# Patient Record
Sex: MALE | Race: BLACK OR AFRICAN AMERICAN | NOT HISPANIC OR LATINO | Employment: OTHER | URBAN - METROPOLITAN AREA
[De-identification: names, ages, dates, MRNs, and addresses within clinical notes are randomized per-mention and may not be internally consistent; named-entity substitution may affect disease eponyms.]

---

## 2017-01-18 ENCOUNTER — OFFICE VISIT (OUTPATIENT)
Dept: TRANSPLANT | Facility: CLINIC | Age: 67
End: 2017-01-18
Payer: MEDICARE

## 2017-01-18 ENCOUNTER — HOSPITAL ENCOUNTER (OUTPATIENT)
Dept: PULMONOLOGY | Facility: CLINIC | Age: 67
Discharge: HOME | End: 2017-01-18
Payer: MEDICARE

## 2017-01-18 VITALS
HEART RATE: 82 BPM | HEIGHT: 74 IN | DIASTOLIC BLOOD PRESSURE: 89 MMHG | SYSTOLIC BLOOD PRESSURE: 140 MMHG | RESPIRATION RATE: 91 BRPM

## 2017-01-18 VITALS — BODY MASS INDEX: 26.24 KG/M2 | WEIGHT: 198 LBS | HEIGHT: 73 IN

## 2017-01-18 DIAGNOSIS — I27.24 CTEPH (CHRONIC THROMBOEMBOLIC PULMONARY HYPERTENSION): ICD-10-CM

## 2017-01-18 DIAGNOSIS — I27.20 PULMONARY HTN: Primary | ICD-10-CM

## 2017-01-18 DIAGNOSIS — I27.9 CHRONIC PULMONARY HEART DISEASE: ICD-10-CM

## 2017-01-18 PROCEDURE — 94620 PR PULMONARY STRESS TESTING,SIMPLE: CPT | Mod: 26,S$PBB,, | Performed by: INTERNAL MEDICINE

## 2017-01-18 PROCEDURE — 99214 OFFICE O/P EST MOD 30 MIN: CPT | Mod: 25,S$PBB,, | Performed by: INTERNAL MEDICINE

## 2017-01-18 PROCEDURE — 99212 OFFICE O/P EST SF 10 MIN: CPT | Mod: PBBFAC | Performed by: INTERNAL MEDICINE

## 2017-01-18 PROCEDURE — 99999 PR PBB SHADOW E&M-EST. PATIENT-LVL II: CPT | Mod: PBBFAC,,, | Performed by: INTERNAL MEDICINE

## 2017-01-18 RX ORDER — IPRATROPIUM BROMIDE 42 UG/1
2 SPRAY, METERED NASAL 4 TIMES DAILY
Qty: 15 ML | Refills: 6 | Status: SHIPPED | OUTPATIENT
Start: 2017-01-18 | End: 2017-11-13 | Stop reason: SDUPTHER

## 2017-01-19 NOTE — PROGRESS NOTES
Subjective:       Patient ID: Ambrosio Bray III is a 66 y.o. male.    Chief Complaint: Pulmonary Hypertension (1mo visit)    HPI   Ambrosio Bray III 66 y.o. male    has a past medical history of Diabetes mellitus; Hypertension; and PTSD (post-traumatic stress disorder).    has a past surgical history that includes Cardiac catheterization.   reports that he has never smoked. He does not have any smokeless tobacco history on file. He reports that he drinks about 1.2 - 1.8 oz of alcohol per week  He reports that he does not use illicit drugs.  Referred by: Dr. Ottoniel Orosco  Who had concerns including Pulmonary Hypertension.  The patient's last visit with me was on 12/14/2016.    Doing well, no complaints  Tolerating adempas well, improved exercise tolerance less sob  No fever chills, ns, wt changes, nausea, vomiting, diarrhea, constipation, chest pain, tightness, pressure  More active, cutting grass, weeding  Still on 1mg tid    Review of Systems    Objective:      Physical Exam  Personal Diagnostic Review    Pulmonary Function Tests 1/18/2017   Height 74.000         Assessment:       1. Pulmonary HTN    2. CTEPH (chronic thromboembolic pulmonary hypertension)        Outpatient Encounter Prescriptions as of 1/18/2017   Medication Sig Dispense Refill    atorvastatin (LIPITOR) 80 MG tablet Take 1 tablet (80 mg total) by mouth once daily. 30 tablet 5    hydrochlorothiazide (HYDRODIURIL) 50 MG tablet Take 0.5 tablets (25 mg total) by mouth once daily. 30 tablet 5    loratadine (CLARITIN) 10 mg tablet Take 1 tablet (10 mg total) by mouth once daily. 30 tablet 0    losartan (COZAAR) 50 MG tablet Take 1 tablet (50 mg total) by mouth once daily. 30 tablet 5    riociguat (ADEMPAS) 1 mg Tab Take 1 mg by mouth 3 (three) times daily. 90 tablet 11    rivaroxaban (XARELTO) 20 mg Tab Take 1 tablet (20 mg total) by mouth daily with dinner or evening meal. 30 tablet 5    tamsulosin (FLOMAX) 0.4 mg Cp24 Take 1 capsule (0.4  mg total) by mouth once daily. 30 capsule 5    tramadol (ULTRAM) 50 mg tablet Take 1 tablet (50 mg total) by mouth 2 (two) times daily as needed for Pain. 20 tablet 0    ipratropium (ATROVENT) 0.06 % nasal spray 2 sprays by Nasal route 4 (four) times daily. 15 mL 6    mometasone (ELOCON) 0.1 % ointment Apply topically once daily. 45 g 1    vardenafil (LEVITRA) 20 MG tablet Take 1 tablet (20 mg total) by mouth daily as needed for Erectile Dysfunction. 10 tablet 11     No facility-administered encounter medications on file as of 1/18/2017.      No orders of the defined types were placed in this encounter.    Plan:           Ambrosio was seen today for pulmonary hypertension.    Diagnoses and all orders for this visit:    Pulmonary HTN    CTEPH (chronic thromboembolic pulmonary hypertension)    Other orders  -     ipratropium (ATROVENT) 0.06 % nasal spray; 2 sprays by Nasal route 4 (four) times daily.    The current medical regimen is effective;  continue present plan and medications.  Increase dose already schedule  Return in about 4 weeks (around 2/15/2017).        Return in about 4 weeks (around 2/15/2017).    There are no Patient Instructions on file for this visit.    Immunization History   Administered Date(s) Administered    Influenza - High Dose 10/31/2016    Influenza Split 01/17/2013    Pneumococcal Conjugate - 13 Valent 10/31/2016    Zoster 01/13/2015    influenza - Quadrivalent 11/06/2014

## 2017-01-23 NOTE — PROCEDURES
Ambrosio Bray III is a 66 y.o.  male patient, who presents for a 6 minute walk test ordered by Alysha Lord MD.  The diagnosis is Pulmonary Hypertension.  The patient's BMI is 26.2 kg/m2.  Predicted distance (lower limit of normal) is 381.98 meters.      Test Results:    The test was completed without stopping.  The total time walked was 360 seconds.  During walking, the patient reported:  No complaints. The patient used no assistive devices during testing.     01/18/2017---------Distance: 365.76 meters (1200 feet)     O2 Sat % Supplemental Oxygen Heart Rate Blood Pressure Archie Scale   Pre-exercise  (Resting) 93 % Room Air 78 bpm 138/84 mmHg 3   During Exercise 91 % Room Air 118 bpm 143/93 mmHg 3   Post-exercise  (Recovery) 92 % Room Air  96 bpm       Recovery Time: 49 seconds    Performing nurse/tech: LINK Schmidt      PREVIOUS STUDY:   12/14/2016---------Distance: 304.8 meters (1000 feet)       O2 Sat % Supplemental Oxygen Heart Rate Blood Pressure Archie Scale   Pre-exercise  (Resting) 94 % Room Air 88 bpm 125/82 mmHg 3   During Exercise 92 % Room Air 119 bpm 142/92 mmHg 2   Post-exercise  (Recovery) 94 % Room Air  119 bpm           CLINICAL INTERPRETATION:  Six minute walk distance is 365.76 meters (1200 feet) with moderate dyspnea.  During exercise, there was no significant desaturation while breathing room air.  Both blood pressure and heart rate increased significantly with walking.  The patient did not report non-pulmonary symptoms during exercise.  Since the previous study in December 2016, exercise capacity is significantly improved.  Based upon age and body mass index, exercise capacity is less than predicted.

## 2017-01-30 ENCOUNTER — TELEPHONE (OUTPATIENT)
Dept: TRANSPLANT | Facility: CLINIC | Age: 67
End: 2017-01-30

## 2017-01-30 DIAGNOSIS — I27.24 CTEPH (CHRONIC THROMBOEMBOLIC PULMONARY HYPERTENSION): Primary | ICD-10-CM

## 2017-02-02 ENCOUNTER — LAB VISIT (OUTPATIENT)
Dept: LAB | Facility: HOSPITAL | Age: 67
End: 2017-02-02
Attending: INTERNAL MEDICINE
Payer: MEDICARE

## 2017-02-02 DIAGNOSIS — R06.82 TACHYPNEA: ICD-10-CM

## 2017-02-02 DIAGNOSIS — Z79.899 POLYPHARMACY: ICD-10-CM

## 2017-02-02 LAB
ALBUMIN SERPL BCP-MCNC: 3.7 G/DL
ALBUMIN SERPL BCP-MCNC: 3.7 G/DL
ALP SERPL-CCNC: 97 U/L
ALP SERPL-CCNC: 97 U/L
ALT SERPL W/O P-5'-P-CCNC: 20 U/L
ALT SERPL W/O P-5'-P-CCNC: 20 U/L
ANION GAP SERPL CALC-SCNC: 10 MMOL/L
ANION GAP SERPL CALC-SCNC: 10 MMOL/L
AST SERPL-CCNC: 24 U/L
AST SERPL-CCNC: 24 U/L
BASOPHILS # BLD AUTO: 0.03 K/UL
BASOPHILS NFR BLD: 0.7 %
BILIRUB SERPL-MCNC: 3.6 MG/DL
BILIRUB SERPL-MCNC: 3.6 MG/DL
BNP SERPL-MCNC: 226 PG/ML
BUN SERPL-MCNC: 17 MG/DL
BUN SERPL-MCNC: 17 MG/DL
CALCIUM SERPL-MCNC: 9.4 MG/DL
CALCIUM SERPL-MCNC: 9.4 MG/DL
CHLORIDE SERPL-SCNC: 107 MMOL/L
CHLORIDE SERPL-SCNC: 107 MMOL/L
CO2 SERPL-SCNC: 22 MMOL/L
CO2 SERPL-SCNC: 22 MMOL/L
CREAT SERPL-MCNC: 1 MG/DL
CREAT SERPL-MCNC: 1 MG/DL
DIFFERENTIAL METHOD: ABNORMAL
EOSINOPHIL # BLD AUTO: 0.1 K/UL
EOSINOPHIL NFR BLD: 2.5 %
ERYTHROCYTE [DISTWIDTH] IN BLOOD BY AUTOMATED COUNT: 18.5 %
EST. GFR  (AFRICAN AMERICAN): >60 ML/MIN/1.73 M^2
EST. GFR  (AFRICAN AMERICAN): >60 ML/MIN/1.73 M^2
EST. GFR  (NON AFRICAN AMERICAN): >60 ML/MIN/1.73 M^2
EST. GFR  (NON AFRICAN AMERICAN): >60 ML/MIN/1.73 M^2
GLUCOSE SERPL-MCNC: 97 MG/DL
GLUCOSE SERPL-MCNC: 97 MG/DL
HCT VFR BLD AUTO: 41.5 %
HGB BLD-MCNC: 14.1 G/DL
LYMPHOCYTES # BLD AUTO: 1.6 K/UL
LYMPHOCYTES NFR BLD: 35.6 %
MAGNESIUM SERPL-MCNC: 1.7 MG/DL
MCH RBC QN AUTO: 28.6 PG
MCHC RBC AUTO-ENTMCNC: 34 %
MCV RBC AUTO: 84 FL
MONOCYTES # BLD AUTO: 0.5 K/UL
MONOCYTES NFR BLD: 11 %
NEUTROPHILS # BLD AUTO: 2.2 K/UL
NEUTROPHILS NFR BLD: 50.2 %
PLATELET # BLD AUTO: 198 K/UL
PMV BLD AUTO: 11.4 FL
POTASSIUM SERPL-SCNC: 3.9 MMOL/L
POTASSIUM SERPL-SCNC: 3.9 MMOL/L
PROT SERPL-MCNC: 7.7 G/DL
PROT SERPL-MCNC: 7.7 G/DL
RBC # BLD AUTO: 4.93 M/UL
SODIUM SERPL-SCNC: 139 MMOL/L
SODIUM SERPL-SCNC: 139 MMOL/L
WBC # BLD AUTO: 4.44 K/UL

## 2017-02-02 PROCEDURE — 83880 ASSAY OF NATRIURETIC PEPTIDE: CPT

## 2017-02-02 PROCEDURE — 83036 HEMOGLOBIN GLYCOSYLATED A1C: CPT

## 2017-02-02 PROCEDURE — 83735 ASSAY OF MAGNESIUM: CPT

## 2017-02-02 PROCEDURE — 80053 COMPREHEN METABOLIC PANEL: CPT

## 2017-02-02 PROCEDURE — 36415 COLL VENOUS BLD VENIPUNCTURE: CPT | Mod: PO

## 2017-02-02 PROCEDURE — 85025 COMPLETE CBC W/AUTO DIFF WBC: CPT | Mod: PO

## 2017-02-03 LAB
ESTIMATED AVG GLUCOSE: 146 MG/DL
HBA1C MFR BLD HPLC: 6.7 %

## 2017-02-10 ENCOUNTER — APPOINTMENT (OUTPATIENT)
Dept: RADIOLOGY | Facility: HOSPITAL | Age: 67
End: 2017-02-10
Attending: NURSE PRACTITIONER
Payer: MEDICARE

## 2017-02-10 ENCOUNTER — OFFICE VISIT (OUTPATIENT)
Dept: FAMILY MEDICINE | Facility: CLINIC | Age: 67
End: 2017-02-10
Payer: MEDICARE

## 2017-02-10 ENCOUNTER — TELEPHONE (OUTPATIENT)
Dept: FAMILY MEDICINE | Facility: CLINIC | Age: 67
End: 2017-02-10

## 2017-02-10 VITALS
DIASTOLIC BLOOD PRESSURE: 70 MMHG | RESPIRATION RATE: 17 BRPM | OXYGEN SATURATION: 95 % | SYSTOLIC BLOOD PRESSURE: 110 MMHG | BODY MASS INDEX: 26.88 KG/M2 | HEART RATE: 74 BPM | HEIGHT: 73 IN | WEIGHT: 202.81 LBS | TEMPERATURE: 98 F

## 2017-02-10 DIAGNOSIS — M25.522 LEFT ELBOW PAIN: Primary | ICD-10-CM

## 2017-02-10 DIAGNOSIS — M25.522 LEFT ELBOW PAIN: ICD-10-CM

## 2017-02-10 PROCEDURE — 73080 X-RAY EXAM OF ELBOW: CPT | Mod: 26,LT,, | Performed by: RADIOLOGY

## 2017-02-10 PROCEDURE — 99214 OFFICE O/P EST MOD 30 MIN: CPT | Mod: S$PBB,,, | Performed by: NURSE PRACTITIONER

## 2017-02-10 PROCEDURE — 73080 X-RAY EXAM OF ELBOW: CPT | Mod: TC,PN,LT

## 2017-02-10 PROCEDURE — 99214 OFFICE O/P EST MOD 30 MIN: CPT | Mod: PBBFAC,PN | Performed by: NURSE PRACTITIONER

## 2017-02-10 PROCEDURE — 99999 PR PBB SHADOW E&M-EST. PATIENT-LVL IV: CPT | Mod: PBBFAC,,, | Performed by: NURSE PRACTITIONER

## 2017-02-10 NOTE — PROGRESS NOTES
Subjective:       Patient ID: Ambrosio Bray III is a 66 y.o. male.    Chief Complaint: Muscle Pain (arm left)    HPI Mr Bray is here for a 5 day h/o L elbow pain, he denies any specific incident, other than letting his truck tailgate down. It is a 7/10, he has decreased ROM and swelling, although it is improving, no treatment attempted, he denies ever being dx with gout.  Review of Systems   Constitutional: Negative for fever.   Respiratory: Negative.    Cardiovascular: Negative.    Musculoskeletal: Positive for arthralgias and joint swelling.       Objective:      Physical Exam   Constitutional: He is oriented to person, place, and time. He appears well-developed and well-nourished. He does not appear ill. No distress.   Cardiovascular: Normal rate.    Pulmonary/Chest: Effort normal.   Musculoskeletal: He exhibits edema. He exhibits no tenderness.        Left elbow: He exhibits decreased range of motion and swelling. No tenderness found.   Neurological: He is alert and oriented to person, place, and time.   Skin: Skin is warm and dry. No erythema.   Psychiatric: He has a normal mood and affect. His behavior is normal.   Vitals reviewed.      Assessment:       1. Left elbow pain        Plan:       Left elbow pain  -     X-Ray Elbow Complete Left; Future; Expected date: 2/10/17    It is improving, R/o gout.  He cannot take NSAIDs d/t xarelto, encouraged RICE therapy.  F/u if not improved.    Verbalized understanding

## 2017-02-10 NOTE — PATIENT INSTRUCTIONS
Follow up if not improved  Go to ER for new worse or concerning symptoms    Arthralgia    Arthralgia is the term for pain in or around the joint. It is a symptom, not a disease. This pain may involve one or more joints. In some cases, the pain moves from joint to joint.  There are many causes for joint pain. These include:  · Injury  · Osteoarthritis (wearing out of the joint surface)  · Gout (inflammation of the joint due to crystals in the joint fluid)  · Infection inside the joint    · Bursitis (inflammation of the fluid-filled sacs around the joint)  · Autoimmune disorders such as rheumatoid arthritis or lupus  · Tendonitis (inflamation of chords that attach muscle to bone)  Home care  · Rest the involved joint(s) until your symptoms improve.   · You may be prescribed pain medication. If none is prescribed, you may use acetaminophen or ibuprofen to control pain and inflammation.  Follow up  Follow up with your healthcare provider or our staff as advised.  When to seek medical care  Contact your healthcare provider right away if any of the following occurs:  · Pain, swelling, or redness of joint increases  · Pain worsens or recurs after a period of improvement  · Pain moves to other joints  · You cannot bear weight on the affected joint   · You cannot move the affected joint  · Joint appears deformed  · New rash appears  · Fever of 101ºF (38.8ºC) or higher, or as directed by your healthcare provider  Date Last Reviewed: 4/26/2015  © 6446-3804 [employer]. [address] [place] [address], [place] [address]. All rights reserved. This information is not intended as a substitute for professional medical care. Always follow your healthcare professional's instructions.

## 2017-02-10 NOTE — MR AVS SNAPSHOT
[facility]  [address]  [place] [address]  Phone: [phone]                  Ambrosio Bray III   2/10/2017 7:40 AM   Office Visit    Description:  Male : 1950   Provider:  Anaid Esparza, NP-C   Department:  [facility]           Reason for Visit     Muscle Pain           Diagnoses this Visit        Comments    Left elbow pain    -  Primary            To Do List           Future Appointments        Provider Department Dept Phone    2017 1:40 PM SIX, MINUTE WALK [facility] - Pulmonary Lab [phone]    2017 2:00 PM Inna Posadas MD [facility] [phone]      Goals (5 Years of Data)     None      [facility] On [facility]     [facility] On [facility] Nurse Care Line -  Assistance  Registered nurses in the [facility] provide clinical advisement, health education, appointment booking, and other advisory services.  Call for this free service at [phone].             Medications           Message regarding Medications     Verify the changes and/or additions to your medication regime listed below are the same as discussed with your clinician today.  If any of these changes or additions are incorrect, please notify your healthcare provider.        STOP taking these medications     losartan (COZAAR) 50 MG tablet Take 1 tablet (50 mg total) by mouth once daily.    tramadol (ULTRAM) 50 mg tablet Take 1 tablet (50 mg total) by mouth 2 (two) times daily as needed for Pain.           Verify that the below list of medications is an accurate representation of the medications you are currently taking.  If none reported, the list may be blank. If incorrect, please contact your healthcare provider. Carry this list with you in case of emergency.           Current Medications     atorvastatin (LIPITOR) 80 MG tablet Take 1 tablet (80 mg total) by mouth once daily.    hydrochlorothiazide (HYDRODIURIL) 50 MG tablet Take 0.5 tablets (25 mg total) by  "mouth once daily.    ipratropium (ATROVENT) 0.06 % nasal spray 2 sprays by Nasal route 4 (four) times daily.    loratadine (CLARITIN) 10 mg tablet Take 1 tablet (10 mg total) by mouth once daily.    riociguat (ADEMPAS) 1.5 mg Tab Take 1 tablet (1.5 mg total) by mouth 3 (three) times daily.    rivaroxaban (XARELTO) 20 mg Tab Take 1 tablet (20 mg total) by mouth daily with dinner or evening meal.    tamsulosin (FLOMAX) 0.4 mg Cp24 Take 1 capsule (0.4 mg total) by mouth once daily.    mometasone (ELOCON) 0.1 % ointment Apply topically once daily.    vardenafil (LEVITRA) 20 MG tablet Take 1 tablet (20 mg total) by mouth daily as needed for Erectile Dysfunction.           Clinical Reference Information           Your Vitals Were     BP Pulse Temp Resp Height Weight    110/70 (BP Location: Right arm, Patient Position: Sitting, BP Method: Manual) 74 97.9 °F (36.6 °C) (Oral) 17 6' 1" (1.854 m) 92 kg (202 lb 13.2 oz)    SpO2 BMI             95% 26.76 kg/m2         Blood Pressure          Most Recent Value    BP  110/70      Allergies as of 2/10/2017     No Known Allergies      Immunizations Administered on Date of Encounter - 2/10/2017     None      Orders Placed During Today's Visit     Future Labs/Procedures Expected by Expires    X-Ray Elbow Complete Left  2/10/2017 2/10/2018      [facility] Sign-Up     Activating your [facility] account is as easy as 1-2-3!     1) Visit [email or web address], select Sign Up Now, enter this activation code and your date of birth, then select Next.  [email or web address]  Expires: 3/27/2017  8:20 AM      2) Create a username and password to use when you visit [facility] in the future and select a security question in case you lose your password and select Next.    3) Enter your e-mail address and click Sign Up!    Additional Information  If you have questions, please e-mail [email or web address] or call [phone] to talk to our [facility] staff. Remember, [facility] is NOT to be used for urgent needs. " For medical emergencies, dial 911.         Instructions    Follow up if not improved  Go to ER for new worse or concerning symptoms    Arthralgia    Arthralgia is the term for pain in or around the joint. It is a symptom, not a disease. This pain may involve one or more joints. In some cases, the pain moves from joint to joint.  There are many causes for joint pain. These include:  · Injury  · Osteoarthritis (wearing out of the joint surface)  · Gout (inflammation of the joint due to crystals in the joint fluid)  · Infection inside the joint    · Bursitis (inflammation of the fluid-filled sacs around the joint)  · Autoimmune disorders such as rheumatoid arthritis or lupus  · Tendonitis (inflamation of chords that attach muscle to bone)  Home care  · Rest the involved joint(s) until your symptoms improve.   · You may be prescribed pain medication. If none is prescribed, you may use acetaminophen or ibuprofen to control pain and inflammation.  Follow up  Follow up with your healthcare provider or our staff as advised.  When to seek medical care  Contact your healthcare provider right away if any of the following occurs:  · Pain, swelling, or redness of joint increases  · Pain worsens or recurs after a period of improvement  · Pain moves to other joints  · You cannot bear weight on the affected joint   · You cannot move the affected joint  · Joint appears deformed  · New rash appears  · Fever of 101ºF (38.8ºC) or higher, or as directed by your healthcare provider  Date Last Reviewed: 4/26/2015  © 0605-9323 [employer]. [address] [place] [address], [place] [address]. All rights reserved. This information is not intended as a substitute for professional medical care. Always follow your healthcare professional's instructions.             Language Assistance Services     ATTENTION: Language assistance services are available, free of charge. Please call [phone].      ATENCIÓN: cheyenne [place]  disposición servicios gratuitos de asistencia lingüística. Pineda al [phone].     HALIMA Ý: N?u b?n nói Ti?ng Vi?t, có các d?ch v? h? tr? ngôn ng? mi?n phí dành cho b?n. G?i s? [phone].         [facility] complies with applicable Federal civil rights laws and does not discriminate on the basis of race, color, national origin, age, disability, or sex.

## 2017-02-14 NOTE — TELEPHONE ENCOUNTER
----- Message from Alexandria Madrid sent at 2/13/2017  4:03 PM CST -----  Contact: self  Pt returned call to the office. [phone]

## 2017-02-14 NOTE — TELEPHONE ENCOUNTER
Spoke with patient and advised of results and recommendations below    He would like to know if he can take Tylenol with Adempas. He states that he does not usually take Tylenol.     He states that pain is getting better and will just continue RICE. Will call back if needed

## 2017-02-22 ENCOUNTER — OFFICE VISIT (OUTPATIENT)
Dept: TRANSPLANT | Facility: CLINIC | Age: 67
End: 2017-02-22
Payer: MEDICARE

## 2017-02-22 ENCOUNTER — HOSPITAL ENCOUNTER (OUTPATIENT)
Dept: PULMONOLOGY | Facility: CLINIC | Age: 67
Discharge: HOME | End: 2017-02-22
Payer: MEDICARE

## 2017-02-22 VITALS
OXYGEN SATURATION: 94 % | WEIGHT: 198 LBS | DIASTOLIC BLOOD PRESSURE: 76 MMHG | HEIGHT: 73 IN | HEART RATE: 86 BPM | BODY MASS INDEX: 26.94 KG/M2 | WEIGHT: 203.25 LBS | HEIGHT: 73 IN | BODY MASS INDEX: 26.24 KG/M2 | SYSTOLIC BLOOD PRESSURE: 125 MMHG

## 2017-02-22 DIAGNOSIS — I27.9 CHRONIC PULMONARY HEART DISEASE: ICD-10-CM

## 2017-02-22 DIAGNOSIS — I27.24 CTEPH (CHRONIC THROMBOEMBOLIC PULMONARY HYPERTENSION): Primary | ICD-10-CM

## 2017-02-22 PROCEDURE — 99214 OFFICE O/P EST MOD 30 MIN: CPT | Mod: 25,S$PBB,, | Performed by: INTERNAL MEDICINE

## 2017-02-22 PROCEDURE — 94620 PR PULMONARY STRESS TESTING,SIMPLE: CPT | Mod: 26,S$PBB,, | Performed by: INTERNAL MEDICINE

## 2017-02-22 PROCEDURE — 99213 OFFICE O/P EST LOW 20 MIN: CPT | Mod: PBBFAC | Performed by: INTERNAL MEDICINE

## 2017-02-22 PROCEDURE — 99999 PR PBB SHADOW E&M-EST. PATIENT-LVL III: CPT | Mod: PBBFAC,,, | Performed by: INTERNAL MEDICINE

## 2017-02-22 NOTE — PROGRESS NOTES
Subjective:       Patient ID: Ambrosio Bray III is a 66 y.o. male.    Chief Complaint: Pulmonary Hypertension (1mo visit)    HPI   Ambrosio Bray III 66 y.o. male    has a past medical history of Diabetes mellitus; Hypertension; and PTSD (post-traumatic stress disorder).    has a past surgical history that includes Cardiac catheterization.   reports that he has never smoked. He does not have any smokeless tobacco history on file. He reports that he drinks about 1.2 - 1.8 oz of alcohol per week  He reports that he does not use illicit drugs.  Referred by: Dr. Ottoniel Orosco  Who had concerns including Pulmonary Hypertension.  The patient's last visit with me was on 1/18/2017.    Doing well- imprved 6mwd by another 30m  Feels well, has lost 10lbs  No complaints, tolerating meds well  No fever chills, ns, wt changes, nausea, vomiting, diarrhea, constipation, chest pain, tightness, pressure    Review of Systems    Objective:      Physical Exam  Personal Diagnostic Review    Pulmonary Function Tests 2/22/2017   Ordering Provider MD Risa   Performing nurse/tech/RT LINK Pittman   Diagnosis Pulmonary Hypertension   Height 73   Weight 3168   BMI (Calculated) 26.2   Patient Race    6MWT Status completed without stopping   Patient Reported No complaints   Was O2 used? No   6MW Distance walked (feet) 1300   Distance walked (meters) 396.24   Did patient stop? No   Type of assistive device(s) used? no assistive devices   Oxygen Saturation 96   Supplemental Oxygen Room Air   Heart Rate 88   Blood Pressure 138/77   Archie Dyspnea Rating  nothing at all   Oxygen Saturation 89   Supplemental Oxygen Room Air   Heart Rate 141   Blood Pressure 103/53   Archie Dyspnea Rating  heavy   Recovery Time (seconds) 564   Oxygen Saturation 95   Supplemental Oxygen Room Air   Heart Rate 86   Is procedure ready for interpretation? Yes         Assessment:       1. CTEPH (chronic thromboembolic pulmonary hypertension)         Outpatient Encounter Prescriptions as of 2/22/2017   Medication Sig Dispense Refill    atorvastatin (LIPITOR) 80 MG tablet Take 1 tablet (80 mg total) by mouth once daily. 30 tablet 5    hydrochlorothiazide (HYDRODIURIL) 50 MG tablet Take 0.5 tablets (25 mg total) by mouth once daily. 30 tablet 5    ipratropium (ATROVENT) 0.06 % nasal spray 2 sprays by Nasal route 4 (four) times daily. 15 mL 6    loratadine (CLARITIN) 10 mg tablet Take 1 tablet (10 mg total) by mouth once daily. 30 tablet 0    riociguat (ADEMPAS) 1.5 mg Tab Take 1 tablet (1.5 mg total) by mouth 3 (three) times daily. 90 tablet 11    rivaroxaban (XARELTO) 20 mg Tab Take 1 tablet (20 mg total) by mouth daily with dinner or evening meal. 30 tablet 5    tamsulosin (FLOMAX) 0.4 mg Cp24 Take 1 capsule (0.4 mg total) by mouth once daily. 30 capsule 5    mometasone (ELOCON) 0.1 % ointment Apply topically once daily. 45 g 1    vardenafil (LEVITRA) 20 MG tablet Take 1 tablet (20 mg total) by mouth daily as needed for Erectile Dysfunction. 10 tablet 11     No facility-administered encounter medications on file as of 2/22/2017.      No orders of the defined types were placed in this encounter.    Plan:           Ambrosio was seen today for pulmonary hypertension.    Diagnoses and all orders for this visit:    CTEPH (chronic thromboembolic pulmonary hypertension)       I personally reviewed the      1. Echo report   2. PFT   3. 6MWD 396m  4. CXR   5. CXR report   6. CT chest   7. CT chest report     Assessment:  CTEPH    Plan:  Continue adempas- dose increase this weekend  Exercise  Law school...    Return in about 3 months (around 5/22/2017).    There are no Patient Instructions on file for this visit.    Immunization History   Administered Date(s) Administered    Influenza - High Dose 10/31/2016    Influenza Split 01/17/2013    Pneumococcal Conjugate - 13 Valent 10/31/2016    Zoster 01/13/2015    influenza - Quadrivalent 11/06/2014

## 2017-02-24 NOTE — PROCEDURES
Ambrosio Bray III is a 66 y.o.  male patient, who presents for a 6 minute walk test ordered by Alysha Lord MD.  The diagnosis is Pulmonary Hypertension.  The patient's BMI is 26.2 kg/m2.  Predicted distance (lower limit of normal) is 381.98 meters.      Test Results:    The test was completed without stopping.  The total time walked was 360 seconds.  During walking, the patient reported:  No complaints. The patient used no assistive devices during testing.     02/22/2017---------Distance: 396.24 meters (1300 feet)     O2 Sat % Supplemental Oxygen Heart Rate Blood Pressure Archie Scale   Pre-exercise  (Resting) 96 % Room Air 88 bpm 138/77 mmHg 0   During Exercise 89 % Room Air 141 bpm 103/53 mmHg 5-6   Post-exercise  (Recovery) 95 % Room Air  86 bpm       Recovery Time: 564 seconds    Performing nurse/tech:  LINK Pittman      PREVIOUS STUDY:   01/18/2017---------Distance: 365.76 meters (1200 feet)       O2 Sat % Supplemental Oxygen Heart Rate Blood Pressure Archie Scale   Pre-exercise  (Resting) 93 % Room Air 78 bpm 138/84 mmHg 3   During Exercise 91 % Room Air 118 bpm 143/93 mmHg 3   Post-exercise  (Recovery) 92 % Room Air  96 bpm           CLINICAL INTERPRETATION:  Six minute walk distance is 396.24 meters (1300 feet) with heavy dyspnea.  During exercise, there was significant desaturation while breathing room air.  Blood pressure decreased significantly and Heart rate increased significantly with walking.  This may represent an abnormal cardiovascular response to exercise.  The patient did not report non-pulmonary symptoms during exercise.  Since the previous study in January 2017, exercise capacity is unchanged.  Based upon age and body mass index, exercise capacity is normal.

## 2017-04-11 ENCOUNTER — TELEPHONE (OUTPATIENT)
Dept: TRANSPLANT | Facility: CLINIC | Age: 67
End: 2017-04-11

## 2017-04-11 DIAGNOSIS — I27.24 CTEPH (CHRONIC THROMBOEMBOLIC PULMONARY HYPERTENSION): ICD-10-CM

## 2017-04-11 NOTE — TELEPHONE ENCOUNTER
"Pt returned call. Reports that it was about two weeks ago that he lost consciousness following bout with stomach virus. Pt went to [facility] and received "approximately 20 stitches." Pt denies any increased shortness of breath, and states "Please let Dr Posadas know that this was all related to me being dehydrated. The Adempas is working great." Message sent to Dr Posadas regarding the same. Pt has f/u appt in May.   "

## 2017-04-11 NOTE — TELEPHONE ENCOUNTER
Correspondence received from [facility] specialty pharmacy. Called patient to check current status--no answer, but left VM for patient to return call, if possible.     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 APPROXIMATELY 2 1/2 WEEKS AGO, PT OVER  EXERTED HIMSELF. HE GOT DEHYDRATED & COLLAPSED, POSSIBLY  FAINTED FOR A FEW SECONDS WHICH LEAD TO AN INJURY ON HIS LEG & HEAD THAT REQUIRED SUTURES. HE  WAS ADMITTED TO THE HOSPITAL FOR 4 DAYS TO BE  MONITORED. PT THEN CAME DOWN WITH SINUS INFECTION SYMPTOMS  WITH A LARGE AMOUNT OF NASAL DRAINAGE & A PRODUCTIVE COUGH THAT IS CLEAR MUCOUS. NO FURTHER  INFORMATION GIVEN.

## 2017-04-26 ENCOUNTER — OFFICE VISIT (OUTPATIENT)
Dept: FAMILY MEDICINE | Facility: CLINIC | Age: 67
End: 2017-04-26
Payer: MEDICARE

## 2017-04-26 VITALS
SYSTOLIC BLOOD PRESSURE: 130 MMHG | BODY MASS INDEX: 26.3 KG/M2 | RESPIRATION RATE: 17 BRPM | DIASTOLIC BLOOD PRESSURE: 72 MMHG | HEART RATE: 76 BPM | OXYGEN SATURATION: 95 % | TEMPERATURE: 98 F | WEIGHT: 198.44 LBS | HEIGHT: 73 IN

## 2017-04-26 DIAGNOSIS — I15.2 HYPERTENSION ASSOCIATED WITH DIABETES: Chronic | ICD-10-CM

## 2017-04-26 DIAGNOSIS — I27.20 PULMONARY HTN: ICD-10-CM

## 2017-04-26 DIAGNOSIS — E11.59 HYPERTENSION ASSOCIATED WITH DIABETES: Chronic | ICD-10-CM

## 2017-04-26 PROCEDURE — 99999 PR PBB SHADOW E&M-EST. PATIENT-LVL III: CPT | Mod: PBBFAC,,, | Performed by: INTERNAL MEDICINE

## 2017-04-26 PROCEDURE — 99214 OFFICE O/P EST MOD 30 MIN: CPT | Mod: S$PBB,,, | Performed by: INTERNAL MEDICINE

## 2017-04-26 PROCEDURE — 99213 OFFICE O/P EST LOW 20 MIN: CPT | Mod: PBBFAC,PN | Performed by: INTERNAL MEDICINE

## 2017-04-26 RX ORDER — LORATADINE 10 MG/1
10 TABLET ORAL
COMMUNITY
Start: 2016-12-09 | End: 2017-04-26 | Stop reason: SDUPTHER

## 2017-04-26 RX ORDER — ATORVASTATIN CALCIUM 80 MG/1
80 TABLET, FILM COATED ORAL
COMMUNITY
Start: 2016-10-27 | End: 2017-04-26 | Stop reason: SDUPTHER

## 2017-04-26 RX ORDER — HYDROCHLOROTHIAZIDE 50 MG/1
25 TABLET ORAL
COMMUNITY
Start: 2016-10-27 | End: 2017-04-26 | Stop reason: SDUPTHER

## 2017-04-26 RX ORDER — TAMSULOSIN HYDROCHLORIDE 0.4 MG/1
0.4 CAPSULE ORAL
COMMUNITY
End: 2017-04-26 | Stop reason: SDUPTHER

## 2017-04-26 RX ORDER — ROSUVASTATIN CALCIUM 20 MG/1
20 TABLET, COATED ORAL
COMMUNITY
Start: 2017-03-23 | End: 2017-04-26 | Stop reason: SDUPTHER

## 2017-04-26 RX ORDER — METOPROLOL TARTRATE 25 MG/1
25 TABLET, FILM COATED ORAL
COMMUNITY
Start: 2017-03-23 | End: 2017-04-26

## 2017-04-26 RX ORDER — MOMETASONE FUROATE 1 MG/G
OINTMENT TOPICAL
COMMUNITY
Start: 2016-03-21 | End: 2019-04-22

## 2017-04-26 RX ORDER — VARDENAFIL HYDROCHLORIDE 20 MG/1
20 TABLET ORAL
COMMUNITY
Start: 2015-02-16 | End: 2018-01-08 | Stop reason: ALTCHOICE

## 2017-04-26 RX ORDER — IPRATROPIUM BROMIDE 42 UG/1
2 SPRAY, METERED NASAL
COMMUNITY
Start: 2017-01-18 | End: 2017-04-26 | Stop reason: SDUPTHER

## 2017-05-03 DIAGNOSIS — I48.20 CHRONIC ATRIAL FIBRILLATION: ICD-10-CM

## 2017-05-04 RX ORDER — RIVAROXABAN 20 MG/1
TABLET, FILM COATED ORAL
Qty: 30 TABLET | Refills: 5 | Status: SHIPPED | OUTPATIENT
Start: 2017-05-04 | End: 2017-11-28 | Stop reason: SDUPTHER

## 2017-05-07 NOTE — PROGRESS NOTES
Subjective:       Patient ID: Ambrosio Bray III is a 66 y.o. male.    Chief Complaint: Medication Refill and Cerumen Impaction (right)    HPI Comments: Breathing improved since starting Adempas.  Ears clogged.  [facility]    Review of Systems   Respiratory: Negative for shortness of breath.    Cardiovascular: Negative for chest pain.       Objective:      Physical Exam   Constitutional: He is oriented to person, place, and time. He appears well-developed and well-nourished. No distress.   HENT:   Head: Normocephalic and atraumatic.   Right Ear: External ear normal.   Left Ear: External ear normal.   Eyes: Conjunctivae are normal. No scleral icterus.   Cardiovascular: Normal rate, regular rhythm and normal heart sounds.  Exam reveals no gallop and no friction rub.    No murmur heard.  Pulmonary/Chest: Effort normal and breath sounds normal. No respiratory distress. He has no wheezes. He has no rales.   Abdominal: Soft. Bowel sounds are normal. He exhibits no distension. There is no tenderness.   Neurological: He is alert and oriented to person, place, and time. No cranial nerve deficit.   Skin: Skin is warm and dry. No rash noted.   Psychiatric: He has a normal mood and affect.   Vitals reviewed.      Assessment:       1. Uncontrolled type 2 diabetes mellitus without complication, without long-term current use of insulin    2. Pulmonary HTN    3. Hypertension associated with diabetes        Plan:       Ambrosio was seen today for medication refill and cerumen impaction.    Diagnoses and all orders for this visit:    Uncontrolled type 2 diabetes mellitus without complication, without long-term current use of insulin - repeat A1c  -     Basic metabolic panel; Future  -     Hemoglobin A1c; Future  -     Microalbumin/creatinine urine ratio; Future    Pulmonary HTN - stable.  F/u cardiology    Hypertension associated with diabetes - bp at goal       F/u 2-3 months

## 2017-05-22 ENCOUNTER — TELEPHONE (OUTPATIENT)
Dept: FAMILY MEDICINE | Facility: CLINIC | Age: 67
End: 2017-05-22

## 2017-05-22 NOTE — TELEPHONE ENCOUNTER
----- Message from Gabbi Nick sent at 5/22/2017  3:17 PM CDT -----  Contact: self  Pt is asking for a call in regards to lab work. Please call [phone]

## 2017-05-24 ENCOUNTER — HOSPITAL ENCOUNTER (OUTPATIENT)
Dept: PULMONOLOGY | Facility: CLINIC | Age: 67
Discharge: HOME | End: 2017-05-24
Payer: MEDICARE

## 2017-05-24 ENCOUNTER — OFFICE VISIT (OUTPATIENT)
Dept: TRANSPLANT | Facility: CLINIC | Age: 67
End: 2017-05-24
Payer: MEDICARE

## 2017-05-24 VITALS
BODY MASS INDEX: 26.85 KG/M2 | HEIGHT: 73 IN | WEIGHT: 202.63 LBS | SYSTOLIC BLOOD PRESSURE: 136 MMHG | HEIGHT: 73 IN | WEIGHT: 203.25 LBS | OXYGEN SATURATION: 96 % | HEART RATE: 76 BPM | BODY MASS INDEX: 26.94 KG/M2 | DIASTOLIC BLOOD PRESSURE: 78 MMHG

## 2017-05-24 DIAGNOSIS — I27.9 CHRONIC PULMONARY HEART DISEASE: ICD-10-CM

## 2017-05-24 DIAGNOSIS — I27.24 CTEPH (CHRONIC THROMBOEMBOLIC PULMONARY HYPERTENSION): Primary | ICD-10-CM

## 2017-05-24 PROCEDURE — 99213 OFFICE O/P EST LOW 20 MIN: CPT | Mod: PBBFAC,25 | Performed by: INTERNAL MEDICINE

## 2017-05-24 PROCEDURE — 99999 PR PBB SHADOW E&M-EST. PATIENT-LVL III: CPT | Mod: PBBFAC,,, | Performed by: INTERNAL MEDICINE

## 2017-05-24 PROCEDURE — 99214 OFFICE O/P EST MOD 30 MIN: CPT | Mod: S$PBB,,, | Performed by: INTERNAL MEDICINE

## 2017-05-24 PROCEDURE — 94620 PR PULMONARY STRESS TESTING,SIMPLE: CPT | Mod: 26,S$PBB,, | Performed by: INTERNAL MEDICINE
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
## 2017-05-25 NOTE — PROCEDURES
Ambrosio Bray III is a 66 y.o.  male patient, who presents for a 6 minute walk test ordered by Alysha Lord MD.  The diagnosis is Pulmonary Hypertension.  The patient's BMI is 26.8 kg/m2.  Predicted distance (lower limit of normal) is 378.61 meters.      Test Results:    The test was completed without stopping.  The total time walked was 360 seconds.  During walking, the patient reported:  No complaints. The patient used no assistive devices during testing.     05/24/2017---------Distance: 426.72 meters (1400 feet)     O2 Sat % Supplemental Oxygen Heart Rate Blood Pressure Archie Scale   Pre-exercise  (Resting) 96 % Room Air 66 bpm 129/72 mmHg 0   During Exercise 90 % Room Air 126 bpm 161/79 mmHg 1   Post-exercise  (Recovery) 95 % Room Air  72 bpm 136/72 mmHg      Recovery Time: 180 seconds    Performing nurse/tech: SHANTAL Nguyễn      PREVIOUS STUDY:   02/22/2017---------Distance: 396.24 meters (1300 feet)       O2 Sat % Supplemental Oxygen Heart Rate Blood Pressure Archie Scale   Pre-exercise  (Resting) 96 % Room Air 88 bpm 138/77 mmHg 0   During Exercise 89 % Room Air 141 bpm 103/53 mmHg 5-6   Post-exercise  (Recovery) 95 % Room Air  86 bpm           CLINICAL INTERPRETATION:  Six minute walk distance is 426.72 meters (1400 feet) with very light dyspnea.  During exercise, there was significant desaturation while breathing room air.  Both blood pressure and heart rate increased significantly with walking.  The patient did not report non-pulmonary symptoms during exercise.  Since the previous study in February 2017, exercise capacity is unchanged.  Based upon age and body mass index, exercise capacity is normal.

## 2017-05-28 DIAGNOSIS — N40.0 BENIGN NON-NODULAR PROSTATIC HYPERPLASIA WITHOUT LOWER URINARY TRACT SYMPTOMS: ICD-10-CM

## 2017-05-29 RX ORDER — TAMSULOSIN HYDROCHLORIDE 0.4 MG/1
CAPSULE ORAL
Qty: 30 CAPSULE | Refills: 5 | Status: SHIPPED | OUTPATIENT
Start: 2017-05-29 | End: 2017-11-30 | Stop reason: SDUPTHER

## 2017-06-28 ENCOUNTER — LAB VISIT (OUTPATIENT)
Dept: LAB | Facility: HOSPITAL | Age: 67
End: 2017-06-28
Attending: UROLOGY
Payer: MEDICARE

## 2017-06-28 DIAGNOSIS — R97.20 ELEVATED PSA: ICD-10-CM

## 2017-06-28 LAB
ANION GAP SERPL CALC-SCNC: 7 MMOL/L
BUN SERPL-MCNC: 16 MG/DL
CALCIUM SERPL-MCNC: 9.3 MG/DL
CHLORIDE SERPL-SCNC: 110 MMOL/L
CO2 SERPL-SCNC: 23 MMOL/L
COMPLEXED PSA SERPL-MCNC: 7.1 NG/ML
CREAT SERPL-MCNC: 1 MG/DL
EST. GFR  (AFRICAN AMERICAN): >60 ML/MIN/1.73 M^2
EST. GFR  (NON AFRICAN AMERICAN): >60 ML/MIN/1.73 M^2
GLUCOSE SERPL-MCNC: 97 MG/DL
POTASSIUM SERPL-SCNC: 3.9 MMOL/L
SODIUM SERPL-SCNC: 140 MMOL/L

## 2017-06-28 PROCEDURE — 83036 HEMOGLOBIN GLYCOSYLATED A1C: CPT

## 2017-06-28 PROCEDURE — 80048 BASIC METABOLIC PNL TOTAL CA: CPT

## 2017-06-28 PROCEDURE — 36415 COLL VENOUS BLD VENIPUNCTURE: CPT | Mod: PO

## 2017-06-28 PROCEDURE — 84153 ASSAY OF PSA TOTAL: CPT

## 2017-06-28 RX ORDER — ATORVASTATIN CALCIUM 80 MG/1
80 TABLET, FILM COATED ORAL DAILY
Qty: 30 TABLET | Refills: 0 | Status: SHIPPED | OUTPATIENT
Start: 2017-06-28 | End: 2017-07-11 | Stop reason: SDUPTHER

## 2017-06-28 RX ORDER — ATORVASTATIN CALCIUM 80 MG/1
80 TABLET, FILM COATED ORAL DAILY
Qty: 30 TABLET | Refills: 5 | Status: CANCELLED | OUTPATIENT
Start: 2017-06-28

## 2017-06-29 LAB
ESTIMATED AVG GLUCOSE: 131 MG/DL
HBA1C MFR BLD HPLC: 6.2 %

## 2017-07-11 ENCOUNTER — OFFICE VISIT (OUTPATIENT)
Dept: FAMILY MEDICINE | Facility: CLINIC | Age: 67
End: 2017-07-11
Payer: MEDICARE

## 2017-07-11 ENCOUNTER — OFFICE VISIT (OUTPATIENT)
Dept: UROLOGY | Facility: CLINIC | Age: 67
End: 2017-07-11
Payer: MEDICARE

## 2017-07-11 VITALS
TEMPERATURE: 98 F | DIASTOLIC BLOOD PRESSURE: 86 MMHG | RESPIRATION RATE: 18 BRPM | WEIGHT: 204.81 LBS | HEART RATE: 73 BPM | SYSTOLIC BLOOD PRESSURE: 138 MMHG | BODY MASS INDEX: 27.14 KG/M2 | HEIGHT: 73 IN | OXYGEN SATURATION: 18 %

## 2017-07-11 VITALS — HEIGHT: 73 IN | BODY MASS INDEX: 26.85 KG/M2 | WEIGHT: 202.63 LBS | RESPIRATION RATE: 14 BRPM

## 2017-07-11 DIAGNOSIS — E11.59 HYPERTENSION ASSOCIATED WITH DIABETES: Chronic | ICD-10-CM

## 2017-07-11 DIAGNOSIS — L97.909 VENOUS STASIS ULCER: ICD-10-CM

## 2017-07-11 DIAGNOSIS — I83.009 VENOUS STASIS ULCER: ICD-10-CM

## 2017-07-11 DIAGNOSIS — R80.9 TYPE 2 DIABETES MELLITUS WITH MICROALBUMINURIA, WITHOUT LONG-TERM CURRENT USE OF INSULIN: Primary | ICD-10-CM

## 2017-07-11 DIAGNOSIS — I87.2 CHRONIC VENOUS STASIS DERMATITIS: ICD-10-CM

## 2017-07-11 DIAGNOSIS — I27.24 CTEPH (CHRONIC THROMBOEMBOLIC PULMONARY HYPERTENSION): ICD-10-CM

## 2017-07-11 DIAGNOSIS — R97.20 ELEVATED PSA: Primary | ICD-10-CM

## 2017-07-11 DIAGNOSIS — E11.29 TYPE 2 DIABETES MELLITUS WITH MICROALBUMINURIA, WITHOUT LONG-TERM CURRENT USE OF INSULIN: Primary | ICD-10-CM

## 2017-07-11 DIAGNOSIS — I15.2 HYPERTENSION ASSOCIATED WITH DIABETES: Chronic | ICD-10-CM

## 2017-07-11 PROCEDURE — 99213 OFFICE O/P EST LOW 20 MIN: CPT | Mod: S$PBB,,, | Performed by: UROLOGY

## 2017-07-11 PROCEDURE — 1126F AMNT PAIN NOTED NONE PRSNT: CPT | Mod: ,,, | Performed by: INTERNAL MEDICINE

## 2017-07-11 PROCEDURE — 99213 OFFICE O/P EST LOW 20 MIN: CPT | Mod: PBBFAC,PN | Performed by: INTERNAL MEDICINE

## 2017-07-11 PROCEDURE — 99999 PR PBB SHADOW E&M-EST. PATIENT-LVL III: CPT | Mod: PBBFAC,,, | Performed by: UROLOGY

## 2017-07-11 PROCEDURE — 99999 PR PBB SHADOW E&M-EST. PATIENT-LVL III: CPT | Mod: PBBFAC,,, | Performed by: INTERNAL MEDICINE

## 2017-07-11 PROCEDURE — 1159F MED LIST DOCD IN RCRD: CPT | Mod: ,,, | Performed by: UROLOGY

## 2017-07-11 PROCEDURE — 1159F MED LIST DOCD IN RCRD: CPT | Mod: ,,, | Performed by: INTERNAL MEDICINE

## 2017-07-11 PROCEDURE — 99214 OFFICE O/P EST MOD 30 MIN: CPT | Mod: S$PBB,,, | Performed by: INTERNAL MEDICINE

## 2017-07-11 PROCEDURE — 3044F HG A1C LEVEL LT 7.0%: CPT | Mod: ,,, | Performed by: INTERNAL MEDICINE

## 2017-07-11 PROCEDURE — 1126F AMNT PAIN NOTED NONE PRSNT: CPT | Mod: ,,, | Performed by: UROLOGY

## 2017-07-11 RX ORDER — ATORVASTATIN CALCIUM 80 MG/1
80 TABLET, FILM COATED ORAL DAILY
Qty: 30 TABLET | Refills: 11 | Status: SHIPPED | OUTPATIENT
Start: 2017-07-11 | End: 2018-01-08 | Stop reason: SDUPTHER

## 2017-07-11 NOTE — PROGRESS NOTES
Subjective:       Patient ID: Ambrosio Bray III is a 66 y.o. male.    Chief Complaint: Follow-up (Diabetes)    Presents for follow.  Reports he is doing great.  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  Otherwise he has made necessary changes to lower his blood sugar.  He does report a small area on his right foot which has reopened.  He reports clear drainage.  He has been cleansing with anti bacterial soap and wrapping the area.  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       Review of Systems   Cardiovascular: Positive for leg swelling. Negative for chest pain.   Skin: Positive for wound.   Neurological: Negative for syncope.       Objective:      Physical Exam   Constitutional: He is oriented to person, place, and time. He appears well-developed and well-nourished. No distress.   HENT:   Head: Normocephalic and atraumatic.   Right Ear: External ear normal.   Left Ear: External ear normal.   Eyes: Conjunctivae are normal. No scleral icterus.   Cardiovascular: Normal rate, regular rhythm and normal heart sounds.  Exam reveals no gallop and no friction rub.    No murmur heard.  Pulmonary/Chest: Effort normal and breath sounds normal. No respiratory distress. He has no wheezes. He has no rales.   Musculoskeletal: He exhibits edema.   Protective Sensation (w/ 10 gram monofilament):  Right: Intact  Left: Intact    Visual Inspection:  Ulceration -  Right superficial with clear drainage, no redness or induration , Dry Skin -  Bilateral and Onychomycosis -  Bilateral    Pedal Pulses:   Right: Diminshed  Left: Diminshed       Neurological: He is alert and oriented to person, place, and time. No cranial nerve deficit.   Skin: Skin is warm and dry. No rash noted.   Psychiatric: He has a normal mood and affect.   Vitals reviewed.      Assessment:       1. Type 2 diabetes mellitus with  "microalbuminuria, without long-term current use of insulin    2. CTEPH (chronic thromboembolic pulmonary hypertension)    3. Hypertension associated with diabetes    4. Chronic venous stasis dermatitis    5. Venous stasis ulcer        Plan:       Ambrosio was seen today for follow-up.    Diagnoses and all orders for this visit:    Type 2 diabetes mellitus with microalbuminuria, without long-term current use of insulin - We have reviewed his labs.  His A1c is improved.  Continue current management.  Repeat A1c next visit.   -     atorvastatin (LIPITOR) 80 MG tablet; Take 1 tablet (80 mg total) by mouth once daily.    CTEPH (chronic thromboembolic pulmonary hypertension) - stable on Adempas.  F/u cardiology    Hypertension associated with diabetes - slightly elevated today.  Reassess next visit    Chronic [facility] stasis dermatitis - [facility]  Encouraged compression stockings daily.  Reassess next visit  -     COMPRESSION STOCKINGS    Venous stasis ulcer - small area which opened recently.  No sign of infection.  Continue dresses at home.  [facility].  F/u if it does not continue to improve.   -     foam bandage (MEPILEX BORDER) 3 X 3 " Bndg; Apply as needed    F/u 4 months and prn     "

## 2017-07-11 NOTE — PROGRESS NOTES
Subjective:       Patient ID: Ambrosio Bray III is a 66 y.o. male.    Chief Complaint: Elevated PSA (yrly ck)    HPI  Patient is here for elevated PSA.  It is now 7.1 and has been slowly rising.  He has a very large prostate with mild lots.  We discussed the pros and cons of repeat biopsy and he would like to hold off  Past Medical History:   Diagnosis Date    Diabetes mellitus     Hypertension     PTSD (post-traumatic stress disorder)        Past Surgical History:   Procedure Laterality Date    CARDIAC CATHETERIZATION         Family History   Problem Relation Age of Onset    Cancer Father      colon       Social History     Social History    Marital status: Single     Spouse name: N/A    Number of children: N/A    Years of education: N/A     Occupational History    Not on file.     Social History Main Topics    Smoking status: Never Smoker    Smokeless tobacco: Never Used    Alcohol use 1.2 - 1.8 oz/week     2 - 3 Shots of liquor per week      Comment: regularly    Drug use: No    Sexual activity: Yes     Partners: Female     Other Topics Concern    Not on file     Social History Narrative    No narrative on file       Allergies:  Review of patient's allergies indicates no known allergies.    Medications:    Current Outpatient Prescriptions:     atorvastatin (LIPITOR) 80 MG tablet, Take 1 tablet (80 mg total) by mouth once daily., Disp: 30 tablet, Rfl: 0    hydrochlorothiazide (HYDRODIURIL) 50 MG tablet, Take 0.5 tablets (25 mg total) by mouth once daily., Disp: 30 tablet, Rfl: 5    ipratropium (ATROVENT) 0.06 % nasal spray, 2 sprays by Nasal route 4 (four) times daily., Disp: 15 mL, Rfl: 6    loratadine (CLARITIN) 10 mg tablet, Take 1 tablet (10 mg total) by mouth once daily., Disp: 30 tablet, Rfl: 0    mometasone (ELOCON) 0.1 % ointment, Apply topically once daily., Disp: , Rfl:     riociguat (ADEMPAS) 2.5 mg tablet, Take 1 tablet (2.5 mg total) by mouth 3 (three) times daily., Disp: 90  tablet, Rfl: 11    tamsulosin (FLOMAX) 0.4 mg Cp24, TAKE ONE CAPSULE BY MOUTH ONCE DAILY, Disp: 30 capsule, Rfl: 5    vardenafil (LEVITRA) 20 MG tablet, Take 20 mg by mouth., Disp: , Rfl:     XARELTO 20 mg Tab, TAKE ONE TABLET BY MOUTH ONCE DAILY WITH  DINNER  OR  EVENING  MEAL, Disp: 30 tablet, Rfl: 5    Review of Systems   Constitutional: Negative for activity change, appetite change, chills, diaphoresis, fatigue, fever and unexpected weight change.   HENT: Negative for congestion, dental problem, hearing loss, mouth sores, postnasal drip, rhinorrhea, sinus pressure and trouble swallowing.    Eyes: Negative for pain, discharge and itching.   Respiratory: Negative for apnea, cough, choking, chest tightness, shortness of breath and wheezing.    Cardiovascular: Negative for chest pain, palpitations and leg swelling.   Gastrointestinal: Negative for abdominal distention, abdominal pain, anal bleeding, blood in stool, constipation, diarrhea, nausea, rectal pain and vomiting.   Endocrine: Negative for polydipsia and polyuria.   Genitourinary: Positive for frequency and urgency. Negative for decreased urine volume, difficulty urinating, discharge, dysuria, enuresis, flank pain, genital sores, hematuria, penile pain, penile swelling, scrotal swelling and testicular pain.   Musculoskeletal: Negative for arthralgias, back pain and myalgias.   Skin: Negative for color change, rash and wound.   Neurological: Negative for dizziness, syncope, speech difficulty, light-headedness and headaches.   Hematological: Negative for adenopathy. Does not bruise/bleed easily.   Psychiatric/Behavioral: Negative for behavioral problems, confusion, hallucinations and sleep disturbance.       Objective:      Physical Exam   Constitutional: He appears well-developed.   HENT:   Head: Normocephalic.   Cardiovascular: Normal rate.    Pulmonary/Chest: Effort normal.   Abdominal: Soft.   Genitourinary: Prostate normal.   Genitourinary Comments: 40  g benign no nodules   Neurological: He is alert.   Skin: Skin is warm.     Psychiatric: He has a normal mood and affect.       Assessment:       1. Elevated PSA        Plan:       Ambrosio was seen today for elevated psa.    Diagnoses and all orders for this visit:    Elevated PSA         return clinic 6 months with PSA and we'll do truss with biopsy when necessary

## 2017-07-24 ENCOUNTER — TELEPHONE (OUTPATIENT)
Dept: ADMINISTRATIVE | Facility: HOSPITAL | Age: 67
End: 2017-07-24

## 2017-07-24 DIAGNOSIS — R60.9 EDEMA, UNSPECIFIED TYPE: Primary | ICD-10-CM

## 2017-07-24 NOTE — TELEPHONE ENCOUNTER
----- Message from Elizabeth Merida sent at 7/24/2017  9:31 AM CDT -----  Contact: Self/[phone]  Patient states that his prescription regarding stockings  has to be sent to [facility] in [place]. He would like to speak to the staff regarding this matter. Thank you.

## 2017-07-24 NOTE — TELEPHONE ENCOUNTER
Pt. Wishes to have an rx  for compression stockings sent to [facility] in [place].

## 2017-07-24 NOTE — TELEPHONE ENCOUNTER
[facility] information and request for patient's Tetanus vaccine and diabetic eye exam for diabetic retinopathy to [phone].

## 2017-07-27 NOTE — TELEPHONE ENCOUNTER
[facility] exam and Tetanus vaccine records to local number at [phone] and [phone].

## 2017-08-07 ENCOUNTER — OFFICE VISIT (OUTPATIENT)
Dept: CARDIOLOGY | Facility: CLINIC | Age: 67
End: 2017-08-07
Payer: MEDICARE

## 2017-08-07 VITALS
HEIGHT: 73 IN | HEART RATE: 70 BPM | BODY MASS INDEX: 26.97 KG/M2 | SYSTOLIC BLOOD PRESSURE: 130 MMHG | OXYGEN SATURATION: 94 % | DIASTOLIC BLOOD PRESSURE: 79 MMHG | WEIGHT: 203.5 LBS

## 2017-08-07 DIAGNOSIS — R42 DIZZINESS: ICD-10-CM

## 2017-08-07 DIAGNOSIS — I27.20 PULMONARY HTN: ICD-10-CM

## 2017-08-07 DIAGNOSIS — I50.32 CHRONIC DIASTOLIC CHF (CONGESTIVE HEART FAILURE): ICD-10-CM

## 2017-08-07 DIAGNOSIS — I10 ESSENTIAL HYPERTENSION: ICD-10-CM

## 2017-08-07 DIAGNOSIS — I48.0 PAROXYSMAL ATRIAL FIBRILLATION: Primary | ICD-10-CM

## 2017-08-07 DIAGNOSIS — I27.24 CTEPH (CHRONIC THROMBOEMBOLIC PULMONARY HYPERTENSION): ICD-10-CM

## 2017-08-07 PROCEDURE — 99214 OFFICE O/P EST MOD 30 MIN: CPT | Mod: S$PBB,,, | Performed by: INTERNAL MEDICINE

## 2017-08-07 PROCEDURE — 3008F BODY MASS INDEX DOCD: CPT | Mod: ,,, | Performed by: INTERNAL MEDICINE

## 2017-08-07 PROCEDURE — 99999 PR PBB SHADOW E&M-EST. PATIENT-LVL III: CPT | Mod: PBBFAC,,, | Performed by: INTERNAL MEDICINE

## 2017-08-07 PROCEDURE — 1125F AMNT PAIN NOTED PAIN PRSNT: CPT | Mod: ,,, | Performed by: INTERNAL MEDICINE

## 2017-08-07 PROCEDURE — 99213 OFFICE O/P EST LOW 20 MIN: CPT | Mod: PBBFAC | Performed by: INTERNAL MEDICINE

## 2017-08-07 PROCEDURE — 1159F MED LIST DOCD IN RCRD: CPT | Mod: ,,, | Performed by: INTERNAL MEDICINE

## 2017-08-07 PROCEDURE — 93010 ELECTROCARDIOGRAM REPORT: CPT | Mod: ,,, | Performed by: INTERNAL MEDICINE
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
## 2017-08-14 ENCOUNTER — HOSPITAL ENCOUNTER (OUTPATIENT)
Dept: CARDIOLOGY | Facility: HOSPITAL | Age: 67
Discharge: HOME | End: 2017-08-14
Attending: INTERNAL MEDICINE
Payer: MEDICARE

## 2017-08-14 DIAGNOSIS — I48.0 PAROXYSMAL ATRIAL FIBRILLATION: ICD-10-CM

## 2017-08-14 LAB
AORTIC VALVE REGURGITATION: ABNORMAL
ESTIMATED PA SYSTOLIC PRESSURE: 86.23
GLOBAL PERICARDIAL EFFUSION: ABNORMAL
MITRAL VALVE MOBILITY: NORMAL
MITRAL VALVE REGURGITATION: ABNORMAL
RETIRED EF AND QEF - SEE NOTES: 50 (ref 55–65)
TRICUSPID VALVE REGURGITATION: ABNORMAL

## 2017-08-14 PROCEDURE — 93306 TTE W/DOPPLER COMPLETE: CPT

## 2017-08-14 PROCEDURE — 93306 TTE W/DOPPLER COMPLETE: CPT | Mod: 26,,, | Performed by: INTERNAL MEDICINE

## 2017-09-06 ENCOUNTER — OFFICE VISIT (OUTPATIENT)
Dept: TRANSPLANT | Facility: CLINIC | Age: 67
End: 2017-09-06
Payer: MEDICARE

## 2017-09-06 ENCOUNTER — HOSPITAL ENCOUNTER (OUTPATIENT)
Dept: PULMONOLOGY | Facility: CLINIC | Age: 67
Discharge: HOME | End: 2017-09-06
Payer: MEDICARE

## 2017-09-06 VITALS — HEIGHT: 72 IN | BODY MASS INDEX: 26.55 KG/M2 | WEIGHT: 196 LBS

## 2017-09-06 VITALS
WEIGHT: 196.44 LBS | HEIGHT: 73 IN | HEART RATE: 80 BPM | SYSTOLIC BLOOD PRESSURE: 125 MMHG | OXYGEN SATURATION: 96 % | DIASTOLIC BLOOD PRESSURE: 65 MMHG | BODY MASS INDEX: 26.03 KG/M2

## 2017-09-06 DIAGNOSIS — I27.20 PULMONARY HYPERTENSION: Primary | ICD-10-CM

## 2017-09-06 DIAGNOSIS — I27.9 CHRONIC PULMONARY HEART DISEASE: ICD-10-CM

## 2017-09-06 PROCEDURE — 99999 PR PBB SHADOW E&M-EST. PATIENT-LVL III: CPT | Mod: PBBFAC,,, | Performed by: INTERNAL MEDICINE

## 2017-09-06 PROCEDURE — 99213 OFFICE O/P EST LOW 20 MIN: CPT | Mod: PBBFAC,25 | Performed by: INTERNAL MEDICINE

## 2017-09-06 PROCEDURE — 99214 OFFICE O/P EST MOD 30 MIN: CPT | Mod: 25,S$PBB,, | Performed by: INTERNAL MEDICINE

## 2017-09-06 PROCEDURE — 94620 PR PULMONARY STRESS TESTING,SIMPLE: CPT | Mod: 26,S$PBB,, | Performed by: INTERNAL MEDICINE

## 2017-09-06 PROCEDURE — 3078F DIAST BP <80 MM HG: CPT | Mod: ,,, | Performed by: INTERNAL MEDICINE

## 2017-09-06 PROCEDURE — 94620 PR PULMONARY STRESS TESTING,SIMPLE: CPT | Mod: PBBFAC | Performed by: INTERNAL MEDICINE

## 2017-09-06 PROCEDURE — 3074F SYST BP LT 130 MM HG: CPT | Mod: ,,, | Performed by: INTERNAL MEDICINE

## 2017-09-06 PROCEDURE — 1159F MED LIST DOCD IN RCRD: CPT | Mod: ,,, | Performed by: INTERNAL MEDICINE

## 2017-09-06 NOTE — PROGRESS NOTES
"Subjective:       Patient ID: Ambrosio Bray III is a 67 y.o. male.    Chief Complaint: No chief complaint on file.    HPI   Ambrosio Bray III 67 y.o. male    has a past medical history of Diabetes mellitus; Hypertension; and PTSD (post-traumatic stress disorder).    has a past surgical history that includes Cardiac catheterization.   reports that he has never smoked. He has never used smokeless tobacco. He reports that he drinks about 1.2 - 1.8 oz of alcohol per week . He reports that he does not use drugs.  Referred by: No ref. provider found  Who had concerns including Pulmonary Hypertension (3.5mo visit).  The patient's last visit with me was on 5/24/2017.    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      Assessment:       No diagnosis found.    Outpatient Encounter Prescriptions as of 9/6/2017   Medication Sig Dispense Refill    atorvastatin (LIPITOR) 80 MG tablet Take 1 tablet (80 mg total) by mouth once daily. 30 tablet 11    foam bandage (MEPILEX BORDER) 3 X 3 " Bndg Apply as needed 10 each 5    hydrochlorothiazide (HYDRODIURIL) 50 MG tablet Take 0.5 tablets (25 mg total) by mouth once daily. 30 tablet 5    ipratropium (ATROVENT) 0.06 % nasal spray 2 sprays by Nasal route 4 (four) times daily. 15 mL 6    loratadine (CLARITIN) 10 mg tablet Take 1 tablet (10 mg total) by mouth once daily. 30 tablet 0    mometasone (ELOCON) 0.1 % ointment Apply topically once daily.      riociguat (ADEMPAS) 2.5 mg tablet Take 1 tablet (2.5 mg total) by mouth 3 (three) times daily. 90 tablet 11    tamsulosin (FLOMAX) 0.4 mg Cp24 TAKE ONE CAPSULE BY MOUTH ONCE DAILY 30 capsule 5    " vardenafil (LEVITRA) 20 MG tablet Take 20 mg by mouth.      XARELTO 20 mg Tab TAKE ONE TABLET BY MOUTH ONCE DAILY WITH  DINNER  OR  EVENING  MEAL 30 tablet 5     No facility-administered encounter medications on file as of 9/6/2017.      No orders of the defined types were placed in this encounter.    Plan:            I personally reviewed the      1. Echo report       Assessment:  Ambrosio was seen today for pulmonary hypertension.    Diagnoses and all orders for this visit:    Pulmonary hypertension  -     2D echo with color flow doppler; Future        Plan:  Repeat echo to confirm or refute values- if remains positive, will need repeat RHC    Return in about 6 months (around 3/6/2018).    There are no Patient Instructions on file for this visit.    Immunization History   Administered Date(s) Administered    Influenza 01/17/2013    Influenza - High Dose 10/17/2015, 10/31/2016    Influenza Split 01/17/2013    Pneumococcal Conjugate - 13 Valent 10/31/2016    Zoster 01/13/2015    influenza - Quadrivalent 11/06/2014

## 2017-09-07 NOTE — PROCEDURES
Ambrosio Bray III is a 67 y.o.  male patient, who presents for a 6 minute walk test ordered by Alysha Lord MD.  The diagnosis is Pulmonary Hypertension.  The patient's BMI is 26.6 kg/m2.  Predicted distance (lower limit of normal) is 372.79 meters.      Test Results:    The test was completed without stopping.  The total time walked was 360 seconds.  During walking, the patient reported:  No complaints.  The patient used no assistive devices during testing.     09/06/2017---------Distance: 426.72 meters (1400 feet)     O2 Sat % Supplemental Oxygen Heart Rate Blood Pressure Archie Scale   Pre-exercise  (Resting) 97 % Room Air 91 bpm 133/69 mmHg 0   During Exercise 90 % Room Air 123 bpm 170/79 mmHg 0   Post-exercise  (Recovery) 96 % Room Air  93 bpm 128/60 mmHg      Recovery Time: 245 seconds    Performing nurse/tech: LINK Steinberg.      PREVIOUS STUDY:   05/24/2017---------Distance: 426.72 meters (1400 feet)       O2 Sat % Supplemental Oxygen Heart Rate Blood Pressure Archie Scale   Pre-exercise  (Resting) 96 % Room Air 66 bpm 129/72 mmHg 0   During Exercise 90 % Room Air 126 bpm 161/79 mmHg 1   Post-exercise  (Recovery) 95 % Room Air  72 bpm 136/72 mmHg         CLINICAL INTERPRETATION:  Six minute walk distance is 426.72 meters (1400 feet) with no dyspnea.  During exercise, there was significant desaturation while breathing room air.  Both blood pressure and heart rate increased significantly with walking.  The patient did not report non-pulmonary symptoms during exercise.  Since the previous study in May 2017, exercise capacity is unchanged.  Based upon age and body mass index, exercise capacity is normal.

## 2017-09-15 ENCOUNTER — CLINICAL SUPPORT (OUTPATIENT)
Dept: FAMILY MEDICINE | Facility: CLINIC | Age: 67
End: 2017-09-15
Payer: MEDICARE

## 2017-09-15 DIAGNOSIS — Z23 NEED FOR PROPHYLACTIC VACCINATION AND INOCULATION AGAINST INFLUENZA: Primary | ICD-10-CM

## 2017-09-15 PROCEDURE — G0008 ADMIN INFLUENZA VIRUS VAC: HCPCS | Mod: PBBFAC,PN

## 2017-09-15 NOTE — PROGRESS NOTES
Flu consent signed by patient. Flu vaccine administered. Advise 15 min wait for possible reactions.VIS form given.

## 2017-09-19 NOTE — TELEPHONE ENCOUNTER
[facility] information and request for patient's Tetanus vaccine and diabetic eye exam for diabetic retinopathy to [phone] and [phone].

## 2017-10-04 ENCOUNTER — HOSPITAL ENCOUNTER (OUTPATIENT)
Dept: CARDIOLOGY | Facility: CLINIC | Age: 67
Discharge: HOME | End: 2017-10-04
Payer: MEDICARE

## 2017-10-04 DIAGNOSIS — I27.20 PULMONARY HYPERTENSION: ICD-10-CM

## 2017-10-04 DIAGNOSIS — I51.7 CARDIOMEGALY: ICD-10-CM

## 2017-10-04 LAB
DIASTOLIC DYSFUNCTION: YES
ESTIMATED PA SYSTOLIC PRESSURE: 103.36
MITRAL VALVE REGURGITATION: ABNORMAL
RETIRED EF AND QEF - SEE NOTES: 55 (ref 55–65)
TRICUSPID VALVE REGURGITATION: ABNORMAL

## 2017-10-04 PROCEDURE — 93306 TTE W/DOPPLER COMPLETE: CPT | Mod: PBBFAC | Performed by: INTERNAL MEDICINE

## 2017-10-24 ENCOUNTER — TELEPHONE (OUTPATIENT)
Dept: TRANSPLANT | Facility: CLINIC | Age: 67
End: 2017-10-24

## 2017-11-05 DIAGNOSIS — E11.59 HYPERTENSION ASSOCIATED WITH DIABETES: Chronic | ICD-10-CM

## 2017-11-05 DIAGNOSIS — I15.2 HYPERTENSION ASSOCIATED WITH DIABETES: Chronic | ICD-10-CM

## 2017-11-05 RX ORDER — HYDROCHLOROTHIAZIDE 50 MG/1
TABLET ORAL
Qty: 30 TABLET | Refills: 0 | Status: SHIPPED | OUTPATIENT
Start: 2017-11-05 | End: 2018-01-08 | Stop reason: SDUPTHER

## 2017-11-13 ENCOUNTER — OFFICE VISIT (OUTPATIENT)
Dept: FAMILY MEDICINE | Facility: CLINIC | Age: 67
End: 2017-11-13
Payer: MEDICARE

## 2017-11-13 VITALS
TEMPERATURE: 98 F | HEART RATE: 65 BPM | OXYGEN SATURATION: 94 % | SYSTOLIC BLOOD PRESSURE: 138 MMHG | DIASTOLIC BLOOD PRESSURE: 80 MMHG | WEIGHT: 205 LBS | HEIGHT: 73 IN | RESPIRATION RATE: 17 BRPM | BODY MASS INDEX: 27.17 KG/M2

## 2017-11-13 DIAGNOSIS — I15.2 HYPERTENSION ASSOCIATED WITH DIABETES: Primary | Chronic | ICD-10-CM

## 2017-11-13 DIAGNOSIS — E11.29 TYPE 2 DIABETES MELLITUS WITH MICROALBUMINURIA, WITHOUT LONG-TERM CURRENT USE OF INSULIN: ICD-10-CM

## 2017-11-13 DIAGNOSIS — E11.59 HYPERTENSION ASSOCIATED WITH DIABETES: Primary | Chronic | ICD-10-CM

## 2017-11-13 DIAGNOSIS — R80.9 TYPE 2 DIABETES MELLITUS WITH MICROALBUMINURIA, WITHOUT LONG-TERM CURRENT USE OF INSULIN: ICD-10-CM

## 2017-11-13 DIAGNOSIS — I27.9 CHRONIC PULMONARY HEART DISEASE: ICD-10-CM

## 2017-11-13 PROCEDURE — 99999 PR PBB SHADOW E&M-EST. PATIENT-LVL III: CPT | Mod: PBBFAC,,, | Performed by: INTERNAL MEDICINE

## 2017-11-13 PROCEDURE — 99214 OFFICE O/P EST MOD 30 MIN: CPT | Mod: S$PBB,,, | Performed by: INTERNAL MEDICINE

## 2017-11-13 PROCEDURE — 99213 OFFICE O/P EST LOW 20 MIN: CPT | Mod: PBBFAC,PN | Performed by: INTERNAL MEDICINE

## 2017-11-13 RX ORDER — IPRATROPIUM BROMIDE 42 UG/1
2 SPRAY, METERED NASAL 4 TIMES DAILY
Qty: 15 ML | Refills: 6 | Status: SHIPPED | OUTPATIENT
Start: 2017-11-13 | End: 2020-04-08 | Stop reason: SDUPTHER

## 2017-11-13 NOTE — PROGRESS NOTES
Subjective:       Patient ID: Ambrosio Bray III is a 67 y.o. male.    Chief Complaint: Diabetes; Hypertension; and Follow-up    He presents for follow-up today.  He has been doing well overall.  He does report a recent elevated PSA.  He will have to have [facility].  She has had one prior biopsy.  His PSA has been elevated for some time.  Otherwise she is also had lab work at the [facility] and was told that his A1c was in normal range.  He is in need of refills otherwise.  He has no new complaints.      Review of Systems   Respiratory: Positive for shortness of breath.    Cardiovascular: Positive for leg swelling.   Skin: Positive for wound.       Objective:      Physical Exam   Constitutional: He is oriented to person, place, and time. He appears well-developed and well-nourished. No distress.   HENT:   Head: Normocephalic and atraumatic.   Right Ear: External ear normal.   Left Ear: External ear normal.   Mouth/Throat: Oropharynx is clear and moist. No oropharyngeal exudate.   Eyes: Conjunctivae and EOM are normal. Pupils are equal, round, and reactive to light. No scleral icterus.   Cardiovascular: Normal rate, regular rhythm and normal heart sounds.  Exam reveals no gallop and no friction rub.    No murmur heard.  Pulmonary/Chest: Effort normal and breath sounds normal. No respiratory distress. He has no wheezes. He has no rales.   Musculoskeletal: He exhibits edema. He exhibits no tenderness.   Neurological: He is alert and oriented to person, place, and time. No cranial nerve deficit.   Skin: Skin is warm and dry. No rash noted.   Psychiatric: He has a normal mood and affect.   Vitals reviewed.      Assessment:       No diagnosis found.    Plan:       Ambrosio was seen today for diabetes, hypertension and follow-up.    Diagnoses and all orders for this visit:    Hypertension associated with diabetes - BP controlled.     Type 2 diabetes mellitus with microalbuminuria, without long-term  current use of insulin - prior A1c improved.  [facility].  Will not repeat today.  F/u 3-6 months.     Chronic pulmonary heart disease - stable on Adempas.  Noted most recent PA pressure over 100.  F/u cardiology    Other orders  -     ipratropium (ATROVENT) 0.06 % nasal spray; 2 sprays by Nasal route 4 (four) times daily.

## 2017-11-20 DIAGNOSIS — I27.24 CTEPH (CHRONIC THROMBOEMBOLIC PULMONARY HYPERTENSION): ICD-10-CM

## 2017-11-28 ENCOUNTER — TELEPHONE (OUTPATIENT)
Dept: TRANSPLANT | Facility: CLINIC | Age: 67
End: 2017-11-28

## 2017-11-28 DIAGNOSIS — I48.20 CHRONIC ATRIAL FIBRILLATION: ICD-10-CM

## 2017-11-28 RX ORDER — RIVAROXABAN 20 MG/1
TABLET, FILM COATED ORAL
Qty: 30 TABLET | Refills: 5 | Status: SHIPPED | OUTPATIENT
Start: 2017-11-28 | End: 2018-01-08 | Stop reason: SDUPTHER

## 2017-11-29 NOTE — TELEPHONE ENCOUNTER
[facility]. No auth needed at this time. Notification sent to Shannan at [facility].

## 2017-11-30 DIAGNOSIS — N40.0 BENIGN NON-NODULAR PROSTATIC HYPERPLASIA WITHOUT LOWER URINARY TRACT SYMPTOMS: ICD-10-CM

## 2017-11-30 RX ORDER — TAMSULOSIN HYDROCHLORIDE 0.4 MG/1
1 CAPSULE ORAL DAILY
Qty: 30 CAPSULE | Refills: 2 | Status: SHIPPED | OUTPATIENT
Start: 2017-11-30 | End: 2018-01-08 | Stop reason: SDUPTHER

## 2017-12-26 ENCOUNTER — TELEPHONE (OUTPATIENT)
Dept: TRANSPLANT | Facility: CLINIC | Age: 67
End: 2017-12-26

## 2017-12-26 ENCOUNTER — HOSPITAL ENCOUNTER (INPATIENT)
Facility: HOSPITAL | Age: 67
LOS: 1 days | Discharge: HOME | DRG: 309 | End: 2017-12-27
Attending: EMERGENCY MEDICINE | Admitting: EMERGENCY MEDICINE
Payer: MEDICARE

## 2017-12-26 DIAGNOSIS — I15.2 HYPERTENSION ASSOCIATED WITH DIABETES: Chronic | ICD-10-CM

## 2017-12-26 DIAGNOSIS — E11.29 TYPE 2 DIABETES MELLITUS WITH MICROALBUMINURIA, WITHOUT LONG-TERM CURRENT USE OF INSULIN: ICD-10-CM

## 2017-12-26 DIAGNOSIS — I48.0 PAROXYSMAL ATRIAL FIBRILLATION: ICD-10-CM

## 2017-12-26 DIAGNOSIS — I27.20 PULMONARY HTN: ICD-10-CM

## 2017-12-26 DIAGNOSIS — E87.20 METABOLIC ACIDOSIS: ICD-10-CM

## 2017-12-26 DIAGNOSIS — E11.59 HYPERTENSION ASSOCIATED WITH DIABETES: Chronic | ICD-10-CM

## 2017-12-26 DIAGNOSIS — R80.9 TYPE 2 DIABETES MELLITUS WITH MICROALBUMINURIA, WITHOUT LONG-TERM CURRENT USE OF INSULIN: ICD-10-CM

## 2017-12-26 DIAGNOSIS — I27.24 CTEPH (CHRONIC THROMBOEMBOLIC PULMONARY HYPERTENSION): ICD-10-CM

## 2017-12-26 DIAGNOSIS — R00.0 RAPID HEART BEAT: ICD-10-CM

## 2017-12-26 DIAGNOSIS — N17.9 ACUTE RENAL FAILURE, UNSPECIFIED ACUTE RENAL FAILURE TYPE: ICD-10-CM

## 2017-12-26 DIAGNOSIS — R06.02 SOB (SHORTNESS OF BREATH): ICD-10-CM

## 2017-12-26 DIAGNOSIS — I50.32 CHRONIC DIASTOLIC CHF (CONGESTIVE HEART FAILURE): ICD-10-CM

## 2017-12-26 DIAGNOSIS — R00.0 WIDE-COMPLEX TACHYCARDIA: Primary | ICD-10-CM

## 2017-12-26 LAB
ALBUMIN SERPL BCP-MCNC: 3.8 G/DL
ALP SERPL-CCNC: 89 U/L
ALT SERPL W/O P-5'-P-CCNC: 42 U/L
ANION GAP SERPL CALC-SCNC: 14 MMOL/L
AST SERPL-CCNC: 39 U/L
BASOPHILS # BLD AUTO: 0.01 K/UL
BASOPHILS NFR BLD: 0.1 %
BILIRUB SERPL-MCNC: 3.8 MG/DL
BNP SERPL-MCNC: 855 PG/ML
BUN SERPL-MCNC: 23 MG/DL
CALCIUM SERPL-MCNC: 10 MG/DL
CHLORIDE SERPL-SCNC: 107 MMOL/L
CO2 SERPL-SCNC: 19 MMOL/L
CREAT SERPL-MCNC: 1.5 MG/DL
DIFFERENTIAL METHOD: ABNORMAL
EOSINOPHIL # BLD AUTO: 0 K/UL
EOSINOPHIL NFR BLD: 0 %
ERYTHROCYTE [DISTWIDTH] IN BLOOD BY AUTOMATED COUNT: 18 %
EST. GFR  (AFRICAN AMERICAN): 55 ML/MIN/1.73 M^2
EST. GFR  (NON AFRICAN AMERICAN): 47 ML/MIN/1.73 M^2
GLUCOSE SERPL-MCNC: 139 MG/DL
HCT VFR BLD AUTO: 42.1 %
HGB BLD-MCNC: 14.1 G/DL
LYMPHOCYTES # BLD AUTO: 1.1 K/UL
LYMPHOCYTES NFR BLD: 13.3 %
MAGNESIUM SERPL-MCNC: 2.2 MG/DL
MCH RBC QN AUTO: 27.8 PG
MCHC RBC AUTO-ENTMCNC: 33.5 G/DL
MCV RBC AUTO: 83 FL
MONOCYTES # BLD AUTO: 0.5 K/UL
MONOCYTES NFR BLD: 6.6 %
NEUTROPHILS # BLD AUTO: 6.4 K/UL
NEUTROPHILS NFR BLD: 80 %
PLATELET # BLD AUTO: 221 K/UL
PMV BLD AUTO: 10.7 FL
POCT GLUCOSE: 122 MG/DL (ref 70–110)
POTASSIUM SERPL-SCNC: 3.5 MMOL/L
PROT SERPL-MCNC: 8.1 G/DL
RBC # BLD AUTO: 5.07 M/UL
SODIUM SERPL-SCNC: 140 MMOL/L
TROPONIN I SERPL DL<=0.01 NG/ML-MCNC: 0.16 NG/ML
TSH SERPL DL<=0.005 MIU/L-ACNC: 3.74 UIU/ML
WBC # BLD AUTO: 8.05 K/UL

## 2017-12-26 PROCEDURE — 63600175 PHARM REV CODE 636 W HCPCS: Performed by: INTERNAL MEDICINE

## 2017-12-26 PROCEDURE — 96365 THER/PROPH/DIAG IV INF INIT: CPT

## 2017-12-26 PROCEDURE — 83735 ASSAY OF MAGNESIUM: CPT

## 2017-12-26 PROCEDURE — 63600175 PHARM REV CODE 636 W HCPCS: Performed by: EMERGENCY MEDICINE

## 2017-12-26 PROCEDURE — 84443 ASSAY THYROID STIM HORMONE: CPT

## 2017-12-26 PROCEDURE — 96375 TX/PRO/DX INJ NEW DRUG ADDON: CPT

## 2017-12-26 PROCEDURE — 84484 ASSAY OF TROPONIN QUANT: CPT

## 2017-12-26 PROCEDURE — 99291 CRITICAL CARE FIRST HOUR: CPT | Mod: 25

## 2017-12-26 PROCEDURE — 85025 COMPLETE CBC W/AUTO DIFF WBC: CPT

## 2017-12-26 PROCEDURE — 25000003 PHARM REV CODE 250: Performed by: EMERGENCY MEDICINE

## 2017-12-26 PROCEDURE — 96366 THER/PROPH/DIAG IV INF ADDON: CPT

## 2017-12-26 PROCEDURE — 80053 COMPREHEN METABOLIC PANEL: CPT

## 2017-12-26 PROCEDURE — 93005 ELECTROCARDIOGRAM TRACING: CPT

## 2017-12-26 PROCEDURE — 93010 ELECTROCARDIOGRAM REPORT: CPT | Mod: ,,, | Performed by: INTERNAL MEDICINE

## 2017-12-26 PROCEDURE — 25000003 PHARM REV CODE 250: Performed by: INTERNAL MEDICINE

## 2017-12-26 PROCEDURE — 83880 ASSAY OF NATRIURETIC PEPTIDE: CPT

## 2017-12-26 PROCEDURE — 99291 CRITICAL CARE FIRST HOUR: CPT | Mod: ,,, | Performed by: INTERNAL MEDICINE

## 2017-12-26 PROCEDURE — 20000000 HC ICU ROOM

## 2017-12-26 RX ORDER — IBUPROFEN 200 MG
24 TABLET ORAL
Status: DISCONTINUED | OUTPATIENT
Start: 2017-12-26 | End: 2017-12-27 | Stop reason: HOSPADM

## 2017-12-26 RX ORDER — AMIODARONE HYDROCHLORIDE 150 MG/3ML
150 INJECTION, SOLUTION INTRAVENOUS
Status: COMPLETED | OUTPATIENT
Start: 2017-12-26 | End: 2017-12-26

## 2017-12-26 RX ORDER — ATORVASTATIN CALCIUM 40 MG/1
80 TABLET, FILM COATED ORAL DAILY
Status: DISCONTINUED | OUTPATIENT
Start: 2017-12-26 | End: 2017-12-26

## 2017-12-26 RX ORDER — FLUTICASONE PROPIONATE 50 MCG
1 SPRAY, SUSPENSION (ML) NASAL DAILY
COMMUNITY
End: 2018-01-11

## 2017-12-26 RX ORDER — ATORVASTATIN CALCIUM 40 MG/1
40 TABLET, FILM COATED ORAL DAILY
Status: DISCONTINUED | OUTPATIENT
Start: 2017-12-26 | End: 2017-12-27 | Stop reason: HOSPADM

## 2017-12-26 RX ORDER — GLUCAGON 1 MG
1 KIT INJECTION
Status: DISCONTINUED | OUTPATIENT
Start: 2017-12-26 | End: 2017-12-27 | Stop reason: HOSPADM

## 2017-12-26 RX ORDER — TAMSULOSIN HYDROCHLORIDE 0.4 MG/1
1 CAPSULE ORAL DAILY
Status: DISCONTINUED | OUTPATIENT
Start: 2017-12-27 | End: 2017-12-27 | Stop reason: HOSPADM

## 2017-12-26 RX ORDER — SODIUM CHLORIDE 0.9 % (FLUSH) 0.9 %
3 SYRINGE (ML) INJECTION
Status: DISCONTINUED | OUTPATIENT
Start: 2017-12-26 | End: 2017-12-27 | Stop reason: HOSPADM

## 2017-12-26 RX ORDER — IBUPROFEN 200 MG
16 TABLET ORAL
Status: DISCONTINUED | OUTPATIENT
Start: 2017-12-26 | End: 2017-12-27 | Stop reason: HOSPADM

## 2017-12-26 RX ORDER — INSULIN ASPART 100 [IU]/ML
1-10 INJECTION, SOLUTION INTRAVENOUS; SUBCUTANEOUS
Status: DISCONTINUED | OUTPATIENT
Start: 2017-12-26 | End: 2017-12-27 | Stop reason: HOSPADM

## 2017-12-26 RX ORDER — HYDROCHLOROTHIAZIDE 25 MG/1
25 TABLET ORAL DAILY
Status: DISCONTINUED | OUTPATIENT
Start: 2017-12-26 | End: 2017-12-26

## 2017-12-26 RX ADMIN — ATORVASTATIN CALCIUM 40 MG: 40 TABLET, FILM COATED ORAL at 05:12

## 2017-12-26 RX ADMIN — AMIODARONE HYDROCHLORIDE 1 MG/MIN: 1.8 INJECTION, SOLUTION INTRAVENOUS at 12:12

## 2017-12-26 RX ADMIN — RIVAROXABAN 20 MG: 20 TABLET, FILM COATED ORAL at 05:12

## 2017-12-26 RX ADMIN — AMIODARONE HYDROCHLORIDE 150 MG: 50 INJECTION, SOLUTION INTRAVENOUS at 12:12

## 2017-12-26 RX ADMIN — AMIODARONE HYDROCHLORIDE 1 MG/MIN: 1.8 INJECTION, SOLUTION INTRAVENOUS at 05:12

## 2017-12-26 NOTE — TELEPHONE ENCOUNTER
"Pt reports since yesterday morning, he has felt dizzy, "bad in general" and some trouble walking. Pt states he feels his HR increase with any exertion (has h/o a-fib). He says "I wonder if I got dehydrated like I have been before, because I had terrible diarrhea the day before yesterday. I have also had sweats." Pt reports he is headed to ER but will likely go to [facility] because he is concerned about parking on [facility]. Pt instructed to bring all meds with him, especially Adempas, which he states he will do. Informed pt to notify ER staff that Dr Posadas manages his PH, and that physicians there may contact HTS provider on call at [facility], if needed. Pt verbalized understanding of all. Will continue to monitor.   "

## 2017-12-26 NOTE — PROGRESS NOTES
"1615 pt on amiodarone gtt, clarified orders with md Mcdermott.  1645 pt reports taking atorvastatin 40 mg daily not 80 mg, also pt's bp 105/69. Spoke with Md Mcdermott, new orders received, ok to change atorvastatin dose and d/c hydrochlorothiazde. Asked MD Mcdermott if any hold parameters for adempas indicated, MD states "no".  "

## 2017-12-26 NOTE — CONSULTS
[facility]  Cardiology  Consult Note    Patient Name: Ambrosio Bray III  MRN: [id number]  Admission Date: 12/26/2017  Hospital Length of Stay: 0 days  Code Status: Full Code   Attending Provider: Ajith Becker MD   Consulting Provider: Kun Gilman MD  Primary Care Physician: Ottoniel Orosco MD  Principal Problem:<principal problem not specified>    Patient information was obtained from patient, past medical records and ER records.     Cardiology  Consult performed by: KUN GILMAN  Consult ordered by: ZACHERY ALEGRIA  Reason for consult: Wide-complex tachycardia        Subjective:     Chief Complaint:  Dizziness and shortness of breath     HPI:   67 y.o. male with a medical history of diabetes mellitus, HTN, and PTSD presents to the ED for an evaluation of acute onset dizziness. Patient reports yesterday he experienced nasal congestion. So, he used a generic Flonase nasal spray. However, 30 minutes later, he began to feel dizzy with an elevated heart rate. Patient states whenever he starts walking or gets up to move, heart rate increases noting he has to sit down. His cardiologist Dr. Gilman. No modifying factors. No prior tx. Otherwise, patient denies fever, chills, chest pain, SOB, N/V/D, abdominal pain, cough, and sore throat.    Called to the ED for significant white complex tachycardia.  Patient is well-known to me from clinic.  He has a history of severe pulmonary hypertension seen by Dr. Posadas at [facility] on arterial vasodilators.  He began abruptly experiencing tachycardia and weakness yesterday.  His wife finally convinced him to come in today was found to be severely tachycardic upwards of 200 bpm but was stable hemodynamically.  Due to his underlying pulmonary condition we feared giving him several medicines including adenosine, procainamide etc. pads were placed and he was given IV amiodarone bolus which converted him to sinus tachycardia.    Past Medical History:  "  Diagnosis Date    Diabetes mellitus     Hypertension     PTSD (post-traumatic stress disorder)        Past Surgical History:   Procedure Laterality Date    CARDIAC CATHETERIZATION         Review of patient's allergies indicates:  No Known Allergies    No current facility-administered medications on file prior to encounter.      Current Outpatient Prescriptions on File Prior to Encounter   Medication Sig    atorvastatin (LIPITOR) 80 MG tablet Take 1 tablet (80 mg total) by mouth once daily.    hydroCHLOROthiazide (HYDRODIURIL) 50 MG tablet TAKE ONE-HALF TABLET BY MOUTH ONCE DAILY    riociguat (ADEMPAS) 2.5 mg tablet Take 1 tablet (2.5 mg total) by mouth 3 (three) times daily.    tamsulosin (FLOMAX) 0.4 mg Cp24 Take 1 capsule (0.4 mg total) by mouth once daily.    XARELTO 20 mg Tab TAKE ONE TABLET BY MOUTH ONCE DAILY WITH  DINNER  OR  EVENING  MEAL    foam bandage (MEPILEX BORDER) 3 X 3 " Bndg Apply as needed    ipratropium (ATROVENT) 0.06 % nasal spray 2 sprays by Nasal route 4 (four) times daily.    mometasone (ELOCON) 0.1 % ointment Apply topically once daily.    vardenafil (LEVITRA) 20 MG tablet Take 20 mg by mouth.    [DISCONTINUED] loratadine (CLARITIN) 10 mg tablet Take 1 tablet (10 mg total) by mouth once daily.     Family History     Problem Relation (Age of Onset)    Cancer Father        Social History Main Topics    Smoking status: Never Smoker    Smokeless tobacco: Never Used    Alcohol use 1.2 - 1.8 oz/week     2 - 3 Shots of liquor per week      Comment: regularly    Drug use: No    Sexual activity: Yes     Partners: Female     Review of Systems   Unable to perform ROS: acuity of condition     Objective:     Vital Signs (Most Recent):  Temp: 97.8 °F (36.6 °C) (12/26/17 1530)  Pulse: (!) 117 (12/26/17 1624)  Resp: (!) 22 (12/26/17 1624)  BP: 105/69 (12/26/17 1617)  SpO2: 97 % (12/26/17 1624) Vital Signs (24h Range):  Temp:  [97.5 °F (36.4 °C)-97.9 °F (36.6 °C)] 97.8 °F (36.6 " °C)  Pulse:  [114-249] 117  Resp:  [17-26] 22  SpO2:  [92 %-99 %] 97 %  BP: (101-136)/(51-87) 105/69     Weight: 88 kg (194 lb 0.1 oz)  Body mass index is 25.6 kg/m².    SpO2: 97 %  O2 Device (Oxygen Therapy): (P) nasal cannula      Intake/Output Summary (Last 24 hours) at 12/26/17 1632  Last data filed at 12/26/17 1615   Gross per 24 hour   Intake           117.66 ml   Output                0 ml   Net           117.66 ml       Lines/Drains/Airways     Peripheral Intravenous Line                 Peripheral IV - Single Lumen 12/26/17 1225 Right Antecubital less than 1 day         Peripheral IV - Single Lumen 12/26/17 1229 Left Hand less than 1 day                Physical Exam   Constitutional: He is oriented to person, place, and time. He appears well-developed and well-nourished. No distress.   HENT:   Head: Normocephalic and atraumatic.   Eyes: Conjunctivae and EOM are normal. Pupils are equal, round, and reactive to light.   Neck: Normal range of motion. Neck supple. JVD present. No thyromegaly present.   Cardiovascular: Regular rhythm and normal heart sounds.  Tachycardia present.    No murmur heard.  Pulmonary/Chest: Effort normal and breath sounds normal. No respiratory distress. He has no wheezes. He has no rales. He exhibits no tenderness.   Abdominal: Soft. Bowel sounds are normal.   Musculoskeletal: He exhibits no edema.   Neurological: He is alert and oriented to person, place, and time.   Skin: Skin is warm and dry.   Psychiatric: He has a normal mood and affect. His behavior is normal.       Significant Labs:   CMP   Recent Labs  Lab 12/26/17  1230      K 3.5      CO2 19*   *   BUN 23   CREATININE 1.5*   CALCIUM 10.0   PROT 8.1   ALBUMIN 3.8   BILITOT 3.8*   ALKPHOS 89   AST 39   ALT 42   ANIONGAP 14   ESTGFRAFRICA 55*   EGFRNONAA 47*   , CBC   Recent Labs  Lab 12/26/17  1230   WBC 8.05   HGB 14.1   HCT 42.1      , INR No results for input(s): INR, PROTIME in the last 48 hours.,  Lipid Panel No results for input(s): CHOL, HDL, LDLCALC, TRIG, CHOLHDL in the last 48 hours. and Troponin   Recent Labs  Lab 12/26/17  1230   TROPONINI 0.163*       Significant Imaging: Echocardiogram:   2D echo with color flow doppler:   Results for orders placed or performed during the hospital encounter of 10/04/17   2D echo with color flow doppler   Result Value Ref Range    EF 55 55 - 65    Mitral Valve Regurgitation MILD     Diastolic Dysfunction Yes (A)     Est. PA Systolic Pressure 103.36 (A)     Tricuspid Valve Regurgitation MILD TO MODERATE      Assessment and Plan:     Wide-complex tachycardia    Appears to be supraventricular in origin  Broke with IV amiodarone bolus  Continue protocol   Likely will need outpatient EP referral        Pulmonary HTN    Followed in pulmonary hypertension clinic by Dr. Jovel  Continue arterial vasodilators, currently not on formulary so patient is okay to take his home supply        Atrial fibrillation    Continue nOAC             VTE Risk Mitigation         Ordered     rivaroxaban tablet 20 mg  With dinner     Route:  Oral        12/26/17 1524     High Risk of VTE  Once      12/26/17 1524     Reason for No Pharmacological VTE Prophylaxis  Once      12/26/17 1524        Total ICU time spent 45 minutes    Thank you for your consult. I will follow-up with patient. Please contact us if you have any additional questions.    Kun Mcdermott MD  Cardiology   [facility]

## 2017-12-26 NOTE — PLAN OF CARE
Problem: Patient Care Overview  Goal: Plan of Care Review  Outcome: Ongoing (interventions implemented as appropriate)  Plan of care reviewed. No falls/injuries this shift. Skin intact. Pt to ICU on amio gtt, 's. Seen by cardiology. Denies pain. Tolerating cardiac diet. On 4lnc. Will monitor.

## 2017-12-26 NOTE — SUBJECTIVE & OBJECTIVE
"Past Medical History:   Diagnosis Date    Diabetes mellitus     Hypertension     PTSD (post-traumatic stress disorder)        Past Surgical History:   Procedure Laterality Date    CARDIAC CATHETERIZATION         Review of patient's allergies indicates:  No Known Allergies    No current facility-administered medications on file prior to encounter.      Current Outpatient Prescriptions on File Prior to Encounter   Medication Sig    atorvastatin (LIPITOR) 80 MG tablet Take 1 tablet (80 mg total) by mouth once daily.    hydroCHLOROthiazide (HYDRODIURIL) 50 MG tablet TAKE ONE-HALF TABLET BY MOUTH ONCE DAILY    riociguat (ADEMPAS) 2.5 mg tablet Take 1 tablet (2.5 mg total) by mouth 3 (three) times daily.    tamsulosin (FLOMAX) 0.4 mg Cp24 Take 1 capsule (0.4 mg total) by mouth once daily.    XARELTO 20 mg Tab TAKE ONE TABLET BY MOUTH ONCE DAILY WITH  DINNER  OR  EVENING  MEAL    foam bandage (MEPILEX BORDER) 3 X 3 " Bndg Apply as needed    ipratropium (ATROVENT) 0.06 % nasal spray 2 sprays by Nasal route 4 (four) times daily.    mometasone (ELOCON) 0.1 % ointment Apply topically once daily.    vardenafil (LEVITRA) 20 MG tablet Take 20 mg by mouth.    [DISCONTINUED] loratadine (CLARITIN) 10 mg tablet Take 1 tablet (10 mg total) by mouth once daily.     Family History     Problem Relation (Age of Onset)    Cancer Father        Social History Main Topics    Smoking status: Never Smoker    Smokeless tobacco: Never Used    Alcohol use 1.2 - 1.8 oz/week     2 - 3 Shots of liquor per week      Comment: regularly    Drug use: No    Sexual activity: Yes     Partners: Female     Review of Systems   Unable to perform ROS: acuity of condition     Objective:     Vital Signs (Most Recent):  Temp: 97.8 °F (36.6 °C) (12/26/17 1530)  Pulse: (!) 117 (12/26/17 1624)  Resp: (!) 22 (12/26/17 1624)  BP: 105/69 (12/26/17 1617)  SpO2: 97 % (12/26/17 1624) Vital Signs (24h Range):  Temp:  [97.5 °F (36.4 °C)-97.9 °F (36.6 °C)] " 97.8 °F (36.6 °C)  Pulse:  [114-249] 117  Resp:  [17-26] 22  SpO2:  [92 %-99 %] 97 %  BP: (101-136)/(51-87) 105/69     Weight: 88 kg (194 lb 0.1 oz)  Body mass index is 25.6 kg/m².    SpO2: 97 %  O2 Device (Oxygen Therapy): (P) nasal cannula      Intake/Output Summary (Last 24 hours) at 12/26/17 1632  Last data filed at 12/26/17 1615   Gross per 24 hour   Intake           117.66 ml   Output                0 ml   Net           117.66 ml       Lines/Drains/Airways     Peripheral Intravenous Line                 Peripheral IV - Single Lumen 12/26/17 1225 Right Antecubital less than 1 day         Peripheral IV - Single Lumen 12/26/17 1229 Left Hand less than 1 day                Physical Exam   Constitutional: He is oriented to person, place, and time. He appears well-developed and well-nourished. No distress.   HENT:   Head: Normocephalic and atraumatic.   Eyes: Conjunctivae and EOM are normal. Pupils are equal, round, and reactive to light.   Neck: Normal range of motion. Neck supple. JVD present. No thyromegaly present.   Cardiovascular: Regular rhythm and normal heart sounds.  Tachycardia present.    No murmur heard.  Pulmonary/Chest: Effort normal and breath sounds normal. No respiratory distress. He has no wheezes. He has no rales. He exhibits no tenderness.   Abdominal: Soft. Bowel sounds are normal.   Musculoskeletal: He exhibits no edema.   Neurological: He is alert and oriented to person, place, and time.   Skin: Skin is warm and dry.   Psychiatric: He has a normal mood and affect. His behavior is normal.       Significant Labs:   CMP   Recent Labs  Lab 12/26/17  1230      K 3.5      CO2 19*   *   BUN 23   CREATININE 1.5*   CALCIUM 10.0   PROT 8.1   ALBUMIN 3.8   BILITOT 3.8*   ALKPHOS 89   AST 39   ALT 42   ANIONGAP 14   ESTGFRAFRICA 55*   EGFRNONAA 47*   , CBC   Recent Labs  Lab 12/26/17  1230   WBC 8.05   HGB 14.1   HCT 42.1      , INR No results for input(s): INR, PROTIME in the  last 48 hours., Lipid Panel No results for input(s): CHOL, HDL, LDLCALC, TRIG, CHOLHDL in the last 48 hours. and Troponin   Recent Labs  Lab 12/26/17  1230   TROPONINI 0.163*       Significant Imaging: Echocardiogram:   2D echo with color flow doppler:   Results for orders placed or performed during the hospital encounter of 10/04/17   2D echo with color flow doppler   Result Value Ref Range    EF 55 55 - 65    Mitral Valve Regurgitation MILD     Diastolic Dysfunction Yes (A)     Est. PA Systolic Pressure 103.36 (A)     Tricuspid Valve Regurgitation MILD TO MODERATE

## 2017-12-26 NOTE — HPI
67 y.o. male with a medical history of diabetes mellitus, HTN, and PTSD presents to the ED for an evaluation of acute onset dizziness. Patient reports yesterday he experienced nasal congestion. So, he used a generic Flonase nasal spray. However, 30 minutes later, he began to feel dizzy with an elevated heart rate. Patient states whenever he starts walking or gets up to move, heart rate increases noting he has to sit down. His cardiologist Dr. Mcdermott. No modifying factors. No prior tx. Otherwise, patient denies fever, chills, chest pain, SOB, N/V/D, abdominal pain, cough, and sore throat.    Patient is well-known to me from clinic.  He has a history of severe pulmonary hypertension seen by Dr. Posadas at [facility] on arterial vasodilators.  He began abruptly experiencing tachycardia and weakness yesterday.  His wife finally convinced him to come in today was found to be severely tachycardic upwards of 200 bpm but was stable hemodynamically.  Due to his underlying pulmonary condition we feared giving him several medicines including adenosine, procainamide etc. pads were placed and he was given IV amiodarone bolus which converted him to sinus tachycardia.

## 2017-12-26 NOTE — ASSESSMENT & PLAN NOTE
Appears to be supraventricular in origin  Broke with IV amiodarone bolus  Continue protocol   Likely will need outpatient EP referral

## 2017-12-26 NOTE — ASSESSMENT & PLAN NOTE
Followed in pulmonary hypertension clinic by Dr. Jovel  Continue arterial vasodilators, currently not on formulary so patient is okay to take his home supply

## 2017-12-26 NOTE — TELEPHONE ENCOUNTER
----- Message from Zita Tovar MA sent at 12/26/2017  9:34 AM CST -----  Contact: self [phone]      ----- Message -----  From: Kathy Cisse  Sent: 12/26/2017   9:28 AM  To: [facility] Staff    Patient states that he sick his heart rate is going up His body is sweating when he walks he states he lost a lot of body fluid. He's loosing breath as he walks he states hes going to the  emergency room

## 2017-12-26 NOTE — ED PROVIDER NOTES
"Encounter Date: 12/26/2017    SCRIBE #1 NOTE: I, Melany Mendes, am scribing for, and in the presence of,  Jp Jones MD. I have scribed the following portions of the note - Other sections scribed: HPI/ROS.       History     Chief Complaint   Patient presents with    Fatigue     "Im having trouble walking, getting dizzy and my heart rate goes up", no falls     CC: Dizziness     HPI: This 67 y.o. male with a medical history of diabetes mellitus, HTN, and PTSD presents to the ED for an evaluation of acute onset dizziness. Patient reports yesterday he experienced nasal congestion. So, he used a generic Flonase nasal spray. However, 30 minutes later, he began to feel dizzy with an elevated heart rate. Patient states whenever he starts walking or gets up to move, heart rate increases noting he has to sit down. His cardiologist Dr. Mcdermott. No modifying factors. No prior tx. Otherwise, patient denies fever, chills, chest pain, SOB, N/V/D, abdominal pain, cough, and sore throat.      The history is provided by the patient. No  was used.     Review of patient's allergies indicates:  No Known Allergies  Past Medical History:   Diagnosis Date    Diabetes mellitus     Hypertension     PTSD (post-traumatic stress disorder)      Past Surgical History:   Procedure Laterality Date    CARDIAC CATHETERIZATION       Family History   Problem Relation Age of Onset    Cancer Father      colon     Social History   Substance Use Topics    Smoking status: Never Smoker    Smokeless tobacco: Never Used    Alcohol use 1.2 - 1.8 oz/week     2 - 3 Shots of liquor per week      Comment: regularly     Review of Systems   Constitutional: Negative for chills and fever.   HENT: Negative for congestion, ear pain, rhinorrhea and sore throat.    Eyes: Negative for pain and visual disturbance.   Respiratory: Negative for cough and shortness of breath.    Cardiovascular: Negative for chest pain.        (+) elevated " heart rate   Gastrointestinal: Negative for abdominal pain, diarrhea, nausea and vomiting.   Genitourinary: Negative for dysuria.   Musculoskeletal: Negative for back pain and neck pain.   Skin: Negative for rash.   Neurological: Positive for dizziness. Negative for headaches.       Physical Exam     Initial Vitals [12/26/17 1200]   BP Pulse Resp Temp SpO2   (!) 101/51 (!) 122 18 97.5 °F (36.4 °C) (!) 94 %      MAP       67.67         Physical Exam    Nursing note and vitals reviewed.  Constitutional: He appears well-developed and well-nourished.   HENT:   Head: Atraumatic.   Eyes: EOM are normal. Pupils are equal, round, and reactive to light.   Neck: Normal range of motion. Neck supple. No JVD present.   Cardiovascular:      Pulmonary/Chest: Breath sounds normal. No respiratory distress. He has no wheezes. He has no rhonchi. He has no rales.   Abdominal: Soft. Bowel sounds are normal. He exhibits no distension. There is no tenderness. There is no rebound and no guarding.   Musculoskeletal: Normal range of motion. He exhibits no edema.   Lymphadenopathy:     He has no cervical adenopathy.   Neurological: He is alert and oriented to person, place, and time. He has normal strength.   Skin: Skin is warm and dry.   Psychiatric: He has a normal mood and affect. Thought content normal.         ED Course   Critical Care  Date/Time: 12/26/2017 2:12 PM  Performed by: ZACHERY ALEGRIA  Authorized by: ZACHERY ALEGRIA   Direct patient critical care time: 20 minutes  Additional history critical care time: 10 minutes  Ordering / reviewing critical care time: 10 minutes  Documentation critical care time: 10 minutes  Consulting other physicians critical care time: 10 minutes  Other critical care time: 10 (admission) minutes  Total critical care time (exclusive of procedural time) : 70 minutes  Critical care time was exclusive of separately billable procedures and treating other patients and teaching time.  Critical  care was necessary to treat or prevent imminent or life-threatening deterioration of the following conditions: circulatory failure.  Critical care was time spent personally by me on the following activities: development of treatment plan with patient or surrogate, discussions with consultants, interpretation of cardiac output measurements, evaluation of patient's response to treatment, examination of patient, obtaining history from patient or surrogate, ordering and performing treatments and interventions, ordering and review of laboratory studies, ordering and review of radiographic studies, pulse oximetry, re-evaluation of patient's condition and review of old charts.        Labs Reviewed   CBC W/ AUTO DIFFERENTIAL - Abnormal; Notable for the following:        Result Value    RDW 18.0 (*)     Gran% 80.0 (*)     Lymph% 13.3 (*)     All other components within normal limits   COMPREHENSIVE METABOLIC PANEL - Abnormal; Notable for the following:     CO2 19 (*)     Glucose 139 (*)     Creatinine 1.5 (*)     Total Bilirubin 3.8 (*)     eGFR if  55 (*)     eGFR if non  47 (*)     All other components within normal limits   B-TYPE NATRIURETIC PEPTIDE - Abnormal; Notable for the following:      (*)     All other components within normal limits   TROPONIN I - Abnormal; Notable for the following:     Troponin I 0.163 (*)     All other components within normal limits   MAGNESIUM   TSH     EKG Readings: (Independently Interpreted)   Wide complex tachycardia        X-Rays:   Independently Interpreted Readings:   Chest X-Ray: No acute abnormalities.      Patient's wide-complex tachycardia broke into a sinus tachycardia with a rate of 118 with amiodarone infusion.  Dr. Campbell came and saw the patient emergency Department recommends admission on amiodarone drip.  Spoke with Dr. Becker who accepted the patient.           Scribe Attestation:   Scribe #1: I performed the above scribed  service and the documentation accurately describes the services I performed. I attest to the accuracy of the note.    Attending Attestation:           Physician Attestation for Scribe:  Physician Attestation Statement for Scribe #1: I, Jp Jones MD, reviewed documentation, as scribed by Melany Mendes in my presence, and it is both accurate and complete.                 ED Course      Clinical Impression:   The primary encounter diagnosis was Wide-complex tachycardia. A diagnosis of SOB (shortness of breath) was also pertinent to this visit.                           Jp Jones MD  12/26/17 0892

## 2017-12-27 VITALS
DIASTOLIC BLOOD PRESSURE: 72 MMHG | HEART RATE: 72 BPM | HEIGHT: 73 IN | SYSTOLIC BLOOD PRESSURE: 109 MMHG | TEMPERATURE: 98 F | BODY MASS INDEX: 26 KG/M2 | RESPIRATION RATE: 20 BRPM | OXYGEN SATURATION: 95 % | WEIGHT: 196.19 LBS

## 2017-12-27 PROBLEM — R00.0 WIDE-COMPLEX TACHYCARDIA: Status: RESOLVED | Noted: 2017-12-26 | Resolved: 2017-12-27

## 2017-12-27 LAB
ALBUMIN SERPL BCP-MCNC: 3.3 G/DL
ALP SERPL-CCNC: 71 U/L
ALT SERPL W/O P-5'-P-CCNC: 35 U/L
ANION GAP SERPL CALC-SCNC: 10 MMOL/L
AST SERPL-CCNC: 29 U/L
BASOPHILS # BLD AUTO: 0.02 K/UL
BASOPHILS NFR BLD: 0.3 %
BILIRUB SERPL-MCNC: 2.5 MG/DL
BNP SERPL-MCNC: 295 PG/ML
BUN SERPL-MCNC: 25 MG/DL
CALCIUM SERPL-MCNC: 9.1 MG/DL
CHLORIDE SERPL-SCNC: 107 MMOL/L
CO2 SERPL-SCNC: 21 MMOL/L
CREAT SERPL-MCNC: 1.2 MG/DL
DIFFERENTIAL METHOD: ABNORMAL
EOSINOPHIL # BLD AUTO: 0.1 K/UL
EOSINOPHIL NFR BLD: 0.7 %
ERYTHROCYTE [DISTWIDTH] IN BLOOD BY AUTOMATED COUNT: 18.1 %
EST. GFR  (AFRICAN AMERICAN): >60 ML/MIN/1.73 M^2
EST. GFR  (NON AFRICAN AMERICAN): >60 ML/MIN/1.73 M^2
ESTIMATED AVG GLUCOSE: 126 MG/DL
GLUCOSE SERPL-MCNC: 116 MG/DL
HBA1C MFR BLD HPLC: 6 %
HCT VFR BLD AUTO: 37.2 %
HGB BLD-MCNC: 12.5 G/DL
LYMPHOCYTES # BLD AUTO: 1.8 K/UL
LYMPHOCYTES NFR BLD: 24.5 %
MCH RBC QN AUTO: 27.5 PG
MCHC RBC AUTO-ENTMCNC: 33.6 G/DL
MCV RBC AUTO: 82 FL
MONOCYTES # BLD AUTO: 0.7 K/UL
MONOCYTES NFR BLD: 9.2 %
NEUTROPHILS # BLD AUTO: 4.8 K/UL
NEUTROPHILS NFR BLD: 65.3 %
PLATELET # BLD AUTO: 200 K/UL
PMV BLD AUTO: 10.6 FL
POCT GLUCOSE: 125 MG/DL (ref 70–110)
POCT GLUCOSE: 140 MG/DL (ref 70–110)
POTASSIUM SERPL-SCNC: 3.5 MMOL/L
PROT SERPL-MCNC: 6.9 G/DL
RBC # BLD AUTO: 4.54 M/UL
SODIUM SERPL-SCNC: 138 MMOL/L
TROPONIN I SERPL DL<=0.01 NG/ML-MCNC: 0.41 NG/ML
WBC # BLD AUTO: 7.39 K/UL

## 2017-12-27 PROCEDURE — 25000003 PHARM REV CODE 250: Performed by: INTERNAL MEDICINE

## 2017-12-27 PROCEDURE — 84484 ASSAY OF TROPONIN QUANT: CPT

## 2017-12-27 PROCEDURE — 83880 ASSAY OF NATRIURETIC PEPTIDE: CPT

## 2017-12-27 PROCEDURE — 63600175 PHARM REV CODE 636 W HCPCS: Performed by: INTERNAL MEDICINE

## 2017-12-27 PROCEDURE — 99232 SBSQ HOSP IP/OBS MODERATE 35: CPT | Mod: ,,, | Performed by: INTERNAL MEDICINE

## 2017-12-27 PROCEDURE — 80053 COMPREHEN METABOLIC PANEL: CPT

## 2017-12-27 PROCEDURE — 25000003 PHARM REV CODE 250: Performed by: EMERGENCY MEDICINE

## 2017-12-27 PROCEDURE — 36415 COLL VENOUS BLD VENIPUNCTURE: CPT

## 2017-12-27 PROCEDURE — 83036 HEMOGLOBIN GLYCOSYLATED A1C: CPT

## 2017-12-27 PROCEDURE — 85025 COMPLETE CBC W/AUTO DIFF WBC: CPT

## 2017-12-27 RX ORDER — AMIODARONE HYDROCHLORIDE 200 MG/1
200 TABLET ORAL 2 TIMES DAILY
Qty: 60 TABLET | Refills: 0 | Status: SHIPPED | OUTPATIENT
Start: 2017-12-27 | End: 2018-01-16 | Stop reason: SDUPTHER

## 2017-12-27 RX ORDER — AMIODARONE HYDROCHLORIDE 200 MG/1
200 TABLET ORAL 2 TIMES DAILY
Status: DISCONTINUED | OUTPATIENT
Start: 2017-12-27 | End: 2017-12-27 | Stop reason: HOSPADM

## 2017-12-27 RX ADMIN — AMIODARONE HYDROCHLORIDE 0.5 MG/MIN: 1.8 INJECTION, SOLUTION INTRAVENOUS at 02:12

## 2017-12-27 RX ADMIN — RIVAROXABAN 20 MG: 20 TABLET, FILM COATED ORAL at 04:12

## 2017-12-27 RX ADMIN — AMIODARONE HYDROCHLORIDE 200 MG: 200 TABLET ORAL at 12:12

## 2017-12-27 RX ADMIN — ATORVASTATIN CALCIUM 40 MG: 40 TABLET, FILM COATED ORAL at 08:12

## 2017-12-27 NOTE — PLAN OF CARE
Recommendations     Recommendation/Intervention:   1. Continue current diet   2. RD to monitor     Goals: Meet 85% EEN  Nutrition Goal Status: new  Communication of RD Recs: reviewed with RN     Continuum of Care Plan     Referral to Outpatient Services:  (D/C planning: ADA/Cardiac Diet)

## 2017-12-27 NOTE — H&P
"[facility] Hospital Medicine  History & Physical    Patient Name: Ambrosio Bray III  MRN: [id number]  Admission Date: 12/26/2017  Attending Physician: Ajith Becker MD   Primary Care Provider: Ottoniel Orosco MD         Patient information was obtained from patient and ER records.     Subjective:     Principal Problem:Wide-complex tachycardia    Chief Complaint:   Chief Complaint   Patient presents with    Fatigue     "Im having trouble walking, getting dizzy and my heart rate goes up", no falls        HPI: 68 y/o male presents to the ER for an evaluation of acute onset dizziness. Patient reports yesterday he experienced nasal congestion. He used a generic Flonase nasal spray. However, 30 minutes later, he began to feel dizzy with an elevated heart rate.  Patient states whenever he starts walking or gets up to move, heart rate increases noting he has to sit down. No other associated symptoms.  He presented to ER where he was noted to be in a wide complex tachycardia with HR>200.  Patient was given Amiodarone and then placed on Amio infusion.  Patient has history of severe pulmonary HTN secondary to PE.  No other complaints.    Past Medical History:   Diagnosis Date    Diabetes mellitus     Hypertension     PTSD (post-traumatic stress disorder)        Past Surgical History:   Procedure Laterality Date    CARDIAC CATHETERIZATION         Review of patient's allergies indicates:  No Known Allergies    No current facility-administered medications on file prior to encounter.      Current Outpatient Prescriptions on File Prior to Encounter   Medication Sig    atorvastatin (LIPITOR) 80 MG tablet Take 1 tablet (80 mg total) by mouth once daily. (Patient taking differently: Take 80 mg by mouth once daily. Pt takes 1/2 pill daily, 40 mg.)    hydroCHLOROthiazide (HYDRODIURIL) 50 MG tablet TAKE ONE-HALF TABLET BY MOUTH ONCE DAILY    riociguat (ADEMPAS) 2.5 mg tablet Take 1 tablet (2.5 mg total) by " "mouth 3 (three) times daily.    tamsulosin (FLOMAX) 0.4 mg Cp24 Take 1 capsule (0.4 mg total) by mouth once daily.    XARELTO 20 mg Tab TAKE ONE TABLET BY MOUTH ONCE DAILY WITH  DINNER  OR  EVENING  MEAL    foam bandage (MEPILEX BORDER) 3 X 3 " Bndg Apply as needed    ipratropium (ATROVENT) 0.06 % nasal spray 2 sprays by Nasal route 4 (four) times daily.    mometasone (ELOCON) 0.1 % ointment Apply topically once daily.    vardenafil (LEVITRA) 20 MG tablet Take 20 mg by mouth.    [DISCONTINUED] loratadine (CLARITIN) 10 mg tablet Take 1 tablet (10 mg total) by mouth once daily.     Family History     Problem Relation (Age of Onset)    Cancer Father        Social History Main Topics    Smoking status: Never Smoker    Smokeless tobacco: Never Used    Alcohol use 1.2 - 1.8 oz/week     2 - 3 Shots of liquor per week      Comment: regularly    Drug use: No    Sexual activity: Yes     Partners: Female     Review of Systems   Constitutional: Negative for chills and fever.   HENT: Negative for ear discharge and ear pain.    Eyes: Negative for pain and itching.   Respiratory: Negative for cough and shortness of breath.    Cardiovascular: Negative for chest pain and palpitations.   Gastrointestinal: Negative for abdominal distention and abdominal pain.   Endocrine: Negative for polyphagia and polyuria.   Genitourinary: Negative for difficulty urinating and dysuria.   Musculoskeletal: Negative for neck pain and neck stiffness.   Skin: Negative for rash and wound.   Neurological: Positive for dizziness. Negative for seizures.   Psychiatric/Behavioral: Negative for agitation and hallucinations.     Objective:     Vital Signs (Most Recent):  Temp: 97.8 °F (36.6 °C) (12/26/17 1530)  Pulse: (!) 119 (12/26/17 1803)  Resp: (!) 21 (12/26/17 1803)  BP: 110/63 (12/26/17 1802)  SpO2: 98 % (12/26/17 1803) Vital Signs (24h Range):  Temp:  [97.5 °F (36.4 °C)-97.9 °F (36.6 °C)] 97.8 °F (36.6 °C)  Pulse:  [114-249] 119  Resp:  " [17-26] 21  SpO2:  [92 %-99 %] 98 %  BP: (101-136)/(51-87) 110/63     Weight: 88 kg (194 lb 0.1 oz)  Body mass index is 25.6 kg/m².    Physical Exam   Constitutional: He is oriented to person, place, and time. He appears well-developed. No distress.   HENT:   Head: Normocephalic and atraumatic.   Eyes: Conjunctivae are normal. Right eye exhibits no discharge. Left eye exhibits no discharge.   Neck: Neck supple.   Cardiovascular: Exam reveals no gallop and no friction rub.    Tachycardic   Pulmonary/Chest: Effort normal and breath sounds normal. No stridor.   Abdominal: Soft. Bowel sounds are normal.   Musculoskeletal: He exhibits no deformity.   Neurological: He is alert and oriented to person, place, and time.   Skin: Skin is warm and dry.           Significant Labs:   BMP:   Recent Labs  Lab 12/26/17  1230   *      K 3.5      CO2 19*   BUN 23   CREATININE 1.5*   CALCIUM 10.0   MG 2.2     CBC:   Recent Labs  Lab 12/26/17  1230   WBC 8.05   HGB 14.1   HCT 42.1          Significant Imaging: I have reviewed all pertinent imaging results/findings within the past 24 hours.    Assessment/Plan:     * Wide-complex tachycardia    Patient presented with wide complex tachycardia and HR>200.  Improved with dose of Amiodarone and then placed on infusion.  Patient has been admitted to ICU and Cardiology consulted.        CTEPH (chronic thromboembolic pulmonary hypertension)    Patient with significant pulmonary HTN.  Continue Adempas.          Hypertension associated with diabetes    Currently normotensive.  Continue to monitor.          Type 2 diabetes mellitus with microalbuminuria, without long-term current use of insulin    Does not seem to be on any home medications.  Diabetic diet and insulin sliding scale.          ARF (acute renal failure)    Slight increase in Creat, but has had similar Creat in past.  Repeat labs in AM.            VTE Risk Mitigation         Ordered     rivaroxaban tablet 20  mg  With dinner     Route:  Oral        12/26/17 1524     High Risk of VTE  Once      12/26/17 1524     Reason for No Pharmacological VTE Prophylaxis  Once      12/26/17 1524      Ajith Becker MD  Department of Hospital Medicine   [facility]

## 2017-12-27 NOTE — PROGRESS NOTES
[facility]    WRITTEN HEALTHCARE and DISCHARGE INFORMATION   FROM YOUR CARE MANAGER  Follow-up Information     Kun Mcdermott MD On 1/3/2018.    Specialties:  INTERVENTIONAL CARDIOLOGY, Cardiology  Why:  9:40 am (Wednesday)--arrive 15 mniutes early, bring ALL your medications, your insurance card and your picture ID--  Contact information:  [address] [facility] [facility]  [address]  [place] [address]  [phone]             Sonia Brothers MD On 1/8/2018.    Specialty:  Family Medicine  Why:  9 am (Monday) arrive 15 mintues early, bring ALL your medications, your insurance cards and picture ID  Contact information:  [address] [place] [address]  [phone]                 PLEASE REMEMBER YOUR PLAN:  1. Getting your prescriptions filled   2. Taking your medications as directed, DO NOT MISS ANY DOSES!  3. Going to your follow-up doctor appointment. This is important because it allow the doctor to monitor your progress and determine if any changes need to made to your treatment plan.    HELP AT HOME:  [facility] On [facility] Care Line - 24/7 Assistance  Registered [facility] nurses can provide appointment booking, health education, clinical advisement, and other advisory services.   Call for this free service at [phone].    Thank you for choosing [facility] for your care.  Within 48-72 hours after leaving the hospital you will receive a call from [facility] Nurses following up to see how you are doing. The team will ask you a few questions and the call will last approximately 20 minutes.     Please answer any calls you may receive from [facility] we want to continue to support you as you manage your healthcare needs. [facility] is happy to have the opportunity to serve you.     Sincerely,  Your [facility] Team,   THANK YOU FOR LETTING ME ASSIST WITH YOUR DISCHARGE PLANNING,     Adalgisa Monreal [facility] [place]  [phone]

## 2017-12-27 NOTE — PROGRESS NOTES
[facility]  Cardiology  Progress Note    Patient Name: Ambrosio Bray III  MRN: [id number]  Admission Date: 12/26/2017  Hospital Length of Stay: 1 days  Code Status: Full Code   Attending Physician: Kati Stafford MD   Primary Care Physician: Ottoniel Orosco MD  Expected Discharge Date:   Principal Problem:Wide-complex tachycardia    Subjective:     Hospital Course:   12/26: Converted to sinus rhythm on amiodarone    Interval History: Breathing is stable, adult blood pressure medicines low SBP    Telemetry: Normal sinus rhythm    Review of Systems   All other systems reviewed and are negative.    Objective:     Vital Signs (Most Recent):  Temp: 98.6 °F (37 °C) (12/27/17 0730)  Pulse: 69 (12/27/17 1045)  Resp: 20 (12/27/17 1045)  BP: 106/64 (12/27/17 1032)  SpO2: 96 % (12/27/17 1045) Vital Signs (24h Range):  Temp:  [97.5 °F (36.4 °C)-98.6 °F (37 °C)] 98.6 °F (37 °C)  Pulse:  [] 69  Resp:  [10-46] 20  SpO2:  [87 %-99 %] 96 %  BP: ()/(51-87) 106/64     Weight: 88 kg (194 lb 0.1 oz)  Body mass index is 25.6 kg/m².     SpO2: 96 %  O2 Device (Oxygen Therapy): nasal cannula      Intake/Output Summary (Last 24 hours) at 12/27/17 1055  Last data filed at 12/27/17 1000   Gross per 24 hour   Intake            730.6 ml   Output              510 ml   Net            220.6 ml       Lines/Drains/Airways     Peripheral Intravenous Line                 Peripheral IV - Single Lumen 12/26/17 1225 Right Antecubital less than 1 day         Peripheral IV - Single Lumen 12/26/17 1229 Left Hand less than 1 day                Physical Exam   Constitutional: He is oriented to person, place, and time. No distress.   Cardiovascular: Normal rate and regular rhythm.    Pulmonary/Chest:   Decreased breath sounds bilaterally   Musculoskeletal: He exhibits no edema.   Neurological: He is alert and oriented to person, place, and time.       Significant Labs:   BMP:   Recent Labs  Lab 12/26/17  1230 12/27/17  0147   GLU  139* 116*    138   K 3.5 3.5    107   CO2 19* 21*   BUN 23 25*   CREATININE 1.5* 1.2   CALCIUM 10.0 9.1   MG 2.2  --     and CBC   Recent Labs  Lab 12/26/17  1230 12/27/17  0147   WBC 8.05 7.39   HGB 14.1 12.5*   HCT 42.1 37.2*    200       Significant Imaging: Echocardiogram:   2D echo with color flow doppler:   Results for orders placed or performed during the hospital encounter of 10/04/17   2D echo with color flow doppler   Result Value Ref Range    EF 55 55 - 65    Mitral Valve Regurgitation MILD     Diastolic Dysfunction Yes (A)     Est. PA Systolic Pressure 103.36 (A)     Tricuspid Valve Regurgitation MILD TO MODERATE      Assessment and Plan:     Brief HPI:     * Wide-complex tachycardia    Appears to be supraventricular in origin  Change to oral amiodarone 200 twice a day  Likely will need outpatient EP referral        Pulmonary HTN    Followed in pulmonary hypertension clinic by Dr. Posadas  Continue arterial vasodilators, currently not on formulary so patient is okay to take his home supply  Should be on home O2       Atrial fibrillation    Continue nOAC             VTE Risk Mitigation         Ordered     rivaroxaban tablet 20 mg  With dinner     Route:  Oral        12/26/17 1524     High Risk of VTE  Once      12/26/17 1524     Reason for No Pharmacological VTE Prophylaxis  Once      12/26/17 1524        Okay to DC from CV standpoint if stable.  Follow-up with me in one to 2 weeks on discharge    Kun Mcdermott MD  Cardiology  [facility]

## 2017-12-27 NOTE — SUBJECTIVE & OBJECTIVE
Interval History: Breathing is stable, adult blood pressure medicines low SBP    Telemetry: Normal sinus rhythm    Review of Systems   All other systems reviewed and are negative.    Objective:     Vital Signs (Most Recent):  Temp: 98.6 °F (37 °C) (12/27/17 0730)  Pulse: 69 (12/27/17 1045)  Resp: 20 (12/27/17 1045)  BP: 106/64 (12/27/17 1032)  SpO2: 96 % (12/27/17 1045) Vital Signs (24h Range):  Temp:  [97.5 °F (36.4 °C)-98.6 °F (37 °C)] 98.6 °F (37 °C)  Pulse:  [] 69  Resp:  [10-46] 20  SpO2:  [87 %-99 %] 96 %  BP: ()/(51-87) 106/64     Weight: 88 kg (194 lb 0.1 oz)  Body mass index is 25.6 kg/m².     SpO2: 96 %  O2 Device (Oxygen Therapy): nasal cannula      Intake/Output Summary (Last 24 hours) at 12/27/17 1055  Last data filed at 12/27/17 1000   Gross per 24 hour   Intake            730.6 ml   Output              510 ml   Net            220.6 ml       Lines/Drains/Airways     Peripheral Intravenous Line                 Peripheral IV - Single Lumen 12/26/17 1225 Right Antecubital less than 1 day         Peripheral IV - Single Lumen 12/26/17 1229 Left Hand less than 1 day                Physical Exam   Constitutional: He is oriented to person, place, and time. No distress.   Cardiovascular: Normal rate and regular rhythm.    Pulmonary/Chest:   Decreased breath sounds bilaterally   Musculoskeletal: He exhibits no edema.   Neurological: He is alert and oriented to person, place, and time.       Significant Labs:   BMP:   Recent Labs  Lab 12/26/17  1230 12/27/17  0147   * 116*    138   K 3.5 3.5    107   CO2 19* 21*   BUN 23 25*   CREATININE 1.5* 1.2   CALCIUM 10.0 9.1   MG 2.2  --     and CBC   Recent Labs  Lab 12/26/17  1230 12/27/17  0147   WBC 8.05 7.39   HGB 14.1 12.5*   HCT 42.1 37.2*    200       Significant Imaging: Echocardiogram:   2D echo with color flow doppler:   Results for orders placed or performed during the hospital encounter of 10/04/17   2D echo with color  flow doppler   Result Value Ref Range    EF 55 55 - 65    Mitral Valve Regurgitation MILD     Diastolic Dysfunction Yes (A)     Est. PA Systolic Pressure 103.36 (A)     Tricuspid Valve Regurgitation MILD TO MODERATE

## 2017-12-27 NOTE — HPI
68 y/o male presents to the ER for an evaluation of acute onset dizziness. Patient reports yesterday he experienced nasal congestion. He used a generic Flonase nasal spray. However, 30 minutes later, he began to feel dizzy with an elevated heart rate.  Patient states whenever he starts walking or gets up to move, heart rate increases noting he has to sit down. No other associated symptoms.  He presented to ER where he was noted to be in a wide complex tachycardia with HR>200.  Patient was given Amiodarone and then placed on Amio infusion.  Patient has history of severe pulmonary HTN secondary to PE.  No other complaints.

## 2017-12-27 NOTE — HOSPITAL COURSE
Mr. Bray was admitted with increased SOB and dizziness due to wide complex tachycardia. He was admitted to the ICU and placed on amiodarone gtt. He was seen by Cardiology who felt it was likely supraventricular in origin. He as underlying Afib and severe pulmonary HTN that likely contributed this event. Pt responded to treatment faster than anticipated and his HR normalized quickly and was able to be transitioned to PO amiodarone. His renal insufficiency resolved with control of his HR back down to normal. He was continued on his Xarelto for anticoagulation. Pt had chronically elevated troponins and his mild increase from his baseline levels were due to demand mismatch and not consistent with ACS. Pt symptomatically improved and was stable for discharge as per Cardiology with close follow up with them in 1 week. He will get referral for EP evaluation upon follow up.

## 2017-12-27 NOTE — CONSULTS
Discharge planning: see assessment below--patient is independent, drives self, monitors blood sugars, controls DM with diet and exercise. While needs new PCP, wants to research available [facility] providers and will make own PCP appointment. Has transportation to appointments and help at home if needed. No needs identified today. SW will follow, assist as needed.

## 2017-12-27 NOTE — PROGRESS NOTES
1030 spoke with md Mcdermott, informed of pt's room air sats 86-88%. Few runs of bi/trigeminy. Replaced back on 2lnc. /65. Informed MD pt remains off of hydrochlorothiazde. MD reports to RN pt is supposed to wear O2 at home, RN reported to MD, pt reports that he does not wear O2 at home, was discontinued 2 years ago by pulmonary doctor. RN will continue to monitor pt sats, no new orders.   1200 pt on room air and tolerating, will monitor.   1346 new orders received to discharge pt, discussed plan of care with md aguilera. md aware of xarelto admin time and d/c of amio gtt and po admin. Informed pt denies use of O2 at home and tolerating room air.

## 2017-12-27 NOTE — DISCHARGE SUMMARY
[facility] Hospital Medicine  Discharge Summary      Patient Name: Ambrosio Bray III  MRN: [id number]  Admission Date: 12/26/2017  Hospital Length of Stay: 1 days  Discharge Date and Time:  12/27/2017 5:09 PM  Attending Physician: Kati Stafford MD   Discharging Provider: Kati Stafford MD  Primary Care Provider: Sonia Brothers MD      HPI:   68 y/o male presents to the ER for an evaluation of acute onset dizziness. Patient reports yesterday he experienced nasal congestion. He used a generic Flonase nasal spray. However, 30 minutes later, he began to feel dizzy with an elevated heart rate.  Patient states whenever he starts walking or gets up to move, heart rate increases noting he has to sit down. No other associated symptoms.  He presented to ER where he was noted to be in a wide complex tachycardia with HR>200.  Patient was given Amiodarone and then placed on Amio infusion.  Patient has history of severe pulmonary HTN secondary to PE.  No other complaints.    * No surgery found *      Hospital Course:   Mr. Bray was admitted with increased SOB and dizziness due to wide complex tachycardia. He was admitted to the ICU and placed on amiodarone gtt. He was seen by Cardiology who felt it was likely supraventricular in origin. He as underlying Afib and severe pulmonary HTN that likely contributed this event. Pt responded to treatment faster than anticipated and his HR normalized quickly and was able to be transitioned to PO amiodarone. His renal insufficiency resolved with control of his HR back down to normal. He was continued on his Xarelto for anticoagulation. Pt had chronically elevated troponins and his mild increase from his baseline levels were due to demand mismatch and not consistent with ACS. Pt symptomatically improved and was stable for discharge as per Cardiology with close follow up with them in 1 week. He will get referral for EP evaluation upon follow up.       Consults:   Consults          Status Ordering Provider     Cardiology  Once     Provider:  Kun Mcdermott MD    Completed ZACHERY ALEGRIA consult to case management  Once     Provider:  (Not yet assigned)    MARILYN Castro          Service: Hospital Medicine    Final Active Diagnoses:    Diagnosis Date Noted POA    Metabolic acidosis [E87.2] 12/26/2017 Yes    CTEPH (chronic thromboembolic pulmonary hypertension) [I27.24] 12/16/2016 Yes    Pulmonary HTN [I27.20] 11/06/2014 Yes    Hypertension associated with diabetes [E11.59, I10] 11/06/2014 Yes     Chronic    Atrial fibrillation [I48.91] 10/02/2014 Yes    Chronic diastolic CHF (congestive heart failure) [I50.32] 09/27/2014 Yes    Type 2 diabetes mellitus with microalbuminuria, without long-term current use of insulin [E11.29, R80.9] 09/27/2014 Yes      Problems Resolved During this Admission:    Diagnosis Date Noted Date Resolved POA    PRINCIPAL PROBLEM:  Wide-complex tachycardia [I47.2] 12/26/2017 12/27/2017 Yes    ARF (acute renal failure) [N17.9] 09/25/2014 12/27/2017 Yes       Discharged Condition: good    Disposition: Home or Self Care    Follow Up:  Follow-up Information     Kun Mcdermott MD On 1/3/2018.    Specialties:  INTERVENTIONAL CARDIOLOGY, Cardiology  Why:  9:40 am (Wednesday)--arrive 15 mniutes early, bring ALL your medications, your insurance card and your picture ID--  Contact information:  [address] [place] [address] [phone]             Sonia Brothers MD On 1/8/2018.    Specialty:  Family Medicine  Why:  9 am (Monday) arrive 15 mintues early, bring ALL your medications, your insurance cards and picture ID  Contact information:  [address] [place] [address] [phone]                 Patient Instructions:     Diet Diabetic 2000 Calories     Diet Cardiac (2gm sodium and 60gm fat)     Activity as tolerated         Significant Diagnostic Studies: None    Pending Diagnostic Studies:     None        "  Medications:  Reconciled Home Medications:   Current Discharge Medication List      START taking these medications    Details   amiodarone (PACERONE) 200 MG Tab Take 1 tablet (200 mg total) by mouth 2 (two) times daily.  Qty: 60 tablet, Refills: 0         CONTINUE these medications which have NOT CHANGED    Details   atorvastatin (LIPITOR) 80 MG tablet Take 1 tablet (80 mg total) by mouth once daily.  Qty: 30 tablet, Refills: 11    Associated Diagnoses: Type 2 diabetes mellitus with microalbuminuria, without long-term current use of insulin      fluticasone (FLONASE) 50 mcg/actuation nasal spray 1 spray by Each Nare route once daily.      hydroCHLOROthiazide (HYDRODIURIL) 50 MG tablet TAKE ONE-HALF TABLET BY MOUTH ONCE DAILY  Qty: 30 tablet, Refills: 0    Comments: Please consider 90 day supplies to promote better adherence  Associated Diagnoses: Hypertension associated with diabetes      riociguat (ADEMPAS) 2.5 mg tablet Take 1 tablet (2.5 mg total) by mouth 3 (three) times daily.  Qty: 90 tablet, Refills: 11    Comments: Filled by [facility] pharmacy. Disregard if e-scripted to retail. Paper rx to follow  Associated Diagnoses: CTEPH (chronic thromboembolic pulmonary hypertension)      tamsulosin (FLOMAX) 0.4 mg Cp24 Take 1 capsule (0.4 mg total) by mouth once daily.  Qty: 30 capsule, Refills: 2    Associated Diagnoses: Benign non-nodular prostatic hyperplasia without lower urinary tract symptoms      XARELTO 20 mg Tab TAKE ONE TABLET BY MOUTH ONCE DAILY WITH  DINNER  OR  EVENING  MEAL  Qty: 30 tablet, Refills: 5    Comments: Please consider 90 day supplies to promote better adherence  Associated Diagnoses: Chronic atrial fibrillation      foam bandage (MEPILEX BORDER) 3 X 3 " Bndg Apply as needed  Qty: 10 each, Refills: 5    Associated Diagnoses: Venous stasis ulcer      ipratropium (ATROVENT) 0.06 % nasal spray 2 sprays by Nasal route 4 (four) times daily.  Qty: 15 mL, Refills: 6      mometasone (ELOCON) 0.1 % " ointment Apply topically once daily.      vardenafil (LEVITRA) 20 MG tablet Take 20 mg by mouth.             Indwelling Lines/Drains at time of discharge:   Lines/Drains/Airways          No matching active lines, drains, or airways          Time spent on the discharge of patient: 35 minutes  Patient was seen and examined on the date of discharge and determined to be suitable for discharge.      Kati Stafford MD  Department of Hospital Medicine  [facility]

## 2017-12-27 NOTE — NURSING
MD Mcdermott notified of an increase in troponin from 0.163 to 0.410. MD states not to worry about it. No new orders received. Will continue to monitor.

## 2017-12-27 NOTE — PLAN OF CARE
"   12/27/17 1000   Discharge Assessment   Assessment Type Discharge Planning Assessment   Confirmed/corrected address and phone number on facesheet? Yes  (correcting)   Assessment information obtained from? Patient;Medical Record   Expected Length of Stay (days) 2   Communicated expected length of stay with patient/caregiver yes   Prior to hospitilization cognitive status: Alert/Oriented   Prior to hospitalization functional status: Independent   Current cognitive status: Alert/Oriented   Current Functional Status: Needs Assistance  (due to ICU lines)   Lives With significant other   Is patient able to care for self after discharge? Yes   Who are your caregiver(s) and their phone number(s)? if needs assistance, s/o or sisters   Patient's perception of discharge disposition home or selfcare   Readmission Within The Last 30 Days no previous admission in last 30 days   Patient currently being followed by outpatient case management? No   Patient currently receives any other outside agency services? No   Equipment Currently Used at Home glucometer   Do you have any problems affording any of your prescribed medications? No   Is the patient taking medications as prescribed? yes   Does the patient have transportation home? Yes   Transportation Available family or friend will provide   Discharge Plan A Home   Patient/Family In Agreement With Plan yes   Does the patient have transportation to healthcare appointments? Yes   KRISTI met with patient in ICU, explained role of SW/CM with treatment team, provided contact information with the "discharge planning begins on admission" checklist handout.   Confirmed information in demographics: updating.   KRISTI reviewed the discharge planning folder, explained to leave in room with patient so that team/patient can place all written discharge information throughout hospital stay. Provided education regarding the importance of using written discharge information to help manage health care at " home.   SW provided education regarding the importance of obtaining, taking all medications at discharge. Preferred pharmacy is   [facility] - [place] - [address]  [address]  [place] [address]  Phone: [phone] Fax: [phone]    [address] [address]  Phone: [phone] Fax: [phone]  .  SW provided education regarding importance of going to all follow up appointments to help manage health care at home. Patient prefers medical appointment during early am.   SW will follow in ICU and assist as needed.    Patient expects to discharge later today once confirm that oral medication effective. Cards following to make decision.   Patient has not chosen new PCP since Dr Orosco left. He informs prefers to make own PCP appointment. SW can make cardiac follow up once Dr Mcdermott advises when.

## 2017-12-27 NOTE — PLAN OF CARE
Problem: Patient Care Overview  Goal: Plan of Care Review  Outcome: Ongoing (interventions implemented as appropriate)  Pt remains in ICU with amio gtt infusing. HR 60s-70s. 4 L NC; sats >95%. Pt voiding per urinal at the bedside. Pt denies chest pain or SOB at this time. AAOx4. Afebrile. No visitors at the bedside. No new hospital acquired injuries this shift.

## 2017-12-27 NOTE — ASSESSMENT & PLAN NOTE
Patient presented with wide complex tachycardia and HR>200.  Improved with dose of Amiodarone and then placed on infusion.  Patient has been admitted to ICU and Cardiology consulted.

## 2017-12-27 NOTE — SUBJECTIVE & OBJECTIVE
"Past Medical History:   Diagnosis Date    Diabetes mellitus     Hypertension     PTSD (post-traumatic stress disorder)        Past Surgical History:   Procedure Laterality Date    CARDIAC CATHETERIZATION         Review of patient's allergies indicates:  No Known Allergies    No current facility-administered medications on file prior to encounter.      Current Outpatient Prescriptions on File Prior to Encounter   Medication Sig    atorvastatin (LIPITOR) 80 MG tablet Take 1 tablet (80 mg total) by mouth once daily. (Patient taking differently: Take 80 mg by mouth once daily. Pt takes 1/2 pill daily, 40 mg.)    hydroCHLOROthiazide (HYDRODIURIL) 50 MG tablet TAKE ONE-HALF TABLET BY MOUTH ONCE DAILY    riociguat (ADEMPAS) 2.5 mg tablet Take 1 tablet (2.5 mg total) by mouth 3 (three) times daily.    tamsulosin (FLOMAX) 0.4 mg Cp24 Take 1 capsule (0.4 mg total) by mouth once daily.    XARELTO 20 mg Tab TAKE ONE TABLET BY MOUTH ONCE DAILY WITH  DINNER  OR  EVENING  MEAL    foam bandage (MEPILEX BORDER) 3 X 3 " Bndg Apply as needed    ipratropium (ATROVENT) 0.06 % nasal spray 2 sprays by Nasal route 4 (four) times daily.    mometasone (ELOCON) 0.1 % ointment Apply topically once daily.    vardenafil (LEVITRA) 20 MG tablet Take 20 mg by mouth.    [DISCONTINUED] loratadine (CLARITIN) 10 mg tablet Take 1 tablet (10 mg total) by mouth once daily.     Family History     Problem Relation (Age of Onset)    Cancer Father        Social History Main Topics    Smoking status: Never Smoker    Smokeless tobacco: Never Used    Alcohol use 1.2 - 1.8 oz/week     2 - 3 Shots of liquor per week      Comment: regularly    Drug use: No    Sexual activity: Yes     Partners: Female     Review of Systems   Constitutional: Negative for chills and fever.   HENT: Negative for ear discharge and ear pain.    Eyes: Negative for pain and itching.   Respiratory: Negative for cough and shortness of breath.    Cardiovascular: Negative for " chest pain and palpitations.   Gastrointestinal: Negative for abdominal distention and abdominal pain.   Endocrine: Negative for polyphagia and polyuria.   Genitourinary: Negative for difficulty urinating and dysuria.   Musculoskeletal: Negative for neck pain and neck stiffness.   Skin: Negative for rash and wound.   Neurological: Positive for dizziness. Negative for seizures.   Psychiatric/Behavioral: Negative for agitation and hallucinations.     Objective:     Vital Signs (Most Recent):  Temp: 97.8 °F (36.6 °C) (12/26/17 1530)  Pulse: (!) 119 (12/26/17 1803)  Resp: (!) 21 (12/26/17 1803)  BP: 110/63 (12/26/17 1802)  SpO2: 98 % (12/26/17 1803) Vital Signs (24h Range):  Temp:  [97.5 °F (36.4 °C)-97.9 °F (36.6 °C)] 97.8 °F (36.6 °C)  Pulse:  [114-249] 119  Resp:  [17-26] 21  SpO2:  [92 %-99 %] 98 %  BP: (101-136)/(51-87) 110/63     Weight: 88 kg (194 lb 0.1 oz)  Body mass index is 25.6 kg/m².    Physical Exam   Constitutional: He is oriented to person, place, and time. He appears well-developed. No distress.   HENT:   Head: Normocephalic and atraumatic.   Eyes: Conjunctivae are normal. Right eye exhibits no discharge. Left eye exhibits no discharge.   Neck: Neck supple.   Cardiovascular: Exam reveals no gallop and no friction rub.    Tachycardic   Pulmonary/Chest: Effort normal and breath sounds normal. No stridor.   Abdominal: Soft. Bowel sounds are normal.   Musculoskeletal: He exhibits no deformity.   Neurological: He is alert and oriented to person, place, and time.   Skin: Skin is warm and dry.           Significant Labs:   BMP:   Recent Labs  Lab 12/26/17  1230   *      K 3.5      CO2 19*   BUN 23   CREATININE 1.5*   CALCIUM 10.0   MG 2.2     CBC:   Recent Labs  Lab 12/26/17  1230   WBC 8.05   HGB 14.1   HCT 42.1          Significant Imaging: I have reviewed all pertinent imaging results/findings within the past 24 hours.

## 2017-12-27 NOTE — PROGRESS NOTES
"  [facility]  Adult Nutrition  Consult Note    SUMMARY     Recommendations    Recommendation/Intervention:   1. Continue current diet   2. RD to monitor    Goals: Meet 85% EEN  Nutrition Goal Status: new  Communication of RD Recs: reviewed with RN    Continuum of Care Plan    Referral to Outpatient Services:  (D/C planning: ADA/Cardiac Diet)    Reason for Assessment    Reason for Assessment: identified at risk by screening criteria  Diagnosis:  (tachycardia)  Relevent Medical History: DM, HTN   Interdisciplinary Rounds: did not attend     General Information Comments: Diet advanced to 2000 ADA/Cardiac. PO intake 100% x 2 meals. Patient denies n/v/chewing or swallowing issues. Reports weight stable PTA. Reports good BG levels at home. Reports prior education on Diabetic Diet.      Nutrition Prescription Ordered    Current Diet Order: 2000 ADA/Cardiac        Evaluation of Received Nutrients/Fluid Intake    Energy Calories Required: meeting needs  Protein Required: meeting needs     I/O: 530/510       Fluid Required: meeting needs  Comments: LBM: 12/26  Tolerance: tolerating  % Intake of Estimated Energy Needs: %  % Meal Intake: %    Nutrition Risk Screen     Nutrition Risk Screen: other (see comments) (ensure + once a week)    Nutrition/Diet History     Food Preferences: Denies cultural, Restorationist, ethnic food preferences.    Labs/Tests/Procedures/Meds     Pertinent Labs Reviewed: reviewed   Pertinent Medications Reviewed: reviewed     Physical Findings    Overall Physical Appearance: nourished   Oral/Mouth Cavity: WDL  Skin: intact    Anthropometrics    Temp: 98 °F (36.7 °C)     Height: 6' 1" (185.4 cm)  Weight Method: Bed Scale  Weight: 88 kg (194 lb 0.1 oz)  Ideal Body Weight (IBW), Male: 184 lb     % Ideal Body Weight, Male (lb): 105.44 lb     BMI (Calculated): 25.6  BMI Grade: 25 - 29.9 - overweight     Estimated/Assessed Needs    Weight Used For Calorie Calculations: 88 kg (194 lb " 0.1 oz)      Energy Calorie Requirements (kcal): 6593-9012 kcal  Energy Need Method: Montcalm-St Jeor     RMR (Montcalm-St. Jeor Equation): 1708.88      Weight Used For Protein Calculations: 88 kg (194 lb 0.1 oz)  Protein Requirements: 70-90 g     Fluid Need Method: RDA Method      RDA Method (mL): 2000       CHO Requirement: 250 g     Assessment and Plan    Nutrition Dx: No nutrition related issues at this time.       Monitor and Evaluation    Food and Nutrient Intake: energy intake, food and beverage intake  Food and Nutrient Adminstration: diet order  Knowledge/Beliefs/Attitudes: food and nutrition knowledge/skill  Physical Activity and Function: nutrition-related ADLs and IADLs  Anthropometric Measurements: weight, weight change  Biochemical Data, Medical Tests and Procedures: electrolyte and renal panel, glucose/endocrine profile  Nutrition-Focused Physical Findings: overall appearance    Nutrition Risk    Level of Risk:  (1 x week)    Nutrition Follow-Up    RD Follow-up?: Yes

## 2017-12-28 ENCOUNTER — PATIENT OUTREACH (OUTPATIENT)
Dept: ADMINISTRATIVE | Facility: CLINIC | Age: 67
End: 2017-12-28

## 2017-12-28 DIAGNOSIS — I27.24 CTEPH (CHRONIC THROMBOEMBOLIC PULMONARY HYPERTENSION): Primary | ICD-10-CM

## 2017-12-28 NOTE — Clinical Note
Please forward this important TCC information to your provider in order to maximize the post discharge care delivery of this patient.  C3 nurse spoke with Ambrosio Bray III  for a TCC post hospital discharge follow up call. The patient has a scheduled HOSFU appointment with Sonia Brothers MD on 1/8 @ 0900. Please change to a TCC HOSFU appointment  in [facility]. THANKING YOU IN ADVANCE  Respectfully, Gabrielle Helms RN  [facility]   [email or web address]     Please do not reply to this message, as this inbox is not routinely monitored.

## 2017-12-28 NOTE — PATIENT INSTRUCTIONS
Discharge Instructions for Atrial Fibrillation  You have been diagnosed with atrial fibrillation. With this condition, your hearts two upper chambers quiver rather than squeeze the blood out in a normal pattern. This leads to an irregular and sometimes rapid heartbeat. Some people will develop associated symptoms such as a flip flopping heartbeat, lightheadedness or shortness of breath. Other people may have no symptoms at all. Atrial fibrillation is serious since it affects the hearts ability to fill with blood as it should. Blood clots may form; this increases the risk of stroke. Untreated atrial fibrillation can also lead to heart failure. Atrial fibrillation can be controlled. With individualized treatment, most people with atrial fibrillation lead normal lives. It is estimated that over 2.5 million Americans have atrial fibrillation.  Home Care  Take your medications exactly as directed. Dont skip doses.  Work with your doctor to determine the proper medications and doses.  Learn to take your own pulse. Keep a record of your results. Ask your doctor which pulse rates mean that you need medical attention. Slowing your pulse is often the goal of treatment. Ask your doctor if its okay for you to use an automatic machine to check your pulse at home. Sometimes these machines dont count the pulse correctly when you have atrial fibrillation.  Limit your intake of coffee, tea, cola, and other beverages with caffeine to 2 per day. Talk with your doctor about whether you should eliminate caffeine.  Avoid over-the-counter medications that contain caffeine.  Let your doctor know what medication you take, including prescription and over-the-counter as well as any supplements. They interfere with some medications given for atrial fibrillation.  Ask your doctor about whether or not you can drink alcohol. Sometimes alcohol needs to be avoided to better treat atrial fibrillation. If you are taking blood-thinner  medications, alcohol may interfere with them by increasing their effect.  Never take stimulants such as amphetamines or cocaine. These drugs can speed up your heart rate and trigger atrial fibrillation.  Follow-Up  Make a follow-up appointment as directed by our staff.    When to Call Your Doctor  Call your doctor immediately if you have any of the following:  Weakness  Dizziness  Fainting  Fatigue  Shortness of breath  Chest pain with increased activity  A change in the usual regularity of your heartbeat, or an unusually fast heartbeat   © 6475-4376 [facility], [address] [place] [address], [place] [address]. All rights reserved. This information is not intended as a substitute for professional medical care. Always follow your healthcare professional's instructions.

## 2017-12-28 NOTE — PLAN OF CARE
12/28/17 1725   Final Note   Assessment Type Final Discharge Note   Discharge Disposition Home   What phone number can be called within the next 1-3 days to see how you are doing after discharge? [phone]   Hospital Follow Up  Appt(s) scheduled? Yes   Discharge plans and expectations educations in teach back method with documentation complete? Yes   written discharge education for follow up medical care, medications management, and tachycardia (Neil) provided (teach back method) per ZENA Zambrano to assist with s/s explanations.

## 2018-01-02 ENCOUNTER — LAB VISIT (OUTPATIENT)
Dept: LAB | Facility: HOSPITAL | Age: 68
End: 2018-01-02
Attending: UROLOGY
Payer: MEDICARE

## 2018-01-02 DIAGNOSIS — R97.20 ELEVATED PSA: ICD-10-CM

## 2018-01-02 LAB — COMPLEXED PSA SERPL-MCNC: 8.1 NG/ML

## 2018-01-02 PROCEDURE — 36415 COLL VENOUS BLD VENIPUNCTURE: CPT | Mod: PO

## 2018-01-02 PROCEDURE — 84153 ASSAY OF PSA TOTAL: CPT

## 2018-01-03 ENCOUNTER — OFFICE VISIT (OUTPATIENT)
Dept: CARDIOLOGY | Facility: CLINIC | Age: 68
End: 2018-01-03
Payer: MEDICARE

## 2018-01-03 VITALS
BODY MASS INDEX: 26.35 KG/M2 | SYSTOLIC BLOOD PRESSURE: 133 MMHG | OXYGEN SATURATION: 92 % | WEIGHT: 199.75 LBS | DIASTOLIC BLOOD PRESSURE: 73 MMHG | HEART RATE: 69 BPM

## 2018-01-03 DIAGNOSIS — I27.20 PULMONARY HYPERTENSION: ICD-10-CM

## 2018-01-03 DIAGNOSIS — I50.32 CHRONIC DIASTOLIC CHF (CONGESTIVE HEART FAILURE): ICD-10-CM

## 2018-01-03 DIAGNOSIS — I10 ESSENTIAL HYPERTENSION: ICD-10-CM

## 2018-01-03 DIAGNOSIS — R42 DIZZINESS: ICD-10-CM

## 2018-01-03 DIAGNOSIS — I48.0 PAROXYSMAL ATRIAL FIBRILLATION: Primary | ICD-10-CM

## 2018-01-03 DIAGNOSIS — I27.24 CTEPH (CHRONIC THROMBOEMBOLIC PULMONARY HYPERTENSION): ICD-10-CM

## 2018-01-03 PROCEDURE — 99214 OFFICE O/P EST MOD 30 MIN: CPT | Mod: S$PBB,,, | Performed by: INTERNAL MEDICINE

## 2018-01-03 PROCEDURE — 99999 PR PBB SHADOW E&M-EST. PATIENT-LVL III: CPT | Mod: PBBFAC,,, | Performed by: INTERNAL MEDICINE

## 2018-01-03 PROCEDURE — 99213 OFFICE O/P EST LOW 20 MIN: CPT | Mod: PBBFAC | Performed by: INTERNAL MEDICINE

## 2018-01-03 NOTE — PROGRESS NOTES
"Subjective:    Patient ID:  Ambrosio Bray III is a 67 y.o. male who presents for follow-up of Hospital Follow Up      Atrial Fibrillation   Past medical history includes atrial fibrillation.   Medication Refill   Pertinent negatives include no abdominal pain.     Previous history:  Here for follow-up of severe pulmonary hypertension.  He denies any worsening cardiopulmonary complaints.  He still has shortness breath on heavier exertion but relieved with rest.  He denies any sustained palpitations or tachycardia.  He's not expressing PND, orthopnea but has some lower extremity edema.  He says that his heart "eyes I was decreased and he is now taking it every other day due to heavy urination.  He describes no dizziness, presyncope or syncope.  He does have a prescription for compression stockings.  Whenever his leg feels he will wear them.  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    Today:  Here for follow-up of severe pulmonary hypertension.  He's recently admitted the hospital with wide complex tachyarrhythmia with a rate above 200 bpm.  He was given IV amiodarone bolus with correction to sinus tachycardia.  He's had no problems post hospital discharge.  He's agreeable to go see electrophysiology for evaluation.  He denies any current chest pain but has similar symptoms terms shortness of breath on heavier exertion but relieved with rest.  He denies any PND, orthopnea or lower edema.  He's not expressing dizziness, presyncope or syncope.  We discussed again symptom limited exercise.      Review of Systems   Constitution: Negative.   HENT: Negative.    Eyes: Negative.    Cardiovascular: Positive for dyspnea on exertion. Negative for irregular heartbeat, leg swelling, near-syncope, orthopnea and paroxysmal nocturnal dyspnea.   Skin: Negative.    Musculoskeletal: Negative.    Gastrointestinal: Negative for abdominal pain, constipation and diarrhea.   Genitourinary: Negative " for dysuria.   Psychiatric/Behavioral: Negative.         Objective:    Physical Exam   Constitutional: He is oriented to person, place, and time. He appears well-developed and well-nourished. No distress.   HENT:   Head: Normocephalic and atraumatic.   Eyes: Conjunctivae and EOM are normal. Pupils are equal, round, and reactive to light.   Neck: Normal range of motion. Neck supple. No thyromegaly present.   Cardiovascular: Normal rate, regular rhythm and normal heart sounds.    No murmur heard.  Pulmonary/Chest: Effort normal and breath sounds normal. No respiratory distress. He has no wheezes. He has no rales. He exhibits no tenderness.   Abdominal: Soft. Bowel sounds are normal.   Musculoskeletal: He exhibits no edema.   Neurological: He is alert and oriented to person, place, and time.   Skin: Skin is warm and dry.   Psychiatric: He has a normal mood and affect. His behavior is normal.       echo:  CONCLUSIONS     1 - Normal left ventricular systolic function (EF 55-60%).     2 - Severe right ventricular enlargement with moderately to severely depressed systolic function.     3 - Impaired LV relaxation, elevated LAP (grade 2 diastolic dysfunction).     4 - Biatrial enlargement (R>L).     5 - Mild to moderate tricuspid regurgitation.     6 - Mild mitral regurgitation.     7 - Pulmonary hypertension. The estimated PA systolic pressure is 103 mmHg.     8 - Increased central venous pressure.     Assessment:       1. Paroxysmal atrial fibrillation    2. Pulmonary hypertension    3. Essential hypertension    4. Dizziness    5. Chronic diastolic CHF (congestive heart failure)    6. CTEPH (chronic thromboembolic pulmonary hypertension)         Plan:       -severe pulm htn almost equal to systemic bp. Has home o2 but doesn't use  -no response in cath lab and likely irreversible dz.   -Follow-up with pulmonary Dr. Posadas  -anyi garrido with DVT hx and afib  -currently in NSR, stable on amio --> refer to EP with new onset  wide complex tachycardia      RTC 3 months

## 2018-01-08 ENCOUNTER — OFFICE VISIT (OUTPATIENT)
Dept: FAMILY MEDICINE | Facility: CLINIC | Age: 68
End: 2018-01-08
Payer: MEDICARE

## 2018-01-08 VITALS
SYSTOLIC BLOOD PRESSURE: 110 MMHG | TEMPERATURE: 98 F | HEIGHT: 73 IN | WEIGHT: 198.44 LBS | OXYGEN SATURATION: 94 % | BODY MASS INDEX: 26.3 KG/M2 | HEART RATE: 67 BPM | DIASTOLIC BLOOD PRESSURE: 80 MMHG

## 2018-01-08 DIAGNOSIS — I15.2 HYPERTENSION ASSOCIATED WITH DIABETES: Chronic | ICD-10-CM

## 2018-01-08 DIAGNOSIS — E11.29 TYPE 2 DIABETES MELLITUS WITH MICROALBUMINURIA, WITHOUT LONG-TERM CURRENT USE OF INSULIN: ICD-10-CM

## 2018-01-08 DIAGNOSIS — R80.9 TYPE 2 DIABETES MELLITUS WITH MICROALBUMINURIA, WITHOUT LONG-TERM CURRENT USE OF INSULIN: ICD-10-CM

## 2018-01-08 DIAGNOSIS — E11.59 HYPERTENSION ASSOCIATED WITH DIABETES: Chronic | ICD-10-CM

## 2018-01-08 DIAGNOSIS — N40.0 BENIGN NON-NODULAR PROSTATIC HYPERPLASIA WITHOUT LOWER URINARY TRACT SYMPTOMS: Primary | ICD-10-CM

## 2018-01-08 DIAGNOSIS — I48.20 CHRONIC ATRIAL FIBRILLATION: ICD-10-CM

## 2018-01-08 DIAGNOSIS — I50.32 CHRONIC DIASTOLIC CHF (CONGESTIVE HEART FAILURE): ICD-10-CM

## 2018-01-08 DIAGNOSIS — I82.5Z2 CHRONIC DEEP VEIN THROMBOSIS (DVT) OF DISTAL VEIN OF LEFT LOWER EXTREMITY: ICD-10-CM

## 2018-01-08 PROBLEM — I87.2 CHRONIC VENOUS STASIS DERMATITIS: Status: RESOLVED | Noted: 2017-07-11 | Resolved: 2018-01-08

## 2018-01-08 PROCEDURE — 99213 OFFICE O/P EST LOW 20 MIN: CPT | Mod: PBBFAC,PO | Performed by: FAMILY MEDICINE

## 2018-01-08 PROCEDURE — 99999 PR PBB SHADOW E&M-EST. PATIENT-LVL III: CPT | Mod: PBBFAC,,, | Performed by: FAMILY MEDICINE

## 2018-01-08 PROCEDURE — 99214 OFFICE O/P EST MOD 30 MIN: CPT | Mod: S$PBB,,, | Performed by: FAMILY MEDICINE

## 2018-01-08 RX ORDER — TAMSULOSIN HYDROCHLORIDE 0.4 MG/1
1 CAPSULE ORAL DAILY
Qty: 90 CAPSULE | Refills: 1 | Status: SHIPPED | OUTPATIENT
Start: 2018-01-08 | End: 2018-11-12 | Stop reason: SDUPTHER

## 2018-01-08 RX ORDER — ATORVASTATIN CALCIUM 40 MG/1
40 TABLET, FILM COATED ORAL DAILY
Qty: 90 TABLET | Refills: 1 | Status: SHIPPED | OUTPATIENT
Start: 2018-01-08 | End: 2018-12-10 | Stop reason: SDUPTHER

## 2018-01-08 RX ORDER — HYDROCHLOROTHIAZIDE 50 MG/1
TABLET ORAL
Qty: 90 TABLET | Refills: 1 | Status: SHIPPED | OUTPATIENT
Start: 2018-01-08 | End: 2019-01-04 | Stop reason: SDUPTHER

## 2018-01-08 NOTE — PROGRESS NOTES
Routine Office Visit    Patient Name: Ambrosio Bray III    : 1950  MRN: [id number]    Subjective:  Ambrosio is a 67 y.o. male who presents today for     1. Establish care/ new to me  2. hospital follow-up - pt was in ICU for wide complex tachycardia which he thinks started after using flonase. He was covered using IV amodarone. He was followed by cardiology and has already had cardiology follow-up. He has an appointment to see the EP specialist, Dr. Mendieta next week. He needs prescription refills. He otherwise denies any chest pain, sob, palpitations.     Review of Systems   Constitutional: Negative for chills and fever.   HENT: Negative for congestion.    Eyes: Negative for blurred vision.   Respiratory: Negative for cough.    Cardiovascular: Negative for chest pain.   Gastrointestinal: Negative for abdominal pain, constipation, diarrhea, heartburn, nausea and vomiting.   Genitourinary: Negative for dysuria.   Musculoskeletal: Negative for myalgias.   Skin: Negative for itching and rash.   Neurological: Negative for dizziness and headaches.   Psychiatric/Behavioral: Negative for depression.       Active Problem List  Patient Active Problem List   Diagnosis    Type 2 diabetes mellitus with microalbuminuria, without long-term current use of insulin    Chronic diastolic CHF (congestive heart failure)    Moderate tricuspid regurgitation    DVT, lower extremity, distal, chronic    Atrial fibrillation    Pulmonary HTN    Hypertension associated with diabetes    Pulmonary nodule    Varicose veins of both lower extremities    Chronic pulmonary heart disease    CTEPH (chronic thromboembolic pulmonary hypertension)    Metabolic acidosis       Past Surgical History  Past Surgical History:   Procedure Laterality Date    CARDIAC CATHETERIZATION         Family History  Family History   Problem Relation Age of Onset    Cancer Father      colon       Social History  Social History     Social History    Marital  "status: Single     Spouse name: N/A    Number of children: N/A    Years of education: N/A     Occupational History    Not on file.     Social History Main Topics    Smoking status: Never Smoker    Smokeless tobacco: Never Used    Alcohol use 1.2 - 1.8 oz/week     2 - 3 Shots of liquor per week      Comment: regularly    Drug use: No    Sexual activity: Yes     Partners: Female     Other Topics Concern    Not on file     Social History Narrative    No narrative on file       Medications and Allergies  Reviewed and updated.   Current Outpatient Prescriptions   Medication Sig    amiodarone (PACERONE) 200 MG Tab Take 1 tablet (200 mg total) by mouth 2 (two) times daily.    atorvastatin (LIPITOR) 40 MG tablet Take 1 tablet (40 mg total) by mouth once daily.    fluticasone (FLONASE) 50 mcg/actuation nasal spray 1 spray by Each Nare route once daily.    foam bandage (MEPILEX BORDER) 3 X 3 " Bndg Apply as needed    hydroCHLOROthiazide (HYDRODIURIL) 50 MG tablet TAKE ONE-HALF TABLET BY MOUTH ONCE DAILY    ipratropium (ATROVENT) 0.06 % nasal spray 2 sprays by Nasal route 4 (four) times daily. (Patient taking differently: 2 sprays by Nasal route 4 (four) times daily as needed. )    mometasone (ELOCON) 0.1 % ointment Apply topically once daily.    riociguat (ADEMPAS) 2.5 mg tablet Take 1 tablet (2.5 mg total) by mouth 3 (three) times daily.    rivaroxaban (XARELTO) 20 mg Tab TAKE ONE TABLET BY MOUTH ONCE DAILY WITH  DINNER  OR  EVENING  MEAL    tamsulosin (FLOMAX) 0.4 mg Cp24 Take 1 capsule (0.4 mg total) by mouth once daily.     No current facility-administered medications for this visit.        Physical Exam  /80 (BP Location: Right arm, Patient Position: Sitting, BP Method: Large (Manual))   Pulse 67   Temp 98.1 °F (36.7 °C) (Oral)   Ht 6' 1" (1.854 m)   Wt 90 kg (198 lb 6.6 oz)   SpO2 (!) 94%   BMI 26.18 kg/m²   Physical Exam   Constitutional: He is oriented to person, place, and time. He " appears well-developed and well-nourished.   HENT:   Head: Normocephalic and atraumatic.   Right Ear: Hearing, tympanic membrane, external ear and ear canal normal.   Left Ear: Hearing, tympanic membrane, external ear and ear canal normal.   Eyes: Conjunctivae and EOM are normal. Pupils are equal, round, and reactive to light.   Neck: Normal range of motion. Neck supple. No JVD present. No thyromegaly present.   Cardiovascular: Normal rate, regular rhythm and normal heart sounds.    Pulmonary/Chest: Effort normal and breath sounds normal. He has no wheezes.   Abdominal: Soft. Bowel sounds are normal. He exhibits no distension. There is no tenderness. There is no guarding.   Musculoskeletal: Normal range of motion.   Lymphadenopathy:     He has no cervical adenopathy.   Neurological: He is alert and oriented to person, place, and time.   Skin: Skin is warm and dry.   Psychiatric: He has a normal mood and affect. His behavior is normal.         Assessment/Plan:  Ambrosio Bray III is a 67 y.o. male who presents today for :    Benign non-nodular prostatic hyperplasia without lower urinary tract symptoms  -     tamsulosin (FLOMAX) 0.4 mg Cp24; Take 1 capsule (0.4 mg total) by mouth once daily.  Dispense: 90 capsule; Refill: 1  The current medical regimen is effective;  continue present plan and medications.    Chronic atrial fibrillation / Chronic deep vein thrombosis (DVT) of distal vein of left lower extremity  -     rivaroxaban (XARELTO) 20 mg Tab; TAKE ONE TABLET BY MOUTH ONCE DAILY WITH  DINNER  OR  EVENING  MEAL  Dispense: 90 tablet; Refill: 1  The current medical regimen is effective;  continue present plan and medications.    Hypertension associated with diabetes / Type 2 diabetes mellitus with microalbuminuria, without long-term current use of insulin  -     hydroCHLOROthiazide (HYDRODIURIL) 50 MG tablet; TAKE ONE-HALF TABLET BY MOUTH ONCE DAILY  Dispense: 90 tablet; Refill: 1  -     atorvastatin (LIPITOR) 40  MG tablet; Take 1 tablet (40 mg total) by mouth once daily.  Dispense: 90 tablet; Refill: 1  The current medical regimen is effective;  continue present plan and medications.    Chronic diastolic CHF (congestive heart failure)  Has f/u with cardiology - Dr. Mcdermott  Has f/u with EP - Dr. Duffy   Continue current medication regimen         Return in about 3 months (around 4/8/2018), or if symptoms worsen or fail to improve.

## 2018-01-09 ENCOUNTER — OFFICE VISIT (OUTPATIENT)
Dept: UROLOGY | Facility: CLINIC | Age: 68
End: 2018-01-09
Payer: MEDICARE

## 2018-01-09 ENCOUNTER — TELEPHONE (OUTPATIENT)
Dept: ELECTROPHYSIOLOGY | Facility: CLINIC | Age: 68
End: 2018-01-09

## 2018-01-09 VITALS
HEART RATE: 57 BPM | WEIGHT: 198.44 LBS | DIASTOLIC BLOOD PRESSURE: 70 MMHG | BODY MASS INDEX: 26.3 KG/M2 | SYSTOLIC BLOOD PRESSURE: 110 MMHG | HEIGHT: 73 IN

## 2018-01-09 DIAGNOSIS — I48.0 PAROXYSMAL ATRIAL FIBRILLATION: Primary | ICD-10-CM

## 2018-01-09 DIAGNOSIS — N52.9 ERECTILE DYSFUNCTION, UNSPECIFIED ERECTILE DYSFUNCTION TYPE: Primary | ICD-10-CM

## 2018-01-09 DIAGNOSIS — R97.20 ELEVATED PSA: ICD-10-CM

## 2018-01-09 PROCEDURE — 99213 OFFICE O/P EST LOW 20 MIN: CPT | Mod: PBBFAC,PO | Performed by: UROLOGY

## 2018-01-09 PROCEDURE — 99999 PR PBB SHADOW E&M-EST. PATIENT-LVL III: CPT | Mod: PBBFAC,,, | Performed by: UROLOGY

## 2018-01-09 PROCEDURE — 99213 OFFICE O/P EST LOW 20 MIN: CPT | Mod: S$PBB,,, | Performed by: UROLOGY

## 2018-01-09 NOTE — PROGRESS NOTES
"Subjective:       Patient ID: Ambrosio Bray III is a 67 y.o. male.    Chief Complaint: Elevated PSA (8.1 FROM 7.1  6 MONTHS AGO)    HPI patient has a rising PSA which is now 8.1.  He status post negative truss with biopsy in the past.  He has atrial fibrillation and erectile dysfunction we discussed different treatment medications and procedures.  He may wish to try pep injections at a later date and he will call when he is ready for that.  He is voiding well without difficulty.  He takes Xarelto    Past Medical History:   Diagnosis Date    Diabetes mellitus     Hypertension     PTSD (post-traumatic stress disorder)        Past Surgical History:   Procedure Laterality Date    CARDIAC CATHETERIZATION         Family History   Problem Relation Age of Onset    Cancer Father      colon       Social History     Social History    Marital status: Single     Spouse name: N/A    Number of children: N/A    Years of education: N/A     Occupational History    Not on file.     Social History Main Topics    Smoking status: Never Smoker    Smokeless tobacco: Never Used    Alcohol use 1.2 - 1.8 oz/week     2 - 3 Shots of liquor per week      Comment: regularly    Drug use: No    Sexual activity: Yes     Partners: Female     Other Topics Concern    Not on file     Social History Narrative    No narrative on file       Allergies:  Patient has no known allergies.    Medications:    Current Outpatient Prescriptions:     amiodarone (PACERONE) 200 MG Tab, Take 1 tablet (200 mg total) by mouth 2 (two) times daily., Disp: 60 tablet, Rfl: 0    atorvastatin (LIPITOR) 40 MG tablet, Take 1 tablet (40 mg total) by mouth once daily., Disp: 90 tablet, Rfl: 1    fluticasone (FLONASE) 50 mcg/actuation nasal spray, 1 spray by Each Nare route once daily., Disp: , Rfl:     foam bandage (MEPILEX BORDER) 3 X 3 " Bndg, Apply as needed, Disp: 10 each, Rfl: 5    hydroCHLOROthiazide (HYDRODIURIL) 50 MG tablet, TAKE ONE-HALF TABLET BY " MOUTH ONCE DAILY, Disp: 90 tablet, Rfl: 1    ipratropium (ATROVENT) 0.06 % nasal spray, 2 sprays by Nasal route 4 (four) times daily. (Patient taking differently: 2 sprays by Nasal route 4 (four) times daily as needed. ), Disp: 15 mL, Rfl: 6    mometasone (ELOCON) 0.1 % ointment, Apply topically once daily., Disp: , Rfl:     riociguat (ADEMPAS) 2.5 mg tablet, Take 1 tablet (2.5 mg total) by mouth 3 (three) times daily., Disp: 90 tablet, Rfl: 11    rivaroxaban (XARELTO) 20 mg Tab, TAKE ONE TABLET BY MOUTH ONCE DAILY WITH  DINNER  OR  EVENING  MEAL, Disp: 90 tablet, Rfl: 1    tamsulosin (FLOMAX) 0.4 mg Cp24, Take 1 capsule (0.4 mg total) by mouth once daily., Disp: 90 capsule, Rfl: 1    Review of Systems   Constitutional: Negative for activity change, appetite change, chills, diaphoresis, fatigue, fever and unexpected weight change.   HENT: Negative for congestion, dental problem, hearing loss, mouth sores, postnasal drip, rhinorrhea, sinus pressure and trouble swallowing.    Eyes: Negative for pain, discharge and itching.   Respiratory: Negative for apnea, cough, choking, chest tightness, shortness of breath and wheezing.    Cardiovascular: Negative for chest pain, palpitations and leg swelling.   Gastrointestinal: Negative for abdominal distention, abdominal pain, anal bleeding, blood in stool, constipation, diarrhea, nausea, rectal pain and vomiting.   Endocrine: Negative for polydipsia and polyuria.   Genitourinary: Negative for decreased urine volume, difficulty urinating, discharge, dysuria, enuresis, flank pain, frequency, genital sores, hematuria, penile pain, penile swelling, scrotal swelling, testicular pain and urgency.   Musculoskeletal: Negative for arthralgias, back pain and myalgias.   Skin: Negative for color change, rash and wound.   Neurological: Negative for dizziness, syncope, speech difficulty, light-headedness and headaches.   Hematological: Negative for adenopathy. Does not bruise/bleed  easily.   Psychiatric/Behavioral: Negative for behavioral problems, confusion, hallucinations and sleep disturbance.       Objective:      Physical Exam   Constitutional: He appears well-developed.   HENT:   Head: Normocephalic.   Cardiovascular: Normal rate.    Pulmonary/Chest: Effort normal.   Abdominal: Soft.   Genitourinary: Prostate normal.   Neurological: He is alert.   Skin: Skin is warm.     Psychiatric: He has a normal mood and affect.       Assessment:       1. Erectile dysfunction, unspecified erectile dysfunction type    2. Elevated PSA        Plan:       Ambrosio was seen today for elevated psa.    Diagnoses and all orders for this visit:    Erectile dysfunction, unspecified erectile dysfunction type    Elevated PSA  -     Prostate Specific Antigen, Diagnostic; Future  -     MRI Pelvis W WO Contrast; Future        phone review MRI results patient sees Dr. Mcdermott as his cardiologist

## 2018-01-09 NOTE — TELEPHONE ENCOUNTER
Pt scheduled with Dr. Duffy 1/11. Asked new pt questions, pt denies any outside records/testing/devices. Pt confirmed his 730AM EKG and 8AM Dr. Duffy appt.

## 2018-01-11 ENCOUNTER — INITIAL CONSULT (OUTPATIENT)
Dept: ELECTROPHYSIOLOGY | Facility: CLINIC | Age: 68
End: 2018-01-11
Payer: MEDICARE

## 2018-01-11 ENCOUNTER — HOSPITAL ENCOUNTER (OUTPATIENT)
Dept: CARDIOLOGY | Facility: CLINIC | Age: 68
Discharge: HOME | End: 2018-01-11
Payer: MEDICARE

## 2018-01-11 VITALS
HEART RATE: 63 BPM | DIASTOLIC BLOOD PRESSURE: 63 MMHG | HEIGHT: 74 IN | WEIGHT: 199.75 LBS | SYSTOLIC BLOOD PRESSURE: 118 MMHG | BODY MASS INDEX: 25.64 KG/M2

## 2018-01-11 DIAGNOSIS — I48.0 PAROXYSMAL ATRIAL FIBRILLATION: ICD-10-CM

## 2018-01-11 DIAGNOSIS — I27.24 CTEPH (CHRONIC THROMBOEMBOLIC PULMONARY HYPERTENSION): Primary | ICD-10-CM

## 2018-01-11 DIAGNOSIS — I48.3 TYPICAL ATRIAL FLUTTER: ICD-10-CM

## 2018-01-11 DIAGNOSIS — E11.59 HYPERTENSION ASSOCIATED WITH DIABETES: Chronic | ICD-10-CM

## 2018-01-11 DIAGNOSIS — I27.20 PULMONARY HTN: ICD-10-CM

## 2018-01-11 DIAGNOSIS — I15.2 HYPERTENSION ASSOCIATED WITH DIABETES: Chronic | ICD-10-CM

## 2018-01-11 PROCEDURE — 99999 PR PBB SHADOW E&M-EST. PATIENT-LVL III: CPT | Mod: PBBFAC,,, | Performed by: INTERNAL MEDICINE

## 2018-01-11 PROCEDURE — 93005 ELECTROCARDIOGRAM TRACING: CPT | Mod: PBBFAC | Performed by: INTERNAL MEDICINE

## 2018-01-11 PROCEDURE — 99205 OFFICE O/P NEW HI 60 MIN: CPT | Mod: S$PBB,,, | Performed by: INTERNAL MEDICINE

## 2018-01-11 PROCEDURE — 93010 ELECTROCARDIOGRAM REPORT: CPT | Mod: S$PBB,,, | Performed by: INTERNAL MEDICINE

## 2018-01-11 PROCEDURE — 99213 OFFICE O/P EST LOW 20 MIN: CPT | Mod: PBBFAC,25 | Performed by: INTERNAL MEDICINE

## 2018-01-11 NOTE — PROGRESS NOTES
Subjective:    Patient ID:  Ambrosio Bray III is a 67 y.o. male who presents for evaluation of Atrial Fibrillation      67 yoM pHTN, thromboembolic disease, DM, HTN here for evaluation of arrhythmia. He has severe pHTN as well as chronic thromboembolic disease. 12/26/17 he presented with rapid palpitations and dyspnea. He was found to be in a wide complex tachycardia with rate of 240 bpm. Amiodarone was given and his rate dropped in half to 118 bpm. He has ECG features consistent with AFL 2:1 conduction. He was then placed on amiodarone 200 mg bid. He was on xarelto chronically. He denies any subsequent symptoms. He had normal EF 10/17 however his PAP was 103 mm Hg. He is followed by Florencia Mcdermott and [facility]. He had history of atrial fibrillation and underwent RITO/CV 9/30/14. In my review of his ECGs, I feel that this is typical AFL as opposed to AF. He was on amiodarone for about one year post CV.     Echo 10/17:  CONCLUSIONS     1 - Normal left ventricular systolic function (EF 55-60%).     2 - Severe right ventricular enlargement with moderately to severely depressed systolic function.     3 - Impaired LV relaxation, elevated LAP (grade 2 diastolic dysfunction).     4 - Biatrial enlargement (R>L).     5 - Mild to moderate tricuspid regurgitation.     6 - Mild mitral regurgitation.     7 - Pulmonary hypertension. The estimated PA systolic pressure is 103 mmHg.     8 - Increased central venous pressure.     Past Medical History:  No date: Diabetes mellitus  No date: Hypertension  No date: PTSD (post-traumatic stress disorder)    Past Surgical History:  No date: CARDIAC CATHETERIZATION    Social History    Marital status: Single              Spouse name:                       Years of education:                 Number of children:               Occupational History    None on file    Social History Main Topics    Smoking status: Never Smoker                                                                Smokeless  tobacco: Never Used                        Alcohol use: Yes           1.2 - 1.8 oz/week       Shots of liquor: 2 - 3 per week       Comment: regularly    Drug use: No              Sexual activity: Yes               Partners with: Female    Other Topics            Concern    None on file    Social History Narrative    None on file    Review of patient's family history indicates:    Cancer                         Father                      Comment: colon          Review of Systems   Constitution: Negative.   HENT: Negative.    Eyes: Negative.    Cardiovascular: Positive for dyspnea on exertion and palpitations. Negative for chest pain, leg swelling, near-syncope and syncope.   Respiratory: Negative.  Negative for shortness of breath.    Endocrine: Negative.    Hematologic/Lymphatic: Negative.    Skin: Negative.    Musculoskeletal: Negative.    Gastrointestinal: Negative.    Genitourinary: Negative.    Neurological: Negative.  Negative for dizziness and light-headedness.   Psychiatric/Behavioral: Negative.    Allergic/Immunologic: Negative.         Objective:    Physical Exam   Constitutional: He is oriented to person, place, and time. He appears well-developed and well-nourished. No distress.   HENT:   Head: Normocephalic and atraumatic.   Eyes: Conjunctivae and EOM are normal. Pupils are equal, round, and reactive to light. Right eye exhibits no discharge. Left eye exhibits no discharge.   Neck: Normal range of motion. Neck supple. No JVD present. No thyromegaly present.   Cardiovascular: Normal rate, regular rhythm, S1 normal, S2 normal and normal heart sounds.  PMI is not displaced.  Exam reveals no gallop and no friction rub.    No murmur heard.  Pulmonary/Chest: Effort normal and breath sounds normal. No respiratory distress. He has no wheezes. He has no rales. He exhibits no tenderness.   Abdominal: Soft. Bowel sounds are normal. He exhibits no distension. There is no tenderness. There is no rebound and no  guarding.   Musculoskeletal: Normal range of motion. He exhibits no edema or tenderness.   Neurological: He is alert and oriented to person, place, and time. No cranial nerve deficit.   Skin: Skin is warm and dry. No rash noted. No erythema.   Psychiatric: He has a normal mood and affect. His behavior is normal. Judgment and thought content normal.   Vitals reviewed.    ECg: NSR nl TX, iRBBB, LAE, R axis        Assessment:       1. CTEPH (chronic thromboembolic pulmonary hypertension)    2. Typical atrial flutter    3. Pulmonary HTN    4. Hypertension associated with diabetes         Plan:       67 yoM with severe pHTN, AFL here for arrhythmia management. I discussed AFL and its basic pathophysiology, including its health implications and treatment options with the patient. Specifically we discussed the need for appropriate CVA prophylaxis as well as arrhythmia control by pharmacologic and/or procedural methods. He had what I believe was typical AFL. His rapid wide complex tachycardia was most likely 1:1 AFL given that the flutter wave cycle length matched the WCT cycle length. He is on xarelto for CTE therapy which will suffice for CVA prophylaxis. With regard to management, I would normally offer AFL ablation however his pHTN is severe. Will discuss with his pulmonologist regarding the safety of this patient undergoing an elective procedure. Option 1 would be to perform the procedure soon and avoid long term complications of amiodarone. Option 2 would be to perform the ablation only if he has recurrence on amiodarone.       Will discuss with Florencia Posadas and Sharron. I discussed the case with anesthesia staff who feel that adequate sedation for right sided RFA is possible despite his p HTN.

## 2018-01-11 NOTE — LETTER
January 11, 2018      Kun Mcdermott MD  [address]  [address]  [facility] [address]           [facility]  [address]  [facility] [address]  Phone: [phone]  Fax: [phone]          Patient: Ambrosio Bray III   MR Number: [id number]   YOB: 1950   Date of Visit: 1/11/2018       Dear Dr. Kun Mcdermott:    Thank you for referring Ambrosio Bray to me for evaluation. Attached you will find relevant portions of my assessment and plan of care.    If you have questions, please do not hesitate to call me. I look forward to following Ambrosio Bray along with you.    Sincerely,    Chaim Duffy MD    Enclosure  CC:  No Recipients    If you would like to receive this communication electronically, please contact [email or web address] or [phone] to request more information on [facility] Link access.    For providers and/or their staff who would like to refer a patient to [facility], please contact us through our one-stop-shop provider referral line, [facility], at [phone].    If you feel you have received this communication in error or would no longer like to receive these types of communications, please e-mail [email or web address]

## 2018-01-11 NOTE — Clinical Note
I think that the most recent arrhythmia was AFL conducted 1:1. I feel that ablation is appropriate however pHTN carries higher risk just based on sedation. I discussed the case with our hear anesthesiologist who feels that we could get the ablation performed. Unless there is any objection, I will offer Mr Bray an atrial flutter ablation.  Chaim Duffy

## 2018-01-12 ENCOUNTER — HOSPITAL ENCOUNTER (OUTPATIENT)
Dept: RADIOLOGY | Facility: HOSPITAL | Age: 68
Discharge: HOME | End: 2018-01-12
Attending: UROLOGY
Payer: MEDICARE

## 2018-01-12 DIAGNOSIS — R97.20 ELEVATED PSA: ICD-10-CM

## 2018-01-12 PROCEDURE — 72197 MRI PELVIS W/O & W/DYE: CPT | Mod: TC

## 2018-01-12 PROCEDURE — 72197 MRI PELVIS W/O & W/DYE: CPT | Mod: 26,GC,, | Performed by: RADIOLOGY

## 2018-01-12 PROCEDURE — 25500020 PHARM REV CODE 255: Performed by: UROLOGY

## 2018-01-12 PROCEDURE — A9585 GADOBUTROL INJECTION: HCPCS | Performed by: UROLOGY

## 2018-01-12 RX ORDER — GADOBUTROL 604.72 MG/ML
10 INJECTION INTRAVENOUS
Status: COMPLETED | OUTPATIENT
Start: 2018-01-12 | End: 2018-01-12

## 2018-01-12 RX ADMIN — GADOBUTROL 10 ML: 604.72 INJECTION INTRAVENOUS at 12:01

## 2018-01-16 DIAGNOSIS — I48.0 PAROXYSMAL ATRIAL FIBRILLATION: Primary | ICD-10-CM

## 2018-01-16 DIAGNOSIS — R97.20 ELEVATED PSA: Primary | ICD-10-CM

## 2018-01-16 RX ORDER — SULFAMETHOXAZOLE AND TRIMETHOPRIM 800; 160 MG/1; MG/1
TABLET ORAL
Qty: 4 TABLET | Refills: 0 | Status: SHIPPED | OUTPATIENT
Start: 2018-01-16 | End: 2018-02-19 | Stop reason: ALTCHOICE

## 2018-01-16 RX ORDER — AMIODARONE HYDROCHLORIDE 200 MG/1
200 TABLET ORAL 2 TIMES DAILY
Qty: 60 TABLET | Refills: 11 | Status: ON HOLD | OUTPATIENT
Start: 2018-01-16 | End: 2018-02-03 | Stop reason: HOSPADM

## 2018-01-16 NOTE — TELEPHONE ENCOUNTER
----- Message from Nabila Munoz sent at 1/15/2018  3:35 PM CST -----  Contact: patient  Please call pt at [phone] if any questions. Refill needed for Amiodarone 200 mg called into [facility] at [phone]. Out of meds  2nd request     Thank you

## 2018-01-18 ENCOUNTER — TELEPHONE (OUTPATIENT)
Dept: UROLOGY | Facility: CLINIC | Age: 68
End: 2018-01-18

## 2018-01-18 NOTE — TELEPHONE ENCOUNTER
Dr barnes would like to do a prostate biopsy on mr mak. Pt is currently on xarelto. Dr barnes would like to hold x 7 days with your approval. Please advise

## 2018-01-18 NOTE — TELEPHONE ENCOUNTER
----- Message from Julius Matute Jr., MD sent at 1/16/2018  7:51 AM CST -----  PIRADS 4 lesions  Needs uronav and hold xarelto and any other anticoags if ok w cards

## 2018-01-19 ENCOUNTER — TELEPHONE (OUTPATIENT)
Dept: TRANSPLANT | Facility: CLINIC | Age: 68
End: 2018-01-19

## 2018-01-22 ENCOUNTER — TELEPHONE (OUTPATIENT)
Dept: UROLOGY | Facility: CLINIC | Age: 68
End: 2018-01-22

## 2018-01-22 NOTE — TELEPHONE ENCOUNTER
Pt notified of date and time for uronav appt. Pt has antibiotics. Pt instructed to stop xarelto 2 days before biopsy. appt and instructions mailed. Pt verbalizes understanding

## 2018-01-23 ENCOUNTER — TELEPHONE (OUTPATIENT)
Dept: ELECTROPHYSIOLOGY | Facility: CLINIC | Age: 68
End: 2018-01-23

## 2018-01-23 DIAGNOSIS — I48.3 TYPICAL ATRIAL FLUTTER: Primary | ICD-10-CM

## 2018-01-23 DIAGNOSIS — I49.9 CARDIAC ARRHYTHMIA, UNSPECIFIED CARDIAC ARRHYTHMIA TYPE: ICD-10-CM

## 2018-01-23 NOTE — TELEPHONE ENCOUNTER
ABLATION EDUCATION CHECKLIST    1/24/18  PRE - PROCEDURE LABS HAVE BEEN ORDERED FOR YOU @ [facility]  BE SURE TO ARRIVE AT YOUR SCHEDULED TIME FOR THIS LAB WORK!  (YOU DO NOT HAVE TO FAST FOR THIS LABWORK!!!!)    2/2/18 @ 6 AM  Report to Cardiology Waiting Room on 3rd floor of the Hospital    (Do not report to clinic)  Directions for Reporting to Cardiology Waiting Area in the Hospital  If you park in the Parking Garage:  Take elevators to the 2nd floor  Walk up ramp and turn right by Gold Elevators  Take elevator to the 3rd floor  Upon exiting the elevator, turn away from the clinic areas  Walk long christensen around to front of hospital to area with windows overlooking [facility]  Check in at Reception Desk  OR  If family is dropping you off:  Have them drop you off at the front of the Hospital  (Near the ER, where all the flags are hung).  Take the E elevators to the 3rd floor.  Check in at the Reception Desk in the waiting room.    Do not eat or drink anything after: 12 mn on the night before your procedure    Medications:   Hold your blood thinner the evening before your procedure (LAST DOSE: 1/31/18)  Hold your fluid pill: Hydrochlorothiazide-HCTZ (Hydrodiuril) the morning of your procedure  You may take your other usual morning medications with a sip of water    You will be spending the night after your procedure  You will need someone to drive you home the day after your procedure.    Your pain during your procedure will be managed by the anesthesia team.     THE ABOVE INSTRUCTIONS WERE GIVEN TO THE PATIENT VERBALLY AND THEY VERBALIZED UNDERSTANDING.  THEY DO NOT REQUIRE ANY SPECIAL NEEDS AND DO NOT HAVE ANY LEARNING BARRIERS.    Any need to reschedule or cancel procedures, or any questions regarding your procedures should be addressed directly with the Arrhythmia Department Nurses at the following phone number: [phone]

## 2018-01-23 NOTE — TELEPHONE ENCOUNTER
----- Message from Nabila Munoz sent at 1/23/2018  2:53 PM CST -----  Contact: patient  Please call pt at [phone]. Patient returning your call regarding his labs and insist on speaking to you    Thank you

## 2018-01-23 NOTE — TELEPHONE ENCOUNTER
Spoke with pt and advised pre-procedure labs will be done tomorrow previously scheduled appt. Understanding verbalized.'

## 2018-01-24 ENCOUNTER — TELEPHONE (OUTPATIENT)
Dept: UROLOGY | Facility: CLINIC | Age: 68
End: 2018-01-24

## 2018-01-24 ENCOUNTER — TELEPHONE (OUTPATIENT)
Dept: TRANSPLANT | Facility: CLINIC | Age: 68
End: 2018-01-24

## 2018-01-24 ENCOUNTER — TELEPHONE (OUTPATIENT)
Dept: CARDIOLOGY | Facility: CLINIC | Age: 68
End: 2018-01-24

## 2018-01-24 ENCOUNTER — OFFICE VISIT (OUTPATIENT)
Dept: TRANSPLANT | Facility: CLINIC | Age: 68
End: 2018-01-24
Payer: MEDICARE

## 2018-01-24 ENCOUNTER — HOSPITAL ENCOUNTER (OUTPATIENT)
Dept: PULMONOLOGY | Facility: CLINIC | Age: 68
Discharge: HOME | End: 2018-01-24
Payer: MEDICARE

## 2018-01-24 ENCOUNTER — TELEPHONE (OUTPATIENT)
Dept: ELECTROPHYSIOLOGY | Facility: CLINIC | Age: 68
End: 2018-01-24

## 2018-01-24 VITALS
WEIGHT: 200.38 LBS | BODY MASS INDEX: 27.14 KG/M2 | DIASTOLIC BLOOD PRESSURE: 66 MMHG | SYSTOLIC BLOOD PRESSURE: 133 MMHG | HEIGHT: 72 IN | HEART RATE: 93 BPM | BODY MASS INDEX: 26.56 KG/M2 | HEIGHT: 73 IN | OXYGEN SATURATION: 95 % | WEIGHT: 200.38 LBS

## 2018-01-24 DIAGNOSIS — I27.9 CHRONIC PULMONARY HEART DISEASE: ICD-10-CM

## 2018-01-24 DIAGNOSIS — I27.24 CTEPH (CHRONIC THROMBOEMBOLIC PULMONARY HYPERTENSION): Primary | ICD-10-CM

## 2018-01-24 DIAGNOSIS — I27.24 CTEPH (CHRONIC THROMBOEMBOLIC PULMONARY HYPERTENSION): ICD-10-CM

## 2018-01-24 PROCEDURE — 99213 OFFICE O/P EST LOW 20 MIN: CPT | Mod: PBBFAC | Performed by: INTERNAL MEDICINE

## 2018-01-24 PROCEDURE — 94618 PULMONARY STRESS TESTING: CPT | Mod: PBBFAC | Performed by: INTERNAL MEDICINE

## 2018-01-24 PROCEDURE — 94618 PULMONARY STRESS TESTING: CPT | Mod: 26,S$PBB,, | Performed by: INTERNAL MEDICINE

## 2018-01-24 PROCEDURE — 99214 OFFICE O/P EST MOD 30 MIN: CPT | Mod: S$PBB,,, | Performed by: INTERNAL MEDICINE

## 2018-01-24 PROCEDURE — 99999 PR PBB SHADOW E&M-EST. PATIENT-LVL III: CPT | Mod: PBBFAC,,, | Performed by: INTERNAL MEDICINE

## 2018-01-24 NOTE — PROCEDURES
Ambrosio Bray III is a 67 y.o.  male patient, who presents for a 6 minute walk test ordered by MD Cuauhtemoc.  The diagnosis is Pulmonary Hypertension.  The patient's BMI is 27.2kg/m2. Predicted distance (lower limit of normal) is 369.43 meters.    Test Results:    The test was completed without stopping.  The total time walked was 360 seconds.  During walking, the patient reported:  No complaints. The patient used no assistive devices during testing.     01/24/2018---------Distance: 413.31 meters (1356 feet)     O2 Sat % Supplemental Oxygen Heart Rate Blood Pressure Archie Scale   Pre-exercise  (Resting) 96 % Room Air 61 bpm 131/67 0   During Exercise 86 % Room Air 99 bpm 143/83 0   Post-exercise   97 % Room Air  78 bpm         Recovery Time: 104 seconds    Oxygen Qualification:     O2 Sat % Supplemental Oxygen Heart Rate Blood Pressure Archie Scale   Pre-exercise  (Resting) 98 % 2 L/M  61 bpm  133/66  0    During Exercise 98 %  2 L/M  85 bpm  125/69  0    Post-exercise   97 %  2 L/M  69 bpm            Recovery Time: 54 seconds    Performing nurse/tech: Rao. LINK    PREVIOUS STUDY:   The patient had a previous study.  09/06/2017---------Distance: 426.72 meters (1400 feet)       O2 Sat % Supplemental Oxygen Heart Rate Blood Pressure Archie Scale   Pre-exercise  (Resting) 97 % Room Air 91 bpm 133/69 mmHg 0   During Exercise 90 % Room Air 123 bpm 170/79 mmHg 0   Post-exercise  (Recovery) 96 % Room Air  93 bpm 128/60 mmHg           CLINICAL INTERPRETATION:  Six minute walk distance is 413.31 meters (1356 feet) with no dyspnea.  During exercise, there was significant desaturation while breathing room air.  Blood pressure remained stable and Heart rate increased significantly with walking.  This may represent a tachycardic response to exercise.  The patient did not report non-pulmonary symptoms during exercise.  The patient may benefit from using supplemental oxygen during exertion.  Since the previous study in  September 2017, exercise capacity is unchanged.  Based upon age and body mass index, exercise capacity is normal.   Oxygen saturation did improve while breathing supplemental oxygen.

## 2018-01-24 NOTE — TELEPHONE ENCOUNTER
----- Message from Chaim Duffy MD sent at 1/23/2018  8:03 PM CST -----  Regarding: RE: please advise  We can stop his xarelto 4 weeks after his albation. He should be fine for his prostate biopsy.     MB  ----- Message -----  From: Gabrielle Patel LPN  Sent: 1/23/2018   3:41 PM  To: Chaim Duffy MD, [facility] Staff  Subject: please advise                                    Mr rockwell is scheduled to have a prostate biopsy with dr barnes on 3-7-18. After speaking to his cardiologist we were told to hold his xarelto for 2 days prior. I instructed the pt and mailed his appointments. I noticed today that he is scheduled for an ablation on 2-1-18 and I just wanted to make sure this would not change anything from our end. Please let me know if we need to change his instructions.

## 2018-01-24 NOTE — TELEPHONE ENCOUNTER
----- Message from Liliana Knowles RN sent at 1/24/2018  4:24 PM CST -----  What day should he stop his blood thinner, xaralto,and should he stay on his amioradone? Please call him Thanks.

## 2018-01-24 NOTE — TELEPHONE ENCOUNTER
Patient called about RITO scheduled on 2/2/18 . Pre-procedural questions asked.  Denies swallowing issues and esophageal issues. Denies previous sedation/anesthesia problems. States does not  have sleep apnea.  Has dentures.  Denies recent trauma/surgery/radiation therapy to head/neck/airway. States is able to move neck without difficulty. RITO described to patient. Instructed NPO past midnight and to have a designated  to drive patient home after the procedures due to sedation being given. Instructed to report to  [facility] at 0600 am.Medications:   Hold your blood thinner the evening before your procedure (LAST DOSE: 1/31/18)  Hold your fluid pill: Hydrochlorothiazide-HCTZ (Hydrodiuril) the morning of your procedure  You may take your other usual morning medications with a sip of water    Verbalizes understanding. Questions answered.

## 2018-01-25 ENCOUNTER — PATIENT MESSAGE (OUTPATIENT)
Dept: TRANSPLANT | Facility: CLINIC | Age: 68
End: 2018-01-25

## 2018-01-26 NOTE — PROGRESS NOTES
Subjective:       Patient ID: Ambrosio Bray III is a 67 y.o. male.    Chief Complaint: Pulmonary Hypertension (4mo clinic visit)    HPI   Ambrosio Bray III 67 y.o. male    has a past medical history of Diabetes mellitus; Hypertension; and PTSD (post-traumatic stress disorder).    has a past surgical history that includes Cardiac catheterization.   reports that he has never smoked. He has never used smokeless tobacco. He reports that he drinks about 1.2 - 1.8 oz of alcohol per week . He reports that he does not use drugs.  Referred by: No ref. provider found  Who had concerns including Pulmonary Hypertension (4mo clinic visit).  The patient's last visit with me was on 9/6/2017.    Doing well on adempas, had episodes of a-flutter over the holidays, hr > 200,   Now improved with amiodarone but not ideal given his lung disease  Plan for ablation next week  No fever chills, ns, wt changes, nausea, vomiting, diarrhea, constipation, chest pain, tightness, pressure  Still active, exercise, etc  Review of Systems    Objective:      Physical Exam  Personal Diagnostic Review    No flowsheet data found.      Assessment:       1. CTEPH (chronic thromboembolic pulmonary hypertension)    2. Chronic pulmonary heart disease        Outpatient Encounter Prescriptions as of 1/24/2018   Medication Sig Dispense Refill    amiodarone (PACERONE) 200 MG Tab Take 1 tablet (200 mg total) by mouth 2 (two) times daily. 60 tablet 11    atorvastatin (LIPITOR) 40 MG tablet Take 1 tablet (40 mg total) by mouth once daily. 90 tablet 1    hydroCHLOROthiazide (HYDRODIURIL) 50 MG tablet TAKE ONE-HALF TABLET BY MOUTH ONCE DAILY 90 tablet 1    ipratropium (ATROVENT) 0.06 % nasal spray 2 sprays by Nasal route 4 (four) times daily. (Patient taking differently: 2 sprays by Nasal route 4 (four) times daily as needed. ) 15 mL 6    mometasone (ELOCON) 0.1 % ointment Apply topically once daily.      riociguat (ADEMPAS) 2.5 mg tablet Take 1 tablet (2.5  mg total) by mouth 3 (three) times daily. 90 tablet 11    rivaroxaban (XARELTO) 20 mg Tab TAKE ONE TABLET BY MOUTH ONCE DAILY WITH  DINNER  OR  EVENING  MEAL 90 tablet 1    sulfamethoxazole-trimethoprim 800-160mg (BACTRIM DS) 800-160 mg Tab Start night before prostate biopsy every 12 hours for 4 doses only 4 tablet 0    tamsulosin (FLOMAX) 0.4 mg Cp24 Take 1 capsule (0.4 mg total) by mouth once daily. 90 capsule 1     No facility-administered encounter medications on file as of 1/24/2018.      No orders of the defined types were placed in this encounter.    Plan:            I personally reviewed the      1. Echo report   2. PFT   3. 6MWD   4. CXR   5. CXR report   6. CT chest   7. CT chest report     Assessment:  Ambrosio was seen today for pulmonary hypertension.    Diagnoses and all orders for this visit:    CTEPH (chronic thromboembolic pulmonary hypertension)    Chronic pulmonary heart disease        Plan:  The current medical regimen is effective;  continue present plan and medications.  Repeat echo in 3 months    Follow-up in about 3 months (around 4/24/2018).    There are no Patient Instructions on file for this visit.    Immunization History   Administered Date(s) Administered    Influenza 01/17/2013    Influenza - High Dose 10/17/2015, 10/31/2016, 09/15/2017    Influenza Split 01/17/2013    Pneumococcal Conjugate - 13 Valent 10/31/2016    Zoster 01/13/2015    influenza - Quadrivalent 11/06/2014

## 2018-02-01 ENCOUNTER — ANESTHESIA EVENT (OUTPATIENT)
Dept: MEDSURG UNIT | Facility: HOSPITAL | Age: 68
End: 2018-02-01
Payer: MEDICARE

## 2018-02-02 ENCOUNTER — SURGERY (OUTPATIENT)
Age: 68
End: 2018-02-02

## 2018-02-02 ENCOUNTER — ANESTHESIA (OUTPATIENT)
Dept: MEDSURG UNIT | Facility: HOSPITAL | Age: 68
End: 2018-02-02
Payer: MEDICARE

## 2018-02-02 ENCOUNTER — HOSPITAL ENCOUNTER (OUTPATIENT)
Facility: HOSPITAL | Age: 68
Discharge: HOME | End: 2018-02-03
Attending: INTERNAL MEDICINE | Admitting: INTERNAL MEDICINE
Payer: MEDICARE

## 2018-02-02 DIAGNOSIS — I48.92 ATRIAL FLUTTER: ICD-10-CM

## 2018-02-02 DIAGNOSIS — I48.3 TYPICAL ATRIAL FLUTTER: Primary | ICD-10-CM

## 2018-02-02 DIAGNOSIS — R80.9 PROTEINURIA: ICD-10-CM

## 2018-02-02 PROCEDURE — D9220A PRA ANESTHESIA: Mod: ANES,,, | Performed by: ANESTHESIOLOGY

## 2018-02-02 PROCEDURE — 82962 GLUCOSE BLOOD TEST: CPT | Performed by: INTERNAL MEDICINE

## 2018-02-02 PROCEDURE — 93621 COMP EP EVL L PAC&REC C SINS: CPT | Mod: 26,,, | Performed by: INTERNAL MEDICINE

## 2018-02-02 PROCEDURE — 93005 ELECTROCARDIOGRAM TRACING: CPT

## 2018-02-02 PROCEDURE — 37000009 HC ANESTHESIA EA ADD 15 MINS: Performed by: INTERNAL MEDICINE

## 2018-02-02 PROCEDURE — 25000003 PHARM REV CODE 250: Performed by: STUDENT IN AN ORGANIZED HEALTH CARE EDUCATION/TRAINING PROGRAM

## 2018-02-02 PROCEDURE — 93653 COMPRE EP EVAL TX SVT: CPT

## 2018-02-02 PROCEDURE — 63600175 PHARM REV CODE 636 W HCPCS: Performed by: NURSE ANESTHETIST, CERTIFIED REGISTERED

## 2018-02-02 PROCEDURE — 93010 ELECTROCARDIOGRAM REPORT: CPT | Mod: ,,, | Performed by: INTERNAL MEDICINE

## 2018-02-02 PROCEDURE — 25000003 PHARM REV CODE 250: Performed by: NURSE PRACTITIONER

## 2018-02-02 PROCEDURE — 37000008 HC ANESTHESIA 1ST 15 MINUTES: Performed by: INTERNAL MEDICINE

## 2018-02-02 PROCEDURE — D9220A PRA ANESTHESIA: Mod: CRNA,,, | Performed by: NURSE ANESTHETIST, CERTIFIED REGISTERED

## 2018-02-02 PROCEDURE — A4216 STERILE WATER/SALINE, 10 ML: HCPCS | Performed by: NURSE ANESTHETIST, CERTIFIED REGISTERED

## 2018-02-02 PROCEDURE — 93010 ELECTROCARDIOGRAM REPORT: CPT | Mod: 76,,, | Performed by: INTERNAL MEDICINE

## 2018-02-02 PROCEDURE — 25000003 PHARM REV CODE 250: Performed by: NURSE ANESTHETIST, CERTIFIED REGISTERED

## 2018-02-02 PROCEDURE — 93653 COMPRE EP EVAL TX SVT: CPT | Mod: ,,, | Performed by: INTERNAL MEDICINE

## 2018-02-02 RX ORDER — ATORVASTATIN CALCIUM 20 MG/1
40 TABLET, FILM COATED ORAL DAILY
Status: DISCONTINUED | OUTPATIENT
Start: 2018-02-02 | End: 2018-02-03 | Stop reason: HOSPADM

## 2018-02-02 RX ORDER — ACETAMINOPHEN 325 MG/1
650 TABLET ORAL EVERY 4 HOURS PRN
Status: DISCONTINUED | OUTPATIENT
Start: 2018-02-02 | End: 2018-02-03 | Stop reason: HOSPADM

## 2018-02-02 RX ORDER — HYDROCHLOROTHIAZIDE 25 MG/1
25 TABLET ORAL DAILY
Status: DISCONTINUED | OUTPATIENT
Start: 2018-02-02 | End: 2018-02-03 | Stop reason: HOSPADM

## 2018-02-02 RX ORDER — AMIODARONE HYDROCHLORIDE 200 MG/1
200 TABLET ORAL 2 TIMES DAILY
Status: DISCONTINUED | OUTPATIENT
Start: 2018-02-02 | End: 2018-02-03 | Stop reason: HOSPADM

## 2018-02-02 RX ORDER — LIDOCAINE HCL/PF 100 MG/5ML
SYRINGE (ML) INTRAVENOUS
Status: DISCONTINUED | OUTPATIENT
Start: 2018-02-02 | End: 2018-02-02

## 2018-02-02 RX ORDER — FENTANYL CITRATE 50 UG/ML
25 INJECTION, SOLUTION INTRAMUSCULAR; INTRAVENOUS EVERY 5 MIN PRN
Status: DISCONTINUED | OUTPATIENT
Start: 2018-02-02 | End: 2018-02-03 | Stop reason: HOSPADM

## 2018-02-02 RX ORDER — FENTANYL CITRATE 50 UG/ML
INJECTION, SOLUTION INTRAMUSCULAR; INTRAVENOUS
Status: DISCONTINUED | OUTPATIENT
Start: 2018-02-02 | End: 2018-02-02

## 2018-02-02 RX ORDER — MIDAZOLAM HYDROCHLORIDE 1 MG/ML
INJECTION, SOLUTION INTRAMUSCULAR; INTRAVENOUS
Status: DISCONTINUED | OUTPATIENT
Start: 2018-02-02 | End: 2018-02-02

## 2018-02-02 RX ORDER — DIPHENHYDRAMINE HYDROCHLORIDE 50 MG/ML
25 INJECTION INTRAMUSCULAR; INTRAVENOUS EVERY 6 HOURS PRN
Status: DISCONTINUED | OUTPATIENT
Start: 2018-02-02 | End: 2018-02-03 | Stop reason: HOSPADM

## 2018-02-02 RX ORDER — IPRATROPIUM BROMIDE 42 UG/1
2 SPRAY, METERED NASAL 4 TIMES DAILY PRN
Status: DISCONTINUED | OUTPATIENT
Start: 2018-02-02 | End: 2018-02-03 | Stop reason: HOSPADM

## 2018-02-02 RX ORDER — TAMSULOSIN HYDROCHLORIDE 0.4 MG/1
1 CAPSULE ORAL DAILY
Status: DISCONTINUED | OUTPATIENT
Start: 2018-02-02 | End: 2018-02-03 | Stop reason: HOSPADM

## 2018-02-02 RX ORDER — SODIUM CHLORIDE 9 MG/ML
INJECTION, SOLUTION INTRAVENOUS CONTINUOUS
Status: DISCONTINUED | OUTPATIENT
Start: 2018-02-02 | End: 2018-02-03 | Stop reason: HOSPADM

## 2018-02-02 RX ORDER — DEXMEDETOMIDINE HYDROCHLORIDE 100 UG/ML
INJECTION, SOLUTION INTRAVENOUS
Status: DISCONTINUED | OUTPATIENT
Start: 2018-02-02 | End: 2018-02-02

## 2018-02-02 RX ORDER — GLYCOPYRROLATE 0.2 MG/ML
INJECTION INTRAMUSCULAR; INTRAVENOUS
Status: DISCONTINUED | OUTPATIENT
Start: 2018-02-02 | End: 2018-02-02

## 2018-02-02 RX ORDER — DIPHENHYDRAMINE HYDROCHLORIDE 50 MG/ML
INJECTION INTRAMUSCULAR; INTRAVENOUS
Status: DISCONTINUED | OUTPATIENT
Start: 2018-02-02 | End: 2018-02-02

## 2018-02-02 RX ADMIN — LIDOCAINE HYDROCHLORIDE 50 MG: 20 INJECTION, SOLUTION INTRAVENOUS at 07:02

## 2018-02-02 RX ADMIN — DEXMEDETOMIDINE HYDROCHLORIDE 44 MCG: 100 INJECTION, SOLUTION, CONCENTRATE INTRAVENOUS at 07:02

## 2018-02-02 RX ADMIN — GLYCOPYRROLATE 0.2 MG: 0.2 INJECTION, SOLUTION INTRAMUSCULAR; INTRAVENOUS at 08:02

## 2018-02-02 RX ADMIN — AMIODARONE HYDROCHLORIDE 200 MG: 200 TABLET ORAL at 08:02

## 2018-02-02 RX ADMIN — RIVAROXABAN 20 MG: 20 TABLET, FILM COATED ORAL at 05:02

## 2018-02-02 RX ADMIN — SODIUM CHLORIDE 1000 ML: 0.9 INJECTION, SOLUTION INTRAVENOUS at 05:02

## 2018-02-02 RX ADMIN — EPHEDRINE SULFATE 10 MG: 50 INJECTION, SOLUTION INTRAMUSCULAR; INTRAVENOUS; SUBCUTANEOUS at 09:02

## 2018-02-02 RX ADMIN — ATORVASTATIN CALCIUM 40 MG: 20 TABLET, FILM COATED ORAL at 11:02

## 2018-02-02 RX ADMIN — MIDAZOLAM 2 MG: 1 INJECTION INTRAMUSCULAR; INTRAVENOUS at 07:02

## 2018-02-02 RX ADMIN — DIPHENHYDRAMINE HYDROCHLORIDE 25 MG: 50 INJECTION, SOLUTION INTRAMUSCULAR; INTRAVENOUS at 07:02

## 2018-02-02 RX ADMIN — FENTANYL CITRATE 25 MCG: 50 INJECTION, SOLUTION INTRAMUSCULAR; INTRAVENOUS at 08:02

## 2018-02-02 RX ADMIN — DEXMEDETOMIDINE HYDROCHLORIDE 0.6 MCG/KG/HR: 100 INJECTION, SOLUTION, CONCENTRATE INTRAVENOUS at 07:02

## 2018-02-02 RX ADMIN — HYDROCHLOROTHIAZIDE 25 MG: 25 TABLET ORAL at 11:02

## 2018-02-02 RX ADMIN — AMIODARONE HYDROCHLORIDE 200 MG: 200 TABLET ORAL at 11:02

## 2018-02-02 RX ADMIN — TAMSULOSIN HYDROCHLORIDE 0.4 MG: 0.4 CAPSULE ORAL at 11:02

## 2018-02-02 NOTE — TRANSFER OF CARE
"Anesthesia Transfer of Care Note    Patient: Ambrosio Bray III    Procedure(s) Performed: Procedure(s) (LRB):  ABLATION (N/A)  TRANSESOPHAGEAL ECHOCARDIOGRAM (RITO) (N/A)    Patient location: PACU    Anesthesia Type: MAC    Transport from OR: Transported from OR on 6-10 L/min O2 by face mask with adequate spontaneous ventilation    Post pain: adequate analgesia    Post assessment: no apparent anesthetic complications and tolerated procedure well    Post vital signs: stable    Level of consciousness: sedated and responds to stimulation    Nausea/Vomiting: no nausea/vomiting    Complications: none    Transfer of care protocol was followedComments: Patient HR in 40's and BP in 80's. Patient treated with Ephedrine 10 mg x2 in PACU.      Last vitals:   Visit Vitals  BP (!) 145/69 (BP Location: Right arm, Patient Position: Lying)   Pulse 75   Temp 36.6 °C (97.9 °F) (Oral)   Resp 18   Ht 6' 1" (1.854 m)   Wt 88 kg (194 lb)   SpO2 (!) 94%   BMI 25.60 kg/m²     "

## 2018-02-02 NOTE — INTERVAL H&P NOTE
The patient has been examined and the H&P has been reviewed:    I concur with the findings and changes have been noted since the H&P was written:     Last amiodarone: 2/1/18 pm  Last Xarelto: 1/30/18 pm  Sinus today, No RITO required    Anesthesia/Surgery risks, benefits and alternative options discussed and understood by patient/family.          Active Hospital Problems    Diagnosis  POA    Atrial flutter [I48.92]  Yes      Resolved Hospital Problems    Diagnosis Date Resolved POA   No resolved problems to display.

## 2018-02-02 NOTE — ANESTHESIA POSTPROCEDURE EVALUATION
"Anesthesia Post Evaluation    Patient: Ambrosio Bray III    Procedure(s) Performed: Procedure(s) (LRB):  ABLATION (N/A)  TRANSESOPHAGEAL ECHOCARDIOGRAM (RITO) (N/A)    Final Anesthesia Type: general  Patient location during evaluation: PACU  Patient participation: Yes- Able to Participate  Level of consciousness: awake and alert  Post-procedure vital signs: reviewed and stable  Pain management: adequate  Airway patency: patent  PONV status at discharge: No PONV  Anesthetic complications: no      Cardiovascular status: hemodynamically stable  Respiratory status: unassisted  Hydration status: euvolemic  Follow-up not needed.        Visit Vitals  /75 (BP Location: Right arm, Patient Position: Lying)   Pulse (!) 46   Temp 35.8 °C (96.4 °F) (Oral)   Resp 16   Ht 6' 1" (1.854 m)   Wt 88 kg (194 lb)   SpO2 (!) 91%   BMI 25.60 kg/m²       Pain/Michael Score: Pain Assessment Performed: Yes (2/2/2018 11:45 AM)  Presence of Pain: denies (2/2/2018 11:45 AM)  Michael Score: 10 (2/2/2018 10:00 AM)      "

## 2018-02-02 NOTE — PLAN OF CARE
Problem: Patient Care Overview  Goal: Plan of Care Review  Outcome: Ongoing (interventions implemented as appropriate)  Received report from Shannan. Patient s/p RFA, AAOx3. VSS, no c/o pain or discomfort at this time, resp even and unlabored. Gauze/tegaderm dressing to R groin is intact with a small amount of drainage, area marked. No active bleeding. No hematoma noted. Post procedure protocol reviewed with patient and patient's family. Understanding verbalized. Family members at bedside. Nurse call bell within reach. Will continue to monitor per post procedure protocol.

## 2018-02-02 NOTE — H&P (VIEW-ONLY)
Subjective:    Patient ID:  Ambrosio Bray III is a 67 y.o. male who presents for evaluation of Atrial Fibrillation      67 yoM pHTN, thromboembolic disease, DM, HTN here for evaluation of arrhythmia. He has severe pHTN as well as chronic thromboembolic disease. 12/26/17 he presented with rapid palpitations and dyspnea. He was found to be in a wide complex tachycardia with rate of 240 bpm. Amiodarone was given and his rate dropped in half to 118 bpm. He has ECG features consistent with AFL 2:1 conduction. He was then placed on amiodarone 200 mg bid. He was on xarelto chronically. He denies any subsequent symptoms. He had normal EF 10/17 however his PAP was 103 mm Hg. He is followed by Florencia Mcdermott and Cuauhtemoc. He had history of atrial fibrillation and underwent RITO/CV 9/30/14. In my review of his ECGs, I feel that this is typical AFL as opposed to AF. He was on amiodarone for about one year post CV.     Echo 10/17:  CONCLUSIONS     1 - Normal left ventricular systolic function (EF 55-60%).     2 - Severe right ventricular enlargement with moderately to severely depressed systolic function.     3 - Impaired LV relaxation, elevated LAP (grade 2 diastolic dysfunction).     4 - Biatrial enlargement (R>L).     5 - Mild to moderate tricuspid regurgitation.     6 - Mild mitral regurgitation.     7 - Pulmonary hypertension. The estimated PA systolic pressure is 103 mmHg.     8 - Increased central venous pressure.     Past Medical History:  No date: Diabetes mellitus  No date: Hypertension  No date: PTSD (post-traumatic stress disorder)    Past Surgical History:  No date: CARDIAC CATHETERIZATION    Social History    Marital status: Single              Spouse name:                       Years of education:                 Number of children:               Occupational History    None on file    Social History Main Topics    Smoking status: Never Smoker                                                                Smokeless  tobacco: Never Used                        Alcohol use: Yes           1.2 - 1.8 oz/week       Shots of liquor: 2 - 3 per week       Comment: regularly    Drug use: No              Sexual activity: Yes               Partners with: Female    Other Topics            Concern    None on file    Social History Narrative    None on file    Review of patient's family history indicates:    Cancer                         Father                      Comment: colon          Review of Systems   Constitution: Negative.   HENT: Negative.    Eyes: Negative.    Cardiovascular: Positive for dyspnea on exertion and palpitations. Negative for chest pain, leg swelling, near-syncope and syncope.   Respiratory: Negative.  Negative for shortness of breath.    Endocrine: Negative.    Hematologic/Lymphatic: Negative.    Skin: Negative.    Musculoskeletal: Negative.    Gastrointestinal: Negative.    Genitourinary: Negative.    Neurological: Negative.  Negative for dizziness and light-headedness.   Psychiatric/Behavioral: Negative.    Allergic/Immunologic: Negative.         Objective:    Physical Exam   Constitutional: He is oriented to person, place, and time. He appears well-developed and well-nourished. No distress.   HENT:   Head: Normocephalic and atraumatic.   Eyes: Conjunctivae and EOM are normal. Pupils are equal, round, and reactive to light. Right eye exhibits no discharge. Left eye exhibits no discharge.   Neck: Normal range of motion. Neck supple. No JVD present. No thyromegaly present.   Cardiovascular: Normal rate, regular rhythm, S1 normal, S2 normal and normal heart sounds.  PMI is not displaced.  Exam reveals no gallop and no friction rub.    No murmur heard.  Pulmonary/Chest: Effort normal and breath sounds normal. No respiratory distress. He has no wheezes. He has no rales. He exhibits no tenderness.   Abdominal: Soft. Bowel sounds are normal. He exhibits no distension. There is no tenderness. There is no rebound and no  guarding.   Musculoskeletal: Normal range of motion. He exhibits no edema or tenderness.   Neurological: He is alert and oriented to person, place, and time. No cranial nerve deficit.   Skin: Skin is warm and dry. No rash noted. No erythema.   Psychiatric: He has a normal mood and affect. His behavior is normal. Judgment and thought content normal.   Vitals reviewed.    ECg: NSR nl AR, iRBBB, LAE, R axis        Assessment:       1. CTEPH (chronic thromboembolic pulmonary hypertension)    2. Typical atrial flutter    3. Pulmonary HTN    4. Hypertension associated with diabetes         Plan:       67 yoM with severe pHTN, AFL here for arrhythmia management. I discussed AFL and its basic pathophysiology, including its health implications and treatment options with the patient. Specifically we discussed the need for appropriate CVA prophylaxis as well as arrhythmia control by pharmacologic and/or procedural methods. He had what I believe was typical AFL. His rapid wide complex tachycardia was most likely 1:1 AFL given that the flutter wave cycle length matched the WCT cycle length. He is on xarelto for CTE therapy which will suffice for CVA prophylaxis. With regard to management, I would normally offer AFL ablation however his pHTN is severe. Will discuss with his pulmonologist regarding the safety of this patient undergoing an elective procedure. Option 1 would be to perform the procedure soon and avoid long term complications of amiodarone. Option 2 would be to perform the ablation only if he has recurrence on amiodarone.       Will discuss with Florencia Posadas and Sharron. I discussed the case with anesthesia staff who feel that adequate sedation for right sided RFA is possible despite his p HTN.

## 2018-02-02 NOTE — ANESTHESIA PREPROCEDURE EVALUATION
02/02/2018  Ambrosio Bray III is a 67 y.o., male.  Patient Active Problem List   Diagnosis    Type 2 diabetes mellitus with microalbuminuria, without long-term current use of insulin    Chronic diastolic CHF (congestive heart failure)    Moderate tricuspid regurgitation    DVT, lower extremity, distal, chronic    Atrial fibrillation    Pulmonary HTN    Hypertension associated with diabetes    Pulmonary nodule    Varicose veins of both lower extremities    Chronic pulmonary heart disease    CTEPH (chronic thromboembolic pulmonary hypertension)    Metabolic acidosis    Typical atrial flutter    Atrial flutter         Anesthesia Evaluation         Review of Systems      Physical Exam  General:  Well nourished    Airway/Jaw/Neck:  Airway Findings: Mouth Opening: Normal Tongue: Normal  General Airway Assessment: Adult  Mallampati: II  Improves to II with phonation.  TM Distance: Normal, at least 6 cm      Dental:  Dental Findings: In tact   Chest/Lungs:  Chest/Lungs Findings: Clear to auscultation     Heart/Vascular:  Heart Findings: Rate: Normal  Rhythm: Regular Rhythm  Sounds: Normal        Mental Status:  Mental Status Findings:  Cooperative, Alert and Oriented         Anesthesia Plan  Type of Anesthesia, risks & benefits discussed:  Anesthesia Type:  MAC  Patient's Preference: Sedation  Intra-op Monitoring Plan: standard ASA monitors  Intra-op Monitoring Plan Comments:   Post Op Pain Control Plan: per primary service following discharge from PACU  Post Op Pain Control Plan Comments:   Induction:   IV  Beta Blocker:  Patient is not currently on a Beta-Blocker (No further documentation required).       Informed Consent: Patient understands risks and agrees with Anesthesia plan.  Questions answered.   ASA Score: 4     Day of Surgery Review of History & Physical:    H&P update referred to the  surgeon.     Anesthesia Plan Notes: Sedation plan discussed.          Ready For Surgery From Anesthesia Perspective.

## 2018-02-02 NOTE — PROGRESS NOTES
Patient ambulated in hallway with standby assist. R groin remained CDI, soft, non tender. Will monitor.

## 2018-02-03 VITALS
DIASTOLIC BLOOD PRESSURE: 69 MMHG | SYSTOLIC BLOOD PRESSURE: 123 MMHG | RESPIRATION RATE: 17 BRPM | HEIGHT: 73 IN | OXYGEN SATURATION: 93 % | WEIGHT: 194 LBS | HEART RATE: 72 BPM | TEMPERATURE: 97 F | BODY MASS INDEX: 25.71 KG/M2

## 2018-02-03 LAB
ANION GAP SERPL CALC-SCNC: 9 MMOL/L
BASOPHILS # BLD AUTO: 0.03 K/UL
BASOPHILS NFR BLD: 0.6 %
BUN SERPL-MCNC: 20 MG/DL
CALCIUM SERPL-MCNC: 8.8 MG/DL
CHLORIDE SERPL-SCNC: 107 MMOL/L
CO2 SERPL-SCNC: 23 MMOL/L
CREAT SERPL-MCNC: 1.2 MG/DL
DIFFERENTIAL METHOD: ABNORMAL
EOSINOPHIL # BLD AUTO: 0.1 K/UL
EOSINOPHIL NFR BLD: 1 %
ERYTHROCYTE [DISTWIDTH] IN BLOOD BY AUTOMATED COUNT: 18.5 %
EST. GFR  (AFRICAN AMERICAN): >60 ML/MIN/1.73 M^2
EST. GFR  (NON AFRICAN AMERICAN): >60 ML/MIN/1.73 M^2
GLUCOSE SERPL-MCNC: 90 MG/DL
HCT VFR BLD AUTO: 36.6 %
HGB BLD-MCNC: 11.8 G/DL
IMM GRANULOCYTES # BLD AUTO: 0.01 K/UL
IMM GRANULOCYTES NFR BLD AUTO: 0.2 %
LYMPHOCYTES # BLD AUTO: 1.2 K/UL
LYMPHOCYTES NFR BLD: 23.3 %
MCH RBC QN AUTO: 27.1 PG
MCHC RBC AUTO-ENTMCNC: 32.2 G/DL
MCV RBC AUTO: 84 FL
MONOCYTES # BLD AUTO: 0.5 K/UL
MONOCYTES NFR BLD: 9.5 %
NEUTROPHILS # BLD AUTO: 3.3 K/UL
NEUTROPHILS NFR BLD: 65.4 %
NRBC BLD-RTO: 0 /100 WBC
PLATELET # BLD AUTO: 197 K/UL
PMV BLD AUTO: 10.8 FL
POTASSIUM SERPL-SCNC: 3.8 MMOL/L
RBC # BLD AUTO: 4.36 M/UL
SODIUM SERPL-SCNC: 139 MMOL/L
WBC # BLD AUTO: 5.06 K/UL

## 2018-02-03 PROCEDURE — 36415 COLL VENOUS BLD VENIPUNCTURE: CPT

## 2018-02-03 PROCEDURE — 85025 COMPLETE CBC W/AUTO DIFF WBC: CPT

## 2018-02-03 PROCEDURE — 80048 BASIC METABOLIC PNL TOTAL CA: CPT

## 2018-02-03 NOTE — PROGRESS NOTES
Pt is AAOx3 and in no apparent distress.  Groin site c/d/i without redness or swelling.  Provided a copy of discharge instructions.  Teaching performed.  Pt verbalized understanding and denied any questions.  PIV d/c catheter tip intact.  2x2 applied and no active bleeding noted.  Pt refused wheelchair and is going to walk to front of hospital to meet family member for transport home.

## 2018-02-03 NOTE — HPI
67 yoM pHTN, thromboembolic disease, DM, HTN here for evaluation of arrhythmia. He has severe pHTN as well as chronic thromboembolic disease. 12/26/17 he presented with rapid palpitations and dyspnea. He was found to be in a wide complex tachycardia with rate of 240 bpm. Amiodarone was given and his rate dropped in half to 118 bpm. He has ECG features consistent with AFL 2:1 conduction. He was then placed on amiodarone 200 mg bid. He was on xarelto chronically. He denies any subsequent symptoms. He had normal EF 10/17 however his PAP was 103 mm Hg. He is followed by Florencia Mcdermott and Cuauhtemoc. He had history of atrial fibrillation and underwent RITO/CV 9/30/14. In my review of his ECGs, I feel that this is typical AFL as opposed to AF. He was on amiodarone for about one year post CV.

## 2018-02-03 NOTE — HOSPITAL COURSE
Had typical AFL ablation 2/2 that was successful. Doing well no complications. Will discharge home off amiodarone given CTEPH and will need to follow up with Dr. Duffy in 6 weeks.

## 2018-02-03 NOTE — DISCHARGE SUMMARY
[facility]  Cardiac Electrophysiology  Discharge Summary      Patient Name: Ambrosio Bray III  MRN: [id number]  Admission Date: 2/2/2018  Hospital Length of Stay: 0 days  Discharge Date and Time:  02/03/2018 8:05 AM  Attending Physician: Chaim Duffy MD    Discharging Provider: Varun Farris MD  Primary Care Physician: Sonia Brothers MD    HPI:   67 yoM pHTN, thromboembolic disease, DM, HTN here for evaluation of arrhythmia. He has severe pHTN as well as chronic thromboembolic disease. 12/26/17 he presented with rapid palpitations and dyspnea. He was found to be in a wide complex tachycardia with rate of 240 bpm. Amiodarone was given and his rate dropped in half to 118 bpm. He has ECG features consistent with AFL 2:1 conduction. He was then placed on amiodarone 200 mg bid. He was on xarelto chronically. He denies any subsequent symptoms. He had normal EF 10/17 however his PAP was 103 mm Hg. He is followed by Florencia Mcdermott and [facility]. He had history of atrial fibrillation and underwent RITO/CV 9/30/14. In my review of his ECGs, I feel that this is typical AFL as opposed to AF. He was on amiodarone for about one year post CV.     Procedure(s) (LRB):  ABLATION (N/A)  TRANSESOPHAGEAL ECHOCARDIOGRAM (RITO) (N/A)     Indwelling Lines/Drains at time of discharge:  Lines/Drains/Airways          No matching active lines, drains, or airways          Hospital Course:  Had typical AFL ablation 2/2 that was successful. Doing well no complications. Will discharge home off amiodarone given CTEPH and will need to follow up with Dr. Duffy in 6 weeks.         Pending Diagnostic Studies:     Procedure Component Value Units Date/Time    Transesophageal echo [[id number]]     Order Status:  Sent Lab Status:  No result           Final Active Diagnoses:    Diagnosis Date Noted POA    Atrial flutter [I48.92] 02/02/2018 Yes      Problems Resolved During this Admission:    Diagnosis Date Noted Date Resolved POA     No new  Assessment & Plan notes have been filed under this hospital service since the last note was generated.  Service: Arrhythmia      Discharged Condition: good    Disposition: Home or Self Care    Follow Up:  Follow-up Information     Chaim Duffy MD In 6 weeks.    Specialties:  Electrophysiology, Cardiovascular Disease  Contact information:  [address]  [facility] [address]  [phone]                 Patient Instructions:     Diet Cardiac     Lifting restrictions     Notify your health care provider if you experience any of the following:  temperature >100.4     Notify your health care provider if you experience any of the following:  persistent nausea and vomiting or diarrhea     Notify your health care provider if you experience any of the following:  severe uncontrolled pain     Notify your health care provider if you experience any of the following:  redness, tenderness, or signs of infection (pain, swelling, redness, odor or green/yellow discharge around incision site)     Remove dressing in 24 hours       Medications:  Reconciled Home Medications:   Current Discharge Medication List      CONTINUE these medications which have NOT CHANGED    Details   atorvastatin (LIPITOR) 40 MG tablet Take 1 tablet (40 mg total) by mouth once daily.  Qty: 90 tablet, Refills: 1    Associated Diagnoses: Type 2 diabetes mellitus with microalbuminuria, without long-term current use of insulin      riociguat (ADEMPAS) 2.5 mg tablet Take 1 tablet (2.5 mg total) by mouth 3 (three) times daily.  Qty: 90 tablet, Refills: 11    Comments: Filled by [facility]. Disregard if e-scripted to retail. Paper rx to follow  Associated Diagnoses: CTEPH (chronic thromboembolic pulmonary hypertension)      hydroCHLOROthiazide (HYDRODIURIL) 50 MG tablet TAKE ONE-HALF TABLET BY MOUTH ONCE DAILY  Qty: 90 tablet, Refills: 1    Comments: Please consider 90 day supplies to promote better adherence  Associated Diagnoses: Hypertension  associated with diabetes      ipratropium (ATROVENT) 0.06 % nasal spray 2 sprays by Nasal route 4 (four) times daily.  Qty: 15 mL, Refills: 6      mometasone (ELOCON) 0.1 % ointment Apply topically once daily.      rivaroxaban (XARELTO) 20 mg Tab TAKE ONE TABLET BY MOUTH ONCE DAILY WITH  DINNER  OR  EVENING  MEAL  Qty: 90 tablet, Refills: 1    Comments: Please consider 90 day supplies to promote better adherence  Associated Diagnoses: Chronic atrial fibrillation      sulfamethoxazole-trimethoprim 800-160mg (BACTRIM DS) 800-160 mg Tab Start night before prostate biopsy every 12 hours for 4 doses only  Qty: 4 tablet, Refills: 0      tamsulosin (FLOMAX) 0.4 mg Cp24 Take 1 capsule (0.4 mg total) by mouth once daily.  Qty: 90 capsule, Refills: 1    Associated Diagnoses: Benign non-nodular prostatic hyperplasia without lower urinary tract symptoms         STOP taking these medications       amiodarone (PACERONE) 200 MG Tab Comments:   Reason for Stopping:               Time spent on the discharge of patient: 15 minutes    Varun Farris MD  Cardiac Electrophysiology  [facility]

## 2018-02-05 ENCOUNTER — OFFICE VISIT (OUTPATIENT)
Dept: UROLOGY | Facility: CLINIC | Age: 68
End: 2018-02-05
Payer: MEDICARE

## 2018-02-05 ENCOUNTER — PATIENT MESSAGE (OUTPATIENT)
Dept: UROLOGY | Facility: CLINIC | Age: 68
End: 2018-02-05

## 2018-02-05 VITALS
SYSTOLIC BLOOD PRESSURE: 122 MMHG | HEART RATE: 82 BPM | HEIGHT: 73 IN | WEIGHT: 195.13 LBS | DIASTOLIC BLOOD PRESSURE: 70 MMHG | BODY MASS INDEX: 25.86 KG/M2

## 2018-02-05 DIAGNOSIS — R32 URINARY INCONTINENCE, UNSPECIFIED TYPE: ICD-10-CM

## 2018-02-05 DIAGNOSIS — R33.9 URINARY RETENTION: Primary | ICD-10-CM

## 2018-02-05 LAB — POCT GLUCOSE: 132 MG/DL (ref 70–110)

## 2018-02-05 PROCEDURE — 99214 OFFICE O/P EST MOD 30 MIN: CPT | Mod: S$PBB,25,, | Performed by: NURSE PRACTITIONER

## 2018-02-05 PROCEDURE — 99999 PR PBB SHADOW E&M-EST. PATIENT-LVL III: CPT | Mod: PBBFAC,,, | Performed by: NURSE PRACTITIONER

## 2018-02-05 PROCEDURE — 51798 US URINE CAPACITY MEASURE: CPT | Mod: PBBFAC | Performed by: NURSE PRACTITIONER

## 2018-02-05 PROCEDURE — 51702 INSERT TEMP BLADDER CATH: CPT | Mod: S$PBB,,, | Performed by: NURSE PRACTITIONER

## 2018-02-05 PROCEDURE — 1126F AMNT PAIN NOTED NONE PRSNT: CPT | Mod: ,,, | Performed by: NURSE PRACTITIONER

## 2018-02-05 PROCEDURE — 1159F MED LIST DOCD IN RCRD: CPT | Mod: ,,, | Performed by: NURSE PRACTITIONER

## 2018-02-05 PROCEDURE — 81002 URINALYSIS NONAUTO W/O SCOPE: CPT | Mod: PBBFAC | Performed by: NURSE PRACTITIONER

## 2018-02-05 PROCEDURE — 99213 OFFICE O/P EST LOW 20 MIN: CPT | Mod: PBBFAC,25 | Performed by: NURSE PRACTITIONER

## 2018-02-05 RX ORDER — LIDOCAINE HYDROCHLORIDE 20 MG/ML
JELLY TOPICAL
Status: DISCONTINUED | OUTPATIENT
Start: 2018-02-05 | End: 2019-04-22

## 2018-02-05 NOTE — PROGRESS NOTES
Subjective:       Patient ID: Ambrosio Bray III is a 67 y.o. male.    Chief Complaint: Leaking since surgery on Friday (6 Depends since yesterday, thinks he may have strained having a BM )      HPI: Ambrosio Bray III is a 67 y.o. Black or  male who presents today for evaluation and management of urinary retention and leakage of urine. He is new to me but established with [facility]. His last clinic visit was with Dr. Matute on 1/9/18.    He had an ablation for a-flutter on Friday 2/2/18 under anesthesia.     Today, he presents to clinic for urinary retention and leakage of urine. He reports he has not been able to urinate since his ablation on Friday. On Saturday, he started leaking urine constantly. He saturated 6 depends from Sunday at noon until this morning. He reports urgency and bladder pressure and pain. He denies any dysuria or hematuria. He has been taking his Flomax daily. Prior to the ablation on Friday, he reported he did not have difficulty urinating or incontinence.     Review of patient's allergies indicates:  No Known Allergies    Current Outpatient Prescriptions   Medication Sig Dispense Refill    atorvastatin (LIPITOR) 40 MG tablet Take 1 tablet (40 mg total) by mouth once daily. 90 tablet 1    hydroCHLOROthiazide (HYDRODIURIL) 50 MG tablet TAKE ONE-HALF TABLET BY MOUTH ONCE DAILY 90 tablet 1    ipratropium (ATROVENT) 0.06 % nasal spray 2 sprays by Nasal route 4 (four) times daily. (Patient taking differently: 2 sprays by Nasal route 4 (four) times daily as needed. ) 15 mL 6    mometasone (ELOCON) 0.1 % ointment Apply topically once daily.      riociguat (ADEMPAS) 2.5 mg tablet Take 1 tablet (2.5 mg total) by mouth 3 (three) times daily. 90 tablet 11    rivaroxaban (XARELTO) 20 mg Tab TAKE ONE TABLET BY MOUTH ONCE DAILY WITH  DINNER  OR  EVENING  MEAL 90 tablet 1    sulfamethoxazole-trimethoprim 800-160mg (BACTRIM DS) 800-160 mg Tab Start night before prostate biopsy every 12  hours for 4 doses only 4 tablet 0    tamsulosin (FLOMAX) 0.4 mg Cp24 Take 1 capsule (0.4 mg total) by mouth once daily. 90 capsule 1     Current Facility-Administered Medications   Medication Dose Route Frequency Provider Last Rate Last Dose    lidocaine HCl 2% urojet   Mucous Membrane 1 time in Clinic/HOD Ligia Nye NP           Past Medical History:   Diagnosis Date    Diabetes mellitus     Hypertension     PTSD (post-traumatic stress disorder)        Past Surgical History:   Procedure Laterality Date    CARDIAC CATHETERIZATION         Family History   Problem Relation Age of Onset    Cancer Father      colon       Review of Systems   Constitutional: Negative for chills, fatigue and fever.   HENT: Negative for congestion and trouble swallowing.    Eyes: Negative for visual disturbance.   Respiratory: Negative for chest tightness and shortness of breath.    Cardiovascular: Negative for chest pain, palpitations and leg swelling.   Gastrointestinal: Negative for abdominal pain, constipation, diarrhea, nausea and vomiting.   Genitourinary: Positive for difficulty urinating and urgency. Negative for dysuria, flank pain and hematuria.        Urinary incontinence   Musculoskeletal: Negative for back pain.   Skin: Negative for rash.   Allergic/Immunologic: Negative for immunocompromised state.   Neurological: Negative for dizziness, seizures, syncope, weakness and headaches.   Hematological: Negative for adenopathy.   Psychiatric/Behavioral: Negative for behavioral problems. The patient is not nervous/anxious.          All other systems were reviewed and were negative.    Objective:     Vitals:    02/05/18 1108   BP: 122/70   Pulse: 82        Physical Exam   Nursing note and vitals reviewed.  Constitutional: He is oriented to person, place, and time. He appears well-developed and well-nourished.   HENT:   Head: Normocephalic and atraumatic.   Eyes: Conjunctivae and EOM are normal.   Neck: Normal range of  motion.   Cardiovascular: Normal rate and regular rhythm.    Pulmonary/Chest: Effort normal. No respiratory distress.   Abdominal: Soft. He exhibits no distension.   Suprapubic tenderness on palpation   Musculoskeletal: Normal range of motion. He exhibits no edema.   Neurological: He is alert and oriented to person, place, and time.   Skin: Skin is warm and dry.     Psychiatric: He has a normal mood and affect. His behavior is normal. Judgment and thought content normal.         Lab Results   Component Value Date    CREATININE 1.2 02/03/2018     Lab Results   Component Value Date    EGFRNONAA >60.0 02/03/2018     Lab Results   Component Value Date    ESTGFRAFRICA >60.0 02/03/2018       UA: negative for blood and bacteria  PVR: done with bladder scanner by Gia GUERIN in office was 580 ml. (patient was not able to void prior, constant leaking of urine)  After insertion of toscano catheter, 900 ml was obtained.  Assessment:       1. Urinary retention    2. Urinary incontinence, unspecified type        Plan:     Ambrosio was seen today for leaking since surgery on friday.    Diagnoses and all orders for this visit:    Urinary retention  -     POCT Bladder Scan  -     lidocaine HCl 2% urojet; by Mucous Membrane route one time.  -     POCT urinalysis, dipstick or tablet reag    Urinary incontinence, unspecified type    -Reassured patient and discussed plan of care  -After verbal consent from patient, toscano catheter was inserted by Genevieve Weber RN using sterile procedure. Urethra meatus was prepped with betadine. 16 fr catheter placed. Balloon inflated with 10cc of sterile water. Catheter was connected to leg bag and secured to leg. Toscano catheter care instructions were discussed with patient; patient verbalized understanding.  -Continue taking Flomax as ordered.  -Follow up in 1 week for voiding trial.       I spent 25 minutes with the patient of which more than half was spent in coordinating the patient's care as well as in  direct consultation with the patient in regards to our treatment and plan.

## 2018-02-05 NOTE — PATIENT INSTRUCTIONS
Continue to use flomax.      Christensen Catheter Care    A Christensen catheter is a rubber tube that is placed through the urethra (opening where urine comes out) and into the bladder. This helps drain urine from the bladder. There is a small balloon on the end of the tube that is inflated after insertion. This keeps the catheter from sliding out of the bladder.  A Christensen catheter is used to treat urinary retention (unable to pass urine). It is also used when there is incontinence (loss of bladder control).  Home care  · It is important to keep bacteria from getting into the collection bag. Do not disconnect the catheter from the collection bag.  · Use a leg band to secure the drainage tube, so it does not pull on the catheter. Drain the collection bag when it becomes full using the drain spout at the bottom of the bag.  · Do not try to pull or remove your catheter. This will injure your urethra. It must be removed by your healthcare provider or nurse.  Follow-up care  Follow up with your healthcare provider as advised for repeat urine testing and catheter removal or replacement.  When to seek medical advice  Call your healthcare provider right away if any of these occur:  · Fever of 101.4ºF (38ºC) or higher, or as directed by your healthcare provider  · Bladder pain or fullness  · Abdominal swelling, nausea or vomiting, or back pain  · Blood or urine leakage around the catheter  · Bloody urine coming from the catheter (if a new symptom)  · Catheter falls out  · Catheter stops draining for 6 hours  · Weakness, dizziness, or fainting  Date Last Reviewed: 10/1/2016  © 9833-1897 [employer]. [address] [place] [address], [place] [address]. All rights reserved. This information is not intended as a substitute for professional medical care. Always follow your healthcare professional's instructions.

## 2018-02-06 ENCOUNTER — PATIENT MESSAGE (OUTPATIENT)
Dept: UROLOGY | Facility: CLINIC | Age: 68
End: 2018-02-06

## 2018-02-06 ENCOUNTER — PATIENT MESSAGE (OUTPATIENT)
Dept: TRANSPLANT | Facility: CLINIC | Age: 68
End: 2018-02-06

## 2018-02-06 ENCOUNTER — PATIENT MESSAGE (OUTPATIENT)
Dept: ELECTROPHYSIOLOGY | Facility: CLINIC | Age: 68
End: 2018-02-06

## 2018-02-09 DIAGNOSIS — E11.9 TYPE 2 DIABETES MELLITUS WITHOUT COMPLICATION: ICD-10-CM

## 2018-02-12 ENCOUNTER — OFFICE VISIT (OUTPATIENT)
Dept: UROLOGY | Facility: CLINIC | Age: 68
End: 2018-02-12
Payer: MEDICARE

## 2018-02-12 VITALS
WEIGHT: 199.06 LBS | SYSTOLIC BLOOD PRESSURE: 119 MMHG | HEART RATE: 66 BPM | DIASTOLIC BLOOD PRESSURE: 67 MMHG | BODY MASS INDEX: 26.38 KG/M2 | HEIGHT: 73 IN

## 2018-02-12 DIAGNOSIS — R33.9 URINARY RETENTION: ICD-10-CM

## 2018-02-12 DIAGNOSIS — Z46.6 ENCOUNTER FOR FOLEY CATHETER REMOVAL: Primary | ICD-10-CM

## 2018-02-12 PROCEDURE — 1159F MED LIST DOCD IN RCRD: CPT | Mod: ,,, | Performed by: NURSE PRACTITIONER

## 2018-02-12 PROCEDURE — 99999 PR PBB SHADOW E&M-EST. PATIENT-LVL III: CPT | Mod: PBBFAC,,, | Performed by: NURSE PRACTITIONER

## 2018-02-12 PROCEDURE — 99213 OFFICE O/P EST LOW 20 MIN: CPT | Mod: S$PBB,25,, | Performed by: NURSE PRACTITIONER

## 2018-02-12 PROCEDURE — 51700 IRRIGATION OF BLADDER: CPT | Mod: PBBFAC | Performed by: NURSE PRACTITIONER

## 2018-02-12 PROCEDURE — 99213 OFFICE O/P EST LOW 20 MIN: CPT | Mod: PBBFAC | Performed by: NURSE PRACTITIONER

## 2018-02-12 PROCEDURE — 51700 IRRIGATION OF BLADDER: CPT | Mod: S$PBB,,, | Performed by: NURSE PRACTITIONER

## 2018-02-12 PROCEDURE — 1126F AMNT PAIN NOTED NONE PRSNT: CPT | Mod: ,,, | Performed by: NURSE PRACTITIONER

## 2018-02-12 NOTE — PROGRESS NOTES
Subjective:       Patient ID: Ambrosio Bray III is a 67 y.o. male.    Chief Complaint: voiding trial      HPI: Ambrosio Bray III is a 67 y.o. Black or  male who presents today for indwelling catheter removal and voiding trial. His catheter was placed on 2/5/18 for urinary retention and leakage of urine. His last clinic visit with Dr. Matute was on 1/9/18.    He had an ablation for a-flutter on Friday 2/2/18 under anesthesia.  He presented to clinic on 2/5/18 for urinary retention and leakage of urine. He reported he was not able to urinate since his ablation on Friday and on Saturday, he started leaking urine constantly. He saturated 6 depends from Sunday at noon until Monday morning. He reported symptoms of urgency and bladder pressure and pain. He denied any dysuria or hematuria. He has been taking his Flomax daily. Prior to the ablation, he reported he did not have difficulty urinating or incontinence.     Today he presents to clinic for voiding trial and removal of toscano catheter. He denies any pain or discomfort from catheter. His urine was clear yellow and drained without difficulty into urine bag. He denies any hematuria or blood clots. Prior to the catheter, he reports he did not have any difficulty urinating or incontinence. He denies any fever, chills, nausea or vomiting. He has been taking his flomax every night.    Review of patient's allergies indicates:  No Known Allergies    Current Outpatient Prescriptions   Medication Sig Dispense Refill    atorvastatin (LIPITOR) 40 MG tablet Take 1 tablet (40 mg total) by mouth once daily. 90 tablet 1    hydroCHLOROthiazide (HYDRODIURIL) 50 MG tablet TAKE ONE-HALF TABLET BY MOUTH ONCE DAILY 90 tablet 1    ipratropium (ATROVENT) 0.06 % nasal spray 2 sprays by Nasal route 4 (four) times daily. (Patient taking differently: 2 sprays by Nasal route 4 (four) times daily as needed. ) 15 mL 6    mometasone (ELOCON) 0.1 % ointment Apply topically once  daily.      riociguat (ADEMPAS) 2.5 mg tablet Take 1 tablet (2.5 mg total) by mouth 3 (three) times daily. 90 tablet 11    rivaroxaban (XARELTO) 20 mg Tab TAKE ONE TABLET BY MOUTH ONCE DAILY WITH  DINNER  OR  EVENING  MEAL 90 tablet 1    tamsulosin (FLOMAX) 0.4 mg Cp24 Take 1 capsule (0.4 mg total) by mouth once daily. 90 capsule 1    sulfamethoxazole-trimethoprim 800-160mg (BACTRIM DS) 800-160 mg Tab Start night before prostate biopsy every 12 hours for 4 doses only 4 tablet 0     Current Facility-Administered Medications   Medication Dose Route Frequency Provider Last Rate Last Dose    lidocaine HCl 2% urojet   Mucous Membrane 1 time in Clinic/HOD Ligia Nye NP           Past Medical History:   Diagnosis Date    Diabetes mellitus     Hypertension     PTSD (post-traumatic stress disorder)        Past Surgical History:   Procedure Laterality Date    CARDIAC CATHETERIZATION         Family History   Problem Relation Age of Onset    Cancer Father      colon       Review of Systems   Constitutional: Negative for chills, fatigue and fever.   HENT: Negative for congestion and trouble swallowing.    Eyes: Negative for visual disturbance.   Respiratory: Negative for chest tightness and shortness of breath.    Cardiovascular: Negative for chest pain, palpitations and leg swelling.   Gastrointestinal: Negative for abdominal pain, constipation, diarrhea, nausea and vomiting.   Genitourinary: Negative for difficulty urinating, dysuria, flank pain and hematuria.   Musculoskeletal: Negative for back pain.   Skin: Negative for rash.   Allergic/Immunologic: Negative for immunocompromised state.   Neurological: Negative for dizziness, seizures, syncope, weakness and headaches.   Hematological: Negative for adenopathy.   Psychiatric/Behavioral: Negative for behavioral problems. The patient is not nervous/anxious.          All other systems were reviewed and were negative.    Objective:     Vitals:    02/12/18 0822    BP: 119/67   Pulse: 66        Physical Exam   Nursing note and vitals reviewed.  Constitutional: He is oriented to person, place, and time. He appears well-developed and well-nourished.   HENT:   Head: Normocephalic and atraumatic.   Eyes: Conjunctivae and EOM are normal.   Neck: Normal range of motion.   Cardiovascular: Normal rate and regular rhythm.    Pulmonary/Chest: Effort normal. No respiratory distress.   Abdominal: Soft. He exhibits no distension.   Genitourinary:   Genitourinary Comments: 16 FR indwelling catheter draining clear yellow urine into leg bag   Musculoskeletal: Normal range of motion. He exhibits no edema.   Neurological: He is alert and oriented to person, place, and time.   Skin: Skin is warm and dry.     Psychiatric: He has a normal mood and affect. His behavior is normal. Judgment and thought content normal.         Lab Results   Component Value Date    CREATININE 1.2 02/03/2018     Lab Results   Component Value Date    EGFRNONAA >60.0 02/03/2018     Lab Results   Component Value Date    ESTGFRAFRICA >60.0 02/03/2018       Assessment:       1. Encounter for Toscano catheter removal    2. Urinary retention        Plan:     Ambrosio was seen today for voiding trial.    Diagnoses and all orders for this visit:    Encounter for Toscano catheter removal    Urinary retention         -Reassured patient and discussed plan of care  -Voiding trial performed by Nurse Luna.  240 ml of sterile water was instilled into bladder. Balloon was deflated and toscano catheter was removed. Patient urinated 100 ml without difficulty.  Voiding trial passed.  Patient was instructed to drink plenty of fluids today.  Instructed patient to call at 2 p.m. to give an update on urine output.  Informed patient to return to clinic or emergency department (if after clinic hours) to have toscano catheter put back in if unable to urinate within 5 hours of toscano catheter removal or starts to experience bladder pressure/pain,  decrease flow, straining/difficulty urinating.    Patient voiced understanding  -Continue taking Flomax as ordered.  -Has scheduled TRUS with biopsy scheduled with Dr. Matute 3/7/18.  -Follow up as needed       I spent 20 minutes with the patient of which more than half was spent in coordinating the patient's care as well as in direct consultation with the patient in regards to our treatment and plan.

## 2018-02-12 NOTE — PATIENT INSTRUCTIONS
Patient was instructed to drink plenty of fluids today.  Instructed patient to call at 2 p.m. to give an update on urine output.  Informed patient to return to clinic or emergency department (if after clinic hours) to have toscano catheter put back in if unable to urinate within 5 hours of toscano catheter removal or starts to experience bladder pressure/pain, decrease flow, straining/difficulty urinating.          When to call your healthcare provider  Call the healthcare provider right away if:  · You have a fever of 101.4°F (38°C) or higher.  · You cant urinate within 5 hours of catheter removal.  · Your abdomen is painful or bloated.  · You have burning pain with urination that lasts for 24 hours.  · You see a lot of blood in the urine (light bleeding for 24 hours is normal).  · It feels like the bladder is not emptying.   Date Last Reviewed: 12/1/2016  © 7555-9613 [employer]. [address] [place] [address], [place] [address]. All rights reserved. This information is not intended as a substitute for professional medical care. Always follow your healthcare professional's instructions.

## 2018-02-13 ENCOUNTER — PATIENT MESSAGE (OUTPATIENT)
Dept: UROLOGY | Facility: CLINIC | Age: 68
End: 2018-02-13

## 2018-02-14 ENCOUNTER — OFFICE VISIT (OUTPATIENT)
Dept: UROLOGY | Facility: CLINIC | Age: 68
End: 2018-02-14
Payer: MEDICARE

## 2018-02-14 VITALS
SYSTOLIC BLOOD PRESSURE: 117 MMHG | HEIGHT: 73 IN | HEART RATE: 79 BPM | DIASTOLIC BLOOD PRESSURE: 58 MMHG | WEIGHT: 195.56 LBS | BODY MASS INDEX: 25.92 KG/M2

## 2018-02-14 DIAGNOSIS — R39.14 BENIGN PROSTATIC HYPERPLASIA WITH INCOMPLETE BLADDER EMPTYING: ICD-10-CM

## 2018-02-14 DIAGNOSIS — N40.1 BENIGN PROSTATIC HYPERPLASIA WITH INCOMPLETE BLADDER EMPTYING: ICD-10-CM

## 2018-02-14 DIAGNOSIS — R33.9 URINARY RETENTION: Primary | ICD-10-CM

## 2018-02-14 PROCEDURE — 99999 PR PBB SHADOW E&M-EST. PATIENT-LVL IV: CPT | Mod: PBBFAC,,, | Performed by: NURSE PRACTITIONER

## 2018-02-14 PROCEDURE — 87086 URINE CULTURE/COLONY COUNT: CPT

## 2018-02-14 PROCEDURE — 51702 INSERT TEMP BLADDER CATH: CPT | Mod: S$PBB,,, | Performed by: NURSE PRACTITIONER

## 2018-02-14 PROCEDURE — 1159F MED LIST DOCD IN RCRD: CPT | Mod: ,,, | Performed by: NURSE PRACTITIONER

## 2018-02-14 PROCEDURE — 87088 URINE BACTERIA CULTURE: CPT

## 2018-02-14 PROCEDURE — 99214 OFFICE O/P EST MOD 30 MIN: CPT | Mod: PBBFAC | Performed by: NURSE PRACTITIONER

## 2018-02-14 PROCEDURE — 1125F AMNT PAIN NOTED PAIN PRSNT: CPT | Mod: ,,, | Performed by: NURSE PRACTITIONER

## 2018-02-14 PROCEDURE — 99213 OFFICE O/P EST LOW 20 MIN: CPT | Mod: S$PBB,25,, | Performed by: NURSE PRACTITIONER

## 2018-02-14 NOTE — PROGRESS NOTES
Subjective:       Patient ID: Ambrosio Bray III is a 67 y.o. male.    Chief Complaint: Urinary Retention      HPI: Ambrosio Bray III is a 67 y.o. Black or  male who presents today for indwelling catheter removal and voiding trial. His catheter was placed on 2/5/18 for urinary retention and leakage of urine. His last clinic visit with Dr. Matute was on 1/9/18 and JAVAD Roblero on 2/12/18.    He had an ablation for a-flutter on Friday 2/2/18 under anesthesia.  He presented to clinic on 2/5/18 for urinary retention and leakage of urine. He reported he was not able to urinate since his ablation on Friday and on Saturday, he started leaking urine constantly. He saturated 6 depends from Sunday at noon until Monday morning. He reported symptoms of urgency and bladder pressure and pain. He denied any dysuria or hematuria. He has been taking his Flomax daily. Prior to the ablation, he reported he did not have difficulty urinating or incontinence.     Today he presents to clinic for possible urinary retention and leakage of urine. He states he dribbles with urination and has a weak FOS. He reports abdominal pain and bloating. He reports constipation until this AM.  He denies any fever, chills, nausea or vomiting. He has been taking his flomax every night.    Review of patient's allergies indicates:  No Known Allergies    Current Outpatient Prescriptions   Medication Sig Dispense Refill    atorvastatin (LIPITOR) 40 MG tablet Take 1 tablet (40 mg total) by mouth once daily. 90 tablet 1    hydroCHLOROthiazide (HYDRODIURIL) 50 MG tablet TAKE ONE-HALF TABLET BY MOUTH ONCE DAILY 90 tablet 1    ipratropium (ATROVENT) 0.06 % nasal spray 2 sprays by Nasal route 4 (four) times daily. (Patient taking differently: 2 sprays by Nasal route 4 (four) times daily as needed. ) 15 mL 6    mometasone (ELOCON) 0.1 % ointment Apply topically once daily.      riociguat (ADEMPAS) 2.5 mg tablet Take 1 tablet (2.5 mg total) by  mouth 3 (three) times daily. 90 tablet 11    rivaroxaban (XARELTO) 20 mg Tab TAKE ONE TABLET BY MOUTH ONCE DAILY WITH  DINNER  OR  EVENING  MEAL 90 tablet 1    sulfamethoxazole-trimethoprim 800-160mg (BACTRIM DS) 800-160 mg Tab Start night before prostate biopsy every 12 hours for 4 doses only 4 tablet 0    tamsulosin (FLOMAX) 0.4 mg Cp24 Take 1 capsule (0.4 mg total) by mouth once daily. 90 capsule 1     Current Facility-Administered Medications   Medication Dose Route Frequency Provider Last Rate Last Dose    lidocaine HCl 2% urojet   Mucous Membrane 1 time in Clinic/HOD Ligia Nye NP           Past Medical History:   Diagnosis Date    Diabetes mellitus     Hypertension     PTSD (post-traumatic stress disorder)        Past Surgical History:   Procedure Laterality Date    CARDIAC CATHETERIZATION         Family History   Problem Relation Age of Onset    Cancer Father      colon       Review of Systems   Constitutional: Negative for chills, fatigue and fever.   HENT: Negative for congestion and trouble swallowing.    Eyes: Negative for visual disturbance.   Respiratory: Negative for chest tightness and shortness of breath.    Cardiovascular: Negative for chest pain, palpitations and leg swelling.   Gastrointestinal: Positive for abdominal pain and constipation. Negative for diarrhea, nausea and vomiting.   Genitourinary: Positive for difficulty urinating. Negative for dysuria, flank pain and hematuria.   Musculoskeletal: Negative for back pain.   Skin: Negative for rash.   Allergic/Immunologic: Negative for immunocompromised state.   Neurological: Negative for dizziness, seizures, syncope, weakness and headaches.   Hematological: Negative for adenopathy.   Psychiatric/Behavioral: Negative for behavioral problems. The patient is not nervous/anxious.          All other systems were reviewed and were negative.    Objective:     Vitals:    02/14/18 0830   BP: (!) 117/58   Pulse: 79        Physical Exam    Nursing note and vitals reviewed.  Constitutional: He is oriented to person, place, and time. He appears well-developed and well-nourished.   HENT:   Head: Normocephalic and atraumatic.   Eyes: Conjunctivae are normal.   Neck: Neck supple.   Cardiovascular: Normal rate and regular rhythm.    Pulmonary/Chest: Effort normal. No respiratory distress.   Abdominal: He exhibits distension. He exhibits no mass. There is tenderness. There is no rebound and no guarding.   Musculoskeletal: Normal range of motion. He exhibits no edema.   Neurological: He is alert and oriented to person, place, and time.   Skin: Skin is warm and dry.     Psychiatric: He has a normal mood and affect. His behavior is normal.     PVR was 759 cc with bladder scanner.    UA today showed + leuk and blood. Spec grav 1.015 and ph 5.   Lab Results   Component Value Date    CREATININE 1.2 02/03/2018     Lab Results   Component Value Date    EGFRNONAA >60.0 02/03/2018     Lab Results   Component Value Date    ESTGFRAFRICA >60.0 02/03/2018       Assessment:       1. Urinary retention    2. Benign prostatic hyperplasia with incomplete bladder emptying        Plan:     Ambrosio was seen today for urinary retention.    Diagnoses and all orders for this visit:    Urinary retention  -     Urine culture    Benign prostatic hyperplasia with incomplete bladder emptying  -     Urine culture       -Reassured patient and discussed plan of care  -Discussed urinary retention with 759 cc in his bladder. Verbal consent was received. Christensen catheter was inserted using sterile procedure. Urethra meatus was prepped with betadine. 16 fr catheter placed. Balloon inflated with 10cc of sterile water.  Catheter was connected to leg bag and secured to leg. 900 cc of clear urine drained into his catheter.   -Continue taking Flomax as ordered.  -UC sent to the lab. Will notify with results  -Has scheduled TRUS with biopsy scheduled with Dr. Matute 3/7/18.  -RTC next week for VT.  Discussed UDS if unable to pass VT again.      I spent 15 minutes with the patient of which more than half was spent in coordinating the patient's care as well as in direct consultation with the patient in regards to our treatment and plan.

## 2018-02-15 LAB — BACTERIA UR CULT: NORMAL

## 2018-02-19 ENCOUNTER — TELEPHONE (OUTPATIENT)
Dept: UROLOGY | Facility: CLINIC | Age: 68
End: 2018-02-19

## 2018-02-19 DIAGNOSIS — N30.00 ACUTE CYSTITIS WITHOUT HEMATURIA: Primary | ICD-10-CM

## 2018-02-19 RX ORDER — AMOXICILLIN AND CLAVULANATE POTASSIUM 500; 125 MG/1; MG/1
1 TABLET, FILM COATED ORAL 2 TIMES DAILY
Qty: 14 TABLET | Refills: 0 | Status: SHIPPED | OUTPATIENT
Start: 2018-02-19 | End: 2018-02-26

## 2018-02-19 NOTE — TELEPHONE ENCOUNTER
Called to discuss symptoms and UC results. Sent augmentin for him to take for 7 days and f/u for VT with me this week. Verbalized understanding.

## 2018-02-21 ENCOUNTER — OFFICE VISIT (OUTPATIENT)
Dept: UROLOGY | Facility: CLINIC | Age: 68
End: 2018-02-21
Payer: MEDICARE

## 2018-02-21 VITALS
HEART RATE: 72 BPM | DIASTOLIC BLOOD PRESSURE: 76 MMHG | WEIGHT: 196 LBS | HEIGHT: 73 IN | BODY MASS INDEX: 25.98 KG/M2 | SYSTOLIC BLOOD PRESSURE: 135 MMHG

## 2018-02-21 DIAGNOSIS — R33.9 URINARY RETENTION: Primary | ICD-10-CM

## 2018-02-21 DIAGNOSIS — R97.20 ELEVATED PSA: ICD-10-CM

## 2018-02-21 PROCEDURE — 1126F AMNT PAIN NOTED NONE PRSNT: CPT | Mod: ,,, | Performed by: NURSE PRACTITIONER

## 2018-02-21 PROCEDURE — 1159F MED LIST DOCD IN RCRD: CPT | Mod: ,,, | Performed by: NURSE PRACTITIONER

## 2018-02-21 PROCEDURE — 99999 PR PBB SHADOW E&M-EST. PATIENT-LVL IV: CPT | Mod: PBBFAC,,, | Performed by: NURSE PRACTITIONER

## 2018-02-21 PROCEDURE — 99213 OFFICE O/P EST LOW 20 MIN: CPT | Mod: S$PBB,25,, | Performed by: NURSE PRACTITIONER

## 2018-02-21 PROCEDURE — 99214 OFFICE O/P EST MOD 30 MIN: CPT | Mod: PBBFAC | Performed by: NURSE PRACTITIONER

## 2018-02-21 PROCEDURE — 51700 IRRIGATION OF BLADDER: CPT | Mod: S$PBB,,, | Performed by: NURSE PRACTITIONER

## 2018-02-21 PROCEDURE — 51700 IRRIGATION OF BLADDER: CPT | Mod: PBBFAC | Performed by: NURSE PRACTITIONER

## 2018-02-21 RX ORDER — LIDOCAINE HYDROCHLORIDE 20 MG/ML
JELLY TOPICAL ONCE
Status: CANCELLED | OUTPATIENT
Start: 2018-02-21 | End: 2018-02-21

## 2018-02-21 RX ORDER — SULFAMETHOXAZOLE AND TRIMETHOPRIM 800; 160 MG/1; MG/1
1 TABLET ORAL ONCE
Status: CANCELLED | OUTPATIENT
Start: 2018-02-21 | End: 2018-02-21

## 2018-02-21 NOTE — PROGRESS NOTES
Subjective:       Patient ID: Ambrosio Bray III is a 67 y.o. male.    Chief Complaint: Other (voiding trial)      HPI: Ambrosio Bray III is a 67 y.o. Black or  male who presents today for indwelling catheter removal and voiding trial. His catheter was placed on 2/5/18 for urinary retention and leakage of urine. His last clinic visit with Dr. Matute was on 1/9/18 and me on 2/14/18.    He had an ablation for a-flutter on Friday 2/2/18 under anesthesia.  He presented to clinic on 2/5/18 for urinary retention and leakage of urine. He reported he was not able to urinate since his ablation on Friday and on Saturday, he started leaking urine constantly. He saturated 6 depends from Sunday at noon until Monday morning. He reported symptoms of urgency and bladder pressure and pain. He denied any dysuria or hematuria. He has been taking his Flomax daily. Prior to the ablation, he reported he did not have difficulty urinating or incontinence.     Today he presents last week with urinary retention and leakage of urine. A catheter was placed. No complaints with his catheter. He presents today for a voiding trial. He has been taking his flomax every night. He has been taking Augmentin BID daily since Monday. He denies constipation today. He denies any fever, chills, nausea or vomiting.     Review of patient's allergies indicates:  No Known Allergies    Current Outpatient Prescriptions   Medication Sig Dispense Refill    amoxicillin-clavulanate 500-125mg (AUGMENTIN) 500-125 mg Tab Take 1 tablet (500 mg total) by mouth 2 (two) times daily. 14 tablet 0    atorvastatin (LIPITOR) 40 MG tablet Take 1 tablet (40 mg total) by mouth once daily. 90 tablet 1    hydroCHLOROthiazide (HYDRODIURIL) 50 MG tablet TAKE ONE-HALF TABLET BY MOUTH ONCE DAILY 90 tablet 1    ipratropium (ATROVENT) 0.06 % nasal spray 2 sprays by Nasal route 4 (four) times daily. (Patient taking differently: 2 sprays by Nasal route 4 (four) times  daily as needed. ) 15 mL 6    mometasone (ELOCON) 0.1 % ointment Apply topically once daily.      riociguat (ADEMPAS) 2.5 mg tablet Take 1 tablet (2.5 mg total) by mouth 3 (three) times daily. 90 tablet 11    rivaroxaban (XARELTO) 20 mg Tab TAKE ONE TABLET BY MOUTH ONCE DAILY WITH  DINNER  OR  EVENING  MEAL 90 tablet 1    tamsulosin (FLOMAX) 0.4 mg Cp24 Take 1 capsule (0.4 mg total) by mouth once daily. 90 capsule 1     Current Facility-Administered Medications   Medication Dose Route Frequency Provider Last Rate Last Dose    lidocaine HCl 2% urojet   Mucous Membrane 1 time in Clinic/HOD Ligia Nye NP           Past Medical History:   Diagnosis Date    Diabetes mellitus     Hypertension     PTSD (post-traumatic stress disorder)        Past Surgical History:   Procedure Laterality Date    CARDIAC CATHETERIZATION         Family History   Problem Relation Age of Onset    Cancer Father      colon       Review of Systems   Constitutional: Negative for chills, fatigue and fever.   HENT: Negative for congestion and trouble swallowing.    Eyes: Negative for visual disturbance.   Respiratory: Negative for chest tightness and shortness of breath.    Cardiovascular: Negative for chest pain, palpitations and leg swelling.   Gastrointestinal: Negative for abdominal pain, constipation, diarrhea, nausea and vomiting.   Genitourinary: Positive for difficulty urinating. Negative for dysuria, flank pain and hematuria.        Catheter draining well   Musculoskeletal: Negative for back pain.   Skin: Negative for rash.   Allergic/Immunologic: Negative for immunocompromised state.   Neurological: Negative for dizziness, seizures, syncope, weakness and headaches.   Hematological: Negative for adenopathy.   Psychiatric/Behavioral: Negative for behavioral problems. The patient is not nervous/anxious.        All other systems were reviewed and were negative.    Objective:     Vitals:    02/21/18 0833   BP: 135/76   Pulse:  72        Physical Exam   Nursing note and vitals reviewed.  Constitutional: He is oriented to person, place, and time. He appears well-developed and well-nourished.   HENT:   Head: Normocephalic and atraumatic.   Eyes: Conjunctivae are normal.   Neck: Neck supple.   Cardiovascular: Normal rate and regular rhythm.    Pulmonary/Chest: Effort normal. No respiratory distress.   Abdominal: He exhibits no distension and no mass. There is no tenderness. There is no rebound and no guarding.   Musculoskeletal: Normal range of motion. He exhibits no edema.   Neurological: He is alert and oriented to person, place, and time.   Skin: Skin is warm and dry.     Psychiatric: He has a normal mood and affect. His behavior is normal.       Lab Results   Component Value Date    CREATININE 1.2 02/03/2018     Lab Results   Component Value Date    EGFRNONAA >60.0 02/03/2018     Lab Results   Component Value Date    ESTGFRAFRICA >60.0 02/03/2018       Assessment:       1. Urinary retention    2. Elevated PSA        Plan:     Ambrosio was seen today for other.    Diagnoses and all orders for this visit:    Urinary retention  -     US Retroperitoneal Complete (Kidney and; Future  -     lidocaine HCl 2% urojet; Place into the urethra once.  -     Cystoscopy; Future  -     sulfamethoxazole-trimethoprim 800-160mg per tablet 1 tablet; Take 1 tablet by mouth once.    Elevated PSA  -     US Retroperitoneal Complete (Kidney and; Future  -     lidocaine HCl 2% urojet; Place into the urethra once.  -     Cystoscopy; Future  -     sulfamethoxazole-trimethoprim 800-160mg per tablet 1 tablet; Take 1 tablet by mouth once.       -Reassured patient and discussed plan of care  -Voiding trial performed by Nurse Campbell.  250 ml of sterile water was instilled into bladder.  Christensen catheter was removed. Patient urinated  100 ml without difficulty.  Voiding trial failed.  Patient was instructed to drink plenty of fluids today.  Pt was educated on CIC. He watched  the video and demonstrated without difficulties. Samples given. Discussed performing CIC if unable to urinate or abdominal pain. Explained he wanted PVR <500 cc. Explained to perform in AM and record his pvr. He will notify me on Friday if he needs more cath samples.   -Continue taking Flomax as ordered and complete augmentin.   -Has scheduled TRUS with biopsy scheduled with Dr. Matute 3/7/18.  -Cysto and renal US for urinary retention.   -RTC for scheduled      I spent 15 minutes with the patient of which more than half was spent in coordinating the patient's care as well as in direct consultation with the patient in regards to our treatment and plan.

## 2018-02-21 NOTE — PATIENT INSTRUCTIONS
Cystoscopy    Cystoscopy is a procedure that lets your doctor look directly inside your urethra and bladder. It can be used to:  · Help diagnose a problem with your urethra, bladder, or kidneys.  · Take a sample (biopsy) of bladder or urethral tissue.  · Treat certain problems (such as removing kidney stones).  · Place a stent to bypass an obstruction.  · Take special X-rays of the kidneys.  Based on the findings, your doctor may recommend other tests or treatments.  What is a cystoscope?  A cystoscope is a telescope-like instrument that contains lenses and fiberoptics (small glass wires that make bright light). The cystoscope may be straight and rigid, or flexible to bend around curves in the urethra. The doctor may look directly into the cystoscope, or project the image onto a monitor.  Getting ready  · Ask your doctor if you should stop taking any medicines before the procedure.  · Ask whether you should avoid eating or drinking anything after midnight before the procedure.  · Follow any other instructions your doctor gives you.  Tell your doctor before the exam if you:  · Take any medicines, such as aspirin or blood thinners  · Have allergies to any medicines  · Are pregnant   The procedure  Cystoscopy is done in the doctors office, surgery center, or hospital. The doctor and a nurse are present during the procedure. It takes only a few minutes, longer if a biopsy, X-ray, or treatment needs to be done.  During the procedure:  · You lie on an exam table on your back, knees bent and legs apart. You are covered with a drape.  · Your urethra and the area around it are washed. Anesthetic jelly may be applied to numb the urethra. Other pain medicine is usually not needed. In some cases, you may be offered a mild sedative to help you relax. If a more extensive procedure is to be done, such as a biopsy or kidney stone removal, general anesthesia may be needed.  · The cystoscope is inserted. A sterile fluid is put  into the bladder to expand it. You may feel pressure from this fluid.  · When the procedure is done, the cystoscope is removed.  After the procedure  If you had a sedative, general anesthesia, or spinal anesthesia, you must have someone drive you home. Once youre home:  · Drink plenty of fluids.  · You may have burning or light bleeding when you urinate--this is normal.  · Medicines may be prescribed to ease any discomfort or prevent infection. Take these as directed.  · Call your doctor if you have heavy bleeding or blood clots, burning that lasts more than a day, a fever over 100°F  (38° C), or trouble urinating.  Date Last Reviewed: 1/1/2017  © 9775-0653 [employer]. [address] [place] [address], [place] [address]. All rights reserved. This information is not intended as a substitute for professional medical care. Always follow your healthcare professional's instructions.

## 2018-02-23 ENCOUNTER — HOSPITAL ENCOUNTER (OUTPATIENT)
Dept: RADIOLOGY | Facility: HOSPITAL | Age: 68
Discharge: HOME | End: 2018-02-23
Attending: NURSE PRACTITIONER
Payer: MEDICARE

## 2018-02-23 DIAGNOSIS — R33.9 URINARY RETENTION: ICD-10-CM

## 2018-02-23 DIAGNOSIS — R97.20 ELEVATED PSA: ICD-10-CM

## 2018-02-23 PROCEDURE — 76770 US EXAM ABDO BACK WALL COMP: CPT | Mod: 26,GC,, | Performed by: RADIOLOGY

## 2018-02-23 PROCEDURE — 76770 US EXAM ABDO BACK WALL COMP: CPT | Mod: TC

## 2018-03-07 ENCOUNTER — PROCEDURE VISIT (OUTPATIENT)
Dept: UROLOGY | Facility: CLINIC | Age: 68
End: 2018-03-07
Payer: MEDICARE

## 2018-03-07 VITALS
RESPIRATION RATE: 18 BRPM | HEART RATE: 73 BPM | DIASTOLIC BLOOD PRESSURE: 69 MMHG | SYSTOLIC BLOOD PRESSURE: 127 MMHG | TEMPERATURE: 98 F | BODY MASS INDEX: 25.66 KG/M2 | HEIGHT: 74 IN | WEIGHT: 199.94 LBS

## 2018-03-07 DIAGNOSIS — R97.20 ELEVATED PSA: ICD-10-CM

## 2018-03-07 PROCEDURE — 88305 TISSUE EXAM BY PATHOLOGIST: CPT | Performed by: PATHOLOGY

## 2018-03-07 PROCEDURE — 76942 ECHO GUIDE FOR BIOPSY: CPT | Mod: 26,S$PBB,59, | Performed by: UROLOGY

## 2018-03-07 PROCEDURE — 55700 PR BIOPSY OF PROSTATE,NEEDLE/PUNCH: CPT | Mod: PBBFAC | Performed by: UROLOGY

## 2018-03-07 PROCEDURE — 88342 IMHCHEM/IMCYTCHM 1ST ANTB: CPT | Performed by: PATHOLOGY

## 2018-03-07 PROCEDURE — 76872 US TRANSRECTAL: CPT | Mod: 26,S$PBB,, | Performed by: UROLOGY

## 2018-03-07 PROCEDURE — 76872 US TRANSRECTAL: CPT | Mod: PBBFAC | Performed by: UROLOGY

## 2018-03-07 PROCEDURE — 55700 TRANSRECTAL ULTRASOUND WITH BIOPSY: CPT | Mod: PBBFAC | Performed by: UROLOGY

## 2018-03-07 PROCEDURE — 76942 ECHO GUIDE FOR BIOPSY: CPT | Mod: PBBFAC | Performed by: UROLOGY

## 2018-03-07 PROCEDURE — 88342 IMHCHEM/IMCYTCHM 1ST ANTB: CPT | Mod: 26,,, | Performed by: PATHOLOGY

## 2018-03-07 PROCEDURE — 55700 PR BIOPSY OF PROSTATE,NEEDLE/PUNCH: CPT | Mod: S$PBB,,, | Performed by: UROLOGY

## 2018-03-07 PROCEDURE — 88305 TISSUE EXAM BY PATHOLOGIST: CPT | Mod: 26,,, | Performed by: PATHOLOGY

## 2018-03-07 RX ORDER — LIDOCAINE HYDROCHLORIDE 20 MG/ML
JELLY TOPICAL ONCE
Status: COMPLETED | OUTPATIENT
Start: 2018-03-07 | End: 2018-03-07

## 2018-03-07 RX ORDER — LIDOCAINE HYDROCHLORIDE 10 MG/ML
1 INJECTION INFILTRATION; PERINEURAL
Status: SHIPPED | OUTPATIENT
Start: 2018-03-07

## 2018-03-07 RX ORDER — LIDOCAINE HYDROCHLORIDE 10 MG/ML
1 INJECTION INFILTRATION; PERINEURAL
Status: COMPLETED | OUTPATIENT
Start: 2018-03-07 | End: 2018-03-07

## 2018-03-07 RX ADMIN — LIDOCAINE HYDROCHLORIDE 1 ML: 10 INJECTION INFILTRATION; PERINEURAL at 02:03

## 2018-03-07 RX ADMIN — LIDOCAINE HYDROCHLORIDE: 20 JELLY TOPICAL at 02:03

## 2018-03-07 NOTE — PROCEDURES
"Transrectal ultrasound w/ biopsy  Date/Time: 3/7/2018 3:09 PM  Performed by: DON SULTANA JR  Authorized by: DON SULTANA JR     Consent Done?:  Yes (Written)  Time out: Immediately prior to procedure a "time out" was called to verify the correct patient, procedure, equipment, support staff and site/side marked as required.    Indications: Elevated PSA    Preparation: Patient was prepped and draped in usual sterile fashion    Position:  Left lateral  Anesthesia:  Pudendal nerve block  Patient sedated: No    Prostate Size:  36.7  Lesions:: Yes         Type:  Mixed hypo- and hyperechoic  Left Base Biopsies: 2  Left Mid Biopsies: 2  Left Seaboard Biopsies: 2  Right Base Biopsies: 2  Right Mid Biopsies: 2  Right Seaboard Biopsies: 2  Transitional zone: No    Total Biopsies:  12      plus 6 uro krystyna bxs  "

## 2018-03-07 NOTE — PATIENT INSTRUCTIONS

## 2018-03-16 ENCOUNTER — PATIENT MESSAGE (OUTPATIENT)
Dept: UROLOGY | Facility: CLINIC | Age: 68
End: 2018-03-16

## 2018-03-16 DIAGNOSIS — Z13.5 DIABETIC RETINOPATHY SCREENING: ICD-10-CM

## 2018-03-20 ENCOUNTER — HOSPITAL ENCOUNTER (OUTPATIENT)
Dept: CARDIOLOGY | Facility: CLINIC | Age: 68
Discharge: HOME | End: 2018-03-20
Payer: MEDICARE

## 2018-03-20 ENCOUNTER — OFFICE VISIT (OUTPATIENT)
Dept: ELECTROPHYSIOLOGY | Facility: CLINIC | Age: 68
End: 2018-03-20
Payer: MEDICARE

## 2018-03-20 VITALS
HEART RATE: 66 BPM | HEIGHT: 73 IN | BODY MASS INDEX: 25.98 KG/M2 | SYSTOLIC BLOOD PRESSURE: 130 MMHG | WEIGHT: 196 LBS | DIASTOLIC BLOOD PRESSURE: 71 MMHG

## 2018-03-20 DIAGNOSIS — I48.92 ATRIAL FLUTTER, UNSPECIFIED TYPE: ICD-10-CM

## 2018-03-20 DIAGNOSIS — I48.0 PAROXYSMAL ATRIAL FIBRILLATION: Primary | ICD-10-CM

## 2018-03-20 DIAGNOSIS — I27.20 PULMONARY HTN: ICD-10-CM

## 2018-03-20 DIAGNOSIS — I48.0 PAROXYSMAL ATRIAL FIBRILLATION: ICD-10-CM

## 2018-03-20 DIAGNOSIS — I50.32 CHRONIC DIASTOLIC CHF (CONGESTIVE HEART FAILURE): ICD-10-CM

## 2018-03-20 DIAGNOSIS — Z79.01 CURRENT USE OF LONG TERM ANTICOAGULATION: ICD-10-CM

## 2018-03-20 DIAGNOSIS — I15.2 HYPERTENSION ASSOCIATED WITH DIABETES: Chronic | ICD-10-CM

## 2018-03-20 DIAGNOSIS — I82.5Z2 CHRONIC DEEP VEIN THROMBOSIS (DVT) OF DISTAL VEIN OF LEFT LOWER EXTREMITY: ICD-10-CM

## 2018-03-20 DIAGNOSIS — E11.59 HYPERTENSION ASSOCIATED WITH DIABETES: Chronic | ICD-10-CM

## 2018-03-20 PROCEDURE — 99999 PR PBB SHADOW E&M-EST. PATIENT-LVL III: CPT | Mod: PBBFAC,,, | Performed by: NURSE PRACTITIONER

## 2018-03-20 PROCEDURE — 99214 OFFICE O/P EST MOD 30 MIN: CPT | Mod: S$PBB,,, | Performed by: NURSE PRACTITIONER

## 2018-03-20 PROCEDURE — 93005 ELECTROCARDIOGRAM TRACING: CPT | Mod: PBBFAC | Performed by: INTERNAL MEDICINE

## 2018-03-20 PROCEDURE — 99213 OFFICE O/P EST LOW 20 MIN: CPT | Mod: PBBFAC | Performed by: NURSE PRACTITIONER

## 2018-03-20 PROCEDURE — 93010 ELECTROCARDIOGRAM REPORT: CPT | Mod: S$PBB,,, | Performed by: INTERNAL MEDICINE

## 2018-03-20 NOTE — PROGRESS NOTES
Subjective:    Patient ID:  Ambrosio Bray III is a 67 y.o. male who presents for follow-up of Atrial Flutter.     Ambrosio Bray III is a patient of Dr. Duffy.     HPI     Mr. Bray is a 66 y/o with pHTN, thromboembolic disease, DM, HTN evaluation of arrhythmia. He has severe pHTN as well as chronic thromboembolic disease. 12/26/17 he presented with rapid palpitations and dyspnea. He was found to be in a WCT with rate of 240 bpm. Amiodarone was given and his rate dropped in half to 118 bpm>>ECG features were c/w AFL, 2:1 conduction.   He was then placed on amiodarone 200 mg bid>>on Xarelto chronically (long-standing DVT). He had normal EF (10/2017) however his PAP was 103 mm Hg>>followed by Kenyetta Mcdermott and [facility].   When he initially [place] [facility], he carried a Dx of AF>>he had undergone a RITO/CV prior (09/30/14). At that initial office visit, all ECGs in [facility] were reviewed>>arrhyhtmia determined to be typical AFL as opposed to AF. He had been on amiodarone for about one year post CV.   Underwent a successful CTI RFA (02/02/18) without complication.     Since the procedure, Mr. Bray reports feeling well>>he denies AFL recurrence. He did experience some issues with urinary retention post-procedure (hx of BPH)>>saw urology>>resolved.   He denies chest pain, SOB/MERINO, dizziness, palpitations, or syncope. He states that his energy level has increased; he remains active without difficulty.    I reviewed today's ECG which demonstrated SR w/occasional PVCs at 66 bpm; , , and QTc 490.    Review of Systems   Constitution: Negative for diaphoresis and malaise/fatigue.   HENT: Negative for nosebleeds.    Eyes: Negative for double vision.   Cardiovascular: Negative for chest pain, dyspnea on exertion, irregular heartbeat, near-syncope, palpitations and syncope.   Respiratory: Negative for shortness of breath.    Skin: Negative.    Musculoskeletal: Negative.    Gastrointestinal: Negative for  hematemesis and hematochezia.   Genitourinary: Negative for hematuria.   Neurological: Negative for dizziness and light-headedness.   Psychiatric/Behavioral: Negative for altered mental status.        Objective:    Physical Exam   Constitutional: He is oriented to person, place, and time. He appears well-developed and well-nourished.   HENT:   Head: Normocephalic and atraumatic.   Eyes: Pupils are equal, round, and reactive to light.   Cardiovascular: Normal rate and regular rhythm.    Pulmonary/Chest: Effort normal.   Musculoskeletal: Normal range of motion.   Neurological: He is alert and oriented to person, place, and time.   Vitals reviewed.        Assessment:       1. Paroxysmal atrial fibrillation    2. Atrial flutter, unspecified type    3. Current use of long term anticoagulation    4. Chronic diastolic CHF (congestive heart failure)    5. Chronic deep vein thrombosis (DVT) of distal vein of left lower extremity    6. Hypertension associated with diabetes    7. Pulmonary HTN         Plan:       Mr. Bray is doing well from a rhythm perspective without clinical AFL recurrence s/p recent CTI RFA; he remains anticoagulated on Xarelto.    Continue current medication regimen. On Xarelto for chronic DVT.   Follow up in clinic in 1 year, sooner as needed.     Jordyn Gaines, MN, APRN, FNP-C      (A copy of today's note was sent to Dr. Duffy. )

## 2018-03-28 ENCOUNTER — OFFICE VISIT (OUTPATIENT)
Dept: CARDIOLOGY | Facility: CLINIC | Age: 68
End: 2018-03-28
Payer: MEDICARE

## 2018-03-28 ENCOUNTER — PATIENT MESSAGE (OUTPATIENT)
Dept: FAMILY MEDICINE | Facility: CLINIC | Age: 68
End: 2018-03-28

## 2018-03-28 VITALS
OXYGEN SATURATION: 94 % | WEIGHT: 196.19 LBS | DIASTOLIC BLOOD PRESSURE: 76 MMHG | RESPIRATION RATE: 16 BRPM | BODY MASS INDEX: 25.89 KG/M2 | HEART RATE: 76 BPM | SYSTOLIC BLOOD PRESSURE: 118 MMHG

## 2018-03-28 DIAGNOSIS — I27.20 PULMONARY HTN: Primary | ICD-10-CM

## 2018-03-28 DIAGNOSIS — I48.0 PAROXYSMAL ATRIAL FIBRILLATION: ICD-10-CM

## 2018-03-28 DIAGNOSIS — I48.92 ATRIAL FLUTTER, UNSPECIFIED TYPE: ICD-10-CM

## 2018-03-28 DIAGNOSIS — Z79.01 CURRENT USE OF LONG TERM ANTICOAGULATION: ICD-10-CM

## 2018-03-28 DIAGNOSIS — I50.32 CHRONIC DIASTOLIC CHF (CONGESTIVE HEART FAILURE): ICD-10-CM

## 2018-03-28 PROCEDURE — 99214 OFFICE O/P EST MOD 30 MIN: CPT | Mod: S$PBB,,, | Performed by: INTERNAL MEDICINE

## 2018-03-28 PROCEDURE — 99213 OFFICE O/P EST LOW 20 MIN: CPT | Mod: PBBFAC,PO | Performed by: INTERNAL MEDICINE

## 2018-03-28 PROCEDURE — 99999 PR PBB SHADOW E&M-EST. PATIENT-LVL III: CPT | Mod: PBBFAC,,, | Performed by: INTERNAL MEDICINE

## 2018-03-28 NOTE — PROGRESS NOTES
Subjective:    Patient ID:  Ambrosio Bray III is a 67 y.o. male who presents for follow-up of Follow-up      Atrial Fibrillation   Past medical history includes atrial fibrillation.   Medication Refill   Pertinent negatives include no abdominal pain.     Previous history:  Here for follow-up of severe pulmonary hypertension.  He's recently admitted the hospital with wide complex tachyarrhythmia with a rate above 200 bpm.  He was given IV amiodarone bolus with correction to sinus tachycardia.  He's had no problems post hospital discharge.  He's agreeable to go see electrophysiology for evaluation.  He denies any current chest pain but has similar symptoms terms shortness of breath on heavier exertion but relieved with rest.  He denies any PND, orthopnea or lower edema.  He's not expressing dizziness, presyncope or syncope.  We discussed again symptom limited exercise.    Today:  Here for follow-up of severe pulmonary hypertension.  The consult it with EP thought he had atrial flutter with 1:1 conduction.  He underwent successful CTI ablation.  He denies any problems postoperatively.  He did have some urinary retention which is now resolved.  He has prostate biopsy without issues.  He denies any chest pain or palpitations.  He still doing a lot of heavy manual labor.  He denies any PND, orthopnea or lower edema.  He's not expressing dizziness, presyncope or syncope.    Review of Systems   Constitution: Negative.   HENT: Negative.    Eyes: Negative.    Cardiovascular: Positive for dyspnea on exertion. Negative for irregular heartbeat, leg swelling, near-syncope, orthopnea and paroxysmal nocturnal dyspnea.   Skin: Negative.    Musculoskeletal: Negative.    Gastrointestinal: Negative for abdominal pain, constipation and diarrhea.   Genitourinary: Negative for dysuria.   Psychiatric/Behavioral: Negative.         Objective:    Physical Exam   Constitutional: He is oriented to person, place, and time. He appears  well-developed and well-nourished. No distress.   HENT:   Head: Normocephalic and atraumatic.   Eyes: Conjunctivae and EOM are normal. Pupils are equal, round, and reactive to light.   Neck: Normal range of motion. Neck supple. No thyromegaly present.   Cardiovascular: Normal rate, regular rhythm and normal heart sounds.    No murmur heard.  Pulmonary/Chest: Effort normal and breath sounds normal. No respiratory distress. He has no wheezes. He has no rales. He exhibits no tenderness.   Abdominal: Soft. Bowel sounds are normal.   Musculoskeletal: He exhibits no edema.   Neurological: He is alert and oriented to person, place, and time.   Skin: Skin is warm and dry.   Psychiatric: He has a normal mood and affect. His behavior is normal.       echo:  CONCLUSIONS     1 - Normal left ventricular systolic function (EF 55-60%).     2 - Severe right ventricular enlargement with moderately to severely depressed systolic function.     3 - Impaired LV relaxation, elevated LAP (grade 2 diastolic dysfunction).     4 - Biatrial enlargement (R>L).     5 - Mild to moderate tricuspid regurgitation.     6 - Mild mitral regurgitation.     7 - Pulmonary hypertension. The estimated PA systolic pressure is 103 mmHg.     8 - Increased central venous pressure.     Assessment:       1. Pulmonary HTN    2. Paroxysmal atrial fibrillation    3. Atrial flutter, unspecified type    4. Chronic diastolic CHF (congestive heart failure)    5. Current use of long term anticoagulation         Plan:       -severe pulm htn almost equal to systemic bp. Has home o2 but doesn't use  -no response in cath lab and likely irreversible dz.   -Follow-up with pulmonary Dr. Posadas  -anyi garrido with DVT hx and afib  -Status post ablation for atrial flutter      RTC 6 months

## 2018-04-09 ENCOUNTER — OFFICE VISIT (OUTPATIENT)
Dept: FAMILY MEDICINE | Facility: CLINIC | Age: 68
End: 2018-04-09
Payer: MEDICARE

## 2018-04-09 ENCOUNTER — LAB VISIT (OUTPATIENT)
Dept: LAB | Facility: HOSPITAL | Age: 68
End: 2018-04-09
Attending: FAMILY MEDICINE
Payer: MEDICARE

## 2018-04-09 VITALS
TEMPERATURE: 98 F | SYSTOLIC BLOOD PRESSURE: 138 MMHG | DIASTOLIC BLOOD PRESSURE: 80 MMHG | WEIGHT: 201.06 LBS | BODY MASS INDEX: 26.65 KG/M2 | OXYGEN SATURATION: 96 % | HEART RATE: 77 BPM | HEIGHT: 73 IN

## 2018-04-09 DIAGNOSIS — E11.29 TYPE 2 DIABETES MELLITUS WITH MICROALBUMINURIA, WITHOUT LONG-TERM CURRENT USE OF INSULIN: ICD-10-CM

## 2018-04-09 DIAGNOSIS — Z23 NEED FOR PROPHYLACTIC VACCINATION AGAINST STREPTOCOCCUS PNEUMONIAE (PNEUMOCOCCUS): ICD-10-CM

## 2018-04-09 DIAGNOSIS — I15.2 HYPERTENSION ASSOCIATED WITH DIABETES: Chronic | ICD-10-CM

## 2018-04-09 DIAGNOSIS — E11.9 TYPE 2 DIABETES MELLITUS WITHOUT COMPLICATION: ICD-10-CM

## 2018-04-09 DIAGNOSIS — R80.9 TYPE 2 DIABETES MELLITUS WITH MICROALBUMINURIA, WITHOUT LONG-TERM CURRENT USE OF INSULIN: ICD-10-CM

## 2018-04-09 DIAGNOSIS — I48.92 ATRIAL FLUTTER, UNSPECIFIED TYPE: ICD-10-CM

## 2018-04-09 DIAGNOSIS — Z12.11 SCREENING FOR MALIGNANT NEOPLASM OF COLON: Primary | ICD-10-CM

## 2018-04-09 DIAGNOSIS — I50.32 CHRONIC DIASTOLIC CHF (CONGESTIVE HEART FAILURE): ICD-10-CM

## 2018-04-09 DIAGNOSIS — E11.59 HYPERTENSION ASSOCIATED WITH DIABETES: Chronic | ICD-10-CM

## 2018-04-09 DIAGNOSIS — I27.20 PULMONARY HTN: ICD-10-CM

## 2018-04-09 LAB
CHOLEST SERPL-MCNC: 134 MG/DL
CHOLEST/HDLC SERPL: 2.7 {RATIO}
CREAT UR-MCNC: 47 MG/DL
HDLC SERPL-MCNC: 50 MG/DL
HDLC SERPL: 37.3 %
LDLC SERPL CALC-MCNC: 73.4 MG/DL
MICROALBUMIN UR DL<=1MG/L-MCNC: 22 UG/ML
MICROALBUMIN/CREATININE RATIO: 46.8 UG/MG
NONHDLC SERPL-MCNC: 84 MG/DL
TRIGL SERPL-MCNC: 53 MG/DL

## 2018-04-09 PROCEDURE — 99215 OFFICE O/P EST HI 40 MIN: CPT | Mod: PBBFAC,PO,25 | Performed by: FAMILY MEDICINE

## 2018-04-09 PROCEDURE — 36415 COLL VENOUS BLD VENIPUNCTURE: CPT | Mod: PO

## 2018-04-09 PROCEDURE — 99214 OFFICE O/P EST MOD 30 MIN: CPT | Mod: S$PBB,,, | Performed by: FAMILY MEDICINE

## 2018-04-09 PROCEDURE — G0009 ADMIN PNEUMOCOCCAL VACCINE: HCPCS | Mod: PBBFAC,PO

## 2018-04-09 PROCEDURE — 99999 PR PBB SHADOW E&M-EST. PATIENT-LVL V: CPT | Mod: PBBFAC,,, | Performed by: FAMILY MEDICINE

## 2018-04-09 PROCEDURE — 80061 LIPID PANEL: CPT

## 2018-04-09 PROCEDURE — 82043 UR ALBUMIN QUANTITATIVE: CPT

## 2018-04-09 NOTE — PROGRESS NOTES
Routine Office Visit    Patient Name: Ambrosio Bray III    : 1950  MRN: [id number]    Subjective:  Ambrosio is a 67 y.o. male who presents today for     1. Blood pressure and tachycardia follow-up - pt has been doing well with his current medication regimen. He has followed up with his cardiologist and EP - Dr Mcdermott / Dr. Duffy. He denies any side effects from current medication regimen. No issues/complaints.   2. Lipids - pt would like blood work to check for his chronic conditions. I reviewed chart, he had recent blood work done in last 3 months with exception of lipids. I will order.   3. Halitosis -  pt states he sometimes smells an odor from his mouth that he believes comes from his stomach / medications. It is intermittent. He drinks 34-40 ounces of water / day; good oral hygiene.     Answers for HPI/ROS submitted by the patient on 2018   activity change: No  unexpected weight change: No  rhinorrhea: No  trouble swallowing: No  visual disturbance: Yes  chest tightness: No  polyuria: No  difficulty urinating: No  joint swelling: No  arthralgias: No  confusion: No  dysphoric mood: No    Review of Systems   Constitutional: Negative for chills and fever.   HENT: Negative for congestion and hearing loss.    Eyes: Negative for blurred vision and discharge.   Respiratory: Negative for cough and wheezing.    Cardiovascular: Negative for chest pain and palpitations.   Gastrointestinal: Negative for abdominal pain, blood in stool, constipation, diarrhea, heartburn, nausea and vomiting.   Genitourinary: Negative for dysuria, hematuria and urgency.   Musculoskeletal: Negative for myalgias and neck pain.   Skin: Negative for itching and rash.   Neurological: Negative for dizziness, weakness and headaches.   Endo/Heme/Allergies: Negative for polydipsia.   Psychiatric/Behavioral: Negative for depression.       Active Problem List  Patient Active Problem List   Diagnosis    Type 2 diabetes mellitus with  microalbuminuria, without long-term current use of insulin    Chronic diastolic CHF (congestive heart failure)    Moderate tricuspid regurgitation    DVT, lower extremity, distal, chronic    Atrial fibrillation    Pulmonary HTN    Hypertension associated with diabetes    Pulmonary nodule    Varicose veins of both lower extremities    Chronic pulmonary heart disease    CTEPH (chronic thromboembolic pulmonary hypertension)    Metabolic acidosis    Typical atrial flutter    Atrial flutter    Current use of long term anticoagulation       Past Surgical History  Past Surgical History:   Procedure Laterality Date    CARDIAC CATHETERIZATION         Family History  Family History   Problem Relation Age of Onset    Cancer Father      colon       Social History  Social History     Social History    Marital status: Single     Spouse name: N/A    Number of children: N/A    Years of education: N/A     Occupational History    Not on file.     Social History Main Topics    Smoking status: Never Smoker    Smokeless tobacco: Never Used    Alcohol use 1.2 - 1.8 oz/week     2 - 3 Shots of liquor per week      Comment: regularly    Drug use: No    Sexual activity: Yes     Partners: Female     Other Topics Concern    Not on file     Social History Narrative    No narrative on file       Medications and Allergies  Reviewed and updated.   Current Outpatient Prescriptions   Medication Sig    atorvastatin (LIPITOR) 40 MG tablet Take 1 tablet (40 mg total) by mouth once daily.    hydroCHLOROthiazide (HYDRODIURIL) 50 MG tablet TAKE ONE-HALF TABLET BY MOUTH ONCE DAILY    ipratropium (ATROVENT) 0.06 % nasal spray 2 sprays by Nasal route 4 (four) times daily. (Patient taking differently: 2 sprays by Nasal route 4 (four) times daily as needed. )    mometasone (ELOCON) 0.1 % ointment Apply topically once daily.    riociguat (ADEMPAS) 2.5 mg tablet Take 1 tablet (2.5 mg total) by mouth 3 (three) times daily.     "rivaroxaban (XARELTO) 20 mg Tab TAKE ONE TABLET BY MOUTH ONCE DAILY WITH  DINNER  OR  EVENING  MEAL    tamsulosin (FLOMAX) 0.4 mg Cp24 Take 1 capsule (0.4 mg total) by mouth once daily.     Current Facility-Administered Medications   Medication    lidocaine HCL 10 mg/ml (1%) injection 1 mL    lidocaine HCl 2% urojet       Physical Exam  /80 (BP Location: Right arm, Patient Position: Sitting, BP Method: Medium (Manual))   Pulse 77   Temp 97.8 °F (36.6 °C) (Oral)   Ht 6' 1" (1.854 m)   Wt 91.2 kg (201 lb 1 oz)   SpO2 96%   BMI 26.53 kg/m²   Physical Exam   Constitutional: He is oriented to person, place, and time. He appears well-developed and well-nourished.   HENT:   Head: Normocephalic and atraumatic.   Eyes: Conjunctivae and EOM are normal. Pupils are equal, round, and reactive to light.   Neck: Normal range of motion. Neck supple. No JVD present. No thyromegaly present.   Cardiovascular: Normal rate, regular rhythm and normal heart sounds.    Pulmonary/Chest: Effort normal and breath sounds normal. He has no wheezes.   Abdominal: Soft. Bowel sounds are normal. He exhibits no distension. There is no tenderness. There is no guarding.   Musculoskeletal: Normal range of motion.   Lymphadenopathy:     He has no cervical adenopathy.   Neurological: He is alert and oriented to person, place, and time.   Skin: Skin is warm and dry.   Psychiatric: He has a normal mood and affect. His behavior is normal.         Assessment/Plan:  Ambrosio Bray III is a 67 y.o. male who presents today for :    Problem List Items Addressed This Visit        Pulmonary    Pulmonary HTN    Overview     Followed by Dr. Sylvester            Cardiac/Vascular    Atrial flutter  Followed by Dr. Duffy      Chronic diastolic CHF (congestive heart failure)  Followed by Dr. Mcdermott       Hypertension associated with diabetes (Chronic)    Relevant Orders    MICROALBUMIN / CREATININE RATIO URINE (Completed)    Lipid panel  The current " medical regimen is effective;  continue present plan and medications.         Endocrine    Type 2 diabetes mellitus with microalbuminuria, without long-term current use of insulin  The current medical regimen is effective;  continue present plan and medications.        Other Visit Diagnoses     Screening for malignant neoplasm of colon    -  Primary    Relevant Orders    Case request GI: COLONOSCOPY (Completed)    Need for prophylactic vaccination against Streptococcus pneumoniae (pneumococcus)        Relevant Orders    (In Office Administered) Pneumococcal Polysaccharide Vaccine (23 Valent) (SQ/IM) (Completed)            Follow-up in about 6 months (around 10/9/2018), or if symptoms worsen or fail to improve.

## 2018-04-09 NOTE — PROGRESS NOTES
Pneumovax-23 vaccination administered. Tolerated well, instructed to wait 15 min for observation. No reaction noted at discharge.

## 2018-04-10 ENCOUNTER — PATIENT OUTREACH (OUTPATIENT)
Dept: ADMINISTRATIVE | Facility: HOSPITAL | Age: 68
End: 2018-04-10

## 2018-04-10 NOTE — PROGRESS NOTES
Faxed to [facility] release of information and request for patient's Tetanus vaccine and diabetic eye exam for diabetic retinopathy to [phone] and [phone].

## 2018-04-13 DIAGNOSIS — Z12.11 COLON CANCER SCREENING: Primary | ICD-10-CM

## 2018-04-15 ENCOUNTER — PATIENT MESSAGE (OUTPATIENT)
Dept: CARDIOLOGY | Facility: CLINIC | Age: 68
End: 2018-04-15

## 2018-04-25 ENCOUNTER — OFFICE VISIT (OUTPATIENT)
Dept: TRANSPLANT | Facility: CLINIC | Age: 68
End: 2018-04-25
Payer: MEDICARE

## 2018-04-25 ENCOUNTER — HOSPITAL ENCOUNTER (OUTPATIENT)
Dept: PULMONOLOGY | Facility: CLINIC | Age: 68
Discharge: HOME | End: 2018-04-25
Payer: MEDICARE

## 2018-04-25 VITALS
DIASTOLIC BLOOD PRESSURE: 65 MMHG | WEIGHT: 201.75 LBS | HEART RATE: 65 BPM | HEIGHT: 73 IN | BODY MASS INDEX: 26.74 KG/M2 | SYSTOLIC BLOOD PRESSURE: 119 MMHG

## 2018-04-25 VITALS — WEIGHT: 194 LBS | HEIGHT: 73 IN | BODY MASS INDEX: 25.71 KG/M2

## 2018-04-25 DIAGNOSIS — I27.24 CTEPH (CHRONIC THROMBOEMBOLIC PULMONARY HYPERTENSION): ICD-10-CM

## 2018-04-25 DIAGNOSIS — I48.0 PAROXYSMAL ATRIAL FIBRILLATION: ICD-10-CM

## 2018-04-25 DIAGNOSIS — D64.9 ANEMIA, UNSPECIFIED TYPE: Primary | ICD-10-CM

## 2018-04-25 DIAGNOSIS — I27.20 PULMONARY HTN: ICD-10-CM

## 2018-04-25 PROCEDURE — 99999 PR PBB SHADOW E&M-EST. PATIENT-LVL III: CPT | Mod: PBBFAC,,, | Performed by: INTERNAL MEDICINE

## 2018-04-25 PROCEDURE — 99214 OFFICE O/P EST MOD 30 MIN: CPT | Mod: 25,S$PBB,, | Performed by: INTERNAL MEDICINE

## 2018-04-25 PROCEDURE — 94618 PULMONARY STRESS TESTING: CPT | Mod: PBBFAC | Performed by: INTERNAL MEDICINE

## 2018-04-25 PROCEDURE — 99213 OFFICE O/P EST LOW 20 MIN: CPT | Mod: PBBFAC,25 | Performed by: INTERNAL MEDICINE

## 2018-04-25 PROCEDURE — 94618 PULMONARY STRESS TESTING: CPT | Mod: 26,S$PBB,, | Performed by: INTERNAL MEDICINE

## 2018-04-25 NOTE — PROGRESS NOTES
Subjective:       Patient ID: Ambrosio Bray III is a 67 y.o. male.    Chief Complaint: Pulmonary Hypertension (3mo fo//ow-up)    HPI   Ambrosio Bray III 67 y.o. male    has a past medical history of Diabetes mellitus; Hypertension; and PTSD (post-traumatic stress disorder).    has a past surgical history that includes Cardiac catheterization.   reports that he has never smoked. He has never used smokeless tobacco. He reports that he drinks about 1.2 - 1.8 oz of alcohol per week . He reports that he does not use drugs.  Referred by: No ref. provider found  Who had concerns including Pulmonary Hypertension (3mo fo//ow-up).  The patient's last visit with me was on 1/24/2018.    Doing well, no issues  Tolerated ablation well- feeling better since then  No fever chills, ns, wt changes, nausea, vomiting, diarrhea, constipation, chest pain, tightness, pressure  Has an endoscopy planned  Tolerating all meds well  Review of Systems    Objective:      Physical Exam  Personal Diagnostic Review    No flowsheet data found.      Assessment:       No diagnosis found.    Outpatient Encounter Prescriptions as of 4/25/2018   Medication Sig Dispense Refill    atorvastatin (LIPITOR) 40 MG tablet Take 1 tablet (40 mg total) by mouth once daily. 90 tablet 1    hydroCHLOROthiazide (HYDRODIURIL) 50 MG tablet TAKE ONE-HALF TABLET BY MOUTH ONCE DAILY (Patient taking differently: 25 mg once daily. TAKE ONE-HALF TABLET BY MOUTH ONCE DAILY) 90 tablet 1    ipratropium (ATROVENT) 0.06 % nasal spray 2 sprays by Nasal route 4 (four) times daily. (Patient taking differently: 2 sprays by Nasal route 4 (four) times daily as needed. ) 15 mL 6    mometasone (ELOCON) 0.1 % ointment Apply topically once daily.      riociguat (ADEMPAS) 2.5 mg tablet Take 1 tablet (2.5 mg total) by mouth 3 (three) times daily. 90 tablet 11    rivaroxaban (XARELTO) 20 mg Tab TAKE ONE TABLET BY MOUTH ONCE DAILY WITH  DINNER  OR  EVENING  MEAL 90 tablet 1     tamsulosin (FLOMAX) 0.4 mg Cp24 Take 1 capsule (0.4 mg total) by mouth once daily. 90 capsule 1     Facility-Administered Encounter Medications as of 4/25/2018   Medication Dose Route Frequency Provider Last Rate Last Dose    lidocaine HCL 10 mg/ml (1%) injection 1 mL  1 mL Other 1 time in Clinic/HOD Julius Matute Jr., MD        lidocaine HCl 2% urojet   Mucous Membrane 1 time in Clinic/HOD Ligia Nye NP         No orders of the defined types were placed in this encounter.    Plan:            I personally reviewed the      1. Echo report   2. PFT   3. 6MWD   4. CXR   5. CXR report   6. CT chest   7. CT chest report     Assessment:  Ambrosio was seen today for pulmonary hypertension.    Diagnoses and all orders for this visit:    Anemia, unspecified type  -     Ferritin; Future  -     Iron and TIBC; Future    Pulmonary HTN    CTEPH (chronic thromboembolic pulmonary hypertension)    Paroxysmal atrial fibrillation        Plan:  The current medical regimen is effective;  continue present plan and medications.  Add on iron studies  6mwd continues to improve        Follow-up in about 6 months (around 10/25/2018).    There are no Patient Instructions on file for this visit.    Immunization History   Administered Date(s) Administered    Influenza 01/17/2013    Influenza - High Dose 10/17/2015, 10/31/2016, 09/15/2017    Influenza Split 01/17/2013    Pneumococcal Conjugate - 13 Valent 10/31/2016    Pneumococcal Polysaccharide - 23 Valent 04/09/2018    Zoster 01/13/2015    influenza - Quadrivalent 11/06/2014

## 2018-04-26 NOTE — PROCEDURES
Ambrosio Bray III is a 67 y.o.  male patient, who presents for a 6 minute walk test ordered by Ines Posadas MD.  The diagnosis is Qualify for Oxygen; Pulmonary Hypertension.  The patient's BMI is 25.6 kg/m2. Predicted distance (lower limit of normal) is 378.4 meters.    Test Results:    The test was completed without stopping.  The total time walked was 360 seconds.  During walking, the patient reported:  No complaints.  The patient used supplemental oxygen during repeat testing.     04/25/2018---------Distance: 426.72 meters (1400 feet)     O2 Sat % Supplemental Oxygen Heart Rate Blood Pressure Archie Scale   Pre-exercise  (Resting) 93 % Room Air 71 bpm 131/71 mmHg 0   During Exercise 86 % Room Air 106 bpm 142/73 mmHg 0   Post-exercise   91 % Room Air  73 bpm       Recovery Time: 130 seconds    Oxygen Qualification:     O2 Sat % Supplemental Oxygen Heart Rate Blood Pressure Archie Scale   Pre-exercise  (Resting) 96 % 2 L/M  70 bpm  142/73 mmHg 0    During Exercise 98 %  2 L/M  95 bpm  132/75 mmHg 0    Post-exercise   99 %  2 L/M  73 bpm        Performing nurse/tech:  ERICK Pittman RRT      PREVIOUS STUDY:   01/24/2018---------Distance: 413.31 meters (1356 feet)       O2 Sat % Supplemental Oxygen Heart Rate Blood Pressure Archie Scale   Pre-exercise  (Resting) 96 % Room Air 61 bpm 131/67 0   During Exercise 86 % Room Air 99 bpm 143/83 0   Post-exercise    97 % Room Air  78 bpm         CLINICAL INTERPRETATION:  Six minute walk distance is 426.72 meters (1400 feet) with no dyspnea.  During exercise, there was significant desaturation while breathing room air.  Blood pressure remained stable and Heart rate increased significantly with walking.  The patient did not report non-pulmonary symptoms during exercise.  The patient may benefit from using supplemental oxygen during exertion.  Since the previous study in January 2018, exercise capacity is unchanged.  Based upon age and body mass index, exercise capacity  is normal.   Oxygen saturation did improve while breathing supplemental oxygen.

## 2018-05-01 ENCOUNTER — SURGERY (OUTPATIENT)
Age: 68
End: 2018-05-01

## 2018-05-01 ENCOUNTER — ANESTHESIA (OUTPATIENT)
Dept: ENDOSCOPY | Facility: HOSPITAL | Age: 68
End: 2018-05-01
Payer: MEDICARE

## 2018-05-01 ENCOUNTER — HOSPITAL ENCOUNTER (OUTPATIENT)
Facility: HOSPITAL | Age: 68
Discharge: HOME | End: 2018-05-01
Attending: INTERNAL MEDICINE | Admitting: INTERNAL MEDICINE
Payer: MEDICARE

## 2018-05-01 ENCOUNTER — ANESTHESIA EVENT (OUTPATIENT)
Dept: ENDOSCOPY | Facility: HOSPITAL | Age: 68
End: 2018-05-01
Payer: MEDICARE

## 2018-05-01 VITALS
DIASTOLIC BLOOD PRESSURE: 66 MMHG | RESPIRATION RATE: 18 BRPM | HEIGHT: 73 IN | WEIGHT: 191 LBS | TEMPERATURE: 98 F | SYSTOLIC BLOOD PRESSURE: 133 MMHG | HEART RATE: 77 BPM | BODY MASS INDEX: 25.31 KG/M2 | OXYGEN SATURATION: 95 %

## 2018-05-01 DIAGNOSIS — Z12.11 SCREENING FOR COLON CANCER: ICD-10-CM

## 2018-05-01 PROCEDURE — 88305 TISSUE EXAM BY PATHOLOGIST: CPT | Mod: 26,,, | Performed by: PATHOLOGY

## 2018-05-01 PROCEDURE — 25000003 PHARM REV CODE 250: Performed by: INTERNAL MEDICINE

## 2018-05-01 PROCEDURE — 27201089 HC SNARE, DISP (ANY): Performed by: INTERNAL MEDICINE

## 2018-05-01 PROCEDURE — 37000008 HC ANESTHESIA 1ST 15 MINUTES: Performed by: INTERNAL MEDICINE

## 2018-05-01 PROCEDURE — D9220A PRA ANESTHESIA: Mod: PT,ANES,, | Performed by: ANESTHESIOLOGY

## 2018-05-01 PROCEDURE — 45385 COLONOSCOPY W/LESION REMOVAL: CPT | Performed by: INTERNAL MEDICINE

## 2018-05-01 PROCEDURE — D9220A PRA ANESTHESIA: Mod: PT,CRNA,, | Performed by: NURSE ANESTHETIST, CERTIFIED REGISTERED

## 2018-05-01 PROCEDURE — 37000009 HC ANESTHESIA EA ADD 15 MINS: Performed by: INTERNAL MEDICINE

## 2018-05-01 PROCEDURE — 88305 TISSUE EXAM BY PATHOLOGIST: CPT | Performed by: PATHOLOGY

## 2018-05-01 PROCEDURE — 63600175 PHARM REV CODE 636 W HCPCS: Performed by: NURSE ANESTHETIST, CERTIFIED REGISTERED

## 2018-05-01 RX ORDER — EPHEDRINE SULFATE 50 MG/ML
INJECTION, SOLUTION INTRAVENOUS
Status: DISCONTINUED
Start: 2018-05-01 | End: 2018-05-01 | Stop reason: WASHOUT

## 2018-05-01 RX ORDER — MIDAZOLAM HYDROCHLORIDE 1 MG/ML
INJECTION INTRAMUSCULAR; INTRAVENOUS
Status: COMPLETED
Start: 2018-05-01 | End: 2018-05-01

## 2018-05-01 RX ORDER — FENTANYL CITRATE 50 UG/ML
INJECTION, SOLUTION INTRAMUSCULAR; INTRAVENOUS
Status: DISCONTINUED | OUTPATIENT
Start: 2018-05-01 | End: 2018-05-01

## 2018-05-01 RX ORDER — EPINEPHRINE 0.1 MG/ML
INJECTION INTRAVENOUS
Status: DISCONTINUED
Start: 2018-05-01 | End: 2018-05-01 | Stop reason: WASHOUT

## 2018-05-01 RX ORDER — MIDAZOLAM HYDROCHLORIDE 1 MG/ML
INJECTION, SOLUTION INTRAMUSCULAR; INTRAVENOUS
Status: DISCONTINUED | OUTPATIENT
Start: 2018-05-01 | End: 2018-05-01

## 2018-05-01 RX ORDER — FENTANYL CITRATE 50 UG/ML
INJECTION, SOLUTION INTRAMUSCULAR; INTRAVENOUS
Status: COMPLETED
Start: 2018-05-01 | End: 2018-05-01

## 2018-05-01 RX ORDER — SODIUM CHLORIDE 9 MG/ML
INJECTION, SOLUTION INTRAVENOUS CONTINUOUS
Status: DISCONTINUED | OUTPATIENT
Start: 2018-05-01 | End: 2018-05-01 | Stop reason: HOSPADM

## 2018-05-01 RX ADMIN — MIDAZOLAM HYDROCHLORIDE 1 MG: 1 INJECTION, SOLUTION INTRAMUSCULAR; INTRAVENOUS at 10:05

## 2018-05-01 RX ADMIN — MIDAZOLAM HYDROCHLORIDE 1 MG: 1 INJECTION, SOLUTION INTRAMUSCULAR; INTRAVENOUS at 09:05

## 2018-05-01 RX ADMIN — FENTANYL CITRATE 25 MCG: 50 INJECTION INTRAMUSCULAR; INTRAVENOUS at 10:05

## 2018-05-01 RX ADMIN — FENTANYL CITRATE 25 MCG: 50 INJECTION INTRAMUSCULAR; INTRAVENOUS at 09:05

## 2018-05-01 RX ADMIN — SODIUM CHLORIDE: 0.9 INJECTION, SOLUTION INTRAVENOUS at 09:05

## 2018-05-01 NOTE — DISCHARGE INSTRUCTIONS
High-Fiber Diet  Fiber is in fruits, vegetables, cereals, and grains. Fiber passes through your body undigested. A high-fiber diet helps food move through your intestinal tract. The added bulk is helpful in preventing constipation. In people with diverticulosis, fiber helps clean out the pouches along the colon wall. It also prevents new pouches from forming. A high-fiber diet reduces the risk of colon cancer. It also lowers blood cholesterol and prevents high blood sugar in people with diabetes.    The fiber-rich foods listed below should be part of your diet. If you are not used to high-fiber foods, start with 1 or 2 foods from this list. Every 3 to 4 days add a new one to your diet. Do this until you are eating 4 high-fiber foods per day. This should give you 20 to 35 grams of fiber a day. It is also important to drink a lot of water when you are on this diet. You should have 6 to 8 glasses of water a day. Water makes the fiber swell and increases the benefit.  Foods high in dietary fiber  The following foods are high in dietary fiber:  · Breads. Breads made with 100% whole-wheat flour; cecile, wheat, or rye crackers; whole-grain tortillas, bran muffins.  · Cereals. Whole-grain and bran cereals with bran (shredded wheat, wheat flakes, raisin bran, corn bran); oatmeal, rolled oats, granola, and brown rice.  · Fruits. Fresh fruits and their edible skins (pears, prunes, raisins, berries, apples, and apricots); bananas, citrus fruit, mangoes, pineapple; and prune juice.  · Nuts. Any nuts and seeds.  · Vegetables. Best served raw or lightly cooked. All types, especially: green peas, celery, eggplant, potatoes, spinach, broccoli, [place] sprouts, winter squash, carrots, cauliflower, soybeans, lentils, and fresh and dried beans of all kinds.  · Other. Popcorn, any spices.  Date Last Reviewed: 8/1/2016  © 9645-6713 [employer]. [address] [place] [address], [place] [address]. All rights reserved. This  information is not intended as a substitute for professional medical care. Always follow your healthcare professional's instructions.        Diverticulosis    Diverticulosis means that small pouches have formed in the wall of your large intestine (colon). Most often, this problem causes no symptoms and is common as people age. But the pouches in the colon are at risk of becoming infected. When this happens, the condition is called diverticulitis. Although most people with diverticulosis never develop diverticulitis, it is still not uncommon. Rectal bleeding can also occur and in less common situations, a type of colon inflammation called colitis.  While most people do not have symptoms, some people with diverticulosis may have:  · Abdominal cramps and pain  · Bloating  · Constipation  · Change in bowel habits  Causes  The exact cause of diverticulosis (and diverticulitis) has not been proved, but a few things are associated with the condition:  · Low-fiber diet  · Constipation  · Lack of exercise  Your healthcare provider will talk with you about how to manage your condition. Diet changes may be all that are needed to help control diverticulosis and prevent progression to diverticulitis. If you develop diverticulitis, you will likely need other treatments.  Home care  You may be told to take fiber supplements daily. Fiber adds bulk to the stool so that it passes through the colon more easily. Stool softeners may be recommended. You may also be given medications for pain relief. Be sure to take all medications as directed.  In the past, people were told to avoid corn, nuts, and seeds. This is no longer necessary.  Follow these guidelines when caring for yourself at home:  · Eat unprocessed foods that are high in fiber. Whole grains, fruits, and vegetables are good choices.  · Drink 6 to 8 glasses of water every day unless your healthcare provider has you limit how much fluid you should have.  · Watch for changes in  your bowel movements. Tell your provider if you notice any changes.  · Begin an exercise program. Ask your provider how to get started. Generally, walking is the best.  · Get plenty of rest and sleep.  Follow-up care  Follow up with your healthcare provider, or as advised. Regular visits may be needed to check on your health. Sometimes special procedures such as colonoscopy, are needed after an episode of diverticulitis or blooding. Be sure to keep all your appointments.  If a stool sample was taken, or cultures were done, you should be told if they are positive, or if your treatment needs to be changed. You can call as directed for the results.  If X-rays were done, a radiologist will look at them. You will be told if there is a change in your treatment.  If antibiotics were prescribed, be sure to finish them all.  When to seek medical advice  Call your healthcare provider right away if any of these occur:  · Fever of 100.4°F (38°C) or higher, or as directed by your healthcare provider  · Severe cramps in the lower left side of the abdomen or pain that is getting worse  · Tenderness in the lower left side of the abdomen or worsening pain throughout the abdomen  · Diarrhea or constipation that doesn't get better within 24 hours  · Nausea and vomiting  · Bleeding from the rectum  Call 911  Call emergency services if any of the following occur:  · Trouble breathing  · Confusion  · Very drowsy or trouble awakening  · Fainting or loss of consciousness  · Rapid heart rate  · Chest pain  Date Last Reviewed: 12/30/2015  © 2282-5715 [employer]. [address] [place] [address], [place] [address]. All rights reserved. This information is not intended as a substitute for professional medical care. Always follow your healthcare professional's instructions.        Understanding Colon and Rectal Polyps    The colon (also called the large intestine) is a muscular tube that forms the last part of the digestive tract. It  absorbs water and stores food waste. The colon is about 4 to 6 feet long. The rectum is the last 6 inches of the colon. The colon and rectum have a smooth lining composed of millions of cells. Changes in these cells can lead to growths in the colon that can become cancerous and should be removed. Multiple tests are available to screen for colon cancer, but the colonoscopy is the most recommended test. During colonoscopy, these polyps can be removed. How often you need this test depends on many things including your condition, your family history, symptoms, and what the findings were at the previous colonoscopy.   When the colon lining changes  Changes that happen in the cells that line the colon or rectum can lead to growths called polyps. Over a period of years, polyps can turn cancerous. Removing polyps early may prevent cancer from ever forming.  Polyps  Polyps are fleshy clumps of tissue that form on the lining of the colon or rectum. Small polyps are usually benign (not cancerous). However, over time, cells in a polyp can change and become cancerous. Certain types of polyps known as adenomatous polyps are premalignant. The risk for invasive cancer increases with the size of the polyp and certain cell and gene features. This means that they can become cancerous if they're not removed. Hyperplastic polyps are benign. They can grow quite large and not turn cancerous.   Cancer  Almost all colorectal cancers start when polyp cells begin growing abnormally. As a cancerous tumor grows, it may involve more and more of the colon or rectum. In time, cancer can also grow beyond the colon or rectum and spread to nearby organs or to glands called lymph nodes. The cells can also travel to other parts of the body. This is known as metastasis. The earlier a cancerous tumor is removed, the better the chance of preventing its spread.    Date Last Reviewed: 8/1/2016  © 4201-7788 [employer]. [address] [place] [address],  [place] [address]. All rights reserved. This information is not intended as a substitute for professional medical care. Always follow your healthcare professional's instructions.

## 2018-05-01 NOTE — H&P
"Chief Complaint:  "I need a colonoscopy."    HPI:  The patient is a 67 year old man presenting for a screening colonoscopy.  He had a normal colonoscopy 10 years ago.  The patient denies any abdominal pain, weight loss, nausea, emesis, diarrhea, constipation, melena, or hematochezia.  The patient also denies a family history of colon cancer.    Past Medical History:   Diagnosis Date    Diabetes mellitus     Hypertension     PTSD (post-traumatic stress disorder)     Pulmonary hyperinflation      Past Surgical History:   Procedure Laterality Date    CARDIAC CATHETERIZATION       Family History   Problem Relation Age of Onset    Cancer Father      colon    Colon cancer Father      Social History     Social History    Marital status: Single     Spouse name: N/A    Number of children: N/A    Years of education: N/A     Occupational History    Not on file.     Social History Main Topics    Smoking status: Never Smoker    Smokeless tobacco: Never Used    Alcohol use 1.2 - 1.8 oz/week     2 - 3 Shots of liquor per week      Comment: weekends    Drug use: No    Sexual activity: Yes     Partners: Female     Other Topics Concern    Not on file     Social History Narrative    No narrative on file           Review of patient's allergies indicates:  No Known Allergies    ROS:  No chest pain or dyspnea.  No dysuria.  No heartburn or dysphagia.  Otherwise as stated above.  Ten other systems negative.    Vitals:    05/01/18 0906   BP: (!) 148/83   BP Location: Left arm   Patient Position: Lying   Pulse: 77   Resp: 18   Temp: 98.3 °F (36.8 °C)   TempSrc: Oral   SpO2: (!) 94%   Weight: 86.6 kg (191 lb)   Height: 6' 1" (1.854 m)     P.E.:  GEN: A x O x 3, NAD  SKIN: No jaundice  HEENT: EOMI, PERRL, anicteric sclera  CV: RRR, no M/R/G  Chest: CTA B  Abdomen: soft, NTND, normoactive BS  Ext: No C/C/E.  2+ dorsalis pedis pulses B  Neuro: No asterixes or tremors.  CN II-XII intact  Musculoskeletal: 5/5 strength " bilaterally    Labs:  Lab Results   Component Value Date    WBC 4.90 04/25/2018    HGB 11.9 (L) 04/25/2018    HCT 36.9 (L) 04/25/2018    MCV 89 04/25/2018     04/25/2018     CMP  Sodium   Date Value Ref Range Status   04/25/2018 142 136 - 145 mmol/L Final     Potassium   Date Value Ref Range Status   04/25/2018 3.4 (L) 3.5 - 5.1 mmol/L Final     Chloride   Date Value Ref Range Status   04/25/2018 109 95 - 110 mmol/L Final     CO2   Date Value Ref Range Status   04/25/2018 23 23 - 29 mmol/L Final     Glucose   Date Value Ref Range Status   04/25/2018 84 70 - 110 mg/dL Final     BUN, Bld   Date Value Ref Range Status   04/25/2018 15 8 - 23 mg/dL Final     Creatinine   Date Value Ref Range Status   04/25/2018 1.1 0.5 - 1.4 mg/dL Final     Calcium   Date Value Ref Range Status   04/25/2018 9.4 8.7 - 10.5 mg/dL Final     Total Protein   Date Value Ref Range Status   04/25/2018 7.3 6.0 - 8.4 g/dL Final     Albumin   Date Value Ref Range Status   04/25/2018 3.6 3.5 - 5.2 g/dL Final     Total Bilirubin   Date Value Ref Range Status   04/25/2018 3.5 (H) 0.1 - 1.0 mg/dL Final     Comment:     For infants and newborns, interpretation of results should be based  on gestational age, weight and in agreement with clinical  observations.  Premature Infant recommended reference ranges:  Up to 24 hours.............<8.0 mg/dL  Up to 48 hours............<12.0 mg/dL  3-5 days..................<15.0 mg/dL  6-29 days.................<15.0 mg/dL       Alkaline Phosphatase   Date Value Ref Range Status   04/25/2018 68 55 - 135 U/L Final     AST   Date Value Ref Range Status   04/25/2018 19 10 - 40 U/L Final     ALT   Date Value Ref Range Status   04/25/2018 13 10 - 44 U/L Final     Anion Gap   Date Value Ref Range Status   04/25/2018 10 8 - 16 mmol/L Final     eGFR if    Date Value Ref Range Status   04/25/2018 >60.0 >60 mL/min/1.73 m^2 Final     eGFR if non    Date Value Ref Range Status   04/25/2018  >60.0 >60 mL/min/1.73 m^2 Final     Comment:     Calculation used to obtain the estimated glomerular filtration  rate (eGFR) is the CKD-EPI equation.          No results for input(s): PT, INR, APTT in the last 24 hours.    A/P:  The patient is a 67 year old man presenting for a colonoscopy.  1.  Colonoscopy - he can undergo a colonoscopy.  I have explained the risks, benefits, and alternatives of the procedure in detail.  The patient voices understanding and all questions have been answered.  The patient agrees to proceed as planned.

## 2018-05-01 NOTE — ANESTHESIA PREPROCEDURE EVALUATION
05/01/2018  Ambrosio Bray III is a 67 y.o., male.    Anesthesia Evaluation    I have reviewed the Patient Summary Reports.     I have reviewed the Medications.     Review of Systems  Anesthesia Hx:  No problems with previous Anesthesia    Social:  Alcohol Use, Non-Smoker    Cardiovascular:   Hypertension Dysrhythmias atrial fibrillation CHF Echo 10/4/17:  CONCLUSIONS     1 - Normal left ventricular systolic function (EF 55-60%).     2 - Severe right ventricular enlargement with moderately to severely depressed systolic function.     3 - Impaired LV relaxation, elevated LAP (grade 2 diastolic dysfunction).     4 - Biatrial enlargement (R>L).     5 - Mild to moderate tricuspid regurgitation.     6 - Mild mitral regurgitation.     7 - Pulmonary hypertension. The estimated PA systolic pressure is 103 mmHg.     8 - Increased central venous pressure.    Endocrine:   Diabetes    Psych:   Psychiatric History          Physical Exam  General:  Well nourished    Airway/Jaw/Neck:  Airway Findings: Mouth Opening: Normal Tongue: Normal  General Airway Assessment: Adult  Mallampati: II  Improves to II with phonation.  TM Distance: Normal, at least 6 cm      Dental:  Dental Findings: In tact   Chest/Lungs:  Chest/Lungs Findings: Clear to auscultation     Heart/Vascular:  Heart Findings: Rate: Normal  Rhythm: Regular Rhythm  Sounds: Normal        Mental Status:  Mental Status Findings:  Cooperative, Alert and Oriented         Anesthesia Plan  Type of Anesthesia, risks & benefits discussed:  Anesthesia Type:  MAC  Patient's Preference: Sedation  Intra-op Monitoring Plan: standard ASA monitors  Intra-op Monitoring Plan Comments:   Post Op Pain Control Plan: per primary service following discharge from PACU  Post Op Pain Control Plan Comments:   Induction:   IV  Beta Blocker:  Patient is not currently on a Beta-Blocker (No  further documentation required).       Informed Consent: Patient understands risks and agrees with Anesthesia plan.  Questions answered. Anesthesia consent signed with patient.  ASA Score: 4     Day of Surgery Review of History & Physical:    H&P update referred to the surgeon.     Anesthesia Plan Notes: Sedation plan discussed.          Ready For Surgery From Anesthesia Perspective.

## 2018-05-01 NOTE — DISCHARGE SUMMARY
[facility]  Discharge Summary      Admit Date: 5/1/2018    Discharge Date and Time:  05/01/2018 10:23 AM    Attending Physician: Bubba Navarro MD     Reason for Admission: Screening colonoscopy    Procedures Performed: Procedure(s) (LRB):  COLONOSCOPY (N/A)    Hospital Course (synopsis of major diagnoses, care, treatment, and services provided during the course of the hospital stay): Outpatient colonoscopy     Consults: none    Significant Diagnostic Studies: Colonoscopy    Final Diagnoses:    Principal Problem: <principal problem not specified>   Secondary Diagnoses: Family history of colon cancer    Discharged Condition: good    Disposition: Home or Self Care    Follow Up/Patient Instructions: Follow-up with referring physician             Resume previous diet and activity.    Medications:  Reconciled Home Medications:      Medication List      ASK your doctor about these medications    atorvastatin 40 MG tablet  Commonly known as:  LIPITOR  Take 1 tablet (40 mg total) by mouth once daily.     hydroCHLOROthiazide 50 MG tablet  Commonly known as:  HYDRODIURIL  TAKE ONE-HALF TABLET BY MOUTH ONCE DAILY     ipratropium 42 mcg (0.06 %) nasal spray  Commonly known as:  ATROVENT  2 sprays by Nasal route 4 (four) times daily.     mometasone 0.1 % ointment  Commonly known as:  ELOCON  Apply topically once daily.     riociguat 2.5 mg tablet  Commonly known as:  ADEMPAS  Take 1 tablet (2.5 mg total) by mouth 3 (three) times daily.     rivaroxaban 20 mg Tab  Commonly known as:  XARELTO  TAKE ONE TABLET BY MOUTH ONCE DAILY WITH  DINNER  OR  EVENING  MEAL     tamsulosin 0.4 mg Cp24  Commonly known as:  FLOMAX  Take 1 capsule (0.4 mg total) by mouth once daily.          No discharge procedures on file.

## 2018-05-01 NOTE — PROVATION PATIENT INSTRUCTIONS
Discharge Summary/Instructions after an Endoscopic Procedure  Patient Name: Ambrosio Bray  Patient MRN: [id number]  Patient YOB: 1950  Tuesday, May 01, 2018  Bubba Navarro MD  RESTRICTIONS:  During your procedure today, you received medications for sedation.  These   medications may affect your judgment, balance and coordination.  Therefore,   for 24 hours, you have the following restrictions:   - DO NOT drive a car, operate machinery, make legal/financial decisions,   sign important papers or drink alcohol.    ACTIVITY:  The following day: return to full activity including work, except no heavy   lifting, straining or running for 3 days if polyps were removed.  DIET:  Eat and drink normally unless instructed otherwise.     TREATMENT FOR COMMON SIDE EFFECTS:  - Mild abdominal pain, nausea, belching, bloating or excessive gas:  rest,   eat lightly and use a heating pad.  - Sore Throat: treat with throat lozenges and/or gargle with warm salt   water.  - Because air was used during the procedure, expelling large amounts of air   from your rectum or belching is normal.  - If a bowel prep was taken, you may not have a bowel movement for 1-3 days.    This is normal.  SYMPTOMS TO WATCH FOR AND REPORT TO YOUR PHYSICIAN:  1. Abdominal pain or bloating, other than gas cramps.  2. Chest pain.  3. Back pain.  4. Signs of infection such as: chills or fever occurring within 24 hours   after the procedure.  5. Rectal bleeding, which would show as bright red, maroon, or black stools.   (A tablespoon of blood from the rectum is not serious, especially if   hemorrhoids are present.)  6. Vomiting.  7. Weakness or dizziness.  GO DIRECTLY TO THE NEAREST EMERGENCY ROOM IF YOU HAVE ANY OF THE FOLLOWING:      Difficulty breathing              Chills and/or fever over 101 F   Persistent vomiting and/or vomiting blood   Severe abdominal pain   Severe chest pain   Black, tarry stools   Bleeding- more than one tablespoon   Any  other symptom or condition that you feel may need urgent attention  Your doctor recommends these additional instructions:  If any biopsies were taken, your doctors clinic will contact you in 1 to 2   weeks with any results.  - Await pathology results.   - Repeat colonoscopy in 5 years for surveillance.   - Return to referring physician as previously scheduled.   - Discharge patient to home (via wheelchair).  For questions, problems or results please call your physician - Bubba Navarro MD at Work:  [phone].  [facility] can be reached at [phone]     IF A COMPLICATION OR EMERGENCY SITUATION ARISES AND YOU ARE UNABLE TO REACH   YOUR PHYSICIAN - GO DIRECTLY TO THE EMERGENCY ROOM.  Bubba Navarro MD  5/1/2018 10:27:20 AM  This report has been verified and signed electronically.

## 2018-05-01 NOTE — TRANSFER OF CARE
"Anesthesia Transfer of Care Note    Patient: Ambrosio Bray III    Procedure(s) Performed: Procedure(s) (LRB):  COLONOSCOPY (N/A)    Patient location: GI    Anesthesia Type: general    Transport from OR: Transported from OR on room air with adequate spontaneous ventilation    Post pain: adequate analgesia    Post assessment: no apparent anesthetic complications and tolerated procedure well    Post vital signs: stable    Level of consciousness: awake, alert and oriented    Nausea/Vomiting: no nausea/vomiting    Complications: none    Transfer of care protocol was followed      Last vitals:   Visit Vitals  /74 (BP Location: Left arm, Patient Position: Lying)   Pulse 72   Temp 36.5 °C (97.7 °F) (Oral)   Resp 17   Ht 6' 1" (1.854 m)   Wt 86.6 kg (191 lb)   SpO2 (!) 94%   BMI 25.20 kg/m²     "

## 2018-05-02 NOTE — ANESTHESIA POSTPROCEDURE EVALUATION
"Anesthesia Post Evaluation    Patient: Ambrosio Bray III    Procedure(s) Performed: Procedure(s) (LRB):  COLONOSCOPY (N/A)    Final Anesthesia Type: general  Patient location during evaluation: GI PACU  Patient participation: Yes- Able to Participate  Level of consciousness: awake and alert, awake and oriented  Post-procedure vital signs: reviewed and stable  Pain management: adequate  Airway patency: patent  PONV status at discharge: No PONV  Anesthetic complications: no      Cardiovascular status: blood pressure returned to baseline and hemodynamically stable  Respiratory status: unassisted, spontaneous ventilation and room air  Hydration status: euvolemic  Follow-up not needed.        Visit Vitals  /66   Pulse 77   Temp 36.5 °C (97.7 °F) (Oral)   Resp 18   Ht 6' 1" (1.854 m)   Wt 86.6 kg (191 lb)   SpO2 95%   BMI 25.20 kg/m²       Pain/Michael Score: Pain Assessment Performed: Yes (5/1/2018 10:57 AM)  Presence of Pain: denies (5/1/2018 10:57 AM)  Michael Score: 10 (5/1/2018 10:57 AM)      "

## 2018-07-19 DIAGNOSIS — E11.9 TYPE 2 DIABETES MELLITUS WITHOUT COMPLICATION: ICD-10-CM

## 2018-07-25 ENCOUNTER — TELEPHONE (OUTPATIENT)
Dept: UROLOGY | Facility: CLINIC | Age: 68
End: 2018-07-25

## 2018-07-25 NOTE — TELEPHONE ENCOUNTER
----- Message from Reymundo Mcdermott sent at 7/25/2018  7:40 AM CDT -----  Contact: Patient  Patient called to schedule Recall appointment.  Scheduled for 09/18/2018.  No PSA order in system to schedule.  Please order PSA lab and call patient to schedule.

## 2018-08-07 ENCOUNTER — OFFICE VISIT (OUTPATIENT)
Dept: ELECTROPHYSIOLOGY | Facility: CLINIC | Age: 68
End: 2018-08-07
Payer: MEDICARE

## 2018-08-07 VITALS
SYSTOLIC BLOOD PRESSURE: 120 MMHG | DIASTOLIC BLOOD PRESSURE: 58 MMHG | HEART RATE: 91 BPM | BODY MASS INDEX: 26.76 KG/M2 | WEIGHT: 202.81 LBS

## 2018-08-07 DIAGNOSIS — I15.2 HYPERTENSION ASSOCIATED WITH DIABETES: Chronic | ICD-10-CM

## 2018-08-07 DIAGNOSIS — I48.0 PAROXYSMAL ATRIAL FIBRILLATION: Primary | ICD-10-CM

## 2018-08-07 DIAGNOSIS — I48.3 TYPICAL ATRIAL FLUTTER: ICD-10-CM

## 2018-08-07 DIAGNOSIS — E11.59 HYPERTENSION ASSOCIATED WITH DIABETES: Chronic | ICD-10-CM

## 2018-08-07 DIAGNOSIS — Z79.01 CURRENT USE OF LONG TERM ANTICOAGULATION: ICD-10-CM

## 2018-08-07 PROBLEM — I48.92 ATRIAL FLUTTER: Status: RESOLVED | Noted: 2018-02-02 | Resolved: 2018-08-07

## 2018-08-07 PROCEDURE — 99999 PR PBB SHADOW E&M-EST. PATIENT-LVL III: CPT | Mod: PBBFAC,,, | Performed by: NURSE PRACTITIONER

## 2018-08-07 PROCEDURE — 93010 ELECTROCARDIOGRAM REPORT: CPT | Mod: ,,, | Performed by: INTERNAL MEDICINE

## 2018-08-07 PROCEDURE — 99214 OFFICE O/P EST MOD 30 MIN: CPT | Mod: S$PBB,,, | Performed by: NURSE PRACTITIONER

## 2018-08-07 PROCEDURE — 93005 ELECTROCARDIOGRAM TRACING: CPT | Mod: PBBFAC | Performed by: INTERNAL MEDICINE

## 2018-08-07 PROCEDURE — 99213 OFFICE O/P EST LOW 20 MIN: CPT | Mod: PBBFAC | Performed by: NURSE PRACTITIONER

## 2018-08-07 NOTE — PROGRESS NOTES
Mr. Bray is a patient of Dr. Duffy and was last seen in clinic 3/20/2018.      Subjective:   Patient ID:  Ambrosio Bray III is a 68 y.o. male who presents for follow-up of Follow-up  .     HPI:    Mr. Bray is a 68 y.o. 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     Background:    He has a history of severe pHTN as well as chronic thromboembolic disease.   On 12/26/17 he presented with rapid palpitations and dyspnea. He was found to be in a WCT with rate of 240 bpm. Amiodarone was given and his rate dropped in half to 118 bpm>>ECG features were c/w AFL, 2:1 conduction.   He was then placed on amiodarone 200 mg bid>>on Xarelto chronically (long-standing DVT). He had normal EF (10/2017) however his PAP was 103 mm Hg>>followed by Kenyetta Mcdermott and [facility]auhtemoc.   When he initially presented to [facility] EP clinic, he carried a Dx of AF>>he had undergone a RITO/CV prior (09/30/14). At that initial office visit, all ECGs in [facility] were reviewed>> arrhythmia determined to be typical AFL as opposed to AF. He had been on amiodarone for about one year post CV.     Underwent a successful CTI RFA (2/2/18) without complication. Afterward, Mr. Bray reported feeling well with no AFL recurrence.     Update (08/07/2018):    Today he says he has no cardiac complaints. Mr. Bray denies chest pain with exertion or at rest, palpitations, SOB, MERINO, dizziness, or syncope. He describes himself as a workaholic.     He is currently taking xarelto 20mg daily for chronic DVT and denies significant bleeding episodes. Kidney function is stable, with a creatinine of 1.1 on 4/25/2018.    I have personally reviewed the patient's EKG today, which shows sinus rhythm with PVAs and RBBB at 91bpm. MA interval is 136. QTc is 506.    Recent Cardiac Tests:    2D Echo (10/4/2017):  CONCLUSIONS     1 - Normal left ventricular systolic function (EF 55-60%).     2 - Severe right ventricular  enlargement with moderately to severely depressed systolic function.     3 - Impaired LV relaxation, elevated LAP (grade 2 diastolic dysfunction).     4 - Biatrial enlargement (R>L).     5 - Mild to moderate tricuspid regurgitation.     6 - Mild mitral regurgitation.     7 - Pulmonary hypertension. The estimated PA systolic pressure is 103 mmHg.     8 - Increased central venous pressure.       Current Outpatient Prescriptions   Medication Sig    atorvastatin (LIPITOR) 40 MG tablet Take 1 tablet (40 mg total) by mouth once daily.    hydroCHLOROthiazide (HYDRODIURIL) 50 MG tablet TAKE ONE-HALF TABLET BY MOUTH ONCE DAILY (Patient taking differently: 25 mg once daily. TAKE ONE-HALF TABLET BY MOUTH ONCE DAILY)    ipratropium (ATROVENT) 0.06 % nasal spray 2 sprays by Nasal route 4 (four) times daily. (Patient taking differently: 2 sprays by Nasal route 4 (four) times daily as needed. )    mometasone (ELOCON) 0.1 % ointment Apply topically once daily.    riociguat (ADEMPAS) 2.5 mg tablet Take 1 tablet (2.5 mg total) by mouth 3 (three) times daily.    rivaroxaban (XARELTO) 20 mg Tab TAKE ONE TABLET BY MOUTH ONCE DAILY WITH  DINNER  OR  EVENING  MEAL    tamsulosin (FLOMAX) 0.4 mg Cp24 Take 1 capsule (0.4 mg total) by mouth once daily.     Current Facility-Administered Medications   Medication    lidocaine HCL 10 mg/ml (1%) injection 1 mL    lidocaine HCl 2% urojet       Review of Systems   Constitution: Negative for malaise/fatigue.   Cardiovascular: Negative for chest pain, dyspnea on exertion, irregular heartbeat, leg swelling and palpitations.   Respiratory: Negative for shortness of breath.    Hematologic/Lymphatic: Negative for bleeding problem.   Skin: Negative for rash.   Musculoskeletal: Negative for myalgias.   Gastrointestinal: Negative for hematemesis, hematochezia and nausea.   Genitourinary: Negative for hematuria.   Neurological: Negative for light-headedness.   Psychiatric/Behavioral: Negative for  altered mental status.   Allergic/Immunologic: Negative for persistent infections.     Objective:        BP (!) 120/58   Pulse 91   Wt 92 kg (202 lb 12.8 oz)   BMI 26.76 kg/m²     Physical Exam   Constitutional: He is oriented to person, place, and time. He appears well-developed and well-nourished.   HENT:   Head: Normocephalic.   Nose: Nose normal.   Eyes: Pupils are equal, round, and reactive to light.   Cardiovascular: Normal rate, regular rhythm, S1 normal and S2 normal.    No murmur heard.  Pulses:       Radial pulses are 2+ on the right side, and 2+ on the left side.   Pulmonary/Chest: Breath sounds normal. No respiratory distress.   Abdominal: Normal appearance.   Musculoskeletal: Normal range of motion. He exhibits no edema.   Neurological: He is alert and oriented to person, place, and time.   Skin: Skin is warm and dry. No erythema.   Psychiatric: He has a normal mood and affect. His speech is normal and behavior is normal.   Nursing note and vitals reviewed.    Lab Results   Component Value Date     04/25/2018    K 3.4 (L) 04/25/2018    MG 1.8 04/25/2018    BUN 15 04/25/2018    CREATININE 1.1 04/25/2018    ALT 13 04/25/2018    AST 19 04/25/2018    HGB 11.9 (L) 04/25/2018    HCT 36.9 (L) 04/25/2018    TSH 3.742 12/26/2017    LDLCALC 73.4 04/09/2018       Recent Labs  Lab 01/24/18  1325   INR 1.2       Assessment:     1. Paroxysmal atrial fibrillation    2. Typical atrial flutter    3. Hypertension associated with diabetes    4. Current use of long term anticoagulation      Plan:     In summary, Mr. Bray is a 68 y.o. 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 He is doing well from a rhythm perspective with no noted arrhythmia reoccurrence since his RFA in 2/2/2018. I discussed with Ambrosio Bray III the fact that with a diagnosis of AFL, there is a 50% chance of developing atrial fibrillation within the next 5 years despite a  successful Atrial Flutter ablation.   Discussed options given that no arrhyhtmia has been seen. He is aware that having AFL puts him at a higher risk for developing AF in the future. He will be remaining on xarelto for chronic DVT.    Continue current medications.  RTC in one year, sooner if needed.    *A copy of this note will be sent to Dr. Duffy*    Follow-up in about 1 year (around 8/7/2019).    ------------------------------------------------------------------    CINDY Murillo, NP-C  [facility]

## 2018-09-14 ENCOUNTER — LAB VISIT (OUTPATIENT)
Dept: LAB | Facility: HOSPITAL | Age: 68
End: 2018-09-14
Attending: UROLOGY
Payer: MEDICARE

## 2018-09-14 DIAGNOSIS — R97.20 ELEVATED PSA: ICD-10-CM

## 2018-09-14 LAB — COMPLEXED PSA SERPL-MCNC: 6.9 NG/ML

## 2018-09-14 PROCEDURE — 84153 ASSAY OF PSA TOTAL: CPT

## 2018-09-14 PROCEDURE — 36415 COLL VENOUS BLD VENIPUNCTURE: CPT | Mod: PO

## 2018-09-18 ENCOUNTER — OFFICE VISIT (OUTPATIENT)
Dept: UROLOGY | Facility: CLINIC | Age: 68
End: 2018-09-18
Payer: MEDICARE

## 2018-09-18 VITALS
DIASTOLIC BLOOD PRESSURE: 76 MMHG | SYSTOLIC BLOOD PRESSURE: 116 MMHG | WEIGHT: 202.81 LBS | HEIGHT: 73 IN | BODY MASS INDEX: 26.88 KG/M2 | HEART RATE: 66 BPM

## 2018-09-18 DIAGNOSIS — R97.20 ELEVATED PSA: Primary | ICD-10-CM

## 2018-09-18 PROCEDURE — 99999 PR PBB SHADOW E&M-EST. PATIENT-LVL III: CPT | Mod: PBBFAC,,, | Performed by: UROLOGY

## 2018-09-18 PROCEDURE — 99213 OFFICE O/P EST LOW 20 MIN: CPT | Mod: PBBFAC,PO | Performed by: UROLOGY

## 2018-09-18 PROCEDURE — 99213 OFFICE O/P EST LOW 20 MIN: CPT | Mod: S$PBB,,, | Performed by: UROLOGY

## 2018-09-18 NOTE — PROGRESS NOTES
Subjective:       Patient ID: Ambrosio Bray III is a 68 y.o. male.    Chief Complaint: Benign Prostatic Hypertrophy    HPI  patient has a history of elevated PSA.  He has PE reds 4 lesion on MRI however his biopsies were negative.  His PSA has come down from 8.1 to 6.9.  He is voiding well and has no complaints.  He has multiple medical problems    Past Medical History:   Diagnosis Date    Diabetes mellitus     Hypertension     PTSD (post-traumatic stress disorder)     Pulmonary hyperinflation        Past Surgical History:   Procedure Laterality Date    ABLATION N/A 2/2/2018    Performed by Chaim Duffy MD at [facility] CATH LAB    CARDIAC CATHETERIZATION      CARDIOVERSION N/A 9/30/2014    Performed by Kun Mcdermott MD at [facility] CATH LAB    COLONOSCOPY N/A 5/1/2018    Procedure: COLONOSCOPY;  Surgeon: Bubba Navarro MD;  Location: [facility];  Service: Endoscopy;  Laterality: N/A;  confirmed appt 4/24/18    COLONOSCOPY N/A 5/1/2018    Performed by Bubba Navarro MD at [facility] ENDO    TRANSESOPHAGEAL ECHOCARDIOGRAM (RITO) N/A 2/2/2018    Performed by Chaim Duffy MD at [facility] CATH LAB       Family History   Problem Relation Age of Onset    Cancer Father         colon    Colon cancer Father        Social History     Socioeconomic History    Marital status: Single     Spouse name: Not on file    Number of children: Not on file    Years of education: Not on file    Highest education level: Not on file   Social Needs    Financial resource strain: Not on file    Food insecurity - worry: Not on file    Food insecurity - inability: Not on file    Transportation needs - medical: Not on file    Transportation needs - non-medical: Not on file   Occupational History    Not on file   Tobacco Use    Smoking status: Never Smoker    Smokeless tobacco: Never Used   Substance and Sexual Activity    Alcohol use: Yes     Alcohol/week: 1.2 - 1.8 oz     Types: 2 - 3 Shots of liquor per week     Comment:  weekends    Drug use: No    Sexual activity: Yes     Partners: Female   Other Topics Concern    Not on file   Social History Narrative    Not on file       Allergies:  Patient has no known allergies.    Medications:    Current Outpatient Medications:     atorvastatin (LIPITOR) 40 MG tablet, Take 1 tablet (40 mg total) by mouth once daily., Disp: 90 tablet, Rfl: 1    hydroCHLOROthiazide (HYDRODIURIL) 50 MG tablet, TAKE ONE-HALF TABLET BY MOUTH ONCE DAILY (Patient taking differently: 25 mg once daily. TAKE ONE-HALF TABLET BY MOUTH ONCE DAILY), Disp: 90 tablet, Rfl: 1    ipratropium (ATROVENT) 0.06 % nasal spray, 2 sprays by Nasal route 4 (four) times daily. (Patient taking differently: 2 sprays by Nasal route 4 (four) times daily as needed. ), Disp: 15 mL, Rfl: 6    mometasone (ELOCON) 0.1 % ointment, Apply topically once daily., Disp: , Rfl:     riociguat (ADEMPAS) 2.5 mg tablet, Take 1 tablet (2.5 mg total) by mouth 3 (three) times daily., Disp: 90 tablet, Rfl: 11    rivaroxaban (XARELTO) 20 mg Tab, TAKE ONE TABLET BY MOUTH ONCE DAILY WITH  DINNER  OR  EVENING  MEAL, Disp: 90 tablet, Rfl: 1    tamsulosin (FLOMAX) 0.4 mg Cp24, Take 1 capsule (0.4 mg total) by mouth once daily., Disp: 90 capsule, Rfl: 1    Current Facility-Administered Medications:     lidocaine HCL 10 mg/ml (1%) injection 1 mL, 1 mL, Other, 1 time in Clinic/HOD, Julius Matute Jr., MD    lidocaine HCl 2% urojet, , Mucous Membrane, 1 time in Clinic/HOD, Ligia Nye NP    Review of Systems   Constitutional: Negative for activity change, appetite change, chills, diaphoresis, fatigue, fever and unexpected weight change.   HENT: Negative for congestion, dental problem, hearing loss, mouth sores, postnasal drip, rhinorrhea, sinus pressure and trouble swallowing.    Eyes: Negative for pain, discharge and itching.   Respiratory: Negative for apnea, cough, choking, chest tightness, shortness of breath and wheezing.    Cardiovascular:  Negative for chest pain, palpitations and leg swelling.   Gastrointestinal: Negative for abdominal distention, abdominal pain, anal bleeding, blood in stool, constipation, diarrhea, nausea, rectal pain and vomiting.   Endocrine: Negative for polydipsia and polyuria.   Genitourinary: Negative for decreased urine volume, difficulty urinating, discharge, dysuria, enuresis, flank pain, frequency, genital sores, hematuria, penile pain, penile swelling, scrotal swelling, testicular pain and urgency.   Musculoskeletal: Negative for arthralgias, back pain and myalgias.   Skin: Negative for color change, rash and wound.   Neurological: Negative for dizziness, syncope, speech difficulty, light-headedness and headaches.   Hematological: Negative for adenopathy. Does not bruise/bleed easily.   Psychiatric/Behavioral: Negative for behavioral problems, confusion, hallucinations and sleep disturbance.       Objective:      Physical Exam   Constitutional: He appears well-developed.   HENT:   Head: Normocephalic.   Cardiovascular: Normal rate.    Pulmonary/Chest: Effort normal.   Abdominal: Soft.   Genitourinary: Prostate normal.   Genitourinary Comments: 35 g benign   Neurological: He is alert.   Skin: Skin is warm.     Psychiatric: He has a normal mood and affect.       Assessment:       1. Elevated PSA        Plan:       Ambrosio was seen today for benign prostatic hypertrophy.    Diagnoses and all orders for this visit:    Elevated PSA  -     Prostate Specific Antigen, Diagnostic; Future     return to clinic 6 months with PSA

## 2018-10-20 ENCOUNTER — CLINICAL SUPPORT (OUTPATIENT)
Dept: FAMILY MEDICINE | Facility: CLINIC | Age: 68
End: 2018-10-20
Payer: MEDICARE

## 2018-10-20 DIAGNOSIS — Z23 NEED FOR PROPHYLACTIC VACCINATION AND INOCULATION AGAINST INFLUENZA: Primary | ICD-10-CM

## 2018-10-20 PROCEDURE — 90662 IIV NO PRSV INCREASED AG IM: CPT | Mod: PBBFAC,PO

## 2018-10-24 ENCOUNTER — OFFICE VISIT (OUTPATIENT)
Dept: TRANSPLANT | Facility: CLINIC | Age: 68
End: 2018-10-24
Payer: MEDICARE

## 2018-10-24 ENCOUNTER — HOSPITAL ENCOUNTER (OUTPATIENT)
Dept: PULMONOLOGY | Facility: CLINIC | Age: 68
Discharge: HOME | End: 2018-10-24
Payer: MEDICARE

## 2018-10-24 VITALS
HEART RATE: 87 BPM | SYSTOLIC BLOOD PRESSURE: 133 MMHG | BODY MASS INDEX: 25.15 KG/M2 | OXYGEN SATURATION: 94 % | BODY MASS INDEX: 27.29 KG/M2 | HEIGHT: 74 IN | DIASTOLIC BLOOD PRESSURE: 73 MMHG | HEIGHT: 73 IN | WEIGHT: 196 LBS | WEIGHT: 205.94 LBS

## 2018-10-24 DIAGNOSIS — I27.24 CTEPH (CHRONIC THROMBOEMBOLIC PULMONARY HYPERTENSION): ICD-10-CM

## 2018-10-24 DIAGNOSIS — I27.20 PULMONARY HYPERTENSION: Primary | ICD-10-CM

## 2018-10-24 PROCEDURE — 99213 OFFICE O/P EST LOW 20 MIN: CPT | Mod: PBBFAC,25 | Performed by: INTERNAL MEDICINE

## 2018-10-24 PROCEDURE — 94618 PULMONARY STRESS TESTING: CPT | Mod: 26,S$PBB,, | Performed by: INTERNAL MEDICINE

## 2018-10-24 PROCEDURE — 99999 PR PBB SHADOW E&M-EST. PATIENT-LVL III: CPT | Mod: PBBFAC,,, | Performed by: INTERNAL MEDICINE

## 2018-10-24 PROCEDURE — 99214 OFFICE O/P EST MOD 30 MIN: CPT | Mod: 25,S$PBB,, | Performed by: INTERNAL MEDICINE

## 2018-10-24 PROCEDURE — 94618 PULMONARY STRESS TESTING: CPT | Mod: PBBFAC | Performed by: INTERNAL MEDICINE

## 2018-10-24 NOTE — PROCEDURES
Ambrosio Bray III is a 68 y.o.  male patient, who presents for a 6 minute walk test ordered by Ines Posadas MD.  The diagnosis is Pulmonary Hypertension.  The patient's BMI is 25.6 kg/m2.  Predicted distance (lower limit of normal) is 371.46 meters.      Test Results:    The test was completed without stopping.  The total time walked was 360 seconds.  During walking, the patient reported:  No complaints.  The patient used no assistive devices during testing.     10/24/2018---------Distance: 381 meters (1250 feet)     O2 Sat % Supplemental Oxygen Heart Rate Blood Pressure Archie Scale   Pre-exercise  (Resting) 97 % Room Air 88 bpm 137/78 mmHg 0   During Exercise 90 % Room Air 87 bpm 178/87 mmHg 0.5   Post-exercise  (Recovery) 96 % Room Air  83 bpm 153/72 mmHg      Recovery Time:  284 seconds    Performing nurse/tech:  Guillermo MAZA      PREVIOUS STUDY:   04/25/2018---------Distance: 426.72 meters (1400 feet)       O2 Sat % Supplemental Oxygen Heart Rate Blood Pressure Archie Scale   Pre-exercise  (Resting) 93 % Room Air 71 bpm 131/71 mmHg 0   During Exercise 86 % Room Air 106 bpm 142/73 mmHg 0   Post-exercise    91 % Room Air  73 bpm             CLINICAL INTERPRETATION:  Six minute walk distance is 381 meters (1250 feet) with very, very light dyspnea.  During exercise, there was significant desaturation while breathing room air.  Blood pressure increased significantly and Heart rate remained stable with walking.  The patient did not report non-pulmonary symptoms during exercise.  Since the previous study in April 2018, exercise capacity may be somewhat worse.  Based upon age and body mass index, exercise capacity is normal.

## 2018-10-24 NOTE — PROGRESS NOTES
Subjective:       Patient ID: Ambrosio Bray III is a 68 y.o. male.    Chief Complaint: Pulmonary Hypertension (6mo visit)    HPI   Ambrosio Bray III 68 y.o. male    has a past medical history of Diabetes mellitus, Hypertension, PTSD (post-traumatic stress disorder), and Pulmonary hyperinflation.    has a past surgical history that includes Cardiac catheterization; COLONOSCOPY (N/A, 5/1/2018); ABLATION (N/A, 2/2/2018); TRANSESOPHAGEAL ECHOCARDIOGRAM (RITO) (N/A, 2/2/2018); and CARDIOVERSION (N/A, 9/30/2014).   reports that  has never smoked. he has never used smokeless tobacco. He reports that he drinks about 1.2 - 1.8 oz of alcohol per week. He reports that he does not use drugs.  Referred by: No ref. provider found  Who had concerns including Pulmonary Hypertension (6mo visit).  The patient's last visit with me was on 4/25/2018.    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      Objective:      Physical Exam   Constitutional: He is oriented to person, place, and time. He appears well-developed and well-nourished. He appears not cachectic. No distress.   HENT:   Head: Normocephalic.   Nose: Nose normal. No mucosal edema.   Mouth/Throat: Oropharynx is clear and moist. Normal dentition. No oropharyngeal exudate.   Neck: Normal range of motion. Neck supple.   Cardiovascular: Normal rate, regular rhythm, normal heart sounds and intact distal pulses. Exam reveals no gallop and no friction rub.   No murmur heard.  Accentuated P2   Pulmonary/Chest: Effort normal and breath sounds normal. No stridor.   Abdominal: Soft. Bowel sounds are normal. He exhibits no distension.   Musculoskeletal: Normal range of motion. He exhibits no edema  or tenderness.   Lymphadenopathy: No supraclavicular adenopathy is present.     He has no cervical adenopathy.   Neurological: He is alert and oriented to person, place, and time. Gait normal.   Skin: Skin is warm and dry. No rash noted. He is not diaphoretic. No cyanosis or erythema. No pallor. Nails show no clubbing.   Psychiatric: He has a normal mood and affect. His behavior is normal. Judgment and thought content normal.   Nursing note and vitals reviewed.    Personal Diagnostic Review    No flowsheet data found.      Assessment:       1. Pulmonary hypertension    2. CTEPH (chronic thromboembolic pulmonary hypertension)        Outpatient Encounter Medications as of 10/24/2018   Medication Sig Dispense Refill    atorvastatin (LIPITOR) 40 MG tablet Take 1 tablet (40 mg total) by mouth once daily. 90 tablet 1    hydroCHLOROthiazide (HYDRODIURIL) 50 MG tablet TAKE ONE-HALF TABLET BY MOUTH ONCE DAILY (Patient taking differently: 25 mg once daily. TAKE ONE-HALF TABLET BY MOUTH ONCE DAILY) 90 tablet 1    ipratropium (ATROVENT) 0.06 % nasal spray 2 sprays by Nasal route 4 (four) times daily. (Patient taking differently: 2 sprays by Nasal route 4 (four) times daily as needed. ) 15 mL 6    mometasone (ELOCON) 0.1 % ointment Apply topically once daily.      riociguat (ADEMPAS) 2.5 mg tablet Take 1 tablet (2.5 mg total) by mouth 3 (three) times daily. 90 tablet 11    rivaroxaban (XARELTO) 20 mg Tab TAKE ONE TABLET BY MOUTH ONCE DAILY WITH  DINNER  OR  EVENING  MEAL 90 tablet 1    tamsulosin (FLOMAX) 0.4 mg Cp24 Take 1 capsule (0.4 mg total) by mouth once daily. 90 capsule 1     Facility-Administered Encounter Medications as of 10/24/2018   Medication Dose Route Frequency Provider Last Rate Last Dose    lidocaine HCL 10 mg/ml (1%) injection 1 mL  1 mL Other 1 time in Clinic/HOD Julius Matute Jr., MD        lidocaine HCl 2% urojet   Mucous Membrane 1 time in Clinic/HOD Ligia Nye NP         Orders Placed  This Encounter   Procedures    2D echo with color flow doppler     Standing Status:   Future     Number of Occurrences:   1     Standing Expiration Date:   10/24/2019     Plan:            I personally reviewed the      1. Echo report p  2. PFT   3. 6MWD   4. CXR   5. CXR report CT chest   6. CT chest report     Assessment:  Ambrosio was seen today for pulmonary hypertension.    Diagnoses and all orders for this visit:    Pulmonary hypertension  -     2D echo with color flow doppler; Future    CTEPH (chronic thromboembolic pulmonary hypertension)        Plan:  Problem List Items Addressed This Visit     CTEPH (chronic thromboembolic pulmonary hypertension)    Overview     Patient with cteph, doing well, but concern by me for increased pressures         Current Assessment & Plan     Repeat echo and if elevated may need additional medications-            Other Visit Diagnoses     Pulmonary hypertension    -  Primary    Relevant Orders    2D echo with color flow doppler (Completed)        Follow-up in about 6 weeks (around 12/5/2018).    There are no Patient Instructions on file for this visit.    Immunization History   Administered Date(s) Administered    Influenza 01/17/2013    Influenza - High Dose 10/17/2015, 10/31/2016, 09/15/2017, 10/20/2018    Influenza - Quadrivalent 11/06/2014    Influenza Split 01/17/2013    Pneumococcal Conjugate - 13 Valent 10/31/2016    Pneumococcal Polysaccharide - 23 Valent 04/09/2018    Zoster 01/13/2015

## 2018-10-25 ENCOUNTER — HOSPITAL ENCOUNTER (OUTPATIENT)
Dept: CARDIOLOGY | Facility: CLINIC | Age: 68
Discharge: HOME | End: 2018-10-25
Attending: INTERNAL MEDICINE
Payer: MEDICARE

## 2018-10-25 DIAGNOSIS — I42.9 CARDIOMYOPATHY: ICD-10-CM

## 2018-10-25 DIAGNOSIS — I27.20 PULMONARY HYPERTENSION: ICD-10-CM

## 2018-10-25 LAB
DIASTOLIC DYSFUNCTION: YES
ESTIMATED PA SYSTOLIC PRESSURE: 68.29
MITRAL VALVE REGURGITATION: ABNORMAL
RETIRED EF AND QEF - SEE NOTES: 40 (ref 55–65)
TRICUSPID VALVE REGURGITATION: ABNORMAL

## 2018-10-25 PROCEDURE — 93306 TTE W/DOPPLER COMPLETE: CPT | Mod: PBBFAC | Performed by: INTERNAL MEDICINE

## 2018-10-30 ENCOUNTER — PATIENT MESSAGE (OUTPATIENT)
Dept: TRANSPLANT | Facility: CLINIC | Age: 68
End: 2018-10-30

## 2018-10-30 ENCOUNTER — TELEPHONE (OUTPATIENT)
Dept: TRANSPLANT | Facility: CLINIC | Age: 68
End: 2018-10-30

## 2018-10-30 NOTE — TELEPHONE ENCOUNTER
"Contacted pt and reported same. Pt states "I knew they were going to be high. I told Dr Posadas that."  Regarding scheduling a  RHC, Patient states "I am not sure about that one. It was hard to put me under before. They won't even go through my neck because it is too risky (for previous procedure." Explained to patient that he would not be sedated for RHC. Explained procedure to patient, and rationale for same, as patient also stated "While Dr Posadas recommends I have another RHC, I recommend that they do another echocardiogram." It was noted that while echocardiograms provide valuable information, the right heart cath could provide more specific measurements to make determinations regarding prognosis and therapy. Pt states "My right heart has always been large. That is nothing new." Explained to patient that while this may be nothing new, he might potentially benefit from additional PH therapies, but this could not be determined without the right heart cath. Offered to send patient information regarding RHC procedure, but he declined, requesting to think more about whether or not he will have the procedure. Pt also requests he have sit down discussion with Dr Posadas regarding same. Will attempt to get appt for pt in timeframe requested. Dr Posadas notified of all.   "

## 2018-10-30 NOTE — TELEPHONE ENCOUNTER
----- Message from Inna Posadas MD sent at 10/30/2018  8:52 AM CDT -----  Please let patient know that his echo was concerning to me for worsening pressures. I would like for him to have another RHC.  Thanks, SJ

## 2018-11-01 DIAGNOSIS — I27.24 CTEPH (CHRONIC THROMBOEMBOLIC PULMONARY HYPERTENSION): ICD-10-CM

## 2018-11-09 NOTE — TELEPHONE ENCOUNTER
Left message for patient to call clinic to schedule eye exam or fax results of eye exam to [phone].

## 2018-11-12 ENCOUNTER — PATIENT MESSAGE (OUTPATIENT)
Dept: ADMINISTRATIVE | Facility: OTHER | Age: 68
End: 2018-11-12

## 2018-11-12 DIAGNOSIS — N40.0 BENIGN NON-NODULAR PROSTATIC HYPERPLASIA WITHOUT LOWER URINARY TRACT SYMPTOMS: ICD-10-CM

## 2018-11-12 RX ORDER — TAMSULOSIN HYDROCHLORIDE 0.4 MG/1
CAPSULE ORAL
Qty: 90 CAPSULE | Refills: 1 | Status: SHIPPED | OUTPATIENT
Start: 2018-11-12 | End: 2019-05-14 | Stop reason: SDUPTHER

## 2018-11-14 ENCOUNTER — OFFICE VISIT (OUTPATIENT)
Dept: TRANSPLANT | Facility: CLINIC | Age: 68
End: 2018-11-14
Payer: MEDICARE

## 2018-11-14 VITALS
HEIGHT: 74 IN | DIASTOLIC BLOOD PRESSURE: 83 MMHG | OXYGEN SATURATION: 93 % | WEIGHT: 201.06 LBS | BODY MASS INDEX: 25.8 KG/M2 | SYSTOLIC BLOOD PRESSURE: 127 MMHG | HEART RATE: 91 BPM

## 2018-11-14 DIAGNOSIS — Z79.01 LONG TERM (CURRENT) USE OF ANTICOAGULANTS: ICD-10-CM

## 2018-11-14 DIAGNOSIS — R06.82 TACHYPNEA: ICD-10-CM

## 2018-11-14 DIAGNOSIS — I27.24 CTEPH (CHRONIC THROMBOEMBOLIC PULMONARY HYPERTENSION): Primary | ICD-10-CM

## 2018-11-14 DIAGNOSIS — Z79.899 POLYPHARMACY: Primary | ICD-10-CM

## 2018-11-14 PROCEDURE — 99213 OFFICE O/P EST LOW 20 MIN: CPT | Mod: S$PBB,,, | Performed by: INTERNAL MEDICINE

## 2018-11-14 PROCEDURE — 99999 PR PBB SHADOW E&M-EST. PATIENT-LVL IV: CPT | Mod: PBBFAC,,, | Performed by: INTERNAL MEDICINE

## 2018-11-14 PROCEDURE — 99214 OFFICE O/P EST MOD 30 MIN: CPT | Mod: PBBFAC | Performed by: INTERNAL MEDICINE

## 2018-11-14 NOTE — PROGRESS NOTES
Subjective:       Patient ID: Ambrosio Bray III is a 68 y.o. male.    Chief Complaint: Pulmonary Hypertension    HPI   Ambrosio Bray III 68 y.o. male    has a past medical history of Diabetes mellitus, Hypertension, PTSD (post-traumatic stress disorder), and Pulmonary hyperinflation.    has a past surgical history that includes Cardiac catheterization; COLONOSCOPY (N/A, 5/1/2018); ABLATION (N/A, 2/2/2018); TRANSESOPHAGEAL ECHOCARDIOGRAM (RITO) (N/A, 2/2/2018); and CARDIOVERSION (N/A, 9/30/2014).   reports that  has never smoked. he has never used smokeless tobacco. He reports that he drinks about 1.2 - 1.8 oz of alcohol per week. He reports that he does not use drugs.  Referred by: No ref. provider found  Who had concerns including Pulmonary Hypertension.  The patient's last visit with me was on 10/24/2018.    Here to discuss last echo and rhc  Discussed procedure  Stopping xarelto ahead of time  No fever chills, ns, wt changes, nausea, vomiting, diarrhea, constipation, chest pain, tightness, pressure    Review of Systems    Objective:      Physical Exam  Personal Diagnostic Review    No flowsheet data found.      Assessment:       1. CTEPH (chronic thromboembolic pulmonary hypertension)        Outpatient Encounter Medications as of 11/14/2018   Medication Sig Dispense Refill    atorvastatin (LIPITOR) 40 MG tablet Take 1 tablet (40 mg total) by mouth once daily. 90 tablet 1    hydroCHLOROthiazide (HYDRODIURIL) 50 MG tablet TAKE ONE-HALF TABLET BY MOUTH ONCE DAILY (Patient taking differently: 25 mg once daily. TAKE ONE-HALF TABLET BY MOUTH ONCE DAILY) 90 tablet 1    ipratropium (ATROVENT) 0.06 % nasal spray 2 sprays by Nasal route 4 (four) times daily. (Patient taking differently: 2 sprays by Nasal route 4 (four) times daily as needed. ) 15 mL 6    mometasone (ELOCON) 0.1 % ointment Apply topically once daily.      riociguat (ADEMPAS) 2.5 mg tablet Take 1 tablet (2.5 mg total) by mouth 3 (three) times daily.  90 tablet 11    rivaroxaban (XARELTO) 20 mg Tab TAKE ONE TABLET BY MOUTH ONCE DAILY WITH  DINNER  OR  EVENING  MEAL 90 tablet 1    tamsulosin (FLOMAX) 0.4 mg Cap TAKE ONE CAPSULE BY MOUTH ONCE DAILY 90 capsule 1     Facility-Administered Encounter Medications as of 11/14/2018   Medication Dose Route Frequency Provider Last Rate Last Dose    lidocaine HCL 10 mg/ml (1%) injection 1 mL  1 mL Other 1 time in Clinic/HOD Julius Matute Jr., MD        lidocaine HCl 2% urojet   Mucous Membrane 1 time in Clinic/HOD Ligia Nye NP         No orders of the defined types were placed in this encounter.      Plan:            I personally reviewed the      Assessment:  Ambrosio was seen today for pulmonary hypertension.    Diagnoses and all orders for this visit:    CTEPH (chronic thromboembolic pulmonary hypertension)  -     Cath lab procedure; Future        Plan:  Problem List Items Addressed This Visit     CTEPH (chronic thromboembolic pulmonary hypertension) - Primary    Overview     Patient with cteph, doing well, but concern by me for increased pressures         Relevant Orders    Cath lab procedure        Call patient with results after procedure  No Follow-up on file.    There are no Patient Instructions on file for this visit.    Immunization History   Administered Date(s) Administered    Influenza 01/17/2013    Influenza - High Dose 10/17/2015, 10/31/2016, 09/15/2017, 10/20/2018    Influenza - Quadrivalent 11/06/2014    Influenza Split 01/17/2013    Pneumococcal Conjugate - 13 Valent 10/31/2016    Pneumococcal Polysaccharide - 23 Valent 04/09/2018    Zoster 01/13/2015

## 2018-11-28 ENCOUNTER — TELEPHONE (OUTPATIENT)
Dept: TRANSPLANT | Facility: CLINIC | Age: 68
End: 2018-11-28

## 2018-11-28 ENCOUNTER — HOSPITAL ENCOUNTER (OUTPATIENT)
Facility: HOSPITAL | Age: 68
Discharge: HOME | End: 2018-11-28
Attending: INTERNAL MEDICINE | Admitting: INTERNAL MEDICINE
Payer: MEDICARE

## 2018-11-28 DIAGNOSIS — I27.20 PULMONARY HYPERTENSION: ICD-10-CM

## 2018-11-28 PROCEDURE — 25000003 PHARM REV CODE 250: Performed by: INTERNAL MEDICINE

## 2018-11-28 PROCEDURE — 93451 RIGHT HEART CATH: CPT | Performed by: INTERNAL MEDICINE

## 2018-11-28 PROCEDURE — C1751 CATH, INF, PER/CENT/MIDLINE: HCPCS | Performed by: INTERNAL MEDICINE

## 2018-11-28 PROCEDURE — C1894 INTRO/SHEATH, NON-LASER: HCPCS | Performed by: INTERNAL MEDICINE

## 2018-11-28 PROCEDURE — 93451 RIGHT HEART CATH: CPT | Mod: 26,,, | Performed by: INTERNAL MEDICINE

## 2018-11-28 RX ORDER — LIDOCAINE HYDROCHLORIDE 20 MG/ML
INJECTION, SOLUTION INFILTRATION; PERINEURAL
Status: DISCONTINUED | OUTPATIENT
Start: 2018-11-28 | End: 2018-11-28 | Stop reason: HOSPADM

## 2018-11-28 NOTE — BRIEF OP NOTE
Admit 11/28/2018  Discharge  11/28/2018  Discharge / Principle diagnosis  pulmonary hypertension    Discharge attending Chelsea Arceo MD  Hospital course.  Came in for RHC. Tolerated procedure well (see full results in cardiac procedure section).  Results discussed with patient / caregiver.   Discharge condition / disposition Good / home   discharge activity activity as tolerated    Discharge diet diet as tolerated / unchanged from admission  Discharge medication   No current facility-administered medications on file prior to encounter.      Current Outpatient Medications on File Prior to Encounter   Medication Sig    atorvastatin (LIPITOR) 40 MG tablet Take 1 tablet (40 mg total) by mouth once daily.    hydroCHLOROthiazide (HYDRODIURIL) 50 MG tablet TAKE ONE-HALF TABLET BY MOUTH ONCE DAILY (Patient taking differently: 25 mg once daily. TAKE ONE-HALF TABLET BY MOUTH ONCE DAILY)    ipratropium (ATROVENT) 0.06 % nasal spray 2 sprays by Nasal route 4 (four) times daily. (Patient taking differently: 2 sprays by Nasal route 4 (four) times daily as needed. )    mometasone (ELOCON) 0.1 % ointment Apply topically once daily.    riociguat (ADEMPAS) 2.5 mg tablet Take 1 tablet (2.5 mg total) by mouth 3 (three) times daily.    rivaroxaban (XARELTO) 20 mg Tab TAKE ONE TABLET BY MOUTH ONCE DAILY WITH  DINNER  OR  EVENING  MEAL    tamsulosin (FLOMAX) 0.4 mg Cap TAKE ONE CAPSULE BY MOUTH ONCE DAILY     Followup in clinic as scheduled

## 2018-11-28 NOTE — H&P
Subjective:      Ambrosio Bray III is a 68 y.o. male with a who needs RHC for evaluation of pulmonary HTN.  Currently able to lay flat.      Past Medical History:   Diagnosis Date    Diabetes mellitus     Hypertension     PTSD (post-traumatic stress disorder)     Pulmonary hyperinflation      Past Surgical History:   Procedure Laterality Date    ABLATION N/A 2/2/2018    Performed by Chaim Duffy MD at [facility] CATH LAB    CARDIAC CATHETERIZATION      CARDIOVERSION N/A 9/30/2014    Performed by Kun Mcdermott MD at [facility] CATH LAB    COLONOSCOPY N/A 5/1/2018    Procedure: COLONOSCOPY;  Surgeon: Bubba Navarro MD;  Location: [facility] ENDO;  Service: Endoscopy;  Laterality: N/A;  confirmed appt 4/24/18    COLONOSCOPY N/A 5/1/2018    Performed by Bubba Navarro MD at [facility] ENDO    TRANSESOPHAGEAL ECHOCARDIOGRAM (RITO) N/A 2/2/2018    Performed by Chaim Duffy MD at [facility] CATH LAB     Social History     Socioeconomic History    Marital status: Single     Spouse name: Not on file    Number of children: Not on file    Years of education: Not on file    Highest education level: Not on file   Social Needs    Financial resource strain: Not on file    Food insecurity - worry: Not on file    Food insecurity - inability: Not on file    Transportation needs - medical: Not on file    Transportation needs - non-medical: Not on file   Occupational History    Not on file   Tobacco Use    Smoking status: Never Smoker    Smokeless tobacco: Never Used   Substance and Sexual Activity    Alcohol use: Yes     Alcohol/week: 1.2 - 1.8 oz     Types: 2 - 3 Shots of liquor per week     Comment: weekends    Drug use: No    Sexual activity: Yes     Partners: Female   Other Topics Concern    Not on file   Social History Narrative    Not on file     Family History   Problem Relation Age of Onset    Cancer Father         colon    Colon cancer Father            Other significant clinical information:  Review of  patient's allergies indicates:  No Known Allergies  No current facility-administered medications on file prior to encounter.      Current Outpatient Medications on File Prior to Encounter   Medication Sig    atorvastatin (LIPITOR) 40 MG tablet Take 1 tablet (40 mg total) by mouth once daily.    hydroCHLOROthiazide (HYDRODIURIL) 50 MG tablet TAKE ONE-HALF TABLET BY MOUTH ONCE DAILY (Patient taking differently: 25 mg once daily. TAKE ONE-HALF TABLET BY MOUTH ONCE DAILY)    ipratropium (ATROVENT) 0.06 % nasal spray 2 sprays by Nasal route 4 (four) times daily. (Patient taking differently: 2 sprays by Nasal route 4 (four) times daily as needed. )    mometasone (ELOCON) 0.1 % ointment Apply topically once daily.    riociguat (ADEMPAS) 2.5 mg tablet Take 1 tablet (2.5 mg total) by mouth 3 (three) times daily.    rivaroxaban (XARELTO) 20 mg Tab TAKE ONE TABLET BY MOUTH ONCE DAILY WITH  DINNER  OR  EVENING  MEAL    tamsulosin (FLOMAX) 0.4 mg Cap TAKE ONE CAPSULE BY MOUTH ONCE DAILY            Objective:      Physical Exam      BP  145 / 82    General Appearance:    Alert, cooperative, no distress, appears stated age   Head:    Normocephalic, without obvious abnormality, atraumatic   Eyes:    PERRL, conjunctiva/corneas clear, EOM's intact, fundi     benign, both eyes        Ears:    Normal TM's and external ear canals, both ears   Nose:   Nares normal, septum midline, mucosa normal, no drainage    or sinus tenderness   Throat:   Lips, mucosa, and tongue normal; teeth and gums normal   Neck:   Supple, symmetrical, trachea midline, no adenopathy;        thyroid:  No enlargement/tenderness/nodules; no carotid    bruit or JVD   Back:     Symmetric, no curvature, ROM normal, no CVA tenderness   Lungs:     Clear to auscultation bilaterally, respirations unlabored   Chest wall:    No tenderness or deformity   Heart:    Regular rate and rhythm, S1 and S2 normal, no murmur, rub   or gallop   Abdomen:     Soft,  non-tender, bowel sounds active all four quadrants,     no masses, no organomegaly   Genitalia:    Normal male without lesion, discharge or tenderness   Rectal:    Normal tone, normal prostate, no masses or tenderness;    guaiac negative stool   Extremities:   Extremities normal, atraumatic, no cyanosis or edema   Pulses:   2+ and symmetric all extremities   Skin:   Skin color, texture, turgor normal, no rashes or lesions   Lymph nodes:   Cervical, supraclavicular, and axillary nodes normal   Neurologic:   CNII-XII intact. Normal strength, sensation and reflexes       throughout         Lab Review   Lab Results   Component Value Date    WBC 5.41 11/28/2018    HGB 12.9 (L) 11/28/2018    HCT 39.4 (L) 11/28/2018    MCV 85 11/28/2018     11/28/2018     Lab Results   Component Value Date    INR 1.2 11/28/2018    INR 1.2 01/24/2018    INR 1.3 (H) 10/01/2014           Assessment:       Plan:     I have explained the risks, benefits, and alternatives of the procedure in detail.  The patient expresses understanding and all questions have been answered.  The patient agrees to the proceed as planned.  RHC via RIJ   Micropuncture access needle will be used to minimize bleeding risk.

## 2018-11-28 NOTE — DISCHARGE INSTRUCTIONS
OK to resume xarelto  AFTER THE PROCEDURE:  -You may remove the bandage in 24 hours and wash with soap and water.  -You may shower, but do not soak in a tub for three days.   PRECAUTIONS FOR THE NEXT 24 HOURS:  -If you need to cough, sneeze, have a bowel movement, or bear down, hold pressure over your bandage.  -Do not  anything heavier than a gallon of milk(about 5 pounds)  -Avoid excessive bending over.  SYMPTOMS TO WATCH FOR AND REPORT TO YOUR DOCTOR:  -BLEEDING: hold pressure over the site until bleeding stops. Proceed to Emergency Room by ambulance (do not drive yourself) if unable to stop bleeding. Notify your doctor.  -HEMATOMA(hard bruise under the skin): Giovani around the bruise if one develops. Call your doctor if it increases in size or if you have difficulty talking, swallowing, breathing or anything unusual.  SIGNS OF INFECTION:Fever (temperature over 100.5 F), pus or redness  -RASH  -CHEST PAIN OR SHORTNESS OF BREATH    You may call you coordinator in the Heart Failure/Heart Transplant/Pulmonary Hypertension Clinic at [phone] during normal business hours(Monday through Friday from 8 A.M. to 5 P.M.) After hours, call the Heart Transplant Service doctor on call at [phone].

## 2018-11-28 NOTE — Clinical Note
The PA catheter is repositioned to the pulmonary wedge. Hemodynamics performed. Oxygen saturation obtained at 94%.

## 2018-11-28 NOTE — Clinical Note
The PA catheter is repositioned to the main pulmonary artery. Hemodynamics performed. Cardiac output obtained at 5 L/min. Oxygen saturation obtained at 64%.

## 2018-12-03 ENCOUNTER — TELEPHONE (OUTPATIENT)
Dept: TRANSPLANT | Facility: CLINIC | Age: 68
End: 2018-12-03

## 2018-12-03 ENCOUNTER — PATIENT MESSAGE (OUTPATIENT)
Dept: TRANSPLANT | Facility: CLINIC | Age: 68
End: 2018-12-03

## 2018-12-04 ENCOUNTER — TELEPHONE (OUTPATIENT)
Dept: TRANSPLANT | Facility: CLINIC | Age: 68
End: 2018-12-04

## 2018-12-10 DIAGNOSIS — R80.9 TYPE 2 DIABETES MELLITUS WITH MICROALBUMINURIA, WITHOUT LONG-TERM CURRENT USE OF INSULIN: ICD-10-CM

## 2018-12-10 DIAGNOSIS — E11.29 TYPE 2 DIABETES MELLITUS WITH MICROALBUMINURIA, WITHOUT LONG-TERM CURRENT USE OF INSULIN: ICD-10-CM

## 2018-12-10 RX ORDER — ATORVASTATIN CALCIUM 40 MG/1
TABLET, FILM COATED ORAL
Qty: 90 TABLET | Refills: 1 | Status: SHIPPED | OUTPATIENT
Start: 2018-12-10 | End: 2019-06-06 | Stop reason: SDUPTHER

## 2018-12-13 ENCOUNTER — TELEPHONE (OUTPATIENT)
Dept: TRANSPLANT | Facility: CLINIC | Age: 68
End: 2018-12-13

## 2018-12-15 DIAGNOSIS — I48.20 CHRONIC ATRIAL FIBRILLATION: ICD-10-CM

## 2018-12-17 RX ORDER — RIVAROXABAN 20 MG/1
TABLET, FILM COATED ORAL
Qty: 90 TABLET | Refills: 1 | Status: SHIPPED | OUTPATIENT
Start: 2018-12-17 | End: 2019-06-18 | Stop reason: SDUPTHER

## 2018-12-19 ENCOUNTER — TELEPHONE (OUTPATIENT)
Dept: TRANSPLANT | Facility: CLINIC | Age: 68
End: 2018-12-19

## 2018-12-19 ENCOUNTER — PATIENT MESSAGE (OUTPATIENT)
Dept: TRANSPLANT | Facility: CLINIC | Age: 68
End: 2018-12-19

## 2018-12-19 NOTE — TELEPHONE ENCOUNTER
"Contacted pt via [facility], with "Remind me" set to call patient if he does not reply to message.   "

## 2018-12-19 NOTE — TELEPHONE ENCOUNTER
----- Message from Inna Posadas MD sent at 12/19/2018  4:19 PM CST -----  Yes, Uptravi. I think that is the next best step.   THanks, SJ  ----- Message -----  From: THOMPSON Savage, RN  Sent: 12/12/2018   4:18 PM  To: Inna Posadas MD    Hi-  Suspect pt will want to hear from you regarding his RHC results. Also, did we decide that plan is for Uptravi? Apologize if I sent this already, and I meant to catch you in our clinic before you left, but it's been kind of nuts!  Thanks,  Eduarda

## 2018-12-20 ENCOUNTER — TELEPHONE (OUTPATIENT)
Dept: TRANSPLANT | Facility: CLINIC | Age: 68
End: 2018-12-20

## 2018-12-20 NOTE — TELEPHONE ENCOUNTER
"Spoke w/pt regarding concerns/questions about Uptravi. Pt states "I am good with starting whatever Dr Posadas thinks will help me." Spent approximately 20 minutes talking w/patient about CTEPH and prognosis. Pt states " I have probably the worst kind, and I read that people only live 5 years with it." Discussed with patient that patients with pulmonary hypertension may live for 15+ years on therapy, but that PH is a progressive disease, and by adding Uptravi, we are hoping to slow progression of his disease. Discussed possible SEs again with patient, also noting that some patients don't experience SEs at all. Pt understanding of all, and requests to proceed with referral for Uptravi. Will keep currently scheduled appt after New Year, in case patient is already up on therapy.   "

## 2019-01-04 DIAGNOSIS — E11.59 HYPERTENSION ASSOCIATED WITH DIABETES: Chronic | ICD-10-CM

## 2019-01-04 DIAGNOSIS — I15.2 HYPERTENSION ASSOCIATED WITH DIABETES: Chronic | ICD-10-CM

## 2019-01-04 RX ORDER — HYDROCHLOROTHIAZIDE 50 MG/1
TABLET ORAL
Qty: 90 TABLET | Refills: 1 | Status: SHIPPED | OUTPATIENT
Start: 2019-01-04 | End: 2019-09-04

## 2019-01-07 ENCOUNTER — TELEPHONE (OUTPATIENT)
Dept: TRANSPLANT | Facility: CLINIC | Age: 69
End: 2019-01-07

## 2019-01-08 ENCOUNTER — TELEPHONE (OUTPATIENT)
Dept: TRANSPLANT | Facility: CLINIC | Age: 69
End: 2019-01-08

## 2019-01-14 NOTE — TELEPHONE ENCOUNTER
Uptravi approved (dose pack and 200mcg shipment, good through 1/1/2020. Notification submitted to [facility].

## 2019-01-16 ENCOUNTER — OFFICE VISIT (OUTPATIENT)
Dept: TRANSPLANT | Facility: CLINIC | Age: 69
End: 2019-01-16
Payer: MEDICARE

## 2019-01-16 VITALS
DIASTOLIC BLOOD PRESSURE: 79 MMHG | HEIGHT: 73 IN | HEART RATE: 77 BPM | OXYGEN SATURATION: 91 % | WEIGHT: 200.81 LBS | SYSTOLIC BLOOD PRESSURE: 121 MMHG | BODY MASS INDEX: 26.62 KG/M2

## 2019-01-16 DIAGNOSIS — I27.24 CTEPH (CHRONIC THROMBOEMBOLIC PULMONARY HYPERTENSION): ICD-10-CM

## 2019-01-16 DIAGNOSIS — I83.93 VARICOSE VEINS OF BOTH LOWER EXTREMITIES, UNSPECIFIED WHETHER COMPLICATED: ICD-10-CM

## 2019-01-16 DIAGNOSIS — I27.20 PULMONARY HTN: Primary | ICD-10-CM

## 2019-01-16 PROCEDURE — 99214 PR OFFICE/OUTPT VISIT, EST, LEVL IV, 30-39 MIN: ICD-10-PCS | Mod: S$PBB,,, | Performed by: INTERNAL MEDICINE

## 2019-01-16 PROCEDURE — 99213 OFFICE O/P EST LOW 20 MIN: CPT | Mod: PBBFAC | Performed by: INTERNAL MEDICINE

## 2019-01-16 PROCEDURE — 99999 PR PBB SHADOW E&M-EST. PATIENT-LVL III: CPT | Mod: PBBFAC,,, | Performed by: INTERNAL MEDICINE

## 2019-01-16 PROCEDURE — 99214 OFFICE O/P EST MOD 30 MIN: CPT | Mod: S$PBB,,, | Performed by: INTERNAL MEDICINE

## 2019-01-16 PROCEDURE — 99999 PR PBB SHADOW E&M-EST. PATIENT-LVL III: ICD-10-PCS | Mod: PBBFAC,,, | Performed by: INTERNAL MEDICINE

## 2019-01-22 NOTE — PROGRESS NOTES
Subjective:       Patient ID: Ambrosio Bray III is a 68 y.o. male.    Chief Complaint: Pulmonary Hypertension    HPI   Ambrosio Bray III 68 y.o. male    has a past medical history of Diabetes mellitus, Hypertension, PTSD (post-traumatic stress disorder), and Pulmonary hyperinflation.    has a past surgical history that includes Cardiac catheterization; Colonoscopy (N/A, 5/1/2018); and right heart catheterization (Right, 11/28/2018).   reports that  has never smoked. he has never used smokeless tobacco. He reports that he drinks about 1.2 - 1.8 oz of alcohol per week. He reports that he does not use drugs.  Referred by: No ref. provider found  Who had concerns including Pulmonary Hypertension.  The patient's last visit with me was on 11/14/2018.    Here for discussion about additional therapy for ph  Patient understands the benefits of lowering pasp early  Discussed se of medication and benefits  No fever chills, ns, wt changes, nausea, vomiting, diarrhea, constipation, chest pain, tightness, pressure. Remainder of review of systems is negative.  Otherwise asymptomatic from pulmonary standpoint.    Review of Systems   All other systems reviewed and are negative.      Objective:      Physical Exam   Vitals reviewed.    Personal Diagnostic Review    No flowsheet data found.      Assessment:       1. Pulmonary HTN    2. CTEPH (chronic thromboembolic pulmonary hypertension)    3. Varicose veins of both lower extremities, unspecified whether complicated        Outpatient Encounter Medications as of 1/16/2019   Medication Sig Dispense Refill    atorvastatin (LIPITOR) 40 MG tablet TAKE ONE TABLET BY MOUTH ONCE DAILY 90 tablet 1    hydroCHLOROthiazide (HYDRODIURIL) 50 MG tablet TAKE ONE-HALF TABLET BY MOUTH ONCE DAILY 90 tablet 1    ipratropium (ATROVENT) 0.06 % nasal spray 2 sprays by Nasal route 4 (four) times daily. (Patient taking differently: 2 sprays by Nasal route 4 (four) times daily as needed. ) 15 mL 6     mometasone (ELOCON) 0.1 % ointment Apply topically once daily.      riociguat (ADEMPAS) 2.5 mg tablet Take 1 tablet (2.5 mg total) by mouth 3 (three) times daily. 90 tablet 11    tamsulosin (FLOMAX) 0.4 mg Cap TAKE ONE CAPSULE BY MOUTH ONCE DAILY 90 capsule 1    XARELTO 20 mg Tab TAKE ONE TABLET BY MOUTH ONCE DAILY WITH  DINNER  OR  EVENING  MEAL 90 tablet 1     Facility-Administered Encounter Medications as of 1/16/2019   Medication Dose Route Frequency Provider Last Rate Last Dose    lidocaine HCL 10 mg/ml (1%) injection 1 mL  1 mL Other 1 time in Clinic/HOD Julius Matute Jr., MD        lidocaine HCl 2% urojet   Mucous Membrane 1 time in Clinic/HOD Ligia Nye NP         No orders of the defined types were placed in this encounter.      Plan:             I personally reviewed the      1. Echo report   2. PFT   3. 6MWD   4. CXR   5. CXR report   6. CT chest   7. CT chest report     Assessment:  Ambrosio was seen today for pulmonary hypertension.    Diagnoses and all orders for this visit:    Pulmonary HTN    CTEPH (chronic thromboembolic pulmonary hypertension)    Varicose veins of both lower extremities, unspecified whether complicated        Plan:  Problem List Items Addressed This Visit     CTEPH (chronic thromboembolic pulmonary hypertension)    Overview     Patient with cteph, doing well, but concern by me for increased pressures  RHC confirmed  Add uptravi to riociguat  And xarelto  Continue hctz         Pulmonary HTN - Primary    Overview     Followed by Dr. Sylvester         Varicose veins of both lower extremities    Overview     [facility]  Infection complication                 Follow-up in about 4 weeks (around 2/13/2019).    There are no Patient Instructions on file for this visit.    Immunization History   Administered Date(s) Administered    Influenza 01/17/2013    Influenza - High Dose 10/17/2015, 10/31/2016, 09/15/2017, 10/20/2018    Influenza - Quadrivalent 11/06/2014     Influenza Split 01/17/2013    Pneumococcal Conjugate - 13 Valent 10/31/2016    Pneumococcal Polysaccharide - 23 Valent 04/09/2018    Zoster 01/13/2015

## 2019-01-25 ENCOUNTER — PATIENT MESSAGE (OUTPATIENT)
Dept: TRANSPLANT | Facility: CLINIC | Age: 69
End: 2019-01-25

## 2019-01-29 ENCOUNTER — TELEPHONE (OUTPATIENT)
Dept: ELECTROPHYSIOLOGY | Facility: CLINIC | Age: 69
End: 2019-01-29

## 2019-01-29 DIAGNOSIS — I48.91 ATRIAL FIBRILLATION, UNSPECIFIED TYPE: Primary | ICD-10-CM

## 2019-02-07 ENCOUNTER — PATIENT MESSAGE (OUTPATIENT)
Dept: TRANSPLANT | Facility: CLINIC | Age: 69
End: 2019-02-07

## 2019-02-18 ENCOUNTER — PATIENT MESSAGE (OUTPATIENT)
Dept: TRANSPLANT | Facility: CLINIC | Age: 69
End: 2019-02-18

## 2019-02-18 DIAGNOSIS — Z79.899 POLYPHARMACY: Primary | ICD-10-CM

## 2019-02-18 DIAGNOSIS — R06.82 TACHYPNEA: ICD-10-CM

## 2019-02-18 DIAGNOSIS — I27.9 CHRONIC PULMONARY HEART DISEASE: ICD-10-CM

## 2019-03-13 ENCOUNTER — HOSPITAL ENCOUNTER (OUTPATIENT)
Dept: PULMONOLOGY | Facility: CLINIC | Age: 69
Discharge: HOME | End: 2019-03-13
Payer: MEDICARE

## 2019-03-13 ENCOUNTER — OFFICE VISIT (OUTPATIENT)
Dept: TRANSPLANT | Facility: CLINIC | Age: 69
End: 2019-03-13
Payer: MEDICARE

## 2019-03-13 ENCOUNTER — TELEPHONE (OUTPATIENT)
Dept: TRANSPLANT | Facility: CLINIC | Age: 69
End: 2019-03-13

## 2019-03-13 VITALS
SYSTOLIC BLOOD PRESSURE: 119 MMHG | BODY MASS INDEX: 25.15 KG/M2 | DIASTOLIC BLOOD PRESSURE: 69 MMHG | HEIGHT: 74 IN | HEIGHT: 74 IN | WEIGHT: 196 LBS | WEIGHT: 199.31 LBS | BODY MASS INDEX: 25.58 KG/M2 | HEART RATE: 78 BPM | OXYGEN SATURATION: 94 %

## 2019-03-13 DIAGNOSIS — I50.22 CHRONIC SYSTOLIC CONGESTIVE HEART FAILURE: Primary | ICD-10-CM

## 2019-03-13 DIAGNOSIS — I27.24 CTEPH (CHRONIC THROMBOEMBOLIC PULMONARY HYPERTENSION): Primary | ICD-10-CM

## 2019-03-13 DIAGNOSIS — I27.9 CHRONIC PULMONARY HEART DISEASE: ICD-10-CM

## 2019-03-13 PROCEDURE — 99213 OFFICE O/P EST LOW 20 MIN: CPT | Mod: 25,S$PBB,, | Performed by: INTERNAL MEDICINE

## 2019-03-13 PROCEDURE — 99213 PR OFFICE/OUTPT VISIT, EST, LEVL III, 20-29 MIN: ICD-10-PCS | Mod: 25,S$PBB,, | Performed by: INTERNAL MEDICINE

## 2019-03-13 PROCEDURE — 99214 OFFICE O/P EST MOD 30 MIN: CPT | Mod: PBBFAC,25 | Performed by: INTERNAL MEDICINE

## 2019-03-13 PROCEDURE — 99999 PR PBB SHADOW E&M-EST. PATIENT-LVL IV: ICD-10-PCS | Mod: PBBFAC,,, | Performed by: INTERNAL MEDICINE

## 2019-03-13 PROCEDURE — 99999 PR PBB SHADOW E&M-EST. PATIENT-LVL IV: CPT | Mod: PBBFAC,,, | Performed by: INTERNAL MEDICINE

## 2019-03-13 PROCEDURE — 94618 PULMONARY STRESS TESTING: ICD-10-PCS | Mod: 26,S$PBB,, | Performed by: INTERNAL MEDICINE

## 2019-03-13 PROCEDURE — 94618 PULMONARY STRESS TESTING: CPT | Mod: 26,S$PBB,, | Performed by: INTERNAL MEDICINE

## 2019-03-13 PROCEDURE — 94618 PULMONARY STRESS TESTING: CPT | Mod: PBBFAC | Performed by: INTERNAL MEDICINE

## 2019-03-14 ENCOUNTER — PATIENT MESSAGE (OUTPATIENT)
Dept: CARDIOLOGY | Facility: CLINIC | Age: 69
End: 2019-03-14

## 2019-03-14 ENCOUNTER — PATIENT MESSAGE (OUTPATIENT)
Dept: TRANSPLANT | Facility: CLINIC | Age: 69
End: 2019-03-14

## 2019-03-14 NOTE — PROCEDURES
Ambrosio rBay III is a 68 y.o.  male patient, who presents for a 6 minute walk test ordered by Alysha Lord MD.  The diagnosis is Pulmonary Hypertension.  The patient's BMI is 25.6 kg/m2. Predicted distance (lower limit of normal) is 371.46 meters.    Test Results:    The test was completed without stopping.  The total time walked was 360 seconds.  During walking, the patient reported:  No complaints.  The patient used no assistive devices during testing.     03/13/2019---------Distance: 354.18 meters (1162 feet)     O2 Sat % Supplemental Oxygen Heart Rate Blood Pressure Archie Scale   Pre-exercise  (Resting) 94 % Room Air 99 bpm 124/69 mmHg 0   During Exercise 85 % Room Air 100 bpm 134/84 mmHg 0   Post-exercise   90 % Room Air  81 bpm       Recovery Time: 137 seconds    Oxygen Qualification:     O2 Sat % Supplemental Oxygen Heart Rate Blood Pressure Archie Scale   Pre-exercise  (Resting) 98 % 2 L/M  82 bpm  133/68 mmHg 0    During Exercise   98 %  2 L/M  77 bpm  137/72 mmHg 0    Post-exercise   98 %  2 L/M  78 bpm        Performing nurse/tech: Guillermo MAZA      PREVIOUS STUDY:   10/24/2018---------Distance: 381 meters (1250 feet)       O2 Sat % Supplemental Oxygen Heart Rate Blood Pressure Archie Scale   Pre-exercise  (Resting) 97 % Room Air 88 bpm 137/78 mmHg 0   During Exercise 90 % Room Air 87 bpm 178/87 mmHg 0.5   Post-exercise  (Recovery) 96 % Room Air  83 bpm 153/72 mmHg           CLINICAL INTERPRETATION:  Six minute walk distance is 354.18 meters (1162 feet) with no dyspnea.  During exercise, there was significant desaturation while breathing room air.  Both blood pressure and heart rate remained stable with walking.  The patient did not report non-pulmonary symptoms during exercise.  The patient may benefit from using supplemental oxygen during exertion.  Since the previous study in October 2018, exercise capacity is unchanged.  Based upon age and body mass index, exercise capacity is less than  predicted.   Oxygen saturation did improve while breathing supplemental oxygen.

## 2019-03-19 NOTE — PROGRESS NOTES
Subjective:       Patient ID: Ambrosio Bray III is a 68 y.o. male.    Chief Complaint: Pulmonary Hypertension    HPI   Ambrosio Bray III 68 y.o. male    has a past medical history of Diabetes mellitus, Hypertension, PTSD (post-traumatic stress disorder), and Pulmonary hyperinflation.    has a past surgical history that includes Cardiac catheterization; Colonoscopy (N/A, 5/1/2018); and Right heart catheterization (Right, 11/28/2018).   reports that  has never smoked. he has never used smokeless tobacco. He reports that he drinks about 1.2 - 1.8 oz of alcohol per week. He reports that he does not use drugs.  Referred by: No ref. provider found  Who had concerns including Pulmonary Hypertension.  The patient's last visit with me was on 1/16/2019.  Doing well, feels like medication is helping him  Had some mild se but is improveing  Glass, jaw pain has resolved  Starting 800bid soon  No fever chills, ns, wt changes, nausea, vomiting, diarrhea, constipation, chest pain, tightness, pressure. Remainder of review of systems is negative.  Otherwise asymptomatic from pulmonary standpoint.      Review of Systems    Objective:      Physical Exam  Personal Diagnostic Review    No flowsheet data found.      Assessment:       1. CTEPH (chronic thromboembolic pulmonary hypertension)        Outpatient Encounter Medications as of 3/13/2019   Medication Sig Dispense Refill    atorvastatin (LIPITOR) 40 MG tablet TAKE ONE TABLET BY MOUTH ONCE DAILY 90 tablet 1    hydroCHLOROthiazide (HYDRODIURIL) 50 MG tablet TAKE ONE-HALF TABLET BY MOUTH ONCE DAILY 90 tablet 1    ipratropium (ATROVENT) 0.06 % nasal spray 2 sprays by Nasal route 4 (four) times daily. (Patient taking differently: 2 sprays by Nasal route 4 (four) times daily as needed. ) 15 mL 6    mometasone (ELOCON) 0.1 % ointment Apply topically as needed      riociguat (ADEMPAS) 2.5 mg tablet Take 1 tablet (2.5 mg total) by mouth 3 (three) times daily. 90 tablet 11    selexipag  (UPTRAVI) 200 mcg (140)- 800 mcg (60) DsPk Take by mouth 2 (two) times daily.       tamsulosin (FLOMAX) 0.4 mg Cap TAKE ONE CAPSULE BY MOUTH ONCE DAILY 90 capsule 1    XARELTO 20 mg Tab TAKE ONE TABLET BY MOUTH ONCE DAILY WITH  DINNER  OR  EVENING  MEAL 90 tablet 1     Facility-Administered Encounter Medications as of 3/13/2019   Medication Dose Route Frequency Provider Last Rate Last Dose    lidocaine HCL 10 mg/ml (1%) injection 1 mL  1 mL Other 1 time in Clinic/HOD Julius Matute Jr., MD        lidocaine HCl 2% urojet   Mucous Membrane 1 time in Clinic/HOD Ligia Nye NP         Orders Placed This Encounter   Procedures    Transthoracic echo (TTE) complete (Cupid Only)     Standing Status:   Future     Standing Expiration Date:   9/13/2020       Plan:               Assessment:  Ambrosio was seen today for pulmonary hypertension.    Diagnoses and all orders for this visit:    CTEPH (chronic thromboembolic pulmonary hypertension)  -     Transthoracic echo (TTE) complete (Cupid Only); Future        Plan:  Problem List Items Addressed This Visit     CTEPH (chronic thromboembolic pulmonary hypertension) - Primary    Overview     Patient with cteph, doing well, but concern by me for increased pressures  RHC confirmed  Add uptravi to riociguat  And xarelto  Continue hctz         Relevant Orders    Transthoracic echo (TTE) complete (Cupid Only)              Follow-up in about 2 months (around 5/13/2019).    There are no Patient Instructions on file for this visit.    Immunization History   Administered Date(s) Administered    Influenza 01/17/2013    Influenza - High Dose 10/17/2015, 10/31/2016, 09/15/2017, 10/20/2018    Influenza - Quadrivalent 11/06/2014    Influenza Split 01/17/2013    Pneumococcal Conjugate - 13 Valent 10/31/2016    Pneumococcal Polysaccharide - 23 Valent 04/09/2018    Zoster 01/13/2015

## 2019-03-20 LAB
CHOLEST SERPL-MSCNC: 106 MG/DL (ref 0–200)
HDLC SERPL-MCNC: 40 MG/DL
LDL CHOLESTEROL DIRECT: 58 MG/DL
LDLC SERPL CALC-MCNC: 59 MG/DL (ref 0–160)
TRIGL SERPL-MCNC: 39 MG/DL

## 2019-03-22 DIAGNOSIS — E11.9 TYPE 2 DIABETES MELLITUS WITHOUT COMPLICATION, UNSPECIFIED WHETHER LONG TERM INSULIN USE: ICD-10-CM

## 2019-03-26 ENCOUNTER — OFFICE VISIT (OUTPATIENT)
Dept: ELECTROPHYSIOLOGY | Facility: CLINIC | Age: 69
End: 2019-03-26
Payer: MEDICARE

## 2019-03-26 ENCOUNTER — HOSPITAL ENCOUNTER (OUTPATIENT)
Dept: CARDIOLOGY | Facility: CLINIC | Age: 69
Discharge: HOME | End: 2019-03-26
Payer: MEDICARE

## 2019-03-26 VITALS
WEIGHT: 197.31 LBS | SYSTOLIC BLOOD PRESSURE: 130 MMHG | HEIGHT: 73 IN | BODY MASS INDEX: 26.15 KG/M2 | DIASTOLIC BLOOD PRESSURE: 82 MMHG | HEART RATE: 107 BPM | OXYGEN SATURATION: 95 %

## 2019-03-26 DIAGNOSIS — I48.3 TYPICAL ATRIAL FLUTTER: Primary | ICD-10-CM

## 2019-03-26 DIAGNOSIS — I48.91 ATRIAL FIBRILLATION, UNSPECIFIED TYPE: ICD-10-CM

## 2019-03-26 PROCEDURE — 93005 ELECTROCARDIOGRAM TRACING: CPT | Mod: PBBFAC | Performed by: INTERNAL MEDICINE

## 2019-03-26 PROCEDURE — 99999 PR PBB SHADOW E&M-EST. PATIENT-LVL III: CPT | Mod: PBBFAC,,, | Performed by: INTERNAL MEDICINE

## 2019-03-26 PROCEDURE — 99214 OFFICE O/P EST MOD 30 MIN: CPT | Mod: S$PBB,,, | Performed by: INTERNAL MEDICINE

## 2019-03-26 PROCEDURE — 93010 RHYTHM STRIP: ICD-10-PCS | Mod: S$PBB,,, | Performed by: INTERNAL MEDICINE

## 2019-03-26 PROCEDURE — 99999 PR PBB SHADOW E&M-EST. PATIENT-LVL III: ICD-10-PCS | Mod: PBBFAC,,, | Performed by: INTERNAL MEDICINE

## 2019-03-26 PROCEDURE — 99213 OFFICE O/P EST LOW 20 MIN: CPT | Mod: PBBFAC,25 | Performed by: INTERNAL MEDICINE

## 2019-03-26 PROCEDURE — 93010 ELECTROCARDIOGRAM REPORT: CPT | Mod: S$PBB,,, | Performed by: INTERNAL MEDICINE

## 2019-03-26 PROCEDURE — 99214 PR OFFICE/OUTPT VISIT, EST, LEVL IV, 30-39 MIN: ICD-10-PCS | Mod: S$PBB,,, | Performed by: INTERNAL MEDICINE

## 2019-03-26 NOTE — PROGRESS NOTES
Subjective:    Patient ID:  Ambrosio Bray III is a 68 y.o. male who presents for follow-up of Atrial Fibrillation      68 yoM pHTN, thromboembolic disease, DM, HTN here for evaluation of arrhythmia. He has severe pHTN as well as chronic thromboembolic disease. 12/26/17 he presented with rapid palpitations and dyspnea. He was found to be in a wide complex tachycardia with rate of 240 bpm. Amiodarone was given and his rate dropped in half to 118 bpm. He has ECG features consistent with AFL 2:1 conduction. He was then placed on amiodarone 200 mg bid. He was on xarelto chronically. He denies any subsequent symptoms. He had normal EF 10/17 however his PAP was 103 mm Hg. He is followed by Florencia Mcdermott and [facility]. He had history of atrial fibrillation and underwent RITO/CV 9/30/14. In my review of his ECGs, I feel that this is typical AFL as opposed to AF. He was on amiodarone for about one year post CV.     Interval history: 2/1/18 AFL RFA. pHTN continues to progress. Remains on xarelto. He feels better symptomatically. EF 40-45% with lower PA pressures. Has recent changes to pHTN medications.     Echo 10/18:  CONCLUSIONS     1 - Mildly to moderately depressed left ventricular systolic function (EF 40-45%).     2 - Right ventricular enlargement with hypertrophy, with severely depressed systolic function.     3 - Impaired LV relaxation, elevated LAP (grade 2 diastolic dysfunction).     4 - Biatrial enlargement.     5 - Mild tricuspid regurgitation.     6 - Mild to moderate mitral regurgitation.     7 - Pulmonary hypertension. The estimated PA systolic pressure is 68 mmHg.     8 - Increased central venous pressure.     Echo 10/17:  CONCLUSIONS     1 - Normal left ventricular systolic function (EF 55-60%).     2 - Severe right ventricular enlargement with moderately to severely depressed systolic function.     3 - Impaired LV relaxation, elevated LAP (grade 2 diastolic dysfunction).     4 - Biatrial enlargement (R>L).     5 -  Mild to moderate tricuspid regurgitation.     6 - Mild mitral regurgitation.     7 - Pulmonary hypertension. The estimated PA systolic pressure is 103 mmHg.     8 - Increased central venous pressure.     Past Medical History:  No date: Diabetes mellitus  No date: Hypertension  No date: PTSD (post-traumatic stress disorder)  No date: Pulmonary hyperinflation    Past Surgical History:  2/2/2018: ABLATION; N/A      Comment:  Performed by Chaim Duffy MD at [facility] CATH LAB  No date: CARDIAC CATHETERIZATION  9/30/2014: CARDIOVERSION; N/A      Comment:  Performed by Kun Mcdermott MD at [facility] CATH LAB  5/1/2018: COLONOSCOPY; N/A      Comment:  Performed by Bubba Navarro MD at [facility] ENDO  11/28/2018: INSERTION, CATHETER, RIGHT HEART; Right      Comment:  Performed by Chelsea Arceo MD at [facility] CATH LAB  2/2/2018: TRANSESOPHAGEAL ECHOCARDIOGRAM (RITO); N/A      Comment:  Performed by Chaim Duffy MD at [facility] CATH LAB    Social History    Socioeconomic History      Marital status: Single      Spouse name: Not on file      Number of children: Not on file      Years of education: Not on file      Highest education level: Not on file    Occupational History      Not on file    Social Needs      Financial resource strain: Not on file      Food insecurity:        Worry: Not on file        Inability: Not on file      Transportation needs:        Medical: Not on file        Non-medical: Not on file    Tobacco Use      Smoking status: Never Smoker      Smokeless tobacco: Never Used    Substance and Sexual Activity      Alcohol use: Yes        Alcohol/week: 1.2 - 1.8 oz        Types: 2 - 3 Shots of liquor per week        Comment: weekends      Drug use: No      Sexual activity: Yes        Partners: Female    Lifestyle      Physical activity:        Days per week: Not on file        Minutes per session: Not on file      Stress: Not on file    Relationships      Social connections:        Talks on phone: Not on file         Gets together: Not on file        Attends Yazidism service: Not on file        Active member of club or organization: Not on file        Attends meetings of clubs or organizations: Not on file        Relationship status: Not on file      Intimate partner violence:        Fear of current or ex partner: Not on file        Emotionally abused: Not on file        Physically abused: Not on file        Forced sexual activity: Not on file    Other Topics      Concerns:        Not on file    Social History Narrative      Not on file      Review of patient's family history indicates:  Problem: Cancer      Relation: Father          Age of Onset: (Not Specified)          Comment: colon  Problem: Colon cancer      Relation: Father          Age of Onset: (Not Specified)        Review of Systems   Constitution: Negative for malaise/fatigue.   Cardiovascular: Negative for chest pain, dyspnea on exertion, irregular heartbeat, leg swelling and palpitations.   Respiratory: Negative for shortness of breath.    Hematologic/Lymphatic: Negative for bleeding problem.   Skin: Negative for rash.   Musculoskeletal: Negative for myalgias.   Gastrointestinal: Negative for hematemesis, hematochezia and nausea.   Genitourinary: Negative for hematuria.   Neurological: Negative for light-headedness.   Psychiatric/Behavioral: Negative for altered mental status.   Allergic/Immunologic: Negative for persistent infections.        Objective:    Physical Exam   Constitutional: He is oriented to person, place, and time. He appears well-developed and well-nourished.   HENT:   Head: Normocephalic.   Nose: Nose normal.   Eyes: Pupils are equal, round, and reactive to light.   Cardiovascular: Normal rate, regular rhythm, S1 normal and S2 normal.   No murmur heard.  Pulses:       Radial pulses are 2+ on the right side, and 2+ on the left side.   Pulmonary/Chest: Breath sounds normal. No respiratory distress.   Abdominal: Normal appearance.    Musculoskeletal: Normal range of motion. He exhibits no edema.   Neurological: He is alert and oriented to person, place, and time.   Skin: Skin is warm and dry. No erythema.   Psychiatric: He has a normal mood and affect. His speech is normal and behavior is normal.   Nursing note and vitals reviewed.    ECg: NSR with frequent PACs        Assessment:       1. Typical atrial flutter         Plan:       68 yoM pHTN, AFL s/p RFA here for follow up. Frequent PACs on ECG, no known AF. On chronic OAC due to VTE/DVT. No changes to therapy. RTC 1y

## 2019-04-04 ENCOUNTER — PATIENT MESSAGE (OUTPATIENT)
Dept: TRANSPLANT | Facility: CLINIC | Age: 69
End: 2019-04-04

## 2019-04-15 ENCOUNTER — LAB VISIT (OUTPATIENT)
Dept: LAB | Facility: HOSPITAL | Age: 69
End: 2019-04-15
Attending: UROLOGY
Payer: MEDICARE

## 2019-04-15 DIAGNOSIS — R97.20 ELEVATED PSA: ICD-10-CM

## 2019-04-15 LAB — COMPLEXED PSA SERPL-MCNC: 8.1 NG/ML (ref 0–4)

## 2019-04-15 PROCEDURE — 36415 COLL VENOUS BLD VENIPUNCTURE: CPT | Mod: PO

## 2019-04-15 PROCEDURE — 84153 ASSAY OF PSA TOTAL: CPT

## 2019-04-18 ENCOUNTER — OFFICE VISIT (OUTPATIENT)
Dept: FAMILY MEDICINE | Facility: CLINIC | Age: 69
End: 2019-04-18
Payer: MEDICARE

## 2019-04-18 VITALS
DIASTOLIC BLOOD PRESSURE: 66 MMHG | OXYGEN SATURATION: 95 % | TEMPERATURE: 98 F | HEIGHT: 73 IN | WEIGHT: 196.44 LBS | BODY MASS INDEX: 26.03 KG/M2 | SYSTOLIC BLOOD PRESSURE: 118 MMHG | HEART RATE: 94 BPM

## 2019-04-18 DIAGNOSIS — R80.9 TYPE 2 DIABETES MELLITUS WITH MICROALBUMINURIA, WITHOUT LONG-TERM CURRENT USE OF INSULIN: ICD-10-CM

## 2019-04-18 DIAGNOSIS — I15.2 HYPERTENSION ASSOCIATED WITH DIABETES: Primary | Chronic | ICD-10-CM

## 2019-04-18 DIAGNOSIS — D50.9 IRON DEFICIENCY ANEMIA, UNSPECIFIED IRON DEFICIENCY ANEMIA TYPE: ICD-10-CM

## 2019-04-18 DIAGNOSIS — E11.29 TYPE 2 DIABETES MELLITUS WITH MICROALBUMINURIA, WITHOUT LONG-TERM CURRENT USE OF INSULIN: ICD-10-CM

## 2019-04-18 DIAGNOSIS — I82.5Z2 CHRONIC DEEP VEIN THROMBOSIS (DVT) OF DISTAL VEIN OF LEFT LOWER EXTREMITY: ICD-10-CM

## 2019-04-18 DIAGNOSIS — E11.59 HYPERTENSION ASSOCIATED WITH DIABETES: Primary | Chronic | ICD-10-CM

## 2019-04-18 PROCEDURE — 99214 OFFICE O/P EST MOD 30 MIN: CPT | Mod: S$PBB,,, | Performed by: INTERNAL MEDICINE

## 2019-04-18 PROCEDURE — 99999 PR PBB SHADOW E&M-EST. PATIENT-LVL III: ICD-10-PCS | Mod: PBBFAC,,, | Performed by: INTERNAL MEDICINE

## 2019-04-18 PROCEDURE — 99213 OFFICE O/P EST LOW 20 MIN: CPT | Mod: PBBFAC,PO | Performed by: INTERNAL MEDICINE

## 2019-04-18 PROCEDURE — 99214 PR OFFICE/OUTPT VISIT, EST, LEVL IV, 30-39 MIN: ICD-10-PCS | Mod: S$PBB,,, | Performed by: INTERNAL MEDICINE

## 2019-04-18 PROCEDURE — 99999 PR PBB SHADOW E&M-EST. PATIENT-LVL III: CPT | Mod: PBBFAC,,, | Performed by: INTERNAL MEDICINE

## 2019-04-18 NOTE — PROGRESS NOTES
SUBJECTIVE     Chief Complaint   Patient presents with    Establish Care       HPI  Ambrosio Bray III is a 68 y.o. male with multiple medical diagnoses as listed in the medical history and problem list that presents for establishment of care. He is fully compliant with meds, but believes he has had some jaw discomfort to Uptravi. Pt denies any other adverse side effects. He checks his fasting blood sugar levels with ranges from . He is also compliant with a low Na and ADA diet. Pt keeps physically active and is without any other complaints today.     PAST MEDICAL HISTORY:  Past Medical History:   Diagnosis Date    Diabetes mellitus     Hypertension     PTSD (post-traumatic stress disorder)     Pulmonary hyperinflation        PAST SURGICAL HISTORY:  Past Surgical History:   Procedure Laterality Date    ABLATION N/A 2/2/2018    Performed by Chaim Duffy MD at [facility] CATH LAB    CARDIAC CATHETERIZATION      CARDIOVERSION N/A 9/30/2014    Performed by Kun [facility]mott MD at [facility] CATH LAB    COLONOSCOPY N/A 5/1/2018    Performed by Bubba Navarro MD at [facility] ENDO    INSERTION, CATHETER, RIGHT HEART Right 11/28/2018    Performed by Chelsea Arceo MD at [facility] CATH LAB    TRANSESOPHAGEAL ECHOCARDIOGRAM (RITO) N/A 2/2/2018    Performed by Chaim Duffy MD at [facility] CATH LAB       SOCIAL HISTORY:  Social History     Socioeconomic History    Marital status: Single     Spouse name: Not on file    Number of children: Not on file    Years of education: Not on file    Highest education level: Not on file   Occupational History    Not on file   Social Needs    Financial resource strain: Not on file    Food insecurity:     Worry: Not on file     Inability: Not on file    Transportation needs:     Medical: Not on file     Non-medical: Not on file   Tobacco Use    Smoking status: Never Smoker    Smokeless tobacco: Never Used   Substance and Sexual Activity    Alcohol use: Yes     Alcohol/week:  1.2 - 1.8 oz     Types: 2 - 3 Shots of liquor per week     Comment: weekends    Drug use: No    Sexual activity: Yes     Partners: Female   Lifestyle    Physical activity:     Days per week: Not on file     Minutes per session: Not on file    Stress: Not on file   Relationships    Social connections:     Talks on phone: Not on file     Gets together: Not on file     Attends Jainism service: Not on file     Active member of club or organization: Not on file     Attends meetings of clubs or organizations: Not on file     Relationship status: Not on file   Other Topics Concern    Not on file   Social History Narrative    Not on file       FAMILY HISTORY:  Family History   Problem Relation Age of Onset    Cancer Father         colon    Colon cancer Father        ALLERGIES AND MEDICATIONS: updated and reviewed.  Review of patient's allergies indicates:  No Known Allergies  Current Outpatient Medications   Medication Sig Dispense Refill    atorvastatin (LIPITOR) 40 MG tablet TAKE ONE TABLET BY MOUTH ONCE DAILY 90 tablet 1    hydroCHLOROthiazide (HYDRODIURIL) 50 MG tablet TAKE ONE-HALF TABLET BY MOUTH ONCE DAILY 90 tablet 1    riociguat (ADEMPAS) 2.5 mg tablet Take 1 tablet (2.5 mg total) by mouth 3 (three) times daily. 90 tablet 11    selexipag (UPTRAVI) 200 mcg (140)- 800 mcg (60) DsPk Take by mouth 2 (two) times daily. Pt states he takes 1600mcg twice a day      tamsulosin (FLOMAX) 0.4 mg Cap TAKE ONE CAPSULE BY MOUTH ONCE DAILY 90 capsule 1    XARELTO 20 mg Tab TAKE ONE TABLET BY MOUTH ONCE DAILY WITH  DINNER  OR  EVENING  MEAL 90 tablet 1    ipratropium (ATROVENT) 0.06 % nasal spray 2 sprays by Nasal route 4 (four) times daily. (Patient taking differently: 2 sprays by Nasal route 4 (four) times daily as needed. ) 15 mL 6    mometasone (ELOCON) 0.1 % ointment Apply topically as needed       Current Facility-Administered Medications   Medication Dose Route Frequency Provider Last Rate Last Dose     "lidocaine HCL 10 mg/ml (1%) injection 1 mL  1 mL Other 1 time in Clinic/HOD Julius Matute Jr., MD        lidocaine HCl 2% urojet   Mucous Membrane 1 time in Clinic/HOD ARACELI Bass  Review of Systems   Constitutional: Positive for activity change. Negative for unexpected weight change.   HENT: Positive for hearing loss, rhinorrhea and trouble swallowing.    Eyes: Negative for discharge and visual disturbance.   Respiratory: Positive for wheezing. Negative for chest tightness.    Cardiovascular: Negative for chest pain and palpitations.   Gastrointestinal: Positive for diarrhea. Negative for blood in stool, constipation and vomiting.   Endocrine: Negative for polydipsia and polyuria.   Genitourinary: Negative for difficulty urinating, hematuria and urgency.   Musculoskeletal: Positive for arthralgias. Negative for joint swelling and neck pain.   Skin: Negative for rash and wound.   Neurological: Positive for headaches. Negative for weakness.   Psychiatric/Behavioral: Negative for confusion and dysphoric mood.         OBJECTIVE     Physical Exam  Vitals:    04/18/19 0930   BP: 118/66   Pulse: 94   Temp: 98.3 °F (36.8 °C)    Body mass index is 25.92 kg/m².  Weight: 89.1 kg (196 lb 6.9 oz)   Height: 6' 1" (185.4 cm)     Physical Exam   Constitutional: He is oriented to person, place, and time. He appears well-developed and well-nourished. No distress.   HENT:   Head: Normocephalic and atraumatic.   Right Ear: External ear normal.   Left Ear: External ear normal.   Nose: Nose normal. No rhinorrhea.   Mouth/Throat: Oropharynx is clear and moist. No uvula swelling. No posterior oropharyngeal edema or posterior oropharyngeal erythema.   B/L cerumen   Eyes: Conjunctivae and EOM are normal. Right eye exhibits no discharge. Left eye exhibits no discharge. No scleral icterus.   Neck: Normal range of motion. Neck supple. No JVD present. No tracheal deviation present.   Cardiovascular: Normal rate, " regular rhythm, normal heart sounds and intact distal pulses. Exam reveals no gallop and no friction rub.   No murmur heard.  Pulmonary/Chest: Effort normal and breath sounds normal. No respiratory distress. He has no wheezes.   Abdominal: Soft. Bowel sounds are normal. He exhibits no distension and no mass. There is no tenderness. There is no rebound and no guarding.   Musculoskeletal: Normal range of motion. He exhibits no edema, tenderness or deformity.   Neurological: He is alert and oriented to person, place, and time. He exhibits normal muscle tone. Coordination normal.   Skin: Skin is warm and dry. No rash noted. No erythema.   Psychiatric: He has a normal mood and affect. His behavior is normal. Judgment and thought content normal.         Health Maintenance       Date Due Completion Date    TETANUS VACCINE 07/14/1968 ---    Eye Exam 06/23/2015 6/23/2014    Foot Exam 07/11/2018 7/11/2017    Override on 7/11/2017: Done    Override on 4/25/2016: Done    Lipid Panel 04/09/2019 4/9/2018    Urine Microalbumin 04/09/2019 4/9/2018    Hemoglobin A1c 04/24/2019 10/24/2018    Low Dose Statin 03/13/2020 3/13/2019    Colonoscopy 05/01/2023 5/1/2018    Override on 5/1/2018: Done            ASSESSMENT     68 y.o. male with     1. Hypertension associated with diabetes    2. Type 2 diabetes mellitus with microalbuminuria, without long-term current use of insulin    3. Chronic deep vein thrombosis (DVT) of distal vein of left lower extremity    4. Iron deficiency anemia, unspecified iron deficiency anemia type        PLAN:     1. Hypertension associated with diabetes  - Stable; no acute issues  - The current medical regimen is effective;  continue present plan and medications.    2. Type 2 diabetes mellitus with microalbuminuria, without long-term current use of insulin  - Stable; no acute issues  - The current medical regimen is effective;  continue present plan and medications.  - Pt brought in outside labs with HgbA1C  noted to be 6.4 on 3/20/19  - , Trig 39, HDL 39.7, and LDL 58.5  - Microalbumin/creatinine urine ratio; Future    3. Chronic deep vein thrombosis (DVT) of distal vein of left lower extremity  - Stable; no acute issues  - The current medical regimen is effective;  continue present plan and medications.    4. Iron deficiency anemia, unspecified iron deficiency anemia type  - H/H 12.1/37.2, iron 34, and ferritin at 13.1 on 3/20/19  - C-scope UTD, but pt needs to start OTC iron supplement        RTC in 6 months; will have labs scanned into chart     Luciana Cisse MD  04/18/2019 9:57 AM        No follow-ups on file.

## 2019-04-22 ENCOUNTER — OFFICE VISIT (OUTPATIENT)
Dept: UROLOGY | Facility: CLINIC | Age: 69
End: 2019-04-22
Payer: MEDICARE

## 2019-04-22 VITALS
DIASTOLIC BLOOD PRESSURE: 76 MMHG | WEIGHT: 195.13 LBS | HEART RATE: 78 BPM | SYSTOLIC BLOOD PRESSURE: 123 MMHG | BODY MASS INDEX: 25.86 KG/M2 | HEIGHT: 73 IN

## 2019-04-22 DIAGNOSIS — R97.20 ELEVATED PSA: Primary | ICD-10-CM

## 2019-04-22 PROCEDURE — 99213 OFFICE O/P EST LOW 20 MIN: CPT | Mod: PBBFAC | Performed by: UROLOGY

## 2019-04-22 PROCEDURE — 99213 OFFICE O/P EST LOW 20 MIN: CPT | Mod: S$PBB,,, | Performed by: UROLOGY

## 2019-04-22 PROCEDURE — 99213 PR OFFICE/OUTPT VISIT, EST, LEVL III, 20-29 MIN: ICD-10-PCS | Mod: S$PBB,,, | Performed by: UROLOGY

## 2019-04-22 PROCEDURE — 99999 PR PBB SHADOW E&M-EST. PATIENT-LVL III: ICD-10-PCS | Mod: PBBFAC,,, | Performed by: UROLOGY

## 2019-04-22 PROCEDURE — 99999 PR PBB SHADOW E&M-EST. PATIENT-LVL III: CPT | Mod: PBBFAC,,, | Performed by: UROLOGY

## 2019-04-22 RX ORDER — MOMETASONE FUROATE 1 MG/G
CREAM TOPICAL DAILY
COMMUNITY

## 2019-04-22 NOTE — PROGRESS NOTES
Subjective:       Patient ID: Ambrosio Bray III is a 68 y.o. male.    Chief Complaint: Elevated PSA (follow up 6 months psa 04/15/2019 8.1)    HPI    Ambrosio Bray III is a 68 y.o. male with PMHx of HTN and DM here for a prostate evaluation. His PSA 7 days ago was 8.1, 7 months ago was 6.9, and 1 year ago was 8.1. He has Pi rads 4 lesion on MRI however his biopsies were negative. No urological complaints at this time.     Past Medical History:   Diagnosis Date    Diabetes mellitus     Hypertension     PTSD (post-traumatic stress disorder)     Pulmonary hyperinflation        Past Surgical History:   Procedure Laterality Date    ABLATION N/A 2/2/2018    Performed by Chaim Duffy MD at [facility] CATH LAB    CARDIAC CATHETERIZATION      CARDIOVERSION N/A 9/30/2014    Performed by Kun [facility]mott MD at [facility] CATH LAB    COLONOSCOPY N/A 5/1/2018    Performed by Bubba Navarro MD at [facility] ENDO    INSERTION, CATHETER, RIGHT HEART Right 11/28/2018    Performed by Chelsea Arceo MD at [facility] CATH LAB    TRANSESOPHAGEAL ECHOCARDIOGRAM (RITO) N/A 2/2/2018    Performed by Chaim Duffy MD at [facility] CATH LAB       Family History   Problem Relation Age of Onset    Cancer Father         colon    Colon cancer Father        Social History     Socioeconomic History    Marital status: Single     Spouse name: Not on file    Number of children: Not on file    Years of education: Not on file    Highest education level: Not on file   Occupational History    Not on file   Social Needs    Financial resource strain: Not on file    Food insecurity:     Worry: Not on file     Inability: Not on file    Transportation needs:     Medical: Not on file     Non-medical: Not on file   Tobacco Use    Smoking status: Never Smoker    Smokeless tobacco: Never Used   Substance and Sexual Activity    Alcohol use: Yes     Alcohol/week: 1.2 - 1.8 oz     Types: 2 - 3 Shots of liquor per week     Comment: weekends    Drug use:  No    Sexual activity: Yes     Partners: Female   Lifestyle    Physical activity:     Days per week: Not on file     Minutes per session: Not on file    Stress: Not on file   Relationships    Social connections:     Talks on phone: Not on file     Gets together: Not on file     Attends Mormonism service: Not on file     Active member of club or organization: Not on file     Attends meetings of clubs or organizations: Not on file     Relationship status: Not on file   Other Topics Concern    Not on file   Social History Narrative    Not on file       Allergies:  Patient has no known allergies.    Medications:    Current Outpatient Medications:     atorvastatin (LIPITOR) 40 MG tablet, TAKE ONE TABLET BY MOUTH ONCE DAILY, Disp: 90 tablet, Rfl: 1    hydroCHLOROthiazide (HYDRODIURIL) 50 MG tablet, TAKE ONE-HALF TABLET BY MOUTH ONCE DAILY, Disp: 90 tablet, Rfl: 1    ipratropium (ATROVENT) 0.06 % nasal spray, 2 sprays by Nasal route 4 (four) times daily. (Patient taking differently: 2 sprays by Nasal route 4 (four) times daily as needed. ), Disp: 15 mL, Rfl: 6    mometasone 0.1% (ELOCON) 0.1 % cream, Apply topically once daily., Disp: , Rfl:     riociguat (ADEMPAS) 2.5 mg tablet, Take 1 tablet (2.5 mg total) by mouth 3 (three) times daily., Disp: 90 tablet, Rfl: 11    selexipag (UPTRAVI) 200 mcg (140)- 800 mcg (60) DsPk, Take by mouth 2 (two) times daily. Pt states he takes 1600mcg twice a day, Disp: , Rfl:     tamsulosin (FLOMAX) 0.4 mg Cap, TAKE ONE CAPSULE BY MOUTH ONCE DAILY, Disp: 90 capsule, Rfl: 1    XARELTO 20 mg Tab, TAKE ONE TABLET BY MOUTH ONCE DAILY WITH  DINNER  OR  EVENING  MEAL, Disp: 90 tablet, Rfl: 1    Current Facility-Administered Medications:     lidocaine HCL 10 mg/ml (1%) injection 1 mL, 1 mL, Other, 1 time in Clinic/HOD, Julius Matute Jr., MD    Review of Systems   Constitutional: Negative for activity change, appetite change, chills, diaphoresis, fatigue, fever and unexpected weight  change.   HENT: Negative for congestion, dental problem, hearing loss, mouth sores, postnasal drip, rhinorrhea, sinus pressure and trouble swallowing.    Eyes: Negative for pain, discharge and itching.   Respiratory: Negative for apnea, cough, choking, chest tightness, shortness of breath and wheezing.    Cardiovascular: Negative for chest pain, palpitations and leg swelling.   Gastrointestinal: Negative for abdominal distention, abdominal pain, anal bleeding, blood in stool, constipation, diarrhea, nausea, rectal pain and vomiting.   Endocrine: Negative for polydipsia and polyuria.   Genitourinary: Negative for decreased urine volume, difficulty urinating, discharge, dysuria, enuresis, flank pain, frequency, genital sores, hematuria, penile pain, penile swelling and scrotal swelling.   Musculoskeletal: Negative for arthralgias, back pain and myalgias.   Skin: Negative for color change, rash and wound.   Neurological: Negative for dizziness, syncope, speech difficulty, light-headedness and headaches.   Hematological: Negative for adenopathy. Does not bruise/bleed easily.   Psychiatric/Behavioral: Negative for behavioral problems, confusion and sleep disturbance.       Objective:      Physical Exam   Constitutional: He appears well-developed.   HENT:   Head: Normocephalic.   Neck: Neck supple.   Cardiovascular: Normal rate.    Pulmonary/Chest: Effort normal.   Abdominal: Soft.   Genitourinary:   Genitourinary Comments: Prostate was smooth without nodularity. No rectal masses. >40 grams. Normal perineum.     Neurological: He is alert.   Skin: Skin is warm.     Psychiatric: He has a normal mood and affect.       Imaging:    MRI Pelvis 01/12/18  3 small right peripheral zone PIRADS 4 lesions without extraprostatic extension.  No intrapelvic metastatic disease.    Overall Assessment:   PIRADS 4 (clinically significant cancer is likely to be present)    Labs:     Ref Range & Units 7d ago   PSA DIAGNOSTIC 0.00 - 4.00 ng/mL  8.1High        Ref Range & Units 7mo ago   PSA DIAGNOSTIC 0.00 - 4.00 ng/mL 6.9High       Assessment:       1. Elevated PSA        Plan:       Ambrosio was seen today for elevated psa.    Diagnoses and all orders for this visit:    Elevated PSA  -     Prostate Specific Antigen, Diagnostic; Future          RTC 6 months with PSA.    I, Sven Wetzel, am acting as a scribe on this patient encounter in the presence and under the supervision of Dr. Matute.    04/22/2019 7:22 AM    I, Dr. Matute, personally performed the services described in this documentation.   All medical record entries made by the scribe were at my direction and in my presence.   I have reviewed the chart and agree that the record is accurate and complete.   Julius Matute MD.  7:40 AM 04/22/2019

## 2019-04-25 ENCOUNTER — TELEPHONE (OUTPATIENT)
Dept: ADMINISTRATIVE | Facility: HOSPITAL | Age: 69
End: 2019-04-25

## 2019-05-10 DIAGNOSIS — E11.65 UNCONTROLLED TYPE 2 DIABETES MELLITUS WITH HYPERGLYCEMIA: Primary | ICD-10-CM

## 2019-05-14 ENCOUNTER — PATIENT MESSAGE (OUTPATIENT)
Dept: FAMILY MEDICINE | Facility: CLINIC | Age: 69
End: 2019-05-14

## 2019-05-14 DIAGNOSIS — N40.0 BENIGN NON-NODULAR PROSTATIC HYPERPLASIA WITHOUT LOWER URINARY TRACT SYMPTOMS: ICD-10-CM

## 2019-05-15 ENCOUNTER — HOSPITAL ENCOUNTER (OUTPATIENT)
Dept: CARDIOLOGY | Facility: CLINIC | Age: 69
Discharge: HOME | End: 2019-05-15
Attending: INTERNAL MEDICINE
Payer: MEDICARE

## 2019-05-15 ENCOUNTER — OFFICE VISIT (OUTPATIENT)
Dept: TRANSPLANT | Facility: CLINIC | Age: 69
End: 2019-05-15
Payer: MEDICARE

## 2019-05-15 ENCOUNTER — HOSPITAL ENCOUNTER (OUTPATIENT)
Dept: PULMONOLOGY | Facility: CLINIC | Age: 69
Discharge: HOME | End: 2019-05-15
Payer: MEDICARE

## 2019-05-15 VITALS
HEIGHT: 74 IN | WEIGHT: 195 LBS | DIASTOLIC BLOOD PRESSURE: 88 MMHG | SYSTOLIC BLOOD PRESSURE: 136 MMHG | WEIGHT: 192.88 LBS | SYSTOLIC BLOOD PRESSURE: 118 MMHG | HEART RATE: 87 BPM | HEART RATE: 76 BPM | HEIGHT: 73 IN | OXYGEN SATURATION: 100 % | BODY MASS INDEX: 24.75 KG/M2 | BODY MASS INDEX: 25.84 KG/M2 | DIASTOLIC BLOOD PRESSURE: 67 MMHG

## 2019-05-15 VITALS — HEIGHT: 74 IN | BODY MASS INDEX: 24.51 KG/M2 | WEIGHT: 191 LBS

## 2019-05-15 DIAGNOSIS — I27.20 PULMONARY HTN: ICD-10-CM

## 2019-05-15 DIAGNOSIS — I27.24 CTEPH (CHRONIC THROMBOEMBOLIC PULMONARY HYPERTENSION): ICD-10-CM

## 2019-05-15 DIAGNOSIS — I50.22 CHRONIC SYSTOLIC CONGESTIVE HEART FAILURE: ICD-10-CM

## 2019-05-15 LAB
ASCENDING AORTA: 3.98 CM
BSA FOR ECHO PROCEDURE: 2.13 M2
CV ECHO LV RWT: 0.26 CM
DOP CALC LVOT AREA: 4.19 CM2
DOP CALC LVOT DIAMETER: 2.31 CM
DOP CALC LVOT PEAK VEL: 1 M/S
DOP CALC LVOT STROKE VOLUME: 69.62 CM3
DOP CALCLVOT PEAK VEL VTI: 16.62 CM
E WAVE DECELERATION TIME: 177.59 MSEC
E/A RATIO: 1.15
E/E' RATIO: 6.1
ECHO LV POSTERIOR WALL: 0.79 CM (ref 0.6–1.1)
FRACTIONAL SHORTENING: 53 % (ref 28–44)
INTERVENTRICULAR SEPTUM: 0.94 CM (ref 0.6–1.1)
IVRT: 0.05 MSEC
LA MAJOR: 6.95 CM
LA MINOR: 6.52 CM
LA WIDTH: 4.15 CM
LEFT ATRIUM SIZE: 4.8 CM
LEFT ATRIUM VOLUME INDEX: 53.5 ML/M2
LEFT ATRIUM VOLUME: 113.92 CM3
LEFT INTERNAL DIMENSION IN SYSTOLE: 2.83 CM (ref 2.1–4)
LEFT VENTRICLE DIASTOLIC VOLUME INDEX: 84.54 ML/M2
LEFT VENTRICLE DIASTOLIC VOLUME: 179.96 ML
LEFT VENTRICLE MASS INDEX: 96.4 G/M2
LEFT VENTRICLE SYSTOLIC VOLUME INDEX: 14.3 ML/M2
LEFT VENTRICLE SYSTOLIC VOLUME: 30.4 ML
LEFT VENTRICULAR INTERNAL DIMENSION IN DIASTOLE: 6 CM (ref 3.5–6)
LEFT VENTRICULAR MASS: 205.18 G
LV LATERAL E/E' RATIO: 4.36
LV SEPTAL E/E' RATIO: 10.17
MV PEAK A VEL: 0.53 M/S
MV PEAK E VEL: 0.61 M/S
PISA TR MAX VEL: 3.96 M/S
PULM VEIN S/D RATIO: 1.42
PV PEAK D VEL: 0.38 M/S
PV PEAK S VEL: 0.54 M/S
RA MAJOR: 6.18 CM
RA PRESSURE: 15 MMHG
RA WIDTH: 6.12 CM
RIGHT VENTRICULAR END-DIASTOLIC DIMENSION: 6.62 CM
RV TISSUE DOPPLER FREE WALL SYSTOLIC VELOCITY 1 (APICAL 4 CHAMBER VIEW): 9.71 M/S
SINUS: 3.58 CM
STJ: 3.24 CM
TDI LATERAL: 0.14
TDI SEPTAL: 0.06
TDI: 0.1
TR MAX PG: 62.73 MMHG
TRICUSPID ANNULAR PLANE SYSTOLIC EXCURSION: 1.8 CM
TV REST PULMONARY ARTERY PRESSURE: 78 MMHG

## 2019-05-15 PROCEDURE — 99999 PR PBB SHADOW E&M-EST. PATIENT-LVL III: CPT | Mod: PBBFAC,,, | Performed by: INTERNAL MEDICINE

## 2019-05-15 PROCEDURE — 93306 TRANSTHORACIC ECHO (TTE) COMPLETE (CUPID ONLY): ICD-10-PCS | Mod: 26,S$PBB,, | Performed by: INTERNAL MEDICINE

## 2019-05-15 PROCEDURE — 99214 PR OFFICE/OUTPT VISIT, EST, LEVL IV, 30-39 MIN: ICD-10-PCS | Mod: S$PBB,,, | Performed by: INTERNAL MEDICINE

## 2019-05-15 PROCEDURE — 99999 PR PBB SHADOW E&M-EST. PATIENT-LVL III: ICD-10-PCS | Mod: PBBFAC,,, | Performed by: INTERNAL MEDICINE

## 2019-05-15 PROCEDURE — 93306 TTE W/DOPPLER COMPLETE: CPT | Mod: PBBFAC | Performed by: INTERNAL MEDICINE

## 2019-05-15 PROCEDURE — 99214 OFFICE O/P EST MOD 30 MIN: CPT | Mod: S$PBB,,, | Performed by: INTERNAL MEDICINE

## 2019-05-15 PROCEDURE — 94618 PULMONARY STRESS TESTING: CPT | Mod: PBBFAC | Performed by: INTERNAL MEDICINE

## 2019-05-15 PROCEDURE — 99213 OFFICE O/P EST LOW 20 MIN: CPT | Mod: PBBFAC,25 | Performed by: INTERNAL MEDICINE

## 2019-05-15 RX ORDER — TAMSULOSIN HYDROCHLORIDE 0.4 MG/1
CAPSULE ORAL
Qty: 90 CAPSULE | Refills: 1 | Status: SHIPPED | OUTPATIENT
Start: 2019-05-15 | End: 2019-10-23 | Stop reason: SDUPTHER

## 2019-05-15 NOTE — PROGRESS NOTES
Subjective:       Patient ID: Ambrosio Bray III is a 68 y.o. male.    Chief Complaint: Pulmonary Hypertension (w/ labs,6mwt,echo)    HPI   Ambrosio Bray III 68 y.o. male    has a past medical history of Diabetes mellitus, Hypertension, PTSD (post-traumatic stress disorder), and Pulmonary hyperinflation.    has a past surgical history that includes Cardiac catheterization; Colonoscopy (N/A, 5/1/2018); and Right heart catheterization (Right, 11/28/2018).   reports that he has never smoked. He has never used smokeless tobacco. He reports that he drinks about 1.2 - 1.8 oz of alcohol per week. He reports that he does not use drugs.  Referred by: No ref. provider found  Who had concerns including Pulmonary Hypertension (w/ labs,6mwt,echo).  The patient's last visit with me was on 3/13/2019.    Here for followup of addition of uptravi to adempas.   Doing well  Echo pressures decr from RHC, RV function improved  Not on diuretic except hctz, however no fito, bnp improved  Sleeping better   Foot healing better    No fever chills, ns, wt changes, nausea, vomiting, diarrhea, constipation, chest pain, tightness, pressure. Remainder of review of systems is negative.  Otherwise asymptomatic from pulmonary standpoint.    Review of Systems   All other systems reviewed and are negative.      Objective:      Physical Exam   Constitutional: He is oriented to person, place, and time. He appears well-developed and well-nourished. He appears not cachectic. No distress.   HENT:   Right Ear: External ear normal.   Left Ear: External ear normal.   Nose: Nose normal.   Pulmonary/Chest: Normal expansion, symmetric chest wall expansion, effort normal and breath sounds normal.   Musculoskeletal: Normal range of motion.   Neurological: He is alert and oriented to person, place, and time. Gait normal.   Skin: Skin is warm and dry. No rash noted. He is not diaphoretic. No cyanosis or erythema. No pallor. Nails show no clubbing.   Psychiatric: He  has a normal mood and affect. His behavior is normal. Judgment and thought content normal.   Nursing note and vitals reviewed.    Personal Diagnostic Review    No flowsheet data found.      Assessment:       1. Pulmonary HTN        Outpatient Encounter Medications as of 5/15/2019   Medication Sig Dispense Refill    atorvastatin (LIPITOR) 40 MG tablet TAKE ONE TABLET BY MOUTH ONCE DAILY 90 tablet 1    hydroCHLOROthiazide (HYDRODIURIL) 50 MG tablet TAKE ONE-HALF TABLET BY MOUTH ONCE DAILY 90 tablet 1    ipratropium (ATROVENT) 0.06 % nasal spray 2 sprays by Nasal route 4 (four) times daily. (Patient taking differently: 2 sprays by Nasal route 4 (four) times daily as needed. ) 15 mL 6    mometasone 0.1% (ELOCON) 0.1 % cream Apply topically once daily.      riociguat (ADEMPAS) 2.5 mg tablet Take 1 tablet (2.5 mg total) by mouth 3 (three) times daily. 90 tablet 11    selexipag (UPTRAVI) 200 mcg (140)- 800 mcg (60) DsPk Take by mouth 2 (two) times daily. Pt states he takes 1600mcg twice a day      tamsulosin (FLOMAX) 0.4 mg Cap TAKE 1 CAPSULE BY MOUTH ONCE DAILY 90 capsule 1    XARELTO 20 mg Tab TAKE ONE TABLET BY MOUTH ONCE DAILY WITH  DINNER  OR  EVENING  MEAL 90 tablet 1     Facility-Administered Encounter Medications as of 5/15/2019   Medication Dose Route Frequency Provider Last Rate Last Dose    lidocaine HCL 10 mg/ml (1%) injection 1 mL  1 mL Other 1 time in Clinic/HOD Julius Matute Jr., MD         No orders of the defined types were placed in this encounter.      Plan:               Assessment:  Ambrosio was seen today for pulmonary hypertension.    Diagnoses and all orders for this visit:    Pulmonary HTN        Plan:  Problem List Items Addressed This Visit     Pulmonary HTN    Overview     Doing well on uptravi and adempas  Echo with decreasing pap from RHC  RV function improving  bnp improved  Continue current therapy  Follow up in 3 months                   Follow up in about 3 months (around  8/15/2019).    There are no Patient Instructions on file for this visit.    Immunization History   Administered Date(s) Administered    Influenza 01/17/2013    Influenza - High Dose 10/17/2015, 10/31/2016, 09/15/2017, 10/20/2018    Influenza - Quadrivalent 11/06/2014    Influenza Split 01/17/2013    Pneumococcal Conjugate - 13 Valent 10/31/2016    Pneumococcal Polysaccharide - 23 Valent 04/09/2018    Zoster 01/13/2015

## 2019-06-06 DIAGNOSIS — R80.9 TYPE 2 DIABETES MELLITUS WITH MICROALBUMINURIA, WITHOUT LONG-TERM CURRENT USE OF INSULIN: ICD-10-CM

## 2019-06-06 DIAGNOSIS — E11.29 TYPE 2 DIABETES MELLITUS WITH MICROALBUMINURIA, WITHOUT LONG-TERM CURRENT USE OF INSULIN: ICD-10-CM

## 2019-06-06 RX ORDER — ATORVASTATIN CALCIUM 40 MG/1
TABLET, FILM COATED ORAL
Qty: 90 TABLET | Refills: 1 | Status: SHIPPED | OUTPATIENT
Start: 2019-06-06 | End: 2021-04-22

## 2019-06-18 DIAGNOSIS — I48.20 CHRONIC ATRIAL FIBRILLATION: ICD-10-CM

## 2019-06-18 RX ORDER — RIVAROXABAN 20 MG/1
TABLET, FILM COATED ORAL
Qty: 90 TABLET | Refills: 0 | Status: SHIPPED | OUTPATIENT
Start: 2019-06-18 | End: 2019-10-22 | Stop reason: SDUPTHER

## 2019-06-21 ENCOUNTER — TELEPHONE (OUTPATIENT)
Dept: CARDIOLOGY | Facility: CLINIC | Age: 69
End: 2019-06-21

## 2019-06-21 NOTE — TELEPHONE ENCOUNTER
Offered appt with different provider, pt declined due to Dr Mcdermott is no longer with [facility], pt declined       ef        ----- Message from Araceli Hodges sent at 6/21/2019  8:05 AM CDT -----  Contact: Lala request  Message     Appointment Request From: Ambrosio Bray III    With Provider: Kun Mcdermott MD [[facility] - Cardiology]    Preferred Date Range: Any date 8/12/2019 or later    Preferred Times: Tuesday Morning    Reason for visit: Existing Patient    Comments:  Annual Visit

## 2019-07-26 ENCOUNTER — PES CALL (OUTPATIENT)
Dept: ADMINISTRATIVE | Facility: CLINIC | Age: 69
End: 2019-07-26

## 2019-09-04 ENCOUNTER — PATIENT MESSAGE (OUTPATIENT)
Dept: TRANSPLANT | Facility: CLINIC | Age: 69
End: 2019-09-04

## 2019-09-04 ENCOUNTER — OFFICE VISIT (OUTPATIENT)
Dept: TRANSPLANT | Facility: CLINIC | Age: 69
End: 2019-09-04
Payer: MEDICARE

## 2019-09-04 ENCOUNTER — LAB VISIT (OUTPATIENT)
Dept: LAB | Facility: HOSPITAL | Age: 69
End: 2019-09-04
Attending: INTERNAL MEDICINE
Payer: MEDICARE

## 2019-09-04 VITALS
DIASTOLIC BLOOD PRESSURE: 62 MMHG | BODY MASS INDEX: 24.99 KG/M2 | HEART RATE: 89 BPM | HEIGHT: 74 IN | SYSTOLIC BLOOD PRESSURE: 123 MMHG | WEIGHT: 194.69 LBS | OXYGEN SATURATION: 94 %

## 2019-09-04 DIAGNOSIS — I83.93 VARICOSE VEINS OF BOTH LOWER EXTREMITIES, UNSPECIFIED WHETHER COMPLICATED: ICD-10-CM

## 2019-09-04 DIAGNOSIS — R06.82 TACHYPNEA: ICD-10-CM

## 2019-09-04 DIAGNOSIS — D64.9 ANEMIA, UNSPECIFIED TYPE: Primary | ICD-10-CM

## 2019-09-04 DIAGNOSIS — I27.24 CTEPH (CHRONIC THROMBOEMBOLIC PULMONARY HYPERTENSION): ICD-10-CM

## 2019-09-04 DIAGNOSIS — D50.9 IRON DEFICIENCY ANEMIA, UNSPECIFIED IRON DEFICIENCY ANEMIA TYPE: ICD-10-CM

## 2019-09-04 DIAGNOSIS — I27.20 PULMONARY HTN: ICD-10-CM

## 2019-09-04 DIAGNOSIS — Z79.899 POLYPHARMACY: ICD-10-CM

## 2019-09-04 LAB
ALBUMIN SERPL BCP-MCNC: 3.9 G/DL (ref 3.5–5.2)
ALP SERPL-CCNC: 92 U/L (ref 55–135)
ALT SERPL W/O P-5'-P-CCNC: 11 U/L (ref 10–44)
ANION GAP SERPL CALC-SCNC: 10 MMOL/L (ref 8–16)
AST SERPL-CCNC: 13 U/L (ref 10–40)
BASOPHILS # BLD AUTO: 0.04 K/UL (ref 0–0.2)
BASOPHILS NFR BLD: 0.8 % (ref 0–1.9)
BILIRUB SERPL-MCNC: 3.4 MG/DL (ref 0.1–1)
BNP SERPL-MCNC: 336 PG/ML (ref 0–99)
BUN SERPL-MCNC: 17 MG/DL (ref 8–23)
CALCIUM SERPL-MCNC: 9.3 MG/DL (ref 8.7–10.5)
CHLORIDE SERPL-SCNC: 108 MMOL/L (ref 95–110)
CO2 SERPL-SCNC: 22 MMOL/L (ref 23–29)
CREAT SERPL-MCNC: 1 MG/DL (ref 0.5–1.4)
DIFFERENTIAL METHOD: ABNORMAL
EOSINOPHIL # BLD AUTO: 0.1 K/UL (ref 0–0.5)
EOSINOPHIL NFR BLD: 1.6 % (ref 0–8)
ERYTHROCYTE [DISTWIDTH] IN BLOOD BY AUTOMATED COUNT: 19 % (ref 11.5–14.5)
EST. GFR  (AFRICAN AMERICAN): >60 ML/MIN/1.73 M^2
EST. GFR  (NON AFRICAN AMERICAN): >60 ML/MIN/1.73 M^2
FERRITIN SERPL-MCNC: 12 NG/ML (ref 20–300)
GLUCOSE SERPL-MCNC: 94 MG/DL (ref 70–110)
HCT VFR BLD AUTO: 33.3 % (ref 40–54)
HGB BLD-MCNC: 10.1 G/DL (ref 14–18)
IMM GRANULOCYTES # BLD AUTO: 0.01 K/UL (ref 0–0.04)
IMM GRANULOCYTES NFR BLD AUTO: 0.2 % (ref 0–0.5)
IRON SERPL-MCNC: 27 UG/DL (ref 45–160)
LYMPHOCYTES # BLD AUTO: 1.2 K/UL (ref 1–4.8)
LYMPHOCYTES NFR BLD: 22.8 % (ref 18–48)
MAGNESIUM SERPL-MCNC: 1.8 MG/DL (ref 1.6–2.6)
MCH RBC QN AUTO: 24.3 PG (ref 27–31)
MCHC RBC AUTO-ENTMCNC: 30.3 G/DL (ref 32–36)
MCV RBC AUTO: 80 FL (ref 82–98)
MONOCYTES # BLD AUTO: 0.4 K/UL (ref 0.3–1)
MONOCYTES NFR BLD: 8.7 % (ref 4–15)
NEUTROPHILS # BLD AUTO: 3.3 K/UL (ref 1.8–7.7)
NEUTROPHILS NFR BLD: 65.9 % (ref 38–73)
NRBC BLD-RTO: 0 /100 WBC
PLATELET # BLD AUTO: 228 K/UL (ref 150–350)
PMV BLD AUTO: 9.8 FL (ref 9.2–12.9)
POTASSIUM SERPL-SCNC: 3.5 MMOL/L (ref 3.5–5.1)
PROT SERPL-MCNC: 7.3 G/DL (ref 6–8.4)
RBC # BLD AUTO: 4.15 M/UL (ref 4.6–6.2)
SATURATED IRON: 6 % (ref 20–50)
SODIUM SERPL-SCNC: 140 MMOL/L (ref 136–145)
TOTAL IRON BINDING CAPACITY: 444 UG/DL (ref 250–450)
TRANSFERRIN SERPL-MCNC: 300 MG/DL (ref 200–375)
WBC # BLD AUTO: 5.05 K/UL (ref 3.9–12.7)

## 2019-09-04 PROCEDURE — 99999 PR PBB SHADOW E&M-EST. PATIENT-LVL III: CPT | Mod: PBBFAC,,, | Performed by: INTERNAL MEDICINE

## 2019-09-04 PROCEDURE — 99214 OFFICE O/P EST MOD 30 MIN: CPT | Mod: 25,S$PBB,, | Performed by: INTERNAL MEDICINE

## 2019-09-04 PROCEDURE — 83735 ASSAY OF MAGNESIUM: CPT

## 2019-09-04 PROCEDURE — 36415 COLL VENOUS BLD VENIPUNCTURE: CPT

## 2019-09-04 PROCEDURE — 83540 ASSAY OF IRON: CPT

## 2019-09-04 PROCEDURE — 99999 PR PBB SHADOW E&M-EST. PATIENT-LVL III: ICD-10-PCS | Mod: PBBFAC,,, | Performed by: INTERNAL MEDICINE

## 2019-09-04 PROCEDURE — 99213 OFFICE O/P EST LOW 20 MIN: CPT | Mod: PBBFAC | Performed by: INTERNAL MEDICINE

## 2019-09-04 PROCEDURE — 83880 ASSAY OF NATRIURETIC PEPTIDE: CPT

## 2019-09-04 PROCEDURE — 85025 COMPLETE CBC W/AUTO DIFF WBC: CPT

## 2019-09-04 PROCEDURE — 99214 PR OFFICE/OUTPT VISIT, EST, LEVL IV, 30-39 MIN: ICD-10-PCS | Mod: 25,S$PBB,, | Performed by: INTERNAL MEDICINE

## 2019-09-04 PROCEDURE — 82728 ASSAY OF FERRITIN: CPT

## 2019-09-04 PROCEDURE — 80053 COMPREHEN METABOLIC PANEL: CPT

## 2019-09-04 RX ORDER — POTASSIUM CHLORIDE 1125 MG/1
15 TABLET, EXTENDED RELEASE ORAL 2 TIMES DAILY
Qty: 60 TABLET | Refills: 11 | Status: SHIPPED | OUTPATIENT
Start: 2019-09-04

## 2019-09-04 RX ORDER — FUROSEMIDE 40 MG/1
40 TABLET ORAL
Qty: 12 TABLET | Refills: 11 | Status: SHIPPED | OUTPATIENT
Start: 2019-09-04 | End: 2020-07-17

## 2019-09-04 NOTE — PROGRESS NOTES
Subjective:       Patient ID: Ambrosio Bray III is a 69 y.o. male.    Chief Complaint: Pulmonary Hypertension    HPI     Ambrosio Bray III 69 y.o. male    has a past medical history of Diabetes mellitus, Hypertension, PTSD (post-traumatic stress disorder), and Pulmonary hyperinflation.    has a past surgical history that includes Cardiac catheterization; Colonoscopy (N/A, 5/1/2018); and Right heart catheterization (Right, 11/28/2018).   reports that he has never smoked. He has never used smokeless tobacco. He reports that he drinks about 1.2 - 1.8 oz of alcohol per week. He reports that he does not use drugs.  Referred by: No ref. provider found  Who had concerns including Pulmonary Hypertension.  The patient's last visit with me was on 5/15/2019.    Having sinus issues -   Taste issues  Trouble with diarrhea- no dark stool or blood in stool  Not a vegetarian    Only eats one meal per day but lost 8 lbs over 1 year  Using nasal atrovent  +[place]    No fever chills, ns, wt changes, nausea, vomiting, diarrhea, constipation, chest pain, tightness, pressure. Remainder of review of systems is negative.  Otherwise asymptomatic from pulmonary standpoint.    Review of Systems    Objective:      Physical Exam   Constitutional: He is oriented to person, place, and time. He appears well-developed and well-nourished. He appears not cachectic. No distress.   HENT:   Right Ear: External ear normal.   Left Ear: External ear normal.   Nose: Nose normal.   Pulmonary/Chest: Normal expansion, symmetric chest wall expansion, effort normal and breath sounds normal.   Musculoskeletal: Normal range of motion.   Neurological: He is alert and oriented to person, place, and time. Gait normal.   Skin: Skin is warm and dry. No rash noted. He is not diaphoretic. No cyanosis or erythema. No pallor. Nails show no clubbing.   Psychiatric: He has a normal mood and affect. His behavior is normal. Judgment and thought content normal.   Nursing note  and vitals reviewed.    Personal Diagnostic Review    No flowsheet data found.      Assessment:       1. Anemia, unspecified type    2. Pulmonary HTN    3. CTEPH (chronic thromboembolic pulmonary hypertension)    4. Varicose veins of both lower extremities, unspecified whether complicated    5. Iron deficiency anemia, unspecified iron deficiency anemia type        Outpatient Encounter Medications as of 9/4/2019   Medication Sig Dispense Refill    atorvastatin (LIPITOR) 40 MG tablet TAKE 1 TABLET BY MOUTH ONCE DAILY 90 tablet 1    ipratropium (ATROVENT) 0.06 % nasal spray 2 sprays by Nasal route 4 (four) times daily. (Patient taking differently: 2 sprays by Nasal route 4 (four) times daily as needed. ) 15 mL 6    mometasone 0.1% (ELOCON) 0.1 % cream Apply topically once daily.      riociguat (ADEMPAS) 2.5 mg tablet Take 1 tablet (2.5 mg total) by mouth 3 (three) times daily. 90 tablet 11    selexipag (UPTRAVI) 200 mcg (140)- 800 mcg (60) DsPk Take by mouth 2 (two) times daily. Pt states he takes 1600mcg twice a day      tamsulosin (FLOMAX) 0.4 mg Cap TAKE 1 CAPSULE BY MOUTH ONCE DAILY 90 capsule 1    XARELTO 20 mg Tab TAKE 1 TABLET BY MOUTH ONCE DAILY WITH  DINNER  OR  EVENING  MEAL 90 tablet 0    [DISCONTINUED] hydroCHLOROthiazide (HYDRODIURIL) 50 MG tablet TAKE ONE-HALF TABLET BY MOUTH ONCE DAILY 90 tablet 1    furosemide (LASIX) 40 MG tablet Take 1 tablet (40 mg total) by mouth every Mon, Wed, Fri. 12 tablet 11    potassium chloride SA (KLOR-CON M15) 15 MEQ tablet Take 1 tablet (15 mEq total) by mouth 2 (two) times daily. 60 tablet 11     Facility-Administered Encounter Medications as of 9/4/2019   Medication Dose Route Frequency Provider Last Rate Last Dose    lidocaine HCL 10 mg/ml (1%) injection 1 mL  1 mL Other 1 time in Clinic/HOD Julius Matute Jr., MD         Orders Placed This Encounter   Procedures    Iron and TIBC     Standing Status:   Future     Standing Expiration Date:   11/2/2020     Ferritin     Standing Status:   Future     Standing Expiration Date:   11/2/2020       Plan:               Assessment:  Ambrosio was seen today for pulmonary hypertension.    Diagnoses and all orders for this visit:    Anemia, unspecified type  -     Iron and TIBC; Future  -     Ferritin; Future    Pulmonary HTN    CTEPH (chronic thromboembolic pulmonary hypertension)    Varicose veins of both lower extremities, unspecified whether complicated    Iron deficiency anemia, unspecified iron deficiency anemia type    Other orders  -     furosemide (LASIX) 40 MG tablet; Take 1 tablet (40 mg total) by mouth every Mon, Wed, Fri.  -     potassium chloride SA (KLOR-CON M15) 15 MEQ tablet; Take 1 tablet (15 mEq total) by mouth 2 (two) times daily.        Plan:  Problem List Items Addressed This Visit     CTEPH (chronic thromboembolic pulmonary hypertension)    Overview     Patient with cteph, doing well, but concern by me for increased pressures  RHC confirmed  Add uptravi to riociguat  And xarelto  Continue hctz         Iron deficiency anemia    Overview     New iron def anemia- etiology unclear. Patient will need egd, colonoscopy to evaluate further.          Pulmonary HTN    Overview     Doing well on uptravi and adempas  Echo with decreasing pap from RHC  RV function improving  bnp improved  Continue current therapy  Follow up in 3 months         Varicose veins of both lower extremities    Overview     [facility]  Infection complication           Other Visit Diagnoses     Anemia, unspecified type    -  Primary    Relevant Orders    Iron and TIBC    Ferritin            Follow up in about 4 months (around 1/4/2020).    There are no Patient Instructions on file for this visit.    Immunization History   Administered Date(s) Administered    Influenza 01/17/2013    Influenza - High Dose - PF (65 years and older) 10/17/2015, 10/31/2016, 09/15/2017, 10/20/2018    Influenza - Quadrivalent 11/06/2014    Influenza Split  01/17/2013    Pneumococcal Conjugate - 13 Valent 10/31/2016    Pneumococcal Polysaccharide - 23 Valent 04/09/2018    Zoster 01/13/2015

## 2019-09-18 ENCOUNTER — TELEPHONE (OUTPATIENT)
Dept: TRANSPLANT | Facility: CLINIC | Age: 69
End: 2019-09-18

## 2019-09-18 ENCOUNTER — PATIENT MESSAGE (OUTPATIENT)
Dept: TRANSPLANT | Facility: CLINIC | Age: 69
End: 2019-09-18

## 2019-09-18 RX ORDER — FERROUS SULFATE 325(65) MG
325 TABLET, DELAYED RELEASE (ENTERIC COATED) ORAL 2 TIMES DAILY WITH MEALS
COMMUNITY
End: 2020-03-13 | Stop reason: SDUPTHER

## 2019-09-18 NOTE — TELEPHONE ENCOUNTER
----- Message from Inna Posadas MD sent at 9/18/2019  3:44 PM CDT -----  Please let him know that his iron studies showed iron deficiency and he needs a colonoscopy and he needs to start taking iron- 325mg tid. Thanks, SJ  ----- Message -----  From: THOMPSON Savage, RN  Sent: 9/5/2019   9:42 AM  To: Inna Posadas MD    Not sure if you already advised on these labs or not...Thank you.

## 2019-09-19 ENCOUNTER — PATIENT MESSAGE (OUTPATIENT)
Dept: FAMILY MEDICINE | Facility: CLINIC | Age: 69
End: 2019-09-19

## 2019-09-26 ENCOUNTER — PATIENT MESSAGE (OUTPATIENT)
Dept: ADMINISTRATIVE | Facility: OTHER | Age: 69
End: 2019-09-26

## 2019-09-27 DIAGNOSIS — E11.9 TYPE 2 DIABETES MELLITUS: ICD-10-CM

## 2019-10-02 DIAGNOSIS — E11.9 TYPE 2 DIABETES MELLITUS: ICD-10-CM

## 2019-10-03 ENCOUNTER — OFFICE VISIT (OUTPATIENT)
Dept: FAMILY MEDICINE | Facility: CLINIC | Age: 69
End: 2019-10-03
Payer: MEDICARE

## 2019-10-03 VITALS
HEIGHT: 73 IN | DIASTOLIC BLOOD PRESSURE: 70 MMHG | BODY MASS INDEX: 26.53 KG/M2 | SYSTOLIC BLOOD PRESSURE: 126 MMHG | TEMPERATURE: 98 F | WEIGHT: 200.19 LBS | HEART RATE: 87 BPM | OXYGEN SATURATION: 97 %

## 2019-10-03 DIAGNOSIS — Z71.2 ENCOUNTER TO DISCUSS TEST RESULTS: ICD-10-CM

## 2019-10-03 DIAGNOSIS — I15.2 HYPERTENSION ASSOCIATED WITH DIABETES: Chronic | ICD-10-CM

## 2019-10-03 DIAGNOSIS — E11.29 TYPE 2 DIABETES MELLITUS WITH MICROALBUMINURIA, WITHOUT LONG-TERM CURRENT USE OF INSULIN: Primary | ICD-10-CM

## 2019-10-03 DIAGNOSIS — D50.9 IRON DEFICIENCY ANEMIA, UNSPECIFIED IRON DEFICIENCY ANEMIA TYPE: ICD-10-CM

## 2019-10-03 DIAGNOSIS — Z23 NEEDS FLU SHOT: ICD-10-CM

## 2019-10-03 DIAGNOSIS — D22.9 ATYPICAL MOLE: ICD-10-CM

## 2019-10-03 DIAGNOSIS — R80.9 TYPE 2 DIABETES MELLITUS WITH MICROALBUMINURIA, WITHOUT LONG-TERM CURRENT USE OF INSULIN: Primary | ICD-10-CM

## 2019-10-03 DIAGNOSIS — E11.59 HYPERTENSION ASSOCIATED WITH DIABETES: Chronic | ICD-10-CM

## 2019-10-03 PROCEDURE — 99214 OFFICE O/P EST MOD 30 MIN: CPT | Mod: S$PBB,,, | Performed by: INTERNAL MEDICINE

## 2019-10-03 PROCEDURE — 99999 PR PBB SHADOW E&M-EST. PATIENT-LVL III: ICD-10-PCS | Mod: PBBFAC,,, | Performed by: INTERNAL MEDICINE

## 2019-10-03 PROCEDURE — 99214 PR OFFICE/OUTPT VISIT, EST, LEVL IV, 30-39 MIN: ICD-10-PCS | Mod: S$PBB,,, | Performed by: INTERNAL MEDICINE

## 2019-10-03 PROCEDURE — 99999 PR PBB SHADOW E&M-EST. PATIENT-LVL III: CPT | Mod: PBBFAC,,, | Performed by: INTERNAL MEDICINE

## 2019-10-03 PROCEDURE — 90662 IIV NO PRSV INCREASED AG IM: CPT | Mod: PBBFAC,PO

## 2019-10-03 PROCEDURE — 99213 OFFICE O/P EST LOW 20 MIN: CPT | Mod: PBBFAC,PO,25 | Performed by: INTERNAL MEDICINE

## 2019-10-03 NOTE — PROGRESS NOTES
SUBJECTIVE     No chief complaint on file.      CELSO Bray III is a 69 y.o. male with multiple medical diagnoses as listed in the medical history and problem list that presents for follow-up for HTN and DM2. Pt has been doing okay since his last visit. He is fully compliant with meds and denies any adverse side effects. Pt reports recently having 2 meds added to his current regimen by Dr. Posadas, Lasix and KCl. He has been taking both, but feels as if the dosage is too much for Lasix so cut back. Pt does report some recent diarrhea, but is otherwise without any complaints today.     PAST MEDICAL HISTORY:  Past Medical History:   Diagnosis Date    Diabetes mellitus     Hypertension     PTSD (post-traumatic stress disorder)     Pulmonary hyperinflation        PAST SURGICAL HISTORY:  Past Surgical History:   Procedure Laterality Date    CARDIAC CATHETERIZATION      COLONOSCOPY N/A 5/1/2018    Procedure: COLONOSCOPY;  Surgeon: Bubba Navarro MD;  Location: [facility] ENDO;  Service: Endoscopy;  Laterality: N/A;  confirmed appt 4/24/18    RIGHT HEART CATHETERIZATION Right 11/28/2018    Procedure: INSERTION, CATHETER, RIGHT HEART;  Surgeon: Chelsea Arceo MD;  Location: [facility] CATH LAB;  Service: Cardiology;  Laterality: Right;       SOCIAL HISTORY:  Social History     Socioeconomic History    Marital status: Single     Spouse name: Not on file    Number of children: Not on file    Years of education: Not on file    Highest education level: Not on file   Occupational History    Not on file   Social Needs    Financial resource strain: Not hard at all    Food insecurity:     Worry: Not on file     Inability: Not on file    Transportation needs:     Medical: Not on file     Non-medical: Not on file   Tobacco Use    Smoking status: Never Smoker    Smokeless tobacco: Never Used   Substance and Sexual Activity    Alcohol use: Yes     Alcohol/week: 2.0 - 3.0 standard drinks     Types: 2 - 3 Shots of liquor  per week     Comment: weekends    Drug use: No    Sexual activity: Yes     Partners: Female   Lifestyle    Physical activity:     Days per week: 7 days     Minutes per session: Not on file    Stress: Not at all   Relationships    Social connections:     Talks on phone: More than three times a week     Gets together: More than three times a week     Attends Church service: Not on file     Active member of club or organization: No     Attends meetings of clubs or organizations: Never     Relationship status: Patient refused   Other Topics Concern    Not on file   Social History Narrative    Not on file       FAMILY HISTORY:  Family History   Problem Relation Age of Onset    Cancer Father         colon    Colon cancer Father        ALLERGIES AND MEDICATIONS: updated and reviewed.  Review of patient's allergies indicates:  No Known Allergies  Current Outpatient Medications   Medication Sig Dispense Refill    atorvastatin (LIPITOR) 40 MG tablet TAKE 1 TABLET BY MOUTH ONCE DAILY 90 tablet 1    ferrous sulfate 325 (65 FE) MG EC tablet Take 325 mg by mouth 2 (two) times daily with meals.      furosemide (LASIX) 40 MG tablet Take 1 tablet (40 mg total) by mouth every Mon, Wed, Fri. 12 tablet 11    ipratropium (ATROVENT) 0.06 % nasal spray 2 sprays by Nasal route 4 (four) times daily. (Patient taking differently: 2 sprays by Nasal route 4 (four) times daily as needed. ) 15 mL 6    mometasone 0.1% (ELOCON) 0.1 % cream Apply topically once daily.      potassium chloride SA (KLOR-CON M15) 15 MEQ tablet Take 1 tablet (15 mEq total) by mouth 2 (two) times daily. 60 tablet 11    riociguat (ADEMPAS) 2.5 mg tablet Take 1 tablet (2.5 mg total) by mouth 3 (three) times daily. 90 tablet 11    selexipag (UPTRAVI) 200 mcg (140)- 800 mcg (60) DsPk Take by mouth 2 (two) times daily. Pt states he takes 1600mcg twice a day      tamsulosin (FLOMAX) 0.4 mg Cap TAKE 1 CAPSULE BY MOUTH ONCE DAILY 90 capsule 1    XARELTO 20  "mg Tab TAKE 1 TABLET BY MOUTH ONCE DAILY WITH  DINNER  OR  EVENING  MEAL 90 tablet 0     Current Facility-Administered Medications   Medication Dose Route Frequency Provider Last Rate Last Dose    lidocaine HCL 10 mg/ml (1%) injection 1 mL  1 mL Other 1 time in Clinic/HOD MD MIRIAM Franklin Jr.  Review of Systems   Constitutional: Positive for unexpected weight change. Negative for activity change.   HENT: Positive for hearing loss. Negative for rhinorrhea and trouble swallowing.    Eyes: Negative for discharge and visual disturbance.   Respiratory: Negative for chest tightness and wheezing.    Cardiovascular: Negative for chest pain and palpitations.   Gastrointestinal: Positive for diarrhea. Negative for blood in stool, constipation and vomiting.   Endocrine: Positive for polyuria. Negative for polydipsia.   Genitourinary: Positive for difficulty urinating and urgency. Negative for hematuria.   Musculoskeletal: Negative for arthralgias, joint swelling and neck pain.   Skin: Negative for rash and wound.   Neurological: Negative for weakness and headaches.   Psychiatric/Behavioral: Negative for confusion and dysphoric mood.         OBJECTIVE     Physical Exam  Vitals:    10/03/19 1011   BP: 126/70   Pulse: 87   Temp: 98.4 °F (36.9 °C)    Body mass index is 26.41 kg/m².  Weight: 90.8 kg (200 lb 2.8 oz)   Height: 6' 1" (185.4 cm)     Physical Exam   Constitutional: He is oriented to person, place, and time. He appears well-developed and well-nourished. No distress.   HENT:   Head: Normocephalic and atraumatic.   Right Ear: External ear normal.   Left Ear: External ear normal.   Nose: Nose normal.   Mouth/Throat: Oropharynx is clear and moist.   Eyes: Conjunctivae and EOM are normal. Right eye exhibits no discharge. Left eye exhibits no discharge. No scleral icterus.   Neck: Normal range of motion. Neck supple. No JVD present. No tracheal deviation present.   Cardiovascular: Normal rate, regular rhythm, " normal heart sounds and intact distal pulses. Exam reveals no gallop and no friction rub.   No murmur heard.  Pulmonary/Chest: Effort normal and breath sounds normal. No respiratory distress. He has no wheezes.   Abdominal: Soft. Bowel sounds are normal. He exhibits no distension and no mass. There is no tenderness. There is no rebound and no guarding.   Musculoskeletal: Normal range of motion. He exhibits edema (2+ pitting edema to BLE). He exhibits no tenderness or deformity.        Right foot: There is normal range of motion and no deformity.        Left foot: There is normal range of motion and no deformity.   Feet:   Right Foot:   Protective Sensation: 10 sites tested. 7 sites sensed.   Skin Integrity: Positive for dry skin. Negative for ulcer, blister, skin breakdown or callus.   Left Foot:   Protective Sensation: 10 sites tested. 10 sites sensed.   Skin Integrity: Positive for dry skin. Negative for ulcer, blister, skin breakdown or callus.   Neurological: He is alert and oriented to person, place, and time. He exhibits normal muscle tone. Coordination normal.   Skin: Skin is warm and dry. No rash noted. No erythema.   Mepilex dressing in place over R ankle    Varicose veins noted to RLE    Atypical lesion to R medial ankle   Psychiatric: He has a normal mood and affect. His behavior is normal. Judgment and thought content normal.         Health Maintenance       Date Due Completion Date    TETANUS VACCINE 07[birth year]14/1968 ---    Shingles Vaccine (2 of 3) 03/10/2015 1/13/2015    Eye Exam 06/23/2015 6/23/2014    Foot Exam 07/11/2018 7/11/2017    Override on 4/25/2016: Done    Hemoglobin A1c 11/15/2019 5/15/2019    Lipid Panel 03/20/2020 3/20/2019    Urine Microalbumin 04/18/2020 4/18/2019    Low Dose Statin 09/09/2020 9/9/2019    Colonoscopy 05/01/2023 5/1/2018            ASSESSMENT     69 y.o. male with     1. Type 2 diabetes mellitus with microalbuminuria, without long-term current use of insulin    2.  Hypertension associated with diabetes    3. Encounter to discuss test results    4. Iron deficiency anemia, unspecified iron deficiency anemia type    5. Needs flu shot    6. Atypical mole        PLAN:     1. Type 2 diabetes mellitus with microalbuminuria, without long-term current use of insulin  - Stable; no acute issues  - The current medical regimen is effective;  continue present plan and medications.  - Hemoglobin A1c; Future    2. Hypertension associated with diabetes  - BP well controlled; at goal of <140/90  - The current medical regimen is effective;  continue present plan and medications.  - CBC auto differential; Future  - Comprehensive metabolic panel; Future  - TSH; Future    3. Encounter to discuss test results  - Discussed recent lab results  - All questions/concerns addressed  - Pt voiced understanding    4. Iron deficiency anemia, unspecified iron deficiency anemia type  - Reviewed recent iron studies and CBC  - Informed pt that he should be taking an iron supplement; he reports he can not tolerate iron TID, so advised to take an OTC supplement BID    5. Needs flu shot  - Influenza - High Dose (65+) (PF) (IM)    6. Atypical mole  - Ambulatory referral to Dermatology        RTC in 6 months     Luciana Cisse MD  10/03/2019 10:20 AM        No follow-ups on file.

## 2019-10-03 NOTE — PROGRESS NOTES
After obtaining consent, and per orders of Dr. Cisse, injection of high dose flu shot given into the left deltoid by Yanira Larry. Patient instructed to remain in clinic for 20 minutes afterwards, and to report any adverse reaction to me immediately.

## 2019-10-20 ENCOUNTER — PATIENT MESSAGE (OUTPATIENT)
Dept: TRANSPLANT | Facility: CLINIC | Age: 69
End: 2019-10-20

## 2019-10-21 DIAGNOSIS — I48.20 CHRONIC ATRIAL FIBRILLATION: ICD-10-CM

## 2019-10-22 ENCOUNTER — OFFICE VISIT (OUTPATIENT)
Dept: FAMILY MEDICINE | Facility: CLINIC | Age: 69
End: 2019-10-22
Payer: MEDICARE

## 2019-10-22 VITALS
BODY MASS INDEX: 25.95 KG/M2 | TEMPERATURE: 99 F | RESPIRATION RATE: 16 BRPM | DIASTOLIC BLOOD PRESSURE: 76 MMHG | OXYGEN SATURATION: 94 % | HEART RATE: 85 BPM | WEIGHT: 202.19 LBS | SYSTOLIC BLOOD PRESSURE: 132 MMHG | HEIGHT: 74 IN

## 2019-10-22 DIAGNOSIS — D50.9 IRON DEFICIENCY ANEMIA, UNSPECIFIED IRON DEFICIENCY ANEMIA TYPE: ICD-10-CM

## 2019-10-22 DIAGNOSIS — Z00.00 ENCOUNTER FOR PREVENTIVE HEALTH EXAMINATION: Primary | ICD-10-CM

## 2019-10-22 DIAGNOSIS — I50.32 CHRONIC DIASTOLIC CHF (CONGESTIVE HEART FAILURE): ICD-10-CM

## 2019-10-22 DIAGNOSIS — I27.20 PULMONARY HTN: ICD-10-CM

## 2019-10-22 DIAGNOSIS — I27.9 CHRONIC PULMONARY HEART DISEASE: ICD-10-CM

## 2019-10-22 DIAGNOSIS — Z79.01 CURRENT USE OF LONG TERM ANTICOAGULATION: ICD-10-CM

## 2019-10-22 DIAGNOSIS — R91.1 PULMONARY NODULE: ICD-10-CM

## 2019-10-22 DIAGNOSIS — R80.9 TYPE 2 DIABETES MELLITUS WITH MICROALBUMINURIA, WITHOUT LONG-TERM CURRENT USE OF INSULIN: ICD-10-CM

## 2019-10-22 DIAGNOSIS — E11.29 TYPE 2 DIABETES MELLITUS WITH MICROALBUMINURIA, WITHOUT LONG-TERM CURRENT USE OF INSULIN: ICD-10-CM

## 2019-10-22 DIAGNOSIS — I48.3 TYPICAL ATRIAL FLUTTER: ICD-10-CM

## 2019-10-22 DIAGNOSIS — I07.1 MODERATE TRICUSPID REGURGITATION: ICD-10-CM

## 2019-10-22 DIAGNOSIS — I70.0 ATHEROSCLEROSIS OF AORTA: ICD-10-CM

## 2019-10-22 DIAGNOSIS — I50.22 CHRONIC SYSTOLIC CONGESTIVE HEART FAILURE: ICD-10-CM

## 2019-10-22 DIAGNOSIS — I27.24 CTEPH (CHRONIC THROMBOEMBOLIC PULMONARY HYPERTENSION): ICD-10-CM

## 2019-10-22 DIAGNOSIS — I48.20 CHRONIC ATRIAL FIBRILLATION: ICD-10-CM

## 2019-10-22 DIAGNOSIS — I83.93 VARICOSE VEINS OF BOTH LOWER EXTREMITIES, UNSPECIFIED WHETHER COMPLICATED: ICD-10-CM

## 2019-10-22 PROBLEM — Z12.11 SCREENING FOR COLON CANCER: Status: RESOLVED | Noted: 2018-05-01 | Resolved: 2019-10-22

## 2019-10-22 PROBLEM — E87.20 METABOLIC ACIDOSIS: Status: RESOLVED | Noted: 2017-12-26 | Resolved: 2019-10-22

## 2019-10-22 PROCEDURE — 99999 PR PBB SHADOW E&M-EST. PATIENT-LVL V: CPT | Mod: PBBFAC,,, | Performed by: PHYSICIAN ASSISTANT

## 2019-10-22 PROCEDURE — 99215 OFFICE O/P EST HI 40 MIN: CPT | Mod: PBBFAC,PO | Performed by: PHYSICIAN ASSISTANT

## 2019-10-22 PROCEDURE — 99999 PR PBB SHADOW E&M-EST. PATIENT-LVL V: ICD-10-PCS | Mod: PBBFAC,,, | Performed by: PHYSICIAN ASSISTANT

## 2019-10-22 PROCEDURE — G0439 PR MEDICARE ANNUAL WELLNESS SUBSEQUENT VISIT: ICD-10-PCS | Mod: S$GLB,,, | Performed by: PHYSICIAN ASSISTANT

## 2019-10-22 PROCEDURE — G0439 PPPS, SUBSEQ VISIT: HCPCS | Mod: S$GLB,,, | Performed by: PHYSICIAN ASSISTANT

## 2019-10-22 RX ORDER — RIVAROXABAN 20 MG/1
TABLET, FILM COATED ORAL
Qty: 90 TABLET | Refills: 0 | OUTPATIENT
Start: 2019-10-22

## 2019-10-22 NOTE — PATIENT INSTRUCTIONS
Counseling and Referral of Other Preventative  (Italic type indicates deductible and co-insurance are waived)    Patient Name: Ambrosio Bray  Today's Date: 10/22/2019    Health Maintenance       Date Due Completion Date    TETANUS VACCINE 07/14/1968 ---    Shingles Vaccine (2 of 3) 03/10/2015 1/13/2015    Eye Exam 06/23/2015 6/23/2014    Hemoglobin A1c 11/15/2019 5/15/2019    Lipid Panel 03/20/2020 3/20/2019    Urine Microalbumin 04/18/2020 4/18/2019    Foot Exam 10/03/2020 10/3/2019    Override on 10/3/2019: Done    Override on 4/25/2016: Done    Low Dose Statin 10/22/2020 10/22/2019    Colonoscopy 05/01/2023 5/1/2018        No orders of the defined types were placed in this encounter.    The following information is provided to all patients.  This information is to help you find resources for any of the problems found today that may be affecting your health:                Living healthy guide: www.[facility].[place].gov      Understanding Diabetes: www.diabetes.org      Eating healthy: www.cdc.gov/healthyweight      CDC home safety checklist: www.cdc.gov/steadi/patient.html      Agency on Aging: www.goea.[place].gov      Alcoholics anonymous (AA): www.aa.org      Physical Activity: www.john paul.nih.gov/xf9wvzj      Tobacco use: www.quitwithusla.org

## 2019-10-22 NOTE — PROGRESS NOTES
"Ambrosio Bray presented for a  Medicare AWV and comprehensive Health Risk Assessment today. The following components were reviewed and updated:    · Medical history  · Family History  · Social history  · Allergies and Current Medications  · Health Risk Assessment  · Health Maintenance  · Care Team     ** See Completed Assessments for Annual Wellness Visit within the encounter summary.**       The following assessments were completed:  · Living Situation  · CAGE  · Depression Screening  · Timed Get Up and Go  · Whisper Test  · Cognitive Function Screening  · Nutrition Screening  · ADL Screening  · PAQ Screening      Vitals:    10/22/19 0906   BP: 132/76   Pulse: 85   Resp: 16   Temp: 98.5 °F (36.9 °C)   TempSrc: Oral   SpO2: (!) 94%   Weight: 91.7 kg (202 lb 2.6 oz)   Height: 6' 1.5" (1.867 m)     Body mass index is 26.31 kg/m².  Physical Exam   Constitutional: He is oriented to person, place, and time. No distress.   HENT:   Head: Normocephalic and atraumatic.   Neurological: He is alert and oriented to person, place, and time.   Skin:   Varicose veins          Diagnoses and health risks identified today and associated recommendations/orders:    1. Chronic atrial fibrillation  The current medical regimen is effective;  continue present plan and medications.  Continue to follow up with specialist  - rivaroxaban (XARELTO) 20 mg Tab; TAKE 1 TABLET BY MOUTH ONCE DAILY WITH  DINNER  OR  EVENING  MEAL  Dispense: 90 tablet; Refill: 1    2. Varicose veins of both lower extremities, unspecified whether complicated  The current medical regimen is effective;  continue present plan and medications.  Compression stockings.     3. CTEPH (chronic thromboembolic pulmonary hypertension)  Continue to follow up with specialist  The current medical regimen is effective;  continue present plan and medications.    4. Typical atrial flutter  Continue to follow up with specialist  The current medical regimen is effective;  continue " present plan and medications.    5. Current use of long term anticoagulation  Continue to follow up with specialist  The current medical regimen is effective;  continue present plan and medications.    6. Iron deficiency anemia, unspecified iron deficiency anemia type  Restart therapy    7. Chronic systolic congestive heart failure  Continue to follow up with specialist  The current medical regimen is effective;  continue present plan and medications.    8. Pulmonary nodule  Continue to follow up with specialist      9. Type 2 diabetes mellitus with microalbuminuria, without long-term current use of insulin  The current medical regimen is effective;  continue present plan and medications.      10. Chronic pulmonary heart disease  Continue to follow up with specialist  The current medical regimen is effective;  continue present plan and medications.    11. Moderate tricuspid regurgitation  Continue to follow up with specialist  The current medical regimen is effective;  continue present plan and medications.    12. Chronic diastolic CHF (congestive heart failure)  Continue to follow up with specialist  The current medical regimen is effective;  continue present plan and medications.    13. Pulmonary HTN  Continue to follow up with specialist  The current medical regimen is effective;  continue present plan and medications.    14. Encounter for preventive health examination  Stable   Increased leg swelling being treated with his pulm doc right now    15. Atherosclerosis of aorta  Continue to control RF BP and lipids     Pt states he will get both updated shingles and eye exam [facility]   Will get follow up labs in 1 months. Pt wants to just walk in.     Provided Ambrosio with a 5-10 year written screening schedule and personal prevention plan. Recommendations were developed using the USPSTF age appropriate recommendations. Education, counseling, and referrals were provided  as needed. After Visit Summary printed and given to patient which includes a list of additional screenings\tests needed.    No follow-ups on file.    FACUNDO Aj

## 2019-10-23 DIAGNOSIS — N40.0 BENIGN NON-NODULAR PROSTATIC HYPERPLASIA WITHOUT LOWER URINARY TRACT SYMPTOMS: ICD-10-CM

## 2019-10-24 RX ORDER — TAMSULOSIN HYDROCHLORIDE 0.4 MG/1
CAPSULE ORAL
Qty: 90 CAPSULE | Refills: 1 | Status: SHIPPED | OUTPATIENT
Start: 2019-10-24 | End: 2020-04-08 | Stop reason: SDUPTHER

## 2019-10-25 ENCOUNTER — PATIENT MESSAGE (OUTPATIENT)
Dept: TRANSPLANT | Facility: CLINIC | Age: 69
End: 2019-10-25

## 2019-11-14 ENCOUNTER — LAB VISIT (OUTPATIENT)
Dept: LAB | Facility: HOSPITAL | Age: 69
End: 2019-11-14
Attending: INTERNAL MEDICINE
Payer: MEDICARE

## 2019-11-14 DIAGNOSIS — E11.59 HYPERTENSION ASSOCIATED WITH DIABETES: Chronic | ICD-10-CM

## 2019-11-14 DIAGNOSIS — R80.9 TYPE 2 DIABETES MELLITUS WITH MICROALBUMINURIA, WITHOUT LONG-TERM CURRENT USE OF INSULIN: ICD-10-CM

## 2019-11-14 DIAGNOSIS — I15.2 HYPERTENSION ASSOCIATED WITH DIABETES: Chronic | ICD-10-CM

## 2019-11-14 DIAGNOSIS — E11.29 TYPE 2 DIABETES MELLITUS WITH MICROALBUMINURIA, WITHOUT LONG-TERM CURRENT USE OF INSULIN: ICD-10-CM

## 2019-11-14 LAB
ALBUMIN SERPL BCP-MCNC: 3.6 G/DL (ref 3.5–5.2)
ALP SERPL-CCNC: 90 U/L (ref 55–135)
ALT SERPL W/O P-5'-P-CCNC: 8 U/L (ref 10–44)
ANION GAP SERPL CALC-SCNC: 9 MMOL/L (ref 8–16)
AST SERPL-CCNC: 22 U/L (ref 10–40)
BASOPHILS # BLD AUTO: 0.06 K/UL (ref 0–0.2)
BASOPHILS NFR BLD: 1.3 % (ref 0–1.9)
BILIRUB SERPL-MCNC: 3.3 MG/DL (ref 0.1–1)
BUN SERPL-MCNC: 14 MG/DL (ref 8–23)
CALCIUM SERPL-MCNC: 9.2 MG/DL (ref 8.7–10.5)
CHLORIDE SERPL-SCNC: 110 MMOL/L (ref 95–110)
CO2 SERPL-SCNC: 20 MMOL/L (ref 23–29)
CREAT SERPL-MCNC: 0.9 MG/DL (ref 0.5–1.4)
DIFFERENTIAL METHOD: ABNORMAL
EOSINOPHIL # BLD AUTO: 0.2 K/UL (ref 0–0.5)
EOSINOPHIL NFR BLD: 3.6 % (ref 0–8)
ERYTHROCYTE [DISTWIDTH] IN BLOOD BY AUTOMATED COUNT: 22.1 % (ref 11.5–14.5)
EST. GFR  (AFRICAN AMERICAN): >60 ML/MIN/1.73 M^2
EST. GFR  (NON AFRICAN AMERICAN): >60 ML/MIN/1.73 M^2
ESTIMATED AVG GLUCOSE: 128 MG/DL (ref 68–131)
GLUCOSE SERPL-MCNC: 84 MG/DL (ref 70–110)
HBA1C MFR BLD HPLC: 6.1 % (ref 4–5.6)
HCT VFR BLD AUTO: 31.6 % (ref 40–54)
HGB BLD-MCNC: 9.3 G/DL (ref 14–18)
IMM GRANULOCYTES # BLD AUTO: 0.02 K/UL (ref 0–0.04)
IMM GRANULOCYTES NFR BLD AUTO: 0.4 % (ref 0–0.5)
LYMPHOCYTES # BLD AUTO: 1.3 K/UL (ref 1–4.8)
LYMPHOCYTES NFR BLD: 28.6 % (ref 18–48)
MCH RBC QN AUTO: 23.4 PG (ref 27–31)
MCHC RBC AUTO-ENTMCNC: 29.4 G/DL (ref 32–36)
MCV RBC AUTO: 80 FL (ref 82–98)
MONOCYTES # BLD AUTO: 0.5 K/UL (ref 0.3–1)
MONOCYTES NFR BLD: 10.3 % (ref 4–15)
NEUTROPHILS # BLD AUTO: 2.5 K/UL (ref 1.8–7.7)
NEUTROPHILS NFR BLD: 55.8 % (ref 38–73)
NRBC BLD-RTO: 0 /100 WBC
PLATELET # BLD AUTO: 290 K/UL (ref 150–350)
PMV BLD AUTO: 10.7 FL (ref 9.2–12.9)
POTASSIUM SERPL-SCNC: 3.8 MMOL/L (ref 3.5–5.1)
PROT SERPL-MCNC: 7.3 G/DL (ref 6–8.4)
RBC # BLD AUTO: 3.97 M/UL (ref 4.6–6.2)
SODIUM SERPL-SCNC: 139 MMOL/L (ref 136–145)
TSH SERPL DL<=0.005 MIU/L-ACNC: 2.28 UIU/ML (ref 0.4–4)
WBC # BLD AUTO: 4.47 K/UL (ref 3.9–12.7)

## 2019-11-14 PROCEDURE — 84443 ASSAY THYROID STIM HORMONE: CPT

## 2019-11-14 PROCEDURE — 85025 COMPLETE CBC W/AUTO DIFF WBC: CPT

## 2019-11-14 PROCEDURE — 80053 COMPREHEN METABOLIC PANEL: CPT

## 2019-11-14 PROCEDURE — 36415 COLL VENOUS BLD VENIPUNCTURE: CPT | Mod: PO

## 2019-11-14 PROCEDURE — 83036 HEMOGLOBIN GLYCOSYLATED A1C: CPT

## 2019-11-15 ENCOUNTER — PATIENT MESSAGE (OUTPATIENT)
Dept: ADMINISTRATIVE | Facility: OTHER | Age: 69
End: 2019-11-15

## 2019-11-19 DIAGNOSIS — I48.20 CHRONIC ATRIAL FIBRILLATION: ICD-10-CM

## 2019-11-27 ENCOUNTER — PATIENT OUTREACH (OUTPATIENT)
Dept: ADMINISTRATIVE | Facility: OTHER | Age: 69
End: 2019-11-27

## 2019-12-02 ENCOUNTER — INITIAL CONSULT (OUTPATIENT)
Dept: DERMATOLOGY | Facility: CLINIC | Age: 69
End: 2019-12-02
Payer: MEDICARE

## 2019-12-02 DIAGNOSIS — I87.2 VENOUS STASIS DERMATITIS, UNSPECIFIED LATERALITY: ICD-10-CM

## 2019-12-02 DIAGNOSIS — L82.1 SK (SEBORRHEIC KERATOSIS): Primary | ICD-10-CM

## 2019-12-02 PROCEDURE — 99999 PR PBB SHADOW E&M-EST. PATIENT-LVL III: ICD-10-PCS | Mod: PBBFAC,,, | Performed by: DERMATOLOGY

## 2019-12-02 PROCEDURE — 1159F PR MEDICATION LIST DOCUMENTED IN MEDICAL RECORD: ICD-10-PCS | Mod: ,,, | Performed by: DERMATOLOGY

## 2019-12-02 PROCEDURE — 99213 OFFICE O/P EST LOW 20 MIN: CPT | Mod: PBBFAC | Performed by: DERMATOLOGY

## 2019-12-02 PROCEDURE — 99202 PR OFFICE/OUTPT VISIT, NEW, LEVL II, 15-29 MIN: ICD-10-PCS | Mod: S$PBB,,, | Performed by: DERMATOLOGY

## 2019-12-02 PROCEDURE — 1125F AMNT PAIN NOTED PAIN PRSNT: CPT | Mod: ,,, | Performed by: DERMATOLOGY

## 2019-12-02 PROCEDURE — 99999 PR PBB SHADOW E&M-EST. PATIENT-LVL III: CPT | Mod: PBBFAC,,, | Performed by: DERMATOLOGY

## 2019-12-02 PROCEDURE — 1159F MED LIST DOCD IN RCRD: CPT | Mod: ,,, | Performed by: DERMATOLOGY

## 2019-12-02 PROCEDURE — 99202 OFFICE O/P NEW SF 15 MIN: CPT | Mod: S$PBB,,, | Performed by: DERMATOLOGY

## 2019-12-02 PROCEDURE — 1125F PR PAIN SEVERITY QUANTIFIED, PAIN PRESENT: ICD-10-PCS | Mod: ,,, | Performed by: DERMATOLOGY

## 2019-12-02 RX ORDER — SELEXIPAG 1600 UG/1
TABLET, COATED ORAL
COMMUNITY
Start: 2019-09-19 | End: 2020-03-26

## 2019-12-02 NOTE — LETTER
December 2, 2019      Luciana Cisse MD  [address] [address]  [address]  [place] [address]           [facility] - Dermatology  [address]  [place] [address]  Phone: [phone]  Fax: [phone]          Patient: Ambrosio Bray III   MR Number: [id number]   YOB: 1950   Date of Visit: 12/2/2019       Dear Dr. Luciana Cisse:    Thank you for referring Ambrosio Bray to me for evaluation. Attached you will find relevant portions of my assessment and plan of care.    If you have questions, please do not hesitate to call me. I look forward to following Ambrosio Bray along with you.    Sincerely,    Ariadna Johnson MD    Enclosure  CC:  No Recipients    If you would like to receive this communication electronically, please contact [email or web address] or [phone] to request more information on [facility] access.    For providers and/or their staff who would like to refer a patient to [facility], please contact us through our one-stop-shop provider referral line, [facility] [place], at [phone].    If you feel you have received this communication in error or would no longer like to receive these types of communications, please e-mail [email or web address]

## 2019-12-02 NOTE — PATIENT INSTRUCTIONS
SEBORRHEIC KERATOSES        What causes seborrheic keratoses?    Seborrheic keratoses are harmless, common skin growths that first appear during adult life.  As time goes by, more growths appear.  Some persons have a very large number of them.  Seborrheic keratoses appear on both covered and uncovered parts of the body; they are not caused by sunlight.  The tendency to develop seborrheic keratoses is inherited.    Seborrheic keratoses are harmless and never become malignant.  They begin as slightly raised, light brown spots.  Gradually they thicken and take on a rough wartlike surface.  They slowly darken and may turn black.  These color changes are harmless.  Seborrheic keratoses are superficial and look as if they were stuck on the skin.  Persons who have had several seborrheic keratoses can usually recognize this type of benign growth.  However, if you are concerned or unsure about any growth, consult me.    Treatment    Seborrheic keratoses can easily be removed in the office.  The only reason for removing a seborrheic keratosis is your wish to get rid of it.          Recommend frequent leg elevation.    Recommend compression hose (at least 18mm HG).

## 2019-12-02 NOTE — PROGRESS NOTES
Subjective:       Patient ID:  Ambrosio Bray III is a 69 y.o. male who presents for   Chief Complaint   Patient presents with    Lesion     right foot x 1 year growing, back x few months, itchy no prev tx       History of Present Illness: The patient presents with chief complaint of nodules and spots on back.    Nodules  Location: Right leg with nodule on the right lower ankle for less that one year. Suspects it could be due to a medication (Uptravi for Pulm HTN)  Duration: < 1year  Signs/Symptoms: Progressive, largely asymptomatic. Does have Hx of Varicose ulcers    Prior treatments: None    Spots  Locations: Back  Duration: Several years  Signs and symptoms: Usually asymptomatic, sometimes pruritic    Prior Tx: None      Lesion         Review of Systems   Cardiovascular: Positive for pedal edema (x many years).   Skin: Positive for dry skin (not using moisturizers). Negative for itching, rash, daily sunscreen use, activity-related sunscreen use and wears hat.   Hematologic/Lymphatic: Bruises/bleeds easily (on blood thinners).        Objective:    Physical Exam   Constitutional: He appears well-developed and well-nourished. No distress.   Neurological: He is alert and oriented to person, place, and time. He is not disoriented.   Psychiatric: He has a normal mood and affect.   Skin:   Areas Examined (abnormalities noted in diagram):   Back Inspection Performed  RLE Inspected  LLE Inspection Performed              Diagram Legend     Erythematous scaling macule/papule c/w actinic keratosis       Vascular papule c/w angioma      Pigmented verrucoid papule/plaque c/w seborrheic keratosis      Yellow umbilicated papule c/w sebaceous hyperplasia      Irregularly shaped tan macule c/w lentigo     1-2 mm smooth white papules consistent with Milia      Movable subcutaneous cyst with punctum c/w epidermal inclusion cyst      Subcutaneous movable cyst c/w pilar cyst      Firm pink to brown papule c/w dermatofibroma       Pedunculated fleshy papule(s) c/w skin tag(s)      Evenly pigmented macule c/w junctional nevus     Mildly variegated pigmented, slightly irregular-bordered macule c/w mildly atypical nevus      Flesh colored to evenly pigmented papule c/w intradermal nevus       Pink pearly papule/plaque c/w basal cell carcinoma      Erythematous hyperkeratotic cursted plaque c/w SCC      Surgical scar with no sign of skin cancer recurrence      Open and closed comedones      Inflammatory papules and pustules      Verrucoid papule consistent consistent with wart     Erythematous eczematous patches and plaques     Dystrophic onycholytic nail with subungual debris c/w onychomycosis     Umbilicated papule    Erythematous-base heme-crusted tan verrucoid plaque consistent with inflamed seborrheic keratosis     Erythematous Silvery Scaling Plaque c/w Psoriasis     See annotation      Assessment / Plan:        SK (seborrheic keratosis)  These are benign inherited growths without a malignant potential. Reassurance given to patient. No treatment is necessary.       Venous stasis dermatitis, unspecified laterality  Recommend frequent leg elevation.    Recommend compression hose (at least 18mm HG).                   Follow up if symptoms worsen or fail to improve.

## 2019-12-03 ENCOUNTER — TELEPHONE (OUTPATIENT)
Dept: TRANSPLANT | Facility: CLINIC | Age: 69
End: 2019-12-03

## 2019-12-03 ENCOUNTER — PATIENT MESSAGE (OUTPATIENT)
Dept: TRANSPLANT | Facility: CLINIC | Age: 69
End: 2019-12-03

## 2019-12-03 DIAGNOSIS — I27.9 CHRONIC PULMONARY HEART DISEASE: Primary | ICD-10-CM

## 2019-12-03 NOTE — TELEPHONE ENCOUNTER
----- Message from Zita Tovar MA sent at 12/3/2019  8:25 AM CST -----  Contact: Ambrosio   tel:  [phone]       ----- Message -----  From: Kelsie Meyer  Sent: 12/3/2019   8:16 AM CST  To: [facility] Staff    Caller says he is scheduled for Dec. 4th and wants to know why is it taken off of your schedule.   Says he wants to speak to a nurse asap.

## 2019-12-03 NOTE — TELEPHONE ENCOUNTER
----- Message from Zita Tovar MA sent at 12/3/2019  9:47 AM CST -----  Contact: [place]     [phone]      ----- Message -----  From: Kelsie Meyer  Sent: 12/3/2019   9:43 AM CST  To: [facility] Staff    Caller says he doesn't  Understand what time he needs to be here for his testing today and why is he seeing the drGurjit At 3pm if his testing is at 12pm .     Says he wants to speak to the nurse about this.   As per caller.

## 2019-12-04 ENCOUNTER — OFFICE VISIT (OUTPATIENT)
Dept: TRANSPLANT | Facility: CLINIC | Age: 69
End: 2019-12-04
Payer: MEDICARE

## 2019-12-04 ENCOUNTER — HOSPITAL ENCOUNTER (OUTPATIENT)
Dept: PULMONOLOGY | Facility: CLINIC | Age: 69
Discharge: HOME | End: 2019-12-04
Payer: MEDICARE

## 2019-12-04 VITALS
OXYGEN SATURATION: 92 % | WEIGHT: 196 LBS | HEIGHT: 73 IN | BODY MASS INDEX: 25.15 KG/M2 | WEIGHT: 200.63 LBS | HEIGHT: 74 IN | SYSTOLIC BLOOD PRESSURE: 139 MMHG | BODY MASS INDEX: 26.59 KG/M2 | HEART RATE: 99 BPM | DIASTOLIC BLOOD PRESSURE: 69 MMHG

## 2019-12-04 DIAGNOSIS — D50.9 IRON DEFICIENCY ANEMIA, UNSPECIFIED IRON DEFICIENCY ANEMIA TYPE: ICD-10-CM

## 2019-12-04 DIAGNOSIS — I27.9 CHRONIC PULMONARY HEART DISEASE: ICD-10-CM

## 2019-12-04 DIAGNOSIS — D62 ACUTE BLOOD LOSS ANEMIA: Primary | ICD-10-CM

## 2019-12-04 DIAGNOSIS — D64.9 ANEMIA, UNSPECIFIED TYPE: ICD-10-CM

## 2019-12-04 DIAGNOSIS — I27.24 CTEPH (CHRONIC THROMBOEMBOLIC PULMONARY HYPERTENSION): ICD-10-CM

## 2019-12-04 PROCEDURE — 99214 OFFICE O/P EST MOD 30 MIN: CPT | Mod: S$PBB,,, | Performed by: INTERNAL MEDICINE

## 2019-12-04 PROCEDURE — 99999 PR PBB SHADOW E&M-EST. PATIENT-LVL III: CPT | Mod: PBBFAC,,, | Performed by: INTERNAL MEDICINE

## 2019-12-04 PROCEDURE — 1159F MED LIST DOCD IN RCRD: CPT | Mod: ,,, | Performed by: INTERNAL MEDICINE

## 2019-12-04 PROCEDURE — 99999 PR PBB SHADOW E&M-EST. PATIENT-LVL III: ICD-10-PCS | Mod: PBBFAC,,, | Performed by: INTERNAL MEDICINE

## 2019-12-04 PROCEDURE — 1159F PR MEDICATION LIST DOCUMENTED IN MEDICAL RECORD: ICD-10-PCS | Mod: ,,, | Performed by: INTERNAL MEDICINE

## 2019-12-04 PROCEDURE — 94618 PULMONARY STRESS TESTING: CPT | Mod: PBBFAC | Performed by: INTERNAL MEDICINE

## 2019-12-04 PROCEDURE — 94618 PULMONARY STRESS TESTING: CPT | Mod: 26,S$PBB,, | Performed by: INTERNAL MEDICINE

## 2019-12-04 PROCEDURE — 1126F AMNT PAIN NOTED NONE PRSNT: CPT | Mod: ,,, | Performed by: INTERNAL MEDICINE

## 2019-12-04 PROCEDURE — 99214 PR OFFICE/OUTPT VISIT, EST, LEVL IV, 30-39 MIN: ICD-10-PCS | Mod: S$PBB,,, | Performed by: INTERNAL MEDICINE

## 2019-12-04 PROCEDURE — 1126F PR PAIN SEVERITY QUANTIFIED, NO PAIN PRESENT: ICD-10-PCS | Mod: ,,, | Performed by: INTERNAL MEDICINE

## 2019-12-04 PROCEDURE — 99213 OFFICE O/P EST LOW 20 MIN: CPT | Mod: PBBFAC,25 | Performed by: INTERNAL MEDICINE

## 2019-12-04 PROCEDURE — 94618 PULMONARY STRESS TESTING: ICD-10-PCS | Mod: 26,S$PBB,, | Performed by: INTERNAL MEDICINE

## 2019-12-07 NOTE — PROCEDURES
Ambrosio Bray III is a 69 y.o.  male patient, who presents for a 6 minute walk test ordered by Ines Posadas MD.  The diagnosis is Qualify for Oxygen; Pulmonary Hypertension.  The patient's BMI is 25.5 kg/m2. Predicted distance (lower limit of normal) is 365.08 meters.    Test Results:    The test was completed without stopping.  The total time walked was 360 seconds.  During walking, the patient reported:  No complaints.  The patient used no assistive devices during testing.     12/04/2019---------Distance: 466.95 meters (1532 feet)     O2 Sat % Supplemental Oxygen Heart Rate Blood Pressure Archie Scale   Pre-exercise  (Resting) 91 % Room Air 95 bpm 140/70 mmHg 0   During Exercise 83 % Room Air 151 bpm 140/78 mmHg 0.5   Post-exercise   90 % Room Air  90 bpm       Recovery Time:  158 seconds    Oxygen Qualification:     O2 Sat % Supplemental Oxygen Heart Rate Blood Pressure Archie Scale   Pre-exercise  (Resting) 94 % 2 L/M  87 bpm  141/83 mmHg 0    During Exercise   98 %  2 L/M  96 bpm  140/85 mmHg 0    Post-exercise   97 %  2 L/M  92 bpm        Performing nurse/tech: Guillermo MAZA      PREVIOUS STUDY:   05/15/2019---------Distance: 405.99 meters (1332 feet)       O2 Sat % Supplemental Oxygen Heart Rate Blood Pressure Archie Scale   Pre-exercise  (Resting) 94 % Room Air 92 bpm 127/62 mmHg 0   During Exercise   85 % Room Air 117 bpm 142/72 mmHg 0.5   Post-exercise  (Recovery) 91 % Room Air  89 bpm   mmHg        CLINICAL INTERPRETATION:  Six minute walk distance is 466.95 meters (1532 feet) with very, very light dyspnea.  During exercise, there was significant desaturation while breathing room air.  Blood pressure remained stable and Heart rate increased significantly with walking.  This may represent a tachycardic response to exercise.  The patient did not report non-pulmonary symptoms during exercise.  The patient may benefit from using supplemental oxygen during exertion.  Since the previous study in May 2019,  exercise capacity is significantly improved.  Based upon age and body mass index, exercise capacity is normal.   Oxygen saturation did improve while breathing supplemental oxygen.

## 2019-12-10 ENCOUNTER — PATIENT MESSAGE (OUTPATIENT)
Dept: TRANSPLANT | Facility: CLINIC | Age: 69
End: 2019-12-10

## 2019-12-10 NOTE — PROGRESS NOTES
Subjective:       Patient ID: Ambrosio Bray III is a 69 y.o. male.    Chief Complaint: Pulmonary Hypertension    HPI   Ambrosio Bray III 69 y.o. male    has a past medical history of Anemia, Atrial fibrillation, Diabetes mellitus, Heart failure, Hyperlipidemia, Hypertension, PTSD (post-traumatic stress disorder), Pulmonary hyperinflation, and Urinary incontinence.    has a past surgical history that includes Cardiac catheterization; Colonoscopy (N/A, 5/1/2018); and Right heart catheterization (Right, 11/28/2018).   reports that he has never smoked. He has never used smokeless tobacco. He reports that he drinks about 2.0 - 3.0 standard drinks of alcohol per week. He reports that he does not use drugs.  Referred by: No ref. provider found  Who had concerns including Pulmonary Hypertension.  The patient's last visit with me was on 9/4/2019.    Doing ok but continues to be anemic, decrease in hgb by 3 g in 1 year! Needs GI appt  Doing ok, otherwise, tolerating diuresis    No fever chills, ns, wt changes, nausea, vomiting, diarrhea, constipation, chest pain, tightness, pressure. Remainder of review of systems is negative.  Otherwise asymptomatic from pulmonary standpoint.    Review of Systems    Objective:      Physical Exam  Personal Diagnostic Review    No flowsheet data found.      Assessment:       1. Acute blood loss anemia    2. Anemia, unspecified type    3. Iron deficiency anemia, unspecified iron deficiency anemia type    4. CTEPH (chronic thromboembolic pulmonary hypertension)        Outpatient Encounter Medications as of 12/4/2019   Medication Sig Dispense Refill    atorvastatin (LIPITOR) 40 MG tablet TAKE 1 TABLET BY MOUTH ONCE DAILY 90 tablet 1    ferrous sulfate 325 (65 FE) MG EC tablet Take 325 mg by mouth 2 (two) times daily with meals.      furosemide (LASIX) 40 MG tablet Take 1 tablet (40 mg total) by mouth every Mon, Wed, Fri. 12 tablet 11    ipratropium (ATROVENT) 0.06 % nasal spray 2 sprays  by Nasal route 4 (four) times daily. (Patient taking differently: 2 sprays by Nasal route 4 (four) times daily as needed. ) 15 mL 6    mometasone 0.1% (ELOCON) 0.1 % cream Apply topically once daily.      potassium chloride SA (KLOR-CON M15) 15 MEQ tablet Take 1 tablet (15 mEq total) by mouth 2 (two) times daily. 60 tablet 11    riociguat (ADEMPAS) 2.5 mg tablet Take 1 tablet (2.5 mg total) by mouth 3 (three) times daily. 90 tablet 11    rivaroxaban (XARELTO) 20 mg Tab TAKE 1 TABLET BY MOUTH ONCE DAILY WITH  DINNER  OR  EVENING  MEAL 90 tablet 1    selexipag (UPTRAVI) 200 mcg (140)- 800 mcg (60) DsPk Take by mouth 2 (two) times daily. Pt states he takes 1600mcg twice a day      tamsulosin (FLOMAX) 0.4 mg Cap TAKE 1 CAPSULE BY MOUTH ONCE DAILY 90 capsule 1    UPTRAVI 1,600 mcg Tab        Facility-Administered Encounter Medications as of 12/4/2019   Medication Dose Route Frequency Provider Last Rate Last Dose    lidocaine HCL 10 mg/ml (1%) injection 1 mL  1 mL Other 1 time in Clinic/HOD Julius Matute Jr., MD         Orders Placed This Encounter   Procedures    Ambulatory consult to Gastroenterology     Referral Priority:   Routine     Referral Type:   Consultation     Referral Reason:   Specialty Services Required     Requested Specialty:   Gastroenterology     Number of Visits Requested:   1       Plan:               Assessment:  Ambrosio was seen today for pulmonary hypertension.    Diagnoses and all orders for this visit:    Acute blood loss anemia    Anemia, unspecified type  -     Ambulatory consult to Gastroenterology    Iron deficiency anemia, unspecified iron deficiency anemia type    CTEPH (chronic thromboembolic pulmonary hypertension)        Plan:  Problem List Items Addressed This Visit     CTEPH (chronic thromboembolic pulmonary hypertension)    Overview     Patient with cteph, doing well, but concern by me for increased pressures  RHC confirmed  Add uptravi to riociguat  And xarelto  Continue  hctz         Iron deficiency anemia    Overview     New iron def anemia- etiology unclear. Patient will need egd, colonoscopy to evaluate further.            Other Visit Diagnoses     Acute blood loss anemia    -  Primary    Anemia, unspecified type        Relevant Orders    Ambulatory consult to Gastroenterology            Follow up in about 2 months (around 2/4/2020).    There are no Patient Instructions on file for this visit.    Immunization History   Administered Date(s) Administered    Influenza 01/17/2013    Influenza - High Dose - PF (65 years and older) 10/17/2015, 10/31/2016, 09/15/2017, 10/20/2018, 10/03/2019    Influenza - Quadrivalent 11/06/2014    Influenza - Quadrivalent - PF (6 months and older) 01/17/2013    Influenza Split 01/17/2013    Pneumococcal Conjugate - 13 Valent 10/31/2016    Pneumococcal Polysaccharide - 23 Valent 04/09/2018    Zoster 01/13/2015

## 2019-12-12 ENCOUNTER — TELEPHONE (OUTPATIENT)
Dept: TRANSPLANT | Facility: CLINIC | Age: 69
End: 2019-12-12

## 2019-12-12 NOTE — TELEPHONE ENCOUNTER
Uptravi approved through 12/11/20. [facility]

## 2019-12-12 NOTE — TELEPHONE ENCOUNTER
Approval notification for Adempas received, good through 12/11/2020. [facility]

## 2019-12-12 NOTE — TELEPHONE ENCOUNTER
PAs for both Adempas and Uptravi were submitted urgently on CMM. [facility]

## 2020-02-05 LAB
LEFT EYE DM RETINOPATHY: NEGATIVE
RIGHT EYE DM RETINOPATHY: NEGATIVE

## 2020-03-03 ENCOUNTER — PATIENT OUTREACH (OUTPATIENT)
Dept: ADMINISTRATIVE | Facility: OTHER | Age: 70
End: 2020-03-03

## 2020-03-04 ENCOUNTER — OFFICE VISIT (OUTPATIENT)
Dept: GASTROENTEROLOGY | Facility: CLINIC | Age: 70
End: 2020-03-04
Payer: MEDICARE

## 2020-03-04 VITALS
HEART RATE: 94 BPM | SYSTOLIC BLOOD PRESSURE: 124 MMHG | DIASTOLIC BLOOD PRESSURE: 82 MMHG | WEIGHT: 201.63 LBS | BODY MASS INDEX: 26.6 KG/M2

## 2020-03-04 DIAGNOSIS — R14.0 ABDOMINAL DISTENSION: Primary | ICD-10-CM

## 2020-03-04 DIAGNOSIS — I27.20 PULMONARY HTN: ICD-10-CM

## 2020-03-04 DIAGNOSIS — R14.0 ABDOMINAL DISTENSION: ICD-10-CM

## 2020-03-04 DIAGNOSIS — D50.9 IRON DEFICIENCY ANEMIA, UNSPECIFIED IRON DEFICIENCY ANEMIA TYPE: Primary | ICD-10-CM

## 2020-03-04 PROCEDURE — 99214 OFFICE O/P EST MOD 30 MIN: CPT | Mod: PBBFAC,PO | Performed by: INTERNAL MEDICINE

## 2020-03-04 PROCEDURE — 99999 PR PBB SHADOW E&M-EST. PATIENT-LVL IV: CPT | Mod: PBBFAC,,, | Performed by: INTERNAL MEDICINE

## 2020-03-04 PROCEDURE — 99204 PR OFFICE/OUTPT VISIT, NEW, LEVL IV, 45-59 MIN: ICD-10-PCS | Mod: S$PBB,,, | Performed by: INTERNAL MEDICINE

## 2020-03-04 PROCEDURE — 99999 PR PBB SHADOW E&M-EST. PATIENT-LVL IV: ICD-10-PCS | Mod: PBBFAC,,, | Performed by: INTERNAL MEDICINE

## 2020-03-04 PROCEDURE — 99204 OFFICE O/P NEW MOD 45 MIN: CPT | Mod: S$PBB,,, | Performed by: INTERNAL MEDICINE

## 2020-03-04 NOTE — LETTER
March 4, 2020      Inna Posadas MD  [address]  [facility] [address]           [facility]  [address]   [facility] [address]  Phone: [phone]  Fax: [phone]          Patient: Ambrosio Bray III   MR Number: [id number]   YOB: 1950   Date of Visit: 3/4/2020       Dear Dr. Inna Posadas:    Thank you for referring Ambrosio Bray to me for evaluation. Attached you will find relevant portions of my assessment and plan of care.    If you have questions, please do not hesitate to call me. I look forward to following Ambrosio Bray along with you.    Sincerely,    Allyn Marquez MD    Enclosure  CC:  No Recipients    If you would like to receive this communication electronically, please contact [email or web address] or [phone] to request more information on [employer] [facility] [facility] access.    For providers and/or their staff who would like to refer a patient to [facility], please contact us through our one-stop-shop provider referral line, [facility], at [phone].    If you feel you have received this communication in error or would no longer like to receive these types of communications, please e-mail [email or web address]

## 2020-03-04 NOTE — PROGRESS NOTES
"Subjective:       Patient ID: Ambrosio Bray III is a 69 y.o. male.    Chief Complaint: Anemia    68 yo M referred for anemia.  He has severe pulmonary HTN on Uptravi, treated at [facility] by Dr. Posadas.  His anemia has been longstanding but more progressive in the past year.  He states that he used to be on oral iron, but he stopped when he began having side effects from the Uptravi.  He states he did well with this drug until this last dose escalation.  Since being on this higher dose, he has severe diarrhea (8x/day) and large amounts of flatus associated with abdominal distention.  He has not taken oral iron in "a while" and he has never received IV iron.  He denies overt rectal bleeding, abdominal pain, n/v, or heartburn.  He has been on Xarelto for 10 years.  His father  of CRC at age 52 and the pt's last cscope 2018 showed hyperplastic polyp only.  He complains of BLE swelling, but states his breathing has been better since increasing the Uptravi.  He does not use home O2.    Review of Systems   Constitutional: Negative for appetite change and unexpected weight change.   Eyes: Negative for photophobia and visual disturbance.   Respiratory: Positive for shortness of breath. Negative for chest tightness and wheezing.    Cardiovascular: Positive for leg swelling. Negative for chest pain and palpitations.   Gastrointestinal: Positive for abdominal distention.   Genitourinary: Negative for dysuria, flank pain and hematuria.   Musculoskeletal: Negative for joint swelling and myalgias.   Skin: Negative for color change and rash.   Neurological: Negative for dizziness and speech difficulty.   Psychiatric/Behavioral: Negative for confusion and hallucinations.       Objective:       /82 (BP Location: Left arm, Patient Position: Sitting, BP Method: Large (Manual))   Pulse 94   Wt 91.4 kg (201 lb 9.6 oz)   BMI 26.60 kg/m²     Physical Exam   Constitutional: He is oriented to person, place, and time. He " appears well-developed and well-nourished.   HENT:   Head: Normocephalic and atraumatic.   Eyes: Pupils are equal, round, and reactive to light. EOM are normal.   Neck: Normal range of motion. Neck supple.   Cardiovascular: Normal rate.   Irregularly irregular   Pulmonary/Chest: Effort normal and breath sounds normal.   Abdominal: Soft. Bowel sounds are normal. He exhibits distension. There is no tenderness. There is no guarding.   Large amount of tympany throughout   Musculoskeletal: He exhibits edema. He exhibits no deformity.   Neurological: He is alert and oriented to person, place, and time.   Skin: Skin is warm and dry.   Psychiatric: He has a normal mood and affect. His behavior is normal.       Lab Results   Component Value Date    WBC 4.05 12/04/2019    HGB 9.4 (L) 12/04/2019    HCT 31.6 (L) 12/04/2019    MCV 80 (L) 12/04/2019     12/04/2019     Lab Results   Component Value Date    IRON 27 (L) 09/04/2019    TIBC 444 09/04/2019    FERRITIN 12 (L) 09/04/2019     Old records from chart reviewed and summarized, significant for:   Cscope 5/2018:  IH, sigmoid tics, small HP  Pulmonary artery pressure of 78    Assessment:       1. Iron deficiency anemia, unspecified iron deficiency anemia type    2. Pulmonary HTN    3. Abdominal distension        Plan:       Iron deficiency anemia, unspecified iron deficiency anemia type  -     FL Upper GI With KUB; Future; Expected date: 03/04/2020  -     Ambulatory referral/consult to Hematology / Oncology; Future; Expected date: 03/11/2020 for IV iron    Pulmonary HTN        -     This pt is very high risk for sedation given his PHTN.  My plan is to exclude mass like lesions using the UGI study and then to replace his iron with IV iron instead of oral iron.  If this is not sufficient or if his primary caregivers at [facility] feel than endoscopy must be pursued, it would need to be done at [facility] for safety reasons.    Abdominal distension  -     X-Ray Abdomen  Flat And Erect; Future; Expected date: 03/04/2020  -     Distention and diarrhea highly likely related to the Uptravi.  Add probiotic for one month and see if it helps.  He can continue forever if it helps, but stop if it offers no benefit

## 2020-03-04 NOTE — PATIENT INSTRUCTIONS

## 2020-03-07 ENCOUNTER — HOSPITAL ENCOUNTER (OUTPATIENT)
Dept: RADIOLOGY | Facility: HOSPITAL | Age: 70
Discharge: HOME | End: 2020-03-07
Attending: INTERNAL MEDICINE
Payer: MEDICARE

## 2020-03-07 DIAGNOSIS — R14.0 ABDOMINAL DISTENSION: ICD-10-CM

## 2020-03-07 PROCEDURE — 76700 US ABDOMEN COMPLETE: ICD-10-PCS | Mod: 26,,, | Performed by: RADIOLOGY

## 2020-03-07 PROCEDURE — 76700 US EXAM ABDOM COMPLETE: CPT | Mod: TC

## 2020-03-07 PROCEDURE — 76700 US EXAM ABDOM COMPLETE: CPT | Mod: 26,,, | Performed by: RADIOLOGY

## 2020-03-11 ENCOUNTER — HOSPITAL ENCOUNTER (OUTPATIENT)
Dept: RADIOLOGY | Facility: HOSPITAL | Age: 70
Discharge: HOME | End: 2020-03-11
Attending: INTERNAL MEDICINE
Payer: MEDICARE

## 2020-03-11 DIAGNOSIS — D50.9 IRON DEFICIENCY ANEMIA, UNSPECIFIED IRON DEFICIENCY ANEMIA TYPE: ICD-10-CM

## 2020-03-11 PROCEDURE — A9698 NON-RAD CONTRAST MATERIALNOC: HCPCS | Performed by: INTERNAL MEDICINE

## 2020-03-11 PROCEDURE — 25500020 PHARM REV CODE 255: Performed by: INTERNAL MEDICINE

## 2020-03-11 PROCEDURE — 74240 X-RAY XM UPR GI TRC 1CNTRST: CPT | Mod: 26,,, | Performed by: RADIOLOGY

## 2020-03-11 PROCEDURE — 74240 X-RAY XM UPR GI TRC 1CNTRST: CPT | Mod: TC,FY

## 2020-03-11 PROCEDURE — 74240 FL UPPER GI W KUB: ICD-10-PCS | Mod: 26,,, | Performed by: RADIOLOGY

## 2020-03-11 RX ADMIN — BARIUM SULFATE 300 ML: 0.6 SUSPENSION ORAL at 09:03

## 2020-03-13 ENCOUNTER — OFFICE VISIT (OUTPATIENT)
Dept: HEMATOLOGY/ONCOLOGY | Facility: CLINIC | Age: 70
End: 2020-03-13
Payer: MEDICARE

## 2020-03-13 VITALS
RESPIRATION RATE: 18 BRPM | BODY MASS INDEX: 27.14 KG/M2 | SYSTOLIC BLOOD PRESSURE: 112 MMHG | HEART RATE: 86 BPM | DIASTOLIC BLOOD PRESSURE: 72 MMHG | OXYGEN SATURATION: 98 % | TEMPERATURE: 98 F | WEIGHT: 205.69 LBS

## 2020-03-13 DIAGNOSIS — I70.0 ATHEROSCLEROSIS OF AORTA: ICD-10-CM

## 2020-03-13 DIAGNOSIS — I27.9 CHRONIC PULMONARY HEART DISEASE: ICD-10-CM

## 2020-03-13 DIAGNOSIS — D50.9 IRON DEFICIENCY ANEMIA, UNSPECIFIED IRON DEFICIENCY ANEMIA TYPE: ICD-10-CM

## 2020-03-13 DIAGNOSIS — D50.0 IRON DEFICIENCY ANEMIA DUE TO CHRONIC BLOOD LOSS: Primary | ICD-10-CM

## 2020-03-13 DIAGNOSIS — I27.24 CTEPH (CHRONIC THROMBOEMBOLIC PULMONARY HYPERTENSION): ICD-10-CM

## 2020-03-13 DIAGNOSIS — E11.29 TYPE 2 DIABETES MELLITUS WITH MICROALBUMINURIA, WITHOUT LONG-TERM CURRENT USE OF INSULIN: ICD-10-CM

## 2020-03-13 DIAGNOSIS — I48.0 PAROXYSMAL ATRIAL FIBRILLATION: ICD-10-CM

## 2020-03-13 DIAGNOSIS — Z79.01 CURRENT USE OF LONG TERM ANTICOAGULATION: ICD-10-CM

## 2020-03-13 DIAGNOSIS — Z71.89 ADVANCE CARE PLANNING: ICD-10-CM

## 2020-03-13 DIAGNOSIS — R80.9 TYPE 2 DIABETES MELLITUS WITH MICROALBUMINURIA, WITHOUT LONG-TERM CURRENT USE OF INSULIN: ICD-10-CM

## 2020-03-13 PROCEDURE — 99497 ADVNCD CARE PLAN 30 MIN: CPT | Mod: S$PBB,,, | Performed by: INTERNAL MEDICINE

## 2020-03-13 PROCEDURE — 99204 OFFICE O/P NEW MOD 45 MIN: CPT | Mod: S$PBB,,, | Performed by: INTERNAL MEDICINE

## 2020-03-13 PROCEDURE — 99214 OFFICE O/P EST MOD 30 MIN: CPT | Mod: PBBFAC,PO,25 | Performed by: INTERNAL MEDICINE

## 2020-03-13 PROCEDURE — 99999 PR PBB SHADOW E&M-EST. PATIENT-LVL IV: CPT | Mod: PBBFAC,,, | Performed by: INTERNAL MEDICINE

## 2020-03-13 PROCEDURE — 99999 PR PBB SHADOW E&M-EST. PATIENT-LVL IV: ICD-10-PCS | Mod: PBBFAC,,, | Performed by: INTERNAL MEDICINE

## 2020-03-13 PROCEDURE — 99497 PR ADVNCD CARE PLAN 30 MIN: ICD-10-PCS | Mod: S$PBB,,, | Performed by: INTERNAL MEDICINE

## 2020-03-13 PROCEDURE — 99204 PR OFFICE/OUTPT VISIT, NEW, LEVL IV, 45-59 MIN: ICD-10-PCS | Mod: S$PBB,,, | Performed by: INTERNAL MEDICINE

## 2020-03-13 PROCEDURE — 99497 ADVNCD CARE PLAN 30 MIN: CPT | Mod: PBBFAC,PO | Performed by: INTERNAL MEDICINE

## 2020-03-13 RX ORDER — SODIUM CHLORIDE 0.9 % (FLUSH) 0.9 %
10 SYRINGE (ML) INJECTION
Status: CANCELLED | OUTPATIENT
Start: 2020-03-25

## 2020-03-13 RX ORDER — HEPARIN 100 UNIT/ML
500 SYRINGE INTRAVENOUS
Status: CANCELLED | OUTPATIENT
Start: 2020-03-25

## 2020-03-13 RX ORDER — HEPARIN 100 UNIT/ML
500 SYRINGE INTRAVENOUS
Status: CANCELLED | OUTPATIENT
Start: 2020-03-18

## 2020-03-13 RX ORDER — FERROUS SULFATE 325(65) MG
325 TABLET, DELAYED RELEASE (ENTERIC COATED) ORAL 2 TIMES DAILY WITH MEALS
Qty: 180 TABLET | Refills: 3 | Status: SHIPPED | OUTPATIENT
Start: 2020-03-13

## 2020-03-13 RX ORDER — SODIUM CHLORIDE 0.9 % (FLUSH) 0.9 %
10 SYRINGE (ML) INJECTION
Status: CANCELLED | OUTPATIENT
Start: 2020-03-18

## 2020-03-13 NOTE — PROGRESS NOTES
PATIENT: Ambrosio Bray III  MRN: [id number]  DATE: 3/13/2020    Diagnosis:   1. Iron deficiency anemia due to chronic blood loss    2. Iron deficiency anemia, unspecified iron deficiency anemia type    3. Current use of long term anticoagulation    4. Paroxysmal atrial fibrillation    5. Atherosclerosis of aorta    6. CTEPH (chronic thromboembolic pulmonary hypertension)    7. Chronic pulmonary heart disease    8. Type 2 diabetes mellitus with microalbuminuria, without long-term current use of insulin    9. Advance care planning      Chief Complaint: Anemia    Subjective:    History of Present Illness: Mr. Bray is a 69 y.o. male who presents for evaluation and management of iron deficiency anemia. He is referred by Dr. Marquez.    - labs since 2-18 reveal a worsening anemia.  - iron studies (9/4/19) revealed a decreased ferritin/iron/saturated iron. These finding are consistent with severe iron deficiency anemia.  - he takes rivaroxaban chronically.  - he denies dark stool. He recently underwent an upper GI KUB series. There is hesitancy to proceed with upper GI endoscopy due to his chronic respiratory condition.  - he has not been taking oral iron.     Past medical, surgical, family, and social histories have been reviewed and updated below.    Past Medical History:   Past Medical History:   Diagnosis Date    Anemia     Atrial fibrillation     Diabetes mellitus     Heart failure     Hyperlipidemia     Hypertension     PTSD (post-traumatic stress disorder)     Pulmonary hyperinflation     Urinary incontinence        Past Surgical History:   Past Surgical History:   Procedure Laterality Date    CARDIAC CATHETERIZATION      COLONOSCOPY N/A 5/1/2018    Procedure: COLONOSCOPY;  Surgeon: Bubba Navarro MD;  Location: [facility];  Service: Endoscopy;  Laterality: N/A;  confirmed appt 4/24/18    RIGHT HEART CATHETERIZATION Right 11/28/2018    Procedure: INSERTION, CATHETER, RIGHT HEART;  Surgeon: Chelsea LANDERS  MD Adis;  Location: [facility] LAB;  Service: Cardiology;  Laterality: Right;       Family History:   Family History   Problem Relation Age of Onset    Cancer Father         colon    Colon cancer Father     Heart disease Mother     Asthma Mother     Diabetes Mother     Hypertension Mother     Kidney disease Maternal Grandmother     Diabetes Paternal Grandmother     Cirrhosis Paternal Grandfather        Social History:  reports that he has never smoked. He has never used smokeless tobacco. He reports that he drinks about 2.0 - 3.0 standard drinks of alcohol per week. He reports that he does not use drugs.    Allergies:  Review of patient's allergies indicates:  No Known Allergies    Medications:  Current Outpatient Medications   Medication Sig Dispense Refill    atorvastatin (LIPITOR) 40 MG tablet TAKE 1 TABLET BY MOUTH ONCE DAILY 90 tablet 1    ferrous sulfate 325 (65 FE) MG EC tablet Take 325 mg by mouth 2 (two) times daily with meals.      furosemide (LASIX) 40 MG tablet Take 1 tablet (40 mg total) by mouth every Mon, Wed, Fri. 12 tablet 11    ipratropium (ATROVENT) 0.06 % nasal spray 2 sprays by Nasal route 4 (four) times daily. (Patient taking differently: 2 sprays by Nasal route 4 (four) times daily as needed. ) 15 mL 6    mometasone 0.1% (ELOCON) 0.1 % cream Apply topically once daily.      potassium chloride SA (KLOR-CON M15) 15 MEQ tablet Take 1 tablet (15 mEq total) by mouth 2 (two) times daily. 60 tablet 11    riociguat (ADEMPAS) 2.5 mg tablet Take 1 tablet (2.5 mg total) by mouth 3 (three) times daily. 90 tablet 11    rivaroxaban (XARELTO) 20 mg Tab TAKE 1 TABLET BY MOUTH ONCE DAILY WITH  DINNER  OR  EVENING  MEAL 90 tablet 1    selexipag (UPTRAVI) 200 mcg (140)- 800 mcg (60) DsPk Take by mouth 2 (two) times daily. Pt states he takes 1600mcg twice a day      tamsulosin (FLOMAX) 0.4 mg Cap TAKE 1 CAPSULE BY MOUTH ONCE DAILY 90 capsule 1    UPTRAVI 1,600 mcg Tab        Current  Facility-Administered Medications   Medication Dose Route Frequency Provider Last Rate Last Dose    lidocaine HCL 10 mg/ml (1%) injection 1 mL  1 mL Other 1 time in Clinic/HOD Julius Matute Jr., MD           Review of Systems   Constitutional: Positive for fatigue.   HENT: Negative for sore throat.    Eyes: Negative for visual disturbance.   Respiratory: Positive for shortness of breath.    Gastrointestinal: Negative for abdominal pain.   Genitourinary: Negative for dysuria.   Musculoskeletal: Negative for back pain.   Skin: Negative for rash.   Neurological: Negative for headaches.   Hematological: Negative for adenopathy.   Psychiatric/Behavioral: The patient is not nervous/anxious.        ECOG Performance Status:   ECOG SCORE 1       Objective:      Vitals:   Vitals:    03/13/20 1317   BP: 112/72   Pulse: 86   Resp: 18   Temp: 98.1 °F (36.7 °C)   TempSrc: Oral   SpO2: 98%   Weight: 93.3 kg (205 lb 11 oz)     BMI: Body mass index is 27.14 kg/m².    Physical Exam   Constitutional: He is oriented to person, place, and time. He appears well-developed and well-nourished.   HENT:   Head: Normocephalic and atraumatic.   Eyes: Pupils are equal, round, and reactive to light. EOM are normal.   Neck: Normal range of motion. Neck supple.   Cardiovascular: Normal rate and regular rhythm.   Pulmonary/Chest: Effort normal.   Abdominal: Soft. Bowel sounds are normal.   Musculoskeletal: Normal range of motion. He exhibits no edema.   Neurological: He is alert and oriented to person, place, and time.   Skin: Skin is warm and dry.   Psychiatric: He has a normal mood and affect. His behavior is normal. Judgment and thought content normal.   Nursing note and vitals reviewed.      Laboratory Data:  Labs have been reviewed.    Lab Results   Component Value Date    WBC 4.05 12/04/2019    HGB 9.4 (L) 12/04/2019    HCT 31.6 (L) 12/04/2019    MCV 80 (L) 12/04/2019     12/04/2019           Imaging:    Assessment:       1. Iron  deficiency anemia due to chronic blood loss    2. Iron deficiency anemia, unspecified iron deficiency anemia type    3. Current use of long term anticoagulation    4. Paroxysmal atrial fibrillation    5. Atherosclerosis of aorta    6. CTEPH (chronic thromboembolic pulmonary hypertension)    7. Chronic pulmonary heart disease    8. Type 2 diabetes mellitus with microalbuminuria, without long-term current use of insulin    9. Advance care planning           Plan:     1. Iron deficiency anemia  - I have reviewed his chart/labs.  - labs since 2-18 reveal a worsening anemia.  - iron studies (9/4/19) revealed a decreased ferritin/iron/saturated iron. These finding are consistent with severe iron deficiency anemia.  - given the severity of his iron deficiency anemia, as well as his symptoms, I recommend ferric carboxymaltose x 2 doses.  - I have also prescribed oral ferrous sulfate as well  - Colonoscopy in 5/2018 was negative for a source of bleeding.  - he denies dark stool. He recently underwent an upper GI KUB series. There is hesitancy to proceed with upper GI endoscopy due to his chronic respiratory condition.  - return to clinic in 7-8 weeks with repeat labs    2. Pulmonary hypertension / atrial fibrillation / chronic anticoagulation  - he take rivaroxaban, which increases his risk of bleeding  - continue to monitor.    3. Atherosclerosis of aorta  - seen on imaging  - continue atorvastatin  - continue to monitor    4. Advance Care Planning     Power of   I initiated the process of advance care planning today and explained the importance of this process to the patient.  I introduced the concept of advance directives to the patient, as well. Then the patient received detailed information about the importance of designating a Health Care Power of  (HCPOA). He was also instructed to communicate with this person about their wishes for future healthcare, should he become sick and lose decision-making  capacity. The patient has not previously appointed a HCPOA. After our discussion, the patient has decided to complete a HCPOA and has appointed his wife Nina Romero ([phone]). I spent a total time of 16 minutes discussing this issue with the patient.          - return to clinic in 7-8 weeks with repeat labs    Ron Ibarra M.D.  Hematology/Oncology  [facility] - [address], [address]  [place] [address]  Phone: [phone]  Fax: [phone]

## 2020-03-13 NOTE — LETTER
March 13, 2020      Allyn Marquez MD  [address]  [address]   [place] [address]           [facility] Hematology Oncology  [address] [place] [address] [address] [address]  Phone: [phone]          Patient: Ambrosio Bray III   MR Number: [id number]   YOB: 1950   Date of Visit: 3/13/2020       Dear Dr. Allyn Marquez:    Thank you for referring Ambrosio Bray to me for evaluation. Attached you will find relevant portions of my assessment and plan of care.    If you have questions, please do not hesitate to call me. I look forward to following Ambrosio Bray along with you.    Sincerely,    Ron Ibarra MD    Enclosure  CC:  No Recipients    If you would like to receive this communication electronically, please contact [email or web address] or [phone] to request more information on [employer] access.    For providers and/or their staff who would like to refer a patient to [facility], please contact us through our one-stop-shop provider referral line, [facility], at [phone].    If you feel you have received this communication in error or would no longer like to receive these types of communications, please e-mail [email or web address]

## 2020-03-20 ENCOUNTER — PATIENT MESSAGE (OUTPATIENT)
Dept: GASTROENTEROLOGY | Facility: CLINIC | Age: 70
End: 2020-03-20

## 2020-03-20 ENCOUNTER — TELEPHONE (OUTPATIENT)
Dept: GASTROENTEROLOGY | Facility: CLINIC | Age: 70
End: 2020-03-20

## 2020-03-20 NOTE — TELEPHONE ENCOUNTER
Spoke to patient via phone. Explained all results to patient and informed patient to get a OTC PPI such as Prilosec. Patient has already seen Hematology.

## 2020-03-20 NOTE — TELEPHONE ENCOUNTER
----- Message from Allyn Marquez MD sent at 3/11/2020  4:19 PM CDT -----  His esophagus does not move very well and there appears to be a ring at the bottom.  He needs to be very careful to NOT lay flat for 3 hours after eating.  He could benefit from a PPI, take any of the OTC PPI once per day in the morning on an empty stomach.  No masses or ulcers were seen.  F/u with pulmonary at [facility].

## 2020-03-24 ENCOUNTER — PATIENT MESSAGE (OUTPATIENT)
Dept: ADMINISTRATIVE | Facility: OTHER | Age: 70
End: 2020-03-24

## 2020-03-25 DIAGNOSIS — E11.9 TYPE 2 DIABETES MELLITUS: ICD-10-CM

## 2020-03-26 RX ORDER — SELEXIPAG 1600 UG/1
TABLET, COATED ORAL
Qty: 60 TABLET | Refills: 12 | Status: SHIPPED | OUTPATIENT
Start: 2020-03-26 | End: 2021-03-18 | Stop reason: SDUPTHER

## 2020-03-27 ENCOUNTER — PATIENT MESSAGE (OUTPATIENT)
Dept: ELECTROPHYSIOLOGY | Facility: CLINIC | Age: 70
End: 2020-03-27

## 2020-03-30 ENCOUNTER — TELEPHONE (OUTPATIENT)
Dept: ELECTROPHYSIOLOGY | Facility: CLINIC | Age: 70
End: 2020-03-30

## 2020-03-31 ENCOUNTER — PATIENT MESSAGE (OUTPATIENT)
Dept: ELECTROPHYSIOLOGY | Facility: CLINIC | Age: 70
End: 2020-03-31

## 2020-03-31 ENCOUNTER — OFFICE VISIT (OUTPATIENT)
Dept: ELECTROPHYSIOLOGY | Facility: CLINIC | Age: 70
End: 2020-03-31
Payer: MEDICARE

## 2020-03-31 ENCOUNTER — TELEPHONE (OUTPATIENT)
Dept: ELECTROPHYSIOLOGY | Facility: CLINIC | Age: 70
End: 2020-03-31

## 2020-03-31 ENCOUNTER — PATIENT OUTREACH (OUTPATIENT)
Dept: ADMINISTRATIVE | Facility: OTHER | Age: 70
End: 2020-03-31

## 2020-03-31 DIAGNOSIS — I83.93 VARICOSE VEINS OF BOTH LOWER EXTREMITIES, UNSPECIFIED WHETHER COMPLICATED: ICD-10-CM

## 2020-03-31 DIAGNOSIS — I48.3 TYPICAL ATRIAL FLUTTER: Primary | ICD-10-CM

## 2020-03-31 DIAGNOSIS — I27.9 CHRONIC PULMONARY HEART DISEASE: ICD-10-CM

## 2020-03-31 PROCEDURE — 99214 OFFICE O/P EST MOD 30 MIN: CPT | Mod: 95,,, | Performed by: INTERNAL MEDICINE

## 2020-03-31 PROCEDURE — 99214 PR OFFICE/OUTPT VISIT, EST, LEVL IV, 30-39 MIN: ICD-10-PCS | Mod: 95,,, | Performed by: INTERNAL MEDICINE

## 2020-03-31 NOTE — PROGRESS NOTES
Subjective:    Patient ID:  Ambrosio rBay III is a 69 y.o. male who presents for follow-up of No chief complaint on file.      The patient location is: Home  The chief complaint leading to consultation is: AFL  Visit type: Virtual visit with synchronous audio and video  Total time spent with patient: 10  Each patient to whom he or she provides medical services by telemedicine is:  (1) informed of the relationship between the physician and patient and the respective role of any other health care provider with respect to management of the patient; and (2) notified that he or she may decline to receive medical services by telemedicine and may withdraw from such care at any time.    Notes:       69 yoM pHTN, thromboembolic disease, DM, HTN here for evaluation of arrhythmia.   1/18: He has severe pHTN as well as chronic thromboembolic disease. 12/26/17 he presented with rapid palpitations and dyspnea. He was found to be in a wide complex tachycardia with rate of 240 bpm. Amiodarone was given and his rate dropped in half to 118 bpm. He has ECG features consistent with AFL 2:1 conduction. He was then placed on amiodarone 200 mg bid. He was on xarelto chronically. He denies any subsequent symptoms. He had normal EF 10/17 however his PAP was 103 mm Hg. He is followed by Florencia Mcdermott and [facility]. He had history of atrial fibrillation and underwent RITO/CV 9/30/14. In my review of his ECGs, I feel that this is typical AFL as opposed to AF. He was on amiodarone for about one year post CV.     3/19: 2/1/18 AFL RFA. pHTN continues to progress. Remains on xarelto. He feels better symptomatically. EF 40-45% with lower PA pressures. Has recent changes to pHTN medications.     Interval history: SR with PACs on subsequent ECGs. EF 65% 5/19. Stable cardiac symptoms. No arrhythmia symptoms. 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     Echo 5/19:  · Normal left ventricular systolic function. The estimated ejection fraction is 65%  · No wall motion  abnormalities.  · Severe biatrial enlargement.  · Interatrial septum bows to left, consider elevated right atrial pressure.  · Grade II (moderate) left ventricular diastolic dysfunction consistent with pseudonormalization.  · Elevated left atrial pressure.  · Moderately reduced right ventricular systolic function.  · Severe right ventricular enlargement.  · Mild mitral regurgitation.  · Moderate to severe tricuspid regurgitation.  · The estimated PA systolic pressure is 78 mm Hg  · Pulmonary hypertension present.  · Elevated central venous pressure (15 mm Hg).    Echo 10/18:  CONCLUSIONS     1 - Mildly to moderately depressed left ventricular systolic function (EF 40-45%).     2 - Right ventricular enlargement with hypertrophy, with severely depressed systolic function.     3 - Impaired LV relaxation, elevated LAP (grade 2 diastolic dysfunction).     4 - Biatrial enlargement.     5 - Mild tricuspid regurgitation.     6 - Mild to moderate mitral regurgitation.     7 - Pulmonary hypertension. The estimated PA systolic pressure is 68 mmHg.     8 - Increased central venous pressure.     Echo 10/17:  CONCLUSIONS     1 - Normal left ventricular systolic function (EF 55-60%).     2 - Severe right ventricular enlargement with moderately to severely depressed systolic function.     3 - Impaired LV relaxation, elevated LAP (grade 2 diastolic dysfunction).     4 - Biatrial enlargement (R>L).     5 - Mild to moderate tricuspid regurgitation.     6 - Mild mitral regurgitation.     7 - Pulmonary hypertension. The estimated PA systolic pressure is 103 mmHg.     8 - Increased central venous pressure.       Past Medical History:  No date: Anemia  No date: Atrial fibrillation  No date: Diabetes mellitus  No date: Heart failure  No date: Hyperlipidemia  No date: Hypertension  No date: PTSD (post-traumatic stress disorder)  No date: Pulmonary hyperinflation  No date: Urinary incontinence    Past Surgical History:  No date: CARDIAC  CATHETERIZATION  5/1/2018: COLONOSCOPY; N/A      Comment:  Procedure: COLONOSCOPY;  Surgeon: Bubba Navarro MD;                 Location: [facility] ENDO;  Service: Endoscopy;  Laterality:                N/A;  confirmed appt 4/24/18 11/28/2018: RIGHT HEART CATHETERIZATION; Right      Comment:  Procedure: INSERTION, CATHETER, RIGHT HEART;  Surgeon:                Chelsea Arceo MD;  Location: [facility] CATH LAB;  Service:                Cardiology;  Laterality: Right;    Social History    Socioeconomic History      Marital status: Single      Spouse name: Not on file      Number of children: Not on file      Years of education: Not on file      Highest education level: Not on file    Occupational History      Not on file    Social Needs      Financial resource strain: Not hard at all      Food insecurity:        Worry: Not on file        Inability: Not on file      Transportation needs:        Medical: Not on file        Non-medical: Not on file    Tobacco Use      Smoking status: Never Smoker      Smokeless tobacco: Never Used    Substance and Sexual Activity      Alcohol use: Yes        Alcohol/week: 2.0 - 3.0 standard drinks        Types: 2 - 3 Shots of liquor per week        Frequency: Monthly or less        Comment: maybe twice a month       Drug use: No      Sexual activity: Yes        Partners: Female    Lifestyle      Physical activity:        Days per week: 7 days        Minutes per session: Not on file      Stress: Not at all    Relationships      Social connections:        Talks on phone: More than three times a week        Gets together: More than three times a week        Attends Shinto service: 1 to 4 times per year        Active member of club or organization: No        Attends meetings of clubs or organizations: Never        Relationship status: Patient refused    Other Topics      Concerns:        Not on file    Social History Narrative      Not on file      Review of patient's family history  indicates:  Problem: Cancer      Relation: Father          Age of Onset: (Not Specified)          Comment: colon  Problem: Colon cancer      Relation: Father          Age of Onset: (Not Specified)  Problem: Heart disease      Relation: Mother          Age of Onset: (Not Specified)  Problem: Asthma      Relation: Mother          Age of Onset: (Not Specified)  Problem: Diabetes      Relation: Mother          Age of Onset: (Not Specified)  Problem: Hypertension      Relation: Mother          Age of Onset: (Not Specified)  Problem: Kidney disease      Relation: Maternal Grandmother          Age of Onset: (Not Specified)  Problem: Diabetes      Relation: Paternal Grandmother          Age of Onset: (Not Specified)  Problem: Cirrhosis      Relation: Paternal Grandfather          Age of Onset: (Not Specified)        Review of Systems   Constitution: Negative for malaise/fatigue.   Cardiovascular: Negative for chest pain, dyspnea on exertion, irregular heartbeat, leg swelling and palpitations.   Respiratory: Negative for shortness of breath.    Hematologic/Lymphatic: Negative for bleeding problem.   Skin: Negative for rash.   Musculoskeletal: Negative for myalgias.   Gastrointestinal: Negative for hematemesis, hematochezia and nausea.   Genitourinary: Negative for hematuria.   Neurological: Negative for light-headedness.   Psychiatric/Behavioral: Negative for altered mental status.   Allergic/Immunologic: Negative for persistent infections.        Objective:    Physical Exam      Assessment:       1. Typical atrial flutter    2. Varicose veins of both lower extremities, unspecified whether complicated    3. Chronic pulmonary heart disease         Plan:       69 yoM AFL, pHTN, VTE here for follow up. No recurrence since his AFL ablation 2y ago. On OAC due to VTE. I discussed that with a diagnosis of AFL, despite successful RFA, there is a 50% chance of developing AF within the next 5 years. RTC as needed

## 2020-04-08 ENCOUNTER — OFFICE VISIT (OUTPATIENT)
Dept: FAMILY MEDICINE | Facility: CLINIC | Age: 70
End: 2020-04-08
Payer: MEDICARE

## 2020-04-08 DIAGNOSIS — R09.81 NASAL CONGESTION: ICD-10-CM

## 2020-04-08 DIAGNOSIS — I87.303 VENOUS HYPERTENSION OF BOTH LOWER EXTREMITIES: ICD-10-CM

## 2020-04-08 DIAGNOSIS — N40.0 BENIGN NON-NODULAR PROSTATIC HYPERPLASIA WITHOUT LOWER URINARY TRACT SYMPTOMS: Primary | ICD-10-CM

## 2020-04-08 PROCEDURE — 99211 OFF/OP EST MAY X REQ PHY/QHP: CPT | Mod: PO

## 2020-04-08 PROCEDURE — 99214 PR OFFICE/OUTPT VISIT, EST, LEVL IV, 30-39 MIN: ICD-10-PCS | Mod: 95,,, | Performed by: INTERNAL MEDICINE

## 2020-04-08 PROCEDURE — 99214 OFFICE O/P EST MOD 30 MIN: CPT | Mod: 95,,, | Performed by: INTERNAL MEDICINE

## 2020-04-08 PROCEDURE — G0463 HOSPITAL OUTPT CLINIC VISIT: HCPCS | Mod: PO

## 2020-04-08 RX ORDER — TAMSULOSIN HYDROCHLORIDE 0.4 MG/1
1 CAPSULE ORAL DAILY
Qty: 90 CAPSULE | Refills: 1 | Status: SHIPPED | OUTPATIENT
Start: 2020-04-08 | End: 2021-02-17

## 2020-04-08 RX ORDER — IPRATROPIUM BROMIDE 42 UG/1
2 SPRAY, METERED NASAL 4 TIMES DAILY
Qty: 15 ML | Refills: 6 | Status: SHIPPED | OUTPATIENT
Start: 2020-04-08

## 2020-04-08 NOTE — PROGRESS NOTES
SUBJECTIVE     No chief complaint on file.      CELSO Bray III is a 69 y.o. male with multiple medical diagnoses as listed in the medical history and problem list that presents for follow-up for HTN and BPH. Pt has been doing okay since his last visit. He is fully compliant with meds and denies any adverse side effects. He reports having difficulty with repleting his iron stores, so has completed IV infusions. He reports some diarrhea with Uptravi and Adempas, but believes it is starting to check. Pt also reports that a hernia was found on recent ultrasound, so he's taking a daily probiotic. He also reports BLE edema with prolonged standing. It resolves with leg elevation. Pt is without any complaints today.       PAST MEDICAL HISTORY:  Past Medical History:   Diagnosis Date    Anemia     Atrial fibrillation     Diabetes mellitus     Heart failure     Hyperlipidemia     Hypertension     PTSD (post-traumatic stress disorder)     Pulmonary hyperinflation     Urinary incontinence        PAST SURGICAL HISTORY:  Past Surgical History:   Procedure Laterality Date    CARDIAC CATHETERIZATION      COLONOSCOPY N/A 5/1/2018    Procedure: COLONOSCOPY;  Surgeon: Bubba Navarro MD;  Location: [facility];  Service: Endoscopy;  Laterality: N/A;  confirmed appt 4/24/18    RIGHT HEART CATHETERIZATION Right 11/28/2018    Procedure: INSERTION, CATHETER, RIGHT HEART;  Surgeon: Chelsea Arceo MD;  Location: [facility] CATH LAB;  Service: Cardiology;  Laterality: Right;       SOCIAL HISTORY:  Social History     Socioeconomic History    Marital status: Single     Spouse name: Not on file    Number of children: Not on file    Years of education: Not on file    Highest education level: Not on file   Occupational History    Not on file   Social Needs    Financial resource strain: Not hard at all    Food insecurity:     Worry: Not on file     Inability: Not on file    Transportation needs:     Medical: Not on file      Non-medical: Not on file   Tobacco Use    Smoking status: Never Smoker    Smokeless tobacco: Never Used   Substance and Sexual Activity    Alcohol use: Yes     Alcohol/week: 2.0 - 3.0 standard drinks     Types: 2 - 3 Shots of liquor per week     Frequency: Monthly or less     Comment: maybe twice a month     Drug use: No    Sexual activity: Yes     Partners: Female   Lifestyle    Physical activity:     Days per week: 7 days     Minutes per session: Not on file    Stress: Not at all   Relationships    Social connections:     Talks on phone: More than three times a week     Gets together: More than three times a week     Attends Rastafari service: 1 to 4 times per year     Active member of club or organization: No     Attends meetings of clubs or organizations: Never     Relationship status: Patient refused   Other Topics Concern    Not on file   Social History Narrative    Not on file       FAMILY HISTORY:  Family History   Problem Relation Age of Onset    Cancer Father         colon    Colon cancer Father     Heart disease Mother     Asthma Mother     Diabetes Mother     Hypertension Mother     Kidney disease Maternal Grandmother     Diabetes Paternal Grandmother     Cirrhosis Paternal Grandfather        ALLERGIES AND MEDICATIONS: updated and reviewed.  Review of patient's allergies indicates:  No Known Allergies  Current Outpatient Medications   Medication Sig Dispense Refill    atorvastatin (LIPITOR) 40 MG tablet TAKE 1 TABLET BY MOUTH ONCE DAILY 90 tablet 1    ferrous sulfate 325 (65 FE) MG EC tablet Take 1 tablet (325 mg total) by mouth 2 (two) times daily with meals. 180 tablet 3    furosemide (LASIX) 40 MG tablet Take 1 tablet (40 mg total) by mouth every Mon, Wed, Fri. 12 tablet 11    ipratropium (ATROVENT) 42 mcg (0.06 %) nasal spray 2 sprays by Nasal route 4 (four) times daily. 15 mL 6    mometasone 0.1% (ELOCON) 0.1 % cream Apply topically once daily.      potassium chloride SA  (KLOR-CON M15) 15 MEQ tablet Take 1 tablet (15 mEq total) by mouth 2 (two) times daily. 60 tablet 11    riociguat (ADEMPAS) 2.5 mg tablet Take 1 tablet (2.5 mg total) by mouth 3 (three) times daily. 90 tablet 11    rivaroxaban (XARELTO) 20 mg Tab TAKE 1 TABLET BY MOUTH ONCE DAILY WITH  DINNER  OR  EVENING  MEAL 90 tablet 1    selexipag (UPTRAVI) 200 mcg (140)- 800 mcg (60) DsPk Take by mouth 2 (two) times daily. Pt states he takes 1600mcg twice a day      tamsulosin (FLOMAX) 0.4 mg Cap Take 1 capsule (0.4 mg total) by mouth once daily. 90 capsule 1    UPTRAVI 1,600 mcg Tab Take 1 tablet by mouth twice daily. 60 tablet 12     Current Facility-Administered Medications   Medication Dose Route Frequency Provider Last Rate Last Dose    lidocaine HCL 10 mg/ml (1%) injection 1 mL  1 mL Other 1 time in Clinic/HOD Julius Matute Jr., MD           ROS  Review of Systems   Constitutional: Negative for chills and fever.   HENT: Positive for congestion. Negative for hearing loss and sore throat.    Eyes: Negative for visual disturbance.   Respiratory: Negative for cough and shortness of breath.    Cardiovascular: Negative for chest pain, palpitations and leg swelling.   Gastrointestinal: Positive for diarrhea. Negative for abdominal pain, constipation, nausea and vomiting.   Genitourinary: Negative for dysuria, frequency and urgency.   Musculoskeletal: Positive for joint swelling (BLE). Negative for arthralgias and myalgias.   Skin: Negative for rash and wound.   Neurological: Negative for headaches.   Psychiatric/Behavioral: Negative for agitation and confusion. The patient is not nervous/anxious.          OBJECTIVE     Physical Exam  There were no vitals filed for this visit. There is no height or weight on file to calculate BMI.            Physical Exam   Constitutional: He is oriented to person, place, and time.   Pulmonary/Chest: Effort normal. No respiratory distress.   Neurological: He is alert and oriented to  person, place, and time.   Psychiatric: He has a normal mood and affect. His behavior is normal. Thought content normal.         Health Maintenance       Date Due Completion Date    TETANUS VACCINE 07/14/1968 ---    Shingles Vaccine (2 of 3) 03/10/2015 1/13/2015    Eye Exam 06/23/2015 6/23/2014    Lipid Panel 03/20/2020 3/20/2019    Urine Microalbumin 04/18/2020 4/18/2019    Hemoglobin A1c 05/14/2020 11/14/2019    Foot Exam 10/03/2020 10/3/2019    Override on 10/3/2019: Done    Override on 4/25/2016: Done    High Dose Statin 03/04/2021 3/4/2020    Colonoscopy 05/01/2023 5/1/2018            ASSESSMENT     69 y.o. male with     1. Benign non-nodular prostatic hyperplasia without lower urinary tract symptoms    2. Venous hypertension of both lower extremities    3. Nasal congestion        PLAN:     1. Benign non-nodular prostatic hyperplasia without lower urinary tract symptoms  - Stable; no acute issues  - The current medical regimen is effective;  continue present plan and medications.  - tamsulosin (FLOMAX) 0.4 mg Cap; Take 1 capsule (0.4 mg total) by mouth once daily.  Dispense: 90 capsule; Refill: 1    2. Venous hypertension of both lower extremities  - Pt advised to wear compression stockings daily and remove nightly; continue low Na diet    3. Nasal congestion  - Will resume Atrovent   - ipratropium (ATROVENT) 42 mcg (0.06 %) nasal spray; 2 sprays by Nasal route 4 (four) times daily.  Dispense: 15 mL; Refill: 6        RTC in 6 months    Consult Start Time: 04/08/2020 09:39  Consult End Time: 04/08/2020 09:57      The patient location is: home  The chief complaint leading to consultation is:  6 month f/u  Visit type: Virtual visit with synchronous audio and video  Total time spent with patient: 13 min  Each patient to whom he or she provides medical services by telemedicine is:  (1) informed of the relationship between the physician and patient and the respective role of any other health care provider with  respect to management of the patient; and (2) notified that he or she may decline to receive medical services by telemedicine and may withdraw from such care at any time.    Notes: As above        Luciana Cisse MD  04/08/2020 9:40 AM        No follow-ups on file.

## 2020-05-07 DIAGNOSIS — E11.9 TYPE 2 DIABETES MELLITUS WITHOUT COMPLICATION: ICD-10-CM

## 2020-05-17 NOTE — PROGRESS NOTES
PATIENT: Ambrosio Bray III  MRN: [id number]  DATE: 5/17/2020    Diagnosis:   1. Iron deficiency anemia due to chronic blood loss    2. Current use of long term anticoagulation    3. Chronic systolic congestive heart failure    4. Atherosclerosis of aorta    5. Type 2 diabetes mellitus with microalbuminuria, without long-term current use of insulin    6. Chronic pulmonary heart disease      Chief Complaint: Anemia    Subjective:    History of Present Illness: Mr. Bray is a 69 y.o. male who presented in March 2020 for evaluation and management of iron deficiency anemia. He was referred by Dr. Marquez.    - labs since 2-18 reveal a worsening anemia.  - iron studies (9/4/19) revealed a decreased ferritin/iron/saturated iron. These finding are consistent with severe iron deficiency anemia.  - he takes rivaroxaban chronically.  - He underwent an upper GI KUB series. There is hesitancy to proceed with upper GI endoscopy due to his chronic respiratory condition.  - he has not been taking oral iron.    Interval history:  - he presents for a follow-up appointment for his iron deficiency anemia.   - he received ferric carboxymaltose on 3/17/20 and 3/24/20.  - today, he reports improvement in fatigue, dyspnea upon exertion. He denies chest pain, nausea, vomiting, diarrhea, constipation.  - he is taking oral iron daily.    Past medical, surgical, family, and social histories have been reviewed and updated below.    Past Medical History:   Past Medical History:   Diagnosis Date    Anemia     Atrial fibrillation     Diabetes mellitus     Heart failure     Hyperlipidemia     Hypertension     PTSD (post-traumatic stress disorder)     Pulmonary hyperinflation     Urinary incontinence        Past Surgical History:   Past Surgical History:   Procedure Laterality Date    CARDIAC CATHETERIZATION      COLONOSCOPY N/A 5/1/2018    Procedure: COLONOSCOPY;  Surgeon: Bubba Navarro MD;  Location[facility];  Service: Endoscopy;   Laterality: N/A;  confirmed appt 4/24/18    RIGHT HEART CATHETERIZATION Right 11/28/2018    Procedure: INSERTION, CATHETER, RIGHT HEART;  Surgeon: Chelsea Arceo MD;  Location: [facility] CATH LAB;  Service: Cardiology;  Laterality: Right;       Family History:   Family History   Problem Relation Age of Onset    Cancer Father         colon    Colon cancer Father     Heart disease Mother     Asthma Mother     Diabetes Mother     Hypertension Mother     Kidney disease Maternal Grandmother     Diabetes Paternal Grandmother     Cirrhosis Paternal Grandfather        Social History:  reports that he has never smoked. He has never used smokeless tobacco. He reports that he drinks about 2.0 - 3.0 standard drinks of alcohol per week. He reports that he does not use drugs.    Allergies:  Review of patient's allergies indicates:  No Known Allergies    Medications:  Current Outpatient Medications   Medication Sig Dispense Refill    atorvastatin (LIPITOR) 40 MG tablet TAKE 1 TABLET BY MOUTH ONCE DAILY 90 tablet 1    ferrous sulfate 325 (65 FE) MG EC tablet Take 1 tablet (325 mg total) by mouth 2 (two) times daily with meals. 180 tablet 3    furosemide (LASIX) 40 MG tablet Take 1 tablet (40 mg total) by mouth every Mon, Wed, Fri. 12 tablet 11    ipratropium (ATROVENT) 42 mcg (0.06 %) nasal spray 2 sprays by Nasal route 4 (four) times daily. 15 mL 6    mometasone 0.1% (ELOCON) 0.1 % cream Apply topically once daily.      potassium chloride SA (KLOR-CON M15) 15 MEQ tablet Take 1 tablet (15 mEq total) by mouth 2 (two) times daily. 60 tablet 11    riociguat (ADEMPAS) 2.5 mg tablet Take 1 tablet (2.5 mg total) by mouth 3 (three) times daily. 90 tablet 11    rivaroxaban (XARELTO) 20 mg Tab TAKE 1 TABLET BY MOUTH ONCE DAILY WITH  DINNER  OR  EVENING  MEAL 90 tablet 1    selexipag (UPTRAVI) 200 mcg (140)- 800 mcg (60) DsPk Take by mouth 2 (two) times daily. Pt states he takes 1600mcg twice a day      tamsulosin (FLOMAX)  0.4 mg Cap Take 1 capsule (0.4 mg total) by mouth once daily. 90 capsule 1    UPTRAVI 1,600 mcg Tab Take 1 tablet by mouth twice daily. 60 tablet 12     Current Facility-Administered Medications   Medication Dose Route Frequency Provider Last Rate Last Dose    lidocaine HCL 10 mg/ml (1%) injection 1 mL  1 mL Other 1 time in Clinic/HOD Julius Matute Jr., MD           Review of Systems   Constitutional: Positive for fatigue (improved).   HENT: Negative for sore throat.    Eyes: Negative for visual disturbance.   Respiratory: Positive for shortness of breath (improved). Negative for cough.    Cardiovascular: Negative for chest pain.   Gastrointestinal: Positive for diarrhea. Negative for abdominal pain.   Genitourinary: Positive for frequency. Negative for dysuria.   Musculoskeletal: Negative for back pain.   Skin: Negative for rash.   Neurological: Negative for headaches.   Hematological: Negative for adenopathy.   Psychiatric/Behavioral: The patient is not nervous/anxious.    Answers for HPI/ROS submitted by the patient on 5/16/2020   appetite change : Yes  unexpected weight change: Yes  visual disturbance: No  cough: No  shortness of breath: No  chest pain: No  abdominal pain: No  diarrhea: Yes  frequency: Yes  back pain: No  rash: No  headaches: No  adenopathy: No  nervous/ anxious: No    ECOG Performance Status:   ECOG SCORE 1       Objective:      Vitals:   Vitals:    05/18/20 0809   BP: 122/70   Pulse: 68   Resp: 18   Weight: 86.1 kg (189 lb 13.1 oz)     BMI: Body mass index is 25.04 kg/m².    Physical Exam   Constitutional: He is oriented to person, place, and time. He appears well-developed and well-nourished.   HENT:   Head: Normocephalic and atraumatic.   Eyes: Pupils are equal, round, and reactive to light. EOM are normal.   Neck: Normal range of motion. Neck supple.   Cardiovascular: Normal rate and regular rhythm.   Pulmonary/Chest: Effort normal.   Abdominal: Soft. Bowel sounds are normal.    Musculoskeletal: Normal range of motion. He exhibits no edema.   Neurological: He is alert and oriented to person, place, and time.   Skin: Skin is warm and dry.   Psychiatric: He has a normal mood and affect. His behavior is normal. Judgment and thought content normal.   Nursing note and vitals reviewed.    Laboratory Data:  Labs have been reviewed.    Lab Results   Component Value Date    WBC 4.05 12/04/2019    HGB 9.4 (L) 12/04/2019    HCT 31.6 (L) 12/04/2019    MCV 80 (L) 12/04/2019     12/04/2019         Imaging:    Assessment:       1. Iron deficiency anemia due to chronic blood loss    2. Current use of long term anticoagulation    3. Chronic systolic congestive heart failure    4. Atherosclerosis of aorta    5. Type 2 diabetes mellitus with microalbuminuria, without long-term current use of insulin    6. Chronic pulmonary heart disease         Plan:     1. Iron deficiency anemia  - I have reviewed his chart/labs.  - labs since 2-18 reveal a worsening anemia.  - iron studies (9/4/19) revealed a decreased ferritin/iron/saturated iron. These finding are consistent with severe iron deficiency anemia.  - given the severity of his iron deficiency anemia, as well as his symptoms, I recommended ferric carboxymaltose x 2 doses.  - he received ferric carboxymaltose on 3/17/20 and 3/24/20.  - clinically, he has improved. Labs have been reviewed. Hemoglobin has improved to 11.3 g/dL. Follow up iron studies.  - continue iron supplement.  - Colonoscopy in 5/2018 was negative for a source of bleeding. There is hesitancy to proceed with upper GI endoscopy due to his chronic respiratory condition.  - return to clinic in 3 months with repeat labs    2. Pulmonary hypertension / atrial fibrillation / chronic anticoagulation  - he take rivaroxaban, which increases his risk of bleeding  - continue to monitor.    3. Atherosclerosis of aorta  - seen on imaging  - continue atorvastatin  - continue to monitor    4. Advance  Care Planning     Power of   I initiated the process of advance care planning today and explained the importance of this process to the patient.  I introduced the concept of advance directives to the patient, as well. Then the patient received detailed information about the importance of designating a Health Care Power of  (HCPOA). He was also instructed to communicate with this person about their wishes for future healthcare, should he become sick and lose decision-making capacity. The patient has not previously appointed a HCPOA. After our discussion, the patient has decided to complete a HCPOA and has appointed his wife Nina Romero ([phone]).         - return to clinic in 3 months with repeat labs    Ron Ibarra M.D.  Hematology/Oncology  [facility] - [address], [address]  [place] [address]  Phone: [phone]  Fax: [phone]

## 2020-05-18 ENCOUNTER — OFFICE VISIT (OUTPATIENT)
Dept: HEMATOLOGY/ONCOLOGY | Facility: CLINIC | Age: 70
End: 2020-05-18
Payer: MEDICARE

## 2020-05-18 VITALS
RESPIRATION RATE: 18 BRPM | WEIGHT: 189.81 LBS | HEART RATE: 68 BPM | SYSTOLIC BLOOD PRESSURE: 122 MMHG | BODY MASS INDEX: 25.04 KG/M2 | DIASTOLIC BLOOD PRESSURE: 70 MMHG

## 2020-05-18 DIAGNOSIS — Z79.01 CURRENT USE OF LONG TERM ANTICOAGULATION: ICD-10-CM

## 2020-05-18 DIAGNOSIS — D50.0 IRON DEFICIENCY ANEMIA DUE TO CHRONIC BLOOD LOSS: Primary | ICD-10-CM

## 2020-05-18 DIAGNOSIS — E11.29 TYPE 2 DIABETES MELLITUS WITH MICROALBUMINURIA, WITHOUT LONG-TERM CURRENT USE OF INSULIN: ICD-10-CM

## 2020-05-18 DIAGNOSIS — I70.0 ATHEROSCLEROSIS OF AORTA: ICD-10-CM

## 2020-05-18 DIAGNOSIS — R80.9 TYPE 2 DIABETES MELLITUS WITH MICROALBUMINURIA, WITHOUT LONG-TERM CURRENT USE OF INSULIN: ICD-10-CM

## 2020-05-18 DIAGNOSIS — I50.22 CHRONIC SYSTOLIC CONGESTIVE HEART FAILURE: ICD-10-CM

## 2020-05-18 DIAGNOSIS — I27.9 CHRONIC PULMONARY HEART DISEASE: ICD-10-CM

## 2020-05-18 PROCEDURE — 99999 PR PBB SHADOW E&M-EST. PATIENT-LVL III: CPT | Mod: PBBFAC,,, | Performed by: INTERNAL MEDICINE

## 2020-05-18 PROCEDURE — 99214 PR OFFICE/OUTPT VISIT, EST, LEVL IV, 30-39 MIN: ICD-10-PCS | Mod: S$PBB,,, | Performed by: INTERNAL MEDICINE

## 2020-05-18 PROCEDURE — 99999 PR PBB SHADOW E&M-EST. PATIENT-LVL III: ICD-10-PCS | Mod: PBBFAC,,, | Performed by: INTERNAL MEDICINE

## 2020-05-18 PROCEDURE — 99213 OFFICE O/P EST LOW 20 MIN: CPT | Mod: PBBFAC,PN | Performed by: INTERNAL MEDICINE

## 2020-05-18 PROCEDURE — 99214 OFFICE O/P EST MOD 30 MIN: CPT | Mod: S$PBB,,, | Performed by: INTERNAL MEDICINE

## 2020-06-05 DIAGNOSIS — E11.9 TYPE 2 DIABETES MELLITUS WITHOUT COMPLICATION: ICD-10-CM

## 2020-06-25 ENCOUNTER — TELEPHONE (OUTPATIENT)
Dept: TRANSPLANT | Facility: CLINIC | Age: 70
End: 2020-06-25

## 2020-06-26 DIAGNOSIS — E11.9 TYPE 2 DIABETES MELLITUS WITHOUT COMPLICATION: ICD-10-CM

## 2020-08-12 ENCOUNTER — TELEPHONE (OUTPATIENT)
Dept: HEMATOLOGY/ONCOLOGY | Facility: CLINIC | Age: 70
End: 2020-08-12

## 2020-08-14 NOTE — PROGRESS NOTES
PATIENT: Ambrosio Bray III  MRN: [id number]  DATE: 8/14/2020    Diagnosis:   1. Iron deficiency anemia due to chronic blood loss    2. Current use of long term anticoagulation    3. Chronic systolic congestive heart failure    4. Atherosclerosis of aorta    5. Type 2 diabetes mellitus with microalbuminuria, without long-term current use of insulin    6. Chronic pulmonary heart disease      Chief Complaint: No chief complaint on file.    Subjective:    History of Present Illness: Mr. Bray is a 70 y.o. male who presented in March 2020 for evaluation and management of iron deficiency anemia. He was referred by Dr. Marquez.    Telemedicine visit:  The patient location is: home  The chief complaint leading to consultation is: iron deficiency anemia    Visit type: audiovisual    Face to Face time with patient: 20 minutes  30 minutes of total time spent on the encounter, which includes face to face time and non-face to face time preparing to see the patient (eg, review of tests), Obtaining and/or reviewing separately obtained history, Documenting clinical information in the electronic or other health record, Independently interpreting results (not separately reported) and communicating results to the patient/family/caregiver, or Care coordination (not separately reported).     Each patient to whom he or she provides medical services by telemedicine is:  (1) informed of the relationship between the physician and patient and the respective role of any other health care provider with respect to management of the patient; and (2) notified that he or she may decline to receive medical services by telemedicine and may withdraw from such care at any time.    Notes: see below      - labs since 2-18 reveal a worsening anemia.  - iron studies (9/4/19) revealed a decreased ferritin/iron/saturated iron. These finding are consistent with severe iron deficiency anemia.  - he takes rivaroxaban chronically.  - He underwent an upper GI KUB  series. There is hesitancy to proceed with upper GI endoscopy due to his chronic respiratory condition.  - he has not been taking oral iron.  - he received ferric carboxymaltose on 3/17/20 and 3/24/20.    Interval history:  - he presents for a follow-up appointment for his iron deficiency anemia.   - today, he reports improvement in fatigue, dyspnea upon exertion. He denies chest pain, nausea, vomiting, diarrhea, constipation.  - he is taking oral iron daily.    Past medical, surgical, family, and social histories have been reviewed and updated below.    Past Medical History:   Past Medical History:   Diagnosis Date    Anemia     Atrial fibrillation     Diabetes mellitus     Heart failure     Hyperlipidemia     Hypertension     PTSD (post-traumatic stress disorder)     Pulmonary hyperinflation     Urinary incontinence        Past Surgical History:   Past Surgical History:   Procedure Laterality Date    CARDIAC CATHETERIZATION      COLONOSCOPY N/A 5/1/2018    Procedure: COLONOSCOPY;  Surgeon: Bubba Navarro MD;  Location: [facility] ENDO;  Service: Endoscopy;  Laterality: N/A;  confirmed appt 4/24/18    RIGHT HEART CATHETERIZATION Right 11/28/2018    Procedure: INSERTION, CATHETER, RIGHT HEART;  Surgeon: Chelsea Arceo MD;  Location: [facility] CATH LAB;  Service: Cardiology;  Laterality: Right;       Family History:   Family History   Problem Relation Age of Onset    Cancer Father         colon    Colon cancer Father     Heart disease Mother     Asthma Mother     Diabetes Mother     Hypertension Mother     Kidney disease Maternal Grandmother     Diabetes Paternal Grandmother     Cirrhosis Paternal Grandfather        Social History:  reports that he has never smoked. He has never used smokeless tobacco. He reports current alcohol use of about 2.0 - 3.0 standard drinks of alcohol per week. He reports that he does not use drugs.    Allergies:  Review of patient's allergies indicates:  No Known  Allergies    Medications:  Current Outpatient Medications   Medication Sig Dispense Refill    atorvastatin (LIPITOR) 40 MG tablet TAKE 1 TABLET BY MOUTH ONCE DAILY 90 tablet 1    ferrous sulfate 325 (65 FE) MG EC tablet Take 1 tablet (325 mg total) by mouth 2 (two) times daily with meals. 180 tablet 3    furosemide (LASIX) 40 MG tablet TAKE 1 TABLET BY MOUTH ON MONDAY, WEDNESDAY AND FRIDAY 36 tablet 0    ipratropium (ATROVENT) 42 mcg (0.06 %) nasal spray 2 sprays by Nasal route 4 (four) times daily. 15 mL 6    mometasone 0.1% (ELOCON) 0.1 % cream Apply topically once daily.      potassium chloride SA (KLOR-CON M15) 15 MEQ tablet Take 1 tablet (15 mEq total) by mouth 2 (two) times daily. 60 tablet 11    riociguat (ADEMPAS) 2.5 mg tablet Take 1 tablet (2.5 mg total) by mouth 3 (three) times daily. 90 tablet 11    selexipag (UPTRAVI) 200 mcg (140)- 800 mcg (60) DsPk Take by mouth 2 (two) times daily. Pt states he takes 1600mcg twice a day      tamsulosin (FLOMAX) 0.4 mg Cap Take 1 capsule (0.4 mg total) by mouth once daily. 90 capsule 1    UPTRAVI 1,600 mcg Tab Take 1 tablet by mouth twice daily. 60 tablet 12    XARELTO 20 mg Tab TAKE 1 TABLET BY MOUTH ONCE DAILY WITH  DINNER  OR  EVENING  MEAL 90 tablet 1     Current Facility-Administered Medications   Medication Dose Route Frequency Provider Last Rate Last Dose    lidocaine HCL 10 mg/ml (1%) injection 1 mL  1 mL Other 1 time in Clinic/HOD Julius Matute Jr., MD           Review of Systems   Constitutional: Positive for fatigue (improved). Negative for appetite change and unexpected weight change.   HENT: Negative for sore throat.    Eyes: Negative for visual disturbance.   Respiratory: Positive for shortness of breath (improved). Negative for cough.    Cardiovascular: Negative for chest pain.   Gastrointestinal: Negative for abdominal pain and diarrhea.   Genitourinary: Positive for frequency. Negative for dysuria.   Musculoskeletal: Negative for back  pain.   Skin: Negative for rash.   Neurological: Negative for headaches.   Hematological: Negative for adenopathy.   Psychiatric/Behavioral: The patient is not nervous/anxious.      ECOG Performance Status:   ECOG SCORE 1       Objective:      Vitals:   There were no vitals filed for this visit.  BMI: There is no height or weight on file to calculate BMI.  Deferred since telemedicine visit.    Physical Exam   Deferred since telemedicine visit.    Laboratory Data:  Labs have been reviewed.    Lab Results   Component Value Date    WBC 3.91 08/14/2020    HGB 11.4 (L) 08/14/2020    HCT 35.8 (L) 08/14/2020    MCV 91 08/14/2020     08/14/2020             Imaging:    Assessment:       1. Iron deficiency anemia due to chronic blood loss    2. Current use of long term anticoagulation    3. Chronic systolic congestive heart failure    4. Atherosclerosis of aorta    5. Type 2 diabetes mellitus with microalbuminuria, without long-term current use of insulin    6. Chronic pulmonary heart disease         Plan:     1. Iron deficiency anemia  - I have reviewed his chart/labs.  - labs since 2-18 reveal a worsening anemia.  - iron studies (9/4/19) revealed a decreased ferritin/iron/saturated iron. These finding are consistent with severe iron deficiency anemia.  - given the severity of his iron deficiency anemia, as well as his symptoms, I recommended ferric carboxymaltose x 2 doses.  - he received ferric carboxymaltose on 3/17/20 and 3/24/20.  - clinically, he has improved.  - Labs have been reviewed. Hemoglobin has improved to 11.4 g/dL. Iron studies are within acceptable limits.  - continue iron supplement.  - Colonoscopy in 5/2018 was negative for a source of bleeding. There is hesitancy to proceed with upper GI endoscopy due to his chronic respiratory condition.  - return to clinic in 4 months with repeat labs    2. Pulmonary hypertension / atrial fibrillation / chronic anticoagulation  - he take rivaroxaban, which  increases his risk of bleeding  - continue to monitor.    3. Atherosclerosis of aorta  - seen on imaging  - continue atorvastatin  - continue to monitor    4. Advance Care Planning     Power of   I initiated the process of advance care planning today and explained the importance of this process to the patient.  I introduced the concept of advance directives to the patient, as well. Then the patient received detailed information about the importance of designating a Health Care Power of  (HCPOA). He was also instructed to communicate with this person about their wishes for future healthcare, should he become sick and lose decision-making capacity. The patient has not previously appointed a HCPOA. After our discussion, the patient has decided to complete a HCPOA and has appointed his wife Nina Romero ([phone]).         - return to clinic in 4 months with repeat labs    Ron Ibarra M.D.  Hematology/Oncology  [facility] - [address], [address]  [place] [address]  Phone: [phone]  Fax: [phone]

## 2020-08-17 ENCOUNTER — TELEPHONE (OUTPATIENT)
Dept: HEMATOLOGY/ONCOLOGY | Facility: CLINIC | Age: 70
End: 2020-08-17

## 2020-08-17 ENCOUNTER — OFFICE VISIT (OUTPATIENT)
Dept: HEMATOLOGY/ONCOLOGY | Facility: CLINIC | Age: 70
End: 2020-08-17
Payer: MEDICARE

## 2020-08-17 DIAGNOSIS — I50.22 CHRONIC SYSTOLIC CONGESTIVE HEART FAILURE: ICD-10-CM

## 2020-08-17 DIAGNOSIS — Z79.01 CURRENT USE OF LONG TERM ANTICOAGULATION: ICD-10-CM

## 2020-08-17 DIAGNOSIS — R80.9 TYPE 2 DIABETES MELLITUS WITH MICROALBUMINURIA, WITHOUT LONG-TERM CURRENT USE OF INSULIN: ICD-10-CM

## 2020-08-17 DIAGNOSIS — I27.9 CHRONIC PULMONARY HEART DISEASE: ICD-10-CM

## 2020-08-17 DIAGNOSIS — I70.0 ATHEROSCLEROSIS OF AORTA: ICD-10-CM

## 2020-08-17 DIAGNOSIS — E11.29 TYPE 2 DIABETES MELLITUS WITH MICROALBUMINURIA, WITHOUT LONG-TERM CURRENT USE OF INSULIN: ICD-10-CM

## 2020-08-17 DIAGNOSIS — D50.0 IRON DEFICIENCY ANEMIA DUE TO CHRONIC BLOOD LOSS: Primary | ICD-10-CM

## 2020-08-17 PROCEDURE — 99214 OFFICE O/P EST MOD 30 MIN: CPT | Mod: 95,,, | Performed by: INTERNAL MEDICINE

## 2020-08-17 PROCEDURE — 99214 PR OFFICE/OUTPT VISIT, EST, LEVL IV, 30-39 MIN: ICD-10-PCS | Mod: 95,,, | Performed by: INTERNAL MEDICINE

## 2020-08-17 NOTE — TELEPHONE ENCOUNTER
LM for pt. To confirm lab appt. At 10:15am on 12/12/20 and telemedicine appt. At 9am on 12/14/20.    ----- Message from Ron Ibarra MD sent at 8/17/2020  8:10 AM CDT -----  1. Schedule labs CBC, ferritin, iron/TIBC in 4 months (on a Friday).2. Schedule f/u telemedicine visit on the Monday following the labs.Thanks!

## 2020-08-17 NOTE — Clinical Note
1. Schedule labs CBC, ferritin, iron/TIBC in 4 months (on a Friday).  2. Schedule f/u telemedicine visit on the Monday following the labs.    Thanks!

## 2020-08-25 ENCOUNTER — TELEPHONE (OUTPATIENT)
Dept: FAMILY MEDICINE | Facility: CLINIC | Age: 70
End: 2020-08-25

## 2020-09-22 ENCOUNTER — PATIENT MESSAGE (OUTPATIENT)
Dept: FAMILY MEDICINE | Facility: CLINIC | Age: 70
End: 2020-09-22

## 2020-09-22 NOTE — TELEPHONE ENCOUNTER
Patient requesting order for a flu vaccine.   Patient is due for follow up visit the first week of October.  Would Dr. Cisse like to have patient schedule visit and administer vaccine during visit?

## 2020-09-24 ENCOUNTER — CLINICAL SUPPORT (OUTPATIENT)
Dept: FAMILY MEDICINE | Facility: CLINIC | Age: 70
End: 2020-09-24
Payer: MEDICARE

## 2020-09-24 DIAGNOSIS — Z23 NEED FOR INFLUENZA VACCINATION: Primary | ICD-10-CM

## 2020-09-24 PROCEDURE — 90694 VACC AIIV4 NO PRSRV 0.5ML IM: CPT | Mod: PBBFAC,PO

## 2020-09-24 PROCEDURE — G0008 ADMIN INFLUENZA VIRUS VAC: HCPCS | Mod: PBBFAC,PO

## 2020-09-24 NOTE — PROGRESS NOTES
..After obtaining consent, and per orders of Dr. Cisse, injection of high-dose flu given by Yanira Larry LPN. Patient instructed to remain in clinic for 20 minutes afterwards, and to report any adverse reaction to me immediately.

## 2020-09-25 DIAGNOSIS — E11.9 TYPE 2 DIABETES MELLITUS WITHOUT COMPLICATION, UNSPECIFIED WHETHER LONG TERM INSULIN USE: ICD-10-CM

## 2020-09-28 ENCOUNTER — PATIENT OUTREACH (OUTPATIENT)
Dept: ADMINISTRATIVE | Facility: HOSPITAL | Age: 70
End: 2020-09-28

## 2020-10-05 ENCOUNTER — PATIENT MESSAGE (OUTPATIENT)
Dept: ADMINISTRATIVE | Facility: HOSPITAL | Age: 70
End: 2020-10-05

## 2020-10-15 ENCOUNTER — LAB VISIT (OUTPATIENT)
Dept: LAB | Facility: HOSPITAL | Age: 70
End: 2020-10-15
Attending: INTERNAL MEDICINE
Payer: MEDICARE

## 2020-10-15 ENCOUNTER — PATIENT MESSAGE (OUTPATIENT)
Dept: FAMILY MEDICINE | Facility: CLINIC | Age: 70
End: 2020-10-15

## 2020-10-15 DIAGNOSIS — E11.9 TYPE 2 DIABETES MELLITUS WITHOUT COMPLICATION: ICD-10-CM

## 2020-10-15 LAB
CHOLEST SERPL-MCNC: 115 MG/DL (ref 120–199)
CHOLEST/HDLC SERPL: 2.7 {RATIO} (ref 2–5)
ESTIMATED AVG GLUCOSE: 114 MG/DL (ref 68–131)
HBA1C MFR BLD HPLC: 5.6 % (ref 4–5.6)
HDLC SERPL-MCNC: 43 MG/DL (ref 40–75)
HDLC SERPL: 37.4 % (ref 20–50)
LDLC SERPL CALC-MCNC: 59.4 MG/DL (ref 63–159)
NONHDLC SERPL-MCNC: 72 MG/DL
TRIGL SERPL-MCNC: 63 MG/DL (ref 30–150)

## 2020-10-15 PROCEDURE — 80061 LIPID PANEL: CPT

## 2020-10-15 PROCEDURE — 36415 COLL VENOUS BLD VENIPUNCTURE: CPT | Mod: PO

## 2020-10-15 PROCEDURE — 83036 HEMOGLOBIN GLYCOSYLATED A1C: CPT

## 2020-10-22 ENCOUNTER — OFFICE VISIT (OUTPATIENT)
Dept: FAMILY MEDICINE | Facility: CLINIC | Age: 70
End: 2020-10-22
Payer: MEDICARE

## 2020-10-22 VITALS
HEART RATE: 73 BPM | BODY MASS INDEX: 24.05 KG/M2 | DIASTOLIC BLOOD PRESSURE: 62 MMHG | TEMPERATURE: 98 F | SYSTOLIC BLOOD PRESSURE: 136 MMHG | OXYGEN SATURATION: 93 % | WEIGHT: 182.31 LBS

## 2020-10-22 DIAGNOSIS — R80.9 TYPE 2 DIABETES MELLITUS WITH MICROALBUMINURIA, WITHOUT LONG-TERM CURRENT USE OF INSULIN: ICD-10-CM

## 2020-10-22 DIAGNOSIS — E11.29 TYPE 2 DIABETES MELLITUS WITH MICROALBUMINURIA, WITHOUT LONG-TERM CURRENT USE OF INSULIN: ICD-10-CM

## 2020-10-22 DIAGNOSIS — Z00.00 ANNUAL PHYSICAL EXAM: Primary | ICD-10-CM

## 2020-10-22 PROCEDURE — 99397 PER PM REEVAL EST PAT 65+ YR: CPT | Mod: S$PBB,,, | Performed by: INTERNAL MEDICINE

## 2020-10-22 PROCEDURE — 99999 PR PBB SHADOW E&M-EST. PATIENT-LVL III: ICD-10-PCS | Mod: PBBFAC,,, | Performed by: INTERNAL MEDICINE

## 2020-10-22 PROCEDURE — 99397 PR PREVENTIVE VISIT,EST,65 & OVER: ICD-10-PCS | Mod: S$PBB,,, | Performed by: INTERNAL MEDICINE

## 2020-10-22 PROCEDURE — 99999 PR PBB SHADOW E&M-EST. PATIENT-LVL III: CPT | Mod: PBBFAC,,, | Performed by: INTERNAL MEDICINE

## 2020-10-22 PROCEDURE — 99213 OFFICE O/P EST LOW 20 MIN: CPT | Mod: PBBFAC,PO | Performed by: INTERNAL MEDICINE

## 2020-10-22 RX ORDER — LOSARTAN POTASSIUM 25 MG/1
12.5 TABLET ORAL DAILY
Qty: 45 TABLET | Refills: 3 | Status: SHIPPED | OUTPATIENT
Start: 2020-10-22 | End: 2021-04-23

## 2020-10-22 NOTE — PROGRESS NOTES
SUBJECTIVE     Chief Complaint   Patient presents with    Annual Exam       HPI  Ambrosio Bray III is a 70 y.o. male with multiple medical diagnoses as listed in the medical history and problem list that presents for annual exam. Pt has been doing well since his last visit. He has a good appetite and eats well. 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 He sleeps for ~5-6 hours nightly. Pt does take OTC supplements, which is iron and a probiotic. He does not have any current stressors. Pt is not UTD on age appropriate CA screening.    PAST MEDICAL HISTORY:  Past Medical History:   Diagnosis Date    Anemia     Atrial fibrillation     Diabetes mellitus     Heart failure     Hyperlipidemia     Hypertension     PTSD (post-traumatic stress disorder)     Pulmonary hyperinflation     Urinary incontinence        PAST SURGICAL HISTORY:  Past Surgical History:   Procedure Laterality Date    CARDIAC CATHETERIZATION      COLONOSCOPY N/A 5/1/2018    Procedure: COLONOSCOPY;  Surgeon: Bubba Navarro MD;  Location: [facility] ENDO;  Service: Endoscopy;  Laterality: N/A;  confirmed appt 4/24/18    RIGHT HEART CATHETERIZATION Right 11/28/2018    Procedure: INSERTION, CATHETER, RIGHT HEART;  Surgeon: Chelsea Arceo MD;  Location: [facility] CATH LAB;  Service: Cardiology;  Laterality: Right;       SOCIAL HISTORY:  Social History     Socioeconomic History    Marital status: Single     Spouse name: Not on file    Number of children: Not on file    Years of education: Not on file    Highest education level: Not on file   Occupational History    Not on file   Social Needs    Financial resource strain: Not hard at all    Food insecurity     Worry: Not on file     Inability: Not on file    Transportation needs     Medical: Not on file     Non-medical: Not on file   Tobacco Use    Smoking status: Never Smoker    Smokeless tobacco: Never Used   Substance and Sexual Activity    Alcohol use: Yes      Alcohol/week: 2.0 - 3.0 standard drinks     Types: 2 - 3 Shots of liquor per week     Frequency: Monthly or less     Comment: maybe twice a month     Drug use: No    Sexual activity: Yes     Partners: Female   Lifestyle    Physical activity     Days per week: 7 days     Minutes per session: Not on file    Stress: Not at all   Relationships    Social connections     Talks on phone: More than three times a week     Gets together: More than three times a week     Attends Yaz[employer] service: 1 to 4 times per year     Active member of club or organization: No     Attends meetings of clubs or organizations: Never     Relationship status: Patient refused   Other Topics Concern    Not on file   Social History Narrative    Not on file       FAMILY HISTORY:  Family History   Problem Relation Age of Onset    Cancer Father         colon    Colon cancer Father     Heart disease Mother     Asthma Mother     Diabetes Mother     Hypertension Mother     Kidney disease Maternal Grandmother     Diabetes Paternal Grandmother     Cirrhosis Paternal Grandfather        ALLERGIES AND MEDICATIONS: updated and reviewed.  Review of patient's allergies indicates:  No Known Allergies  Current Outpatient Medications   Medication Sig Dispense Refill    atorvastatin (LIPITOR) 40 MG tablet TAKE 1 TABLET BY MOUTH ONCE DAILY 90 tablet 1    ferrous sulfate 325 (65 FE) MG EC tablet Take 1 tablet (325 mg total) by mouth 2 (two) times daily with meals. 180 tablet 3    furosemide (LASIX) 40 MG tablet TAKE 1 TABLET BY MOUTH ON MONDAY, WEDNESDAY AND FRIDAY 36 tablet 0    ipratropium (ATROVENT) 42 mcg (0.06 %) nasal spray 2 sprays by Nasal route 4 (four) times daily. 15 mL 6    mometasone 0.1% (ELOCON) 0.1 % cream Apply topically once daily.      potassium chloride SA (KLOR-CON M15) 15 MEQ tablet Take 1 tablet (15 mEq total) by mouth 2 (two) times daily. 60 tablet 11    riociguat (ADEMPAS) 2.5 mg tablet Take 1 tablet (2.5 mg total) by  mouth 3 (three) times daily. 90 tablet 11    tamsulosin (FLOMAX) 0.4 mg Cap Take 1 capsule (0.4 mg total) by mouth once daily. 90 capsule 1    UPTRAVI 1,600 mcg Tab Take 1 tablet by mouth twice daily. 60 tablet 12    XARELTO 20 mg Tab TAKE 1 TABLET BY MOUTH ONCE DAILY WITH  DINNER  OR  EVENING  MEAL 90 tablet 1    losartan (COZAAR) 25 MG tablet Take 0.5 tablets (12.5 mg total) by mouth once daily. 45 tablet 3    selexipag (UPTRAVI) 200 mcg (140)- 800 mcg (60) DsPk Take by mouth 2 (two) times daily. Pt states he takes 1600mcg twice a day       Current Facility-Administered Medications   Medication Dose Route Frequency Provider Last Rate Last Dose    lidocaine HCL 10 mg/ml (1%) injection 1 mL  1 mL Other 1 time in Clinic/HOD Julius Matute Jr., MD           ROS  Review of Systems   Constitutional: Negative for chills and fever.   HENT: Negative for hearing loss and sore throat.    Eyes: Negative for visual disturbance.   Respiratory: Negative for cough and shortness of breath.    Cardiovascular: Negative for chest pain, palpitations and leg swelling.   Gastrointestinal: Negative for abdominal pain, constipation, diarrhea, nausea and vomiting.   Genitourinary: Negative for dysuria, frequency and urgency.   Musculoskeletal: Negative for arthralgias, joint swelling and myalgias.   Skin: Positive for wound (R lateral and medial ankles). Negative for rash.   Neurological: Negative for headaches.   Psychiatric/Behavioral: Negative for agitation and confusion. The patient is not nervous/anxious.          OBJECTIVE     Physical Exam  Vitals:    10/22/20 0826   BP: 136/62   Pulse: 73   Temp: 98.4 °F (36.9 °C)    Body mass index is 24.05 kg/m².  Weight: 82.7 kg (182 lb 5.1 oz)         Physical Exam  Constitutional:       General: He is not in acute distress.     Appearance: He is well-developed.   HENT:      Head: Normocephalic and atraumatic.      Right Ear: External ear normal.      Left Ear: External ear normal.       Nose: Nose normal.   Eyes:      General: No scleral icterus.        Right eye: No discharge.         Left eye: No discharge.      Conjunctiva/sclera: Conjunctivae normal.   Neck:      Musculoskeletal: Normal range of motion and neck supple.      Vascular: No JVD.      Trachea: No tracheal deviation.   Cardiovascular:      Rate and Rhythm: Normal rate and regular rhythm.      Heart sounds: Normal heart sounds. No murmur. No friction rub. No gallop.    Pulmonary:      Effort: Pulmonary effort is normal. No respiratory distress.      Breath sounds: Normal breath sounds. No wheezing.   Abdominal:      General: Bowel sounds are normal. There is no distension.      Palpations: Abdomen is soft. There is no mass.      Tenderness: There is no abdominal tenderness. There is no guarding or rebound.   Musculoskeletal: Normal range of motion.         General: No tenderness or deformity.      Right lower leg: Edema (1+ pitting) present.      Left lower leg: Edema (1+ pitting) present.   Skin:     General: Skin is warm and dry.      Findings: No erythema or rash.      Comments: R ankle ulcer medially and laterally with eschar   Neurological:      Mental Status: He is alert and oriented to person, place, and time.      Motor: No abnormal muscle tone.      Coordination: Coordination normal.   Psychiatric:         Behavior: Behavior normal.         Thought Content: Thought content normal.         Judgment: Judgment normal.           Health Maintenance       Date Due Completion Date    TETANUS VACCINE 07[birth year]14/1968 ---    Foot Exam 10/03/2020 10/3/2019    Override on 10/3/2019: Done    Override on 4/25/2016: Done    Eye Exam 02/05/2021 2/5/2020    Hemoglobin A1c 04/15/2021 10/15/2020    Lipid Panel 10/15/2021 10/15/2020    Urine Microalbumin 10/15/2021 10/15/2020    High Dose Statin 10/22/2021 10/22/2020    Colorectal Cancer Screening 05/01/2023 5/1/2018            ASSESSMENT     70 y.o. male with     1. Annual physical exam    2. Type 2  diabetes mellitus with microalbuminuria, without long-term current use of insulin        PLAN:     1. Annual physical exam  - Counseled on age appropriate medical preventative services, including age appropriate cancer screenings, over all nutritional health, need for a consistent exercise regimen and an over all push towards maintaining a vigorous and active lifestyle.  Counseled on age appropriate vaccines and discussed upcoming health care needs based on age/gender.  Spent time with patient counseling on need for a good patient/doctor relationship moving forward.  Discussed use of common OTC medications and supplements.  Discussed common dietary aids and use of caffeine and the need for good sleep hygiene and stress management.    2. Type 2 diabetes mellitus with microalbuminuria, without long-term current use of insulin  - Start Losartan as below; otherwise well controlled  - losartan (COZAAR) 25 MG tablet; Take 0.5 tablets (12.5 mg total) by mouth once daily.  Dispense: 45 tablet; Refill: 3        RTC in 6 months     Luciana Cisse MD  10/22/2020 8:53 AM        No follow-ups on file.

## 2020-11-03 ENCOUNTER — PES CALL (OUTPATIENT)
Dept: ADMINISTRATIVE | Facility: CLINIC | Age: 70
End: 2020-11-03

## 2020-11-18 ENCOUNTER — PATIENT MESSAGE (OUTPATIENT)
Dept: TRANSPLANT | Facility: CLINIC | Age: 70
End: 2020-11-18

## 2020-11-18 RX ORDER — FUROSEMIDE 40 MG/1
TABLET ORAL
Qty: 36 TABLET | Refills: 0 | Status: CANCELLED | OUTPATIENT
Start: 2020-11-18

## 2020-11-19 RX ORDER — FUROSEMIDE 40 MG/1
TABLET ORAL
Qty: 36 TABLET | Refills: 6 | Status: SHIPPED | OUTPATIENT
Start: 2020-11-19 | End: 2021-04-22 | Stop reason: ALTCHOICE

## 2020-11-27 ENCOUNTER — TELEPHONE (OUTPATIENT)
Dept: TRANSPLANT | Facility: CLINIC | Age: 70
End: 2020-11-27

## 2020-11-30 ENCOUNTER — PATIENT MESSAGE (OUTPATIENT)
Dept: TRANSPLANT | Facility: CLINIC | Age: 70
End: 2020-11-30

## 2020-12-08 ENCOUNTER — TELEPHONE (OUTPATIENT)
Dept: TRANSPLANT | Facility: CLINIC | Age: 70
End: 2020-12-08

## 2020-12-14 ENCOUNTER — OFFICE VISIT (OUTPATIENT)
Dept: HEMATOLOGY/ONCOLOGY | Facility: CLINIC | Age: 70
End: 2020-12-14
Payer: MEDICARE

## 2020-12-14 DIAGNOSIS — I70.0 ATHEROSCLEROSIS OF AORTA: ICD-10-CM

## 2020-12-14 DIAGNOSIS — Z79.01 CURRENT USE OF LONG TERM ANTICOAGULATION: ICD-10-CM

## 2020-12-14 DIAGNOSIS — D50.0 IRON DEFICIENCY ANEMIA DUE TO CHRONIC BLOOD LOSS: Primary | ICD-10-CM

## 2020-12-14 DIAGNOSIS — I27.9 CHRONIC PULMONARY HEART DISEASE: ICD-10-CM

## 2020-12-14 DIAGNOSIS — E11.29 TYPE 2 DIABETES MELLITUS WITH MICROALBUMINURIA, WITHOUT LONG-TERM CURRENT USE OF INSULIN: ICD-10-CM

## 2020-12-14 DIAGNOSIS — I50.22 CHRONIC SYSTOLIC CONGESTIVE HEART FAILURE: ICD-10-CM

## 2020-12-14 DIAGNOSIS — R80.9 TYPE 2 DIABETES MELLITUS WITH MICROALBUMINURIA, WITHOUT LONG-TERM CURRENT USE OF INSULIN: ICD-10-CM

## 2020-12-14 PROCEDURE — 99213 OFFICE O/P EST LOW 20 MIN: CPT | Mod: 95,,, | Performed by: INTERNAL MEDICINE

## 2020-12-14 PROCEDURE — 99213 PR OFFICE/OUTPT VISIT, EST, LEVL III, 20-29 MIN: ICD-10-PCS | Mod: 95,,, | Performed by: INTERNAL MEDICINE

## 2020-12-14 NOTE — PROGRESS NOTES
PATIENT: Ambrosio Bray III  MRN: [id number]  DATE: 12/13/2020    Diagnosis:   1. Iron deficiency anemia due to chronic blood loss    2. Current use of long term anticoagulation    3. Chronic systolic congestive heart failure    4. Atherosclerosis of aorta    5. Type 2 diabetes mellitus with microalbuminuria, without long-term current use of insulin    6. Chronic pulmonary heart disease      Chief Complaint: iron deficiency anemia    Subjective:    History of Present Illness: Mr. Bray is a 70 y.o. male who presented in March 2020 for evaluation and management of iron deficiency anemia. He was referred by Dr. Marquez.    Telemedicine visit:  The patient location is: home  The chief complaint leading to consultation is: iron deficiency anemia    Visit type: audiovisual    Face to Face time with patient: 15 minutes  20 minutes of total time spent on the encounter, which includes face to face time and non-face to face time preparing to see the patient (eg, review of tests), Obtaining and/or reviewing separately obtained history, Documenting clinical information in the electronic or other health record, Independently interpreting results (not separately reported) and communicating results to the patient/family/caregiver, or Care coordination (not separately reported).     Each patient to whom he or she provides medical services by telemedicine is:  (1) informed of the relationship between the physician and patient and the respective role of any other health care provider with respect to management of the patient; and (2) notified that he or she may decline to receive medical services by telemedicine and may withdraw from such care at any time.    Notes: see below      - labs since 2-18 reveal a worsening anemia.  - iron studies (9/4/19) revealed a decreased ferritin/iron/saturated iron. These finding are consistent with severe iron deficiency anemia.  - he takes rivaroxaban chronically.  - He underwent an upper GI KUB  series. There is hesitancy to proceed with upper GI endoscopy due to his chronic respiratory condition.  - he has not been taking oral iron.  - he received ferric carboxymaltose on 3/17/20 and 3/24/20.    Interval history:  - he presents for a follow-up appointment for his iron deficiency anemia.  - today, he is doing well. He denies chest pain, nausea, vomiting, diarrhea, constipation. He denies fatigue.  - he is taking oral iron daily.    Past medical, surgical, family, and social histories have been reviewed and updated below.    Past Medical History:   Past Medical History:   Diagnosis Date    Anemia     Atrial fibrillation     Diabetes mellitus     Heart failure     Hyperlipidemia     Hypertension     PTSD (post-traumatic stress disorder)     Pulmonary hyperinflation     Urinary incontinence        Past Surgical History:   Past Surgical History:   Procedure Laterality Date    CARDIAC CATHETERIZATION      COLONOSCOPY N/A 5/1/2018    Procedure: COLONOSCOPY;  Surgeon: Bubba Navarro MD;  Location: [facility] ENDO;  Service: Endoscopy;  Laterality: N/A;  confirmed appt 4/24/18    RIGHT HEART CATHETERIZATION Right 11/28/2018    Procedure: INSERTION, CATHETER, RIGHT HEART;  Surgeon: Chelsea Arceo MD;  Location: [facility] CATH LAB;  Service: Cardiology;  Laterality: Right;       Family History:   Family History   Problem Relation Age of Onset    Cancer Father         colon    Colon cancer Father     Heart disease Mother     Asthma Mother     Diabetes Mother     Hypertension Mother     Kidney disease Maternal Grandmother     Diabetes Paternal Grandmother     Cirrhosis Paternal Grandfather        Social History:  reports that he has never smoked. He has never used smokeless tobacco. He reports current alcohol use of about 2.0 - 3.0 standard drinks of alcohol per week. He reports that he does not use drugs.    Allergies:  Review of patient's allergies indicates:  No Known Allergies    Medications:  Current  Outpatient Medications   Medication Sig Dispense Refill    atorvastatin (LIPITOR) 40 MG tablet TAKE 1 TABLET BY MOUTH ONCE DAILY 90 tablet 1    ferrous sulfate 325 (65 FE) MG EC tablet Take 1 tablet (325 mg total) by mouth 2 (two) times daily with meals. 180 tablet 3    furosemide (LASIX) 40 MG tablet TAKE 1 TABLET BY MOUTH ON MONDAY, WEDNESDAY AND FRIDAY 36 tablet 6    ipratropium (ATROVENT) 42 mcg (0.06 %) nasal spray 2 sprays by Nasal route 4 (four) times daily. 15 mL 6    losartan (COZAAR) 25 MG tablet Take 0.5 tablets (12.5 mg total) by mouth once daily. 45 tablet 3    mometasone 0.1% (ELOCON) 0.1 % cream Apply topically once daily.      potassium chloride SA (KLOR-CON M15) 15 MEQ tablet Take 1 tablet (15 mEq total) by mouth 2 (two) times daily. 60 tablet 11    riociguat (ADEMPAS) 2.5 mg tablet Take 1 tablet (2.5 mg total) by mouth 3 (three) times daily. 90 tablet 11    selexipag (UPTRAVI) 200 mcg (140)- 800 mcg (60) DsPk Take by mouth 2 (two) times daily. Pt states he takes 1600mcg twice a day      tamsulosin (FLOMAX) 0.4 mg Cap Take 1 capsule (0.4 mg total) by mouth once daily. 90 capsule 1    UPTRAVI 1,600 mcg Tab Take 1 tablet by mouth twice daily. 60 tablet 12    XARELTO 20 mg Tab TAKE 1 TABLET BY MOUTH ONCE DAILY WITH  DINNER  OR  EVENING  MEAL 90 tablet 1     Current Facility-Administered Medications   Medication Dose Route Frequency Provider Last Rate Last Dose    lidocaine HCL 10 mg/ml (1%) injection 1 mL  1 mL Other 1 time in Clinic/HOD Julius Matute Jr., MD           Review of Systems   Constitutional: Negative for appetite change, fatigue and unexpected weight change.   HENT: Negative for sore throat.    Eyes: Negative for visual disturbance.   Respiratory: Positive for shortness of breath (improved). Negative for cough.    Cardiovascular: Negative for chest pain.   Gastrointestinal: Negative for abdominal pain and diarrhea.   Genitourinary: Negative for dysuria.   Musculoskeletal:  Negative for back pain.   Skin: Negative for rash.   Neurological: Negative for headaches.   Hematological: Negative for adenopathy.   Psychiatric/Behavioral: The patient is not nervous/anxious.      ECOG Performance Status:   ECOG SCORE 1       Objective:      Vitals:   There were no vitals filed for this visit.  BMI: There is no height or weight on file to calculate BMI.  Deferred since telemedicine visit.    Physical Exam   Deferred since telemedicine visit.    Laboratory Data:  Labs have been reviewed.    Lab Results   Component Value Date    WBC 4.23 12/12/2020    HGB 11.3 (L) 12/12/2020    HCT 35.9 (L) 12/12/2020    MCV 90 12/12/2020     12/12/2020             Imaging:    Assessment:       1. Iron deficiency anemia due to chronic blood loss    2. Current use of long term anticoagulation    3. Chronic systolic congestive heart failure    4. Atherosclerosis of aorta    5. Type 2 diabetes mellitus with microalbuminuria, without long-term current use of insulin    6. Chronic pulmonary heart disease         Plan:     1. Iron deficiency anemia  - I have reviewed his chart/labs.  - labs since 2-18 reveal a worsening anemia.  - iron studies (9/4/19) revealed a decreased ferritin/iron/saturated iron. These finding are consistent with severe iron deficiency anemia.  - given the severity of his iron deficiency anemia, as well as his symptoms, I recommended ferric carboxymaltose x 2 doses.  - he received ferric carboxymaltose on 3/17/20 and 3/24/20.  - clinically, he has improved.  - Labs have been reviewed. Hemoglobin has improved to 11.3 g/dL. Iron studies are within acceptable limits.  - continue iron supplement.  - Colonoscopy in 5/2018 was negative for a source of bleeding. There is hesitancy to proceed with upper GI endoscopy due to his chronic respiratory condition.  - return to clinic in 4 months with repeat labs    2. Pulmonary hypertension / atrial fibrillation / chronic anticoagulation  - he take  rivaroxaban, which increases his risk of bleeding  - continue to monitor.    3. Atherosclerosis of aorta  - seen on imaging  - continue atorvastatin  - continue to monitor    4. Advance Care Planning     Power of   After our discussion (at previous visit), the patient decided to complete a HCPOA and appointed his wife Nina Romero ([phone]).         - return to clinic in 4 months with repeat labs    Ron Ibarra M.D.  Hematology/Oncology  [facility] - [address], [address]  [place] [address]  Phone: [phone]  Fax: [phone]

## 2020-12-14 NOTE — Clinical Note
1. Schedule labs CBC, ferritin, iron/TIBC in 4 months. He would like labs on a weekday.  2. Schedule virtual appt a few days after the labs.    Thanks!

## 2021-01-08 ENCOUNTER — PATIENT MESSAGE (OUTPATIENT)
Dept: FAMILY MEDICINE | Facility: CLINIC | Age: 71
End: 2021-01-08

## 2021-01-09 ENCOUNTER — IMMUNIZATION (OUTPATIENT)
Dept: INTERNAL MEDICINE | Facility: CLINIC | Age: 71
End: 2021-01-09
Payer: MEDICARE

## 2021-01-09 DIAGNOSIS — Z23 NEED FOR VACCINATION: ICD-10-CM

## 2021-01-09 PROCEDURE — 91300 COVID-19, MRNA, LNP-S, PF, 30 MCG/0.3 ML DOSE VACCINE: CPT | Mod: PBBFAC

## 2021-01-14 ENCOUNTER — TELEPHONE (OUTPATIENT)
Dept: HEMATOLOGY/ONCOLOGY | Facility: CLINIC | Age: 71
End: 2021-01-14

## 2021-01-25 DIAGNOSIS — I48.20 CHRONIC ATRIAL FIBRILLATION: ICD-10-CM

## 2021-01-25 RX ORDER — RIVAROXABAN 20 MG/1
1 TABLET, FILM COATED ORAL
Qty: 90 TABLET | Refills: 1 | OUTPATIENT
Start: 2021-01-25

## 2021-01-30 ENCOUNTER — IMMUNIZATION (OUTPATIENT)
Dept: INTERNAL MEDICINE | Facility: CLINIC | Age: 71
End: 2021-01-30
Payer: MEDICARE

## 2021-01-30 DIAGNOSIS — Z23 NEED FOR VACCINATION: Primary | ICD-10-CM

## 2021-01-30 PROCEDURE — 91300 COVID-19, MRNA, LNP-S, PF, 30 MCG/0.3 ML DOSE VACCINE: CPT | Mod: PBBFAC

## 2021-01-30 PROCEDURE — 0002A COVID-19, MRNA, LNP-S, PF, 30 MCG/0.3 ML DOSE VACCINE: CPT | Mod: PBBFAC

## 2021-02-03 DIAGNOSIS — R80.9 TYPE 2 DIABETES MELLITUS WITH MICROALBUMINURIA, WITHOUT LONG-TERM CURRENT USE OF INSULIN: ICD-10-CM

## 2021-02-03 DIAGNOSIS — E11.29 TYPE 2 DIABETES MELLITUS WITH MICROALBUMINURIA, WITHOUT LONG-TERM CURRENT USE OF INSULIN: ICD-10-CM

## 2021-02-10 ENCOUNTER — PATIENT MESSAGE (OUTPATIENT)
Dept: TRANSPLANT | Facility: CLINIC | Age: 71
End: 2021-02-10

## 2021-02-25 ENCOUNTER — PES CALL (OUTPATIENT)
Dept: ADMINISTRATIVE | Facility: CLINIC | Age: 71
End: 2021-02-25

## 2021-03-19 RX ORDER — SELEXIPAG 1600 UG/1
1 TABLET, COATED ORAL 2 TIMES DAILY
Qty: 60 TABLET | Refills: 12 | Status: SHIPPED | OUTPATIENT
Start: 2021-03-19

## 2021-03-25 ENCOUNTER — PATIENT MESSAGE (OUTPATIENT)
Dept: FAMILY MEDICINE | Facility: CLINIC | Age: 71
End: 2021-03-25

## 2021-03-26 DIAGNOSIS — I50.32 CHRONIC DIASTOLIC CHF (CONGESTIVE HEART FAILURE): Primary | ICD-10-CM

## 2021-03-26 DIAGNOSIS — R80.9 TYPE 2 DIABETES MELLITUS WITH MICROALBUMINURIA, WITHOUT LONG-TERM CURRENT USE OF INSULIN: ICD-10-CM

## 2021-03-26 DIAGNOSIS — E11.29 TYPE 2 DIABETES MELLITUS WITH MICROALBUMINURIA, WITHOUT LONG-TERM CURRENT USE OF INSULIN: ICD-10-CM

## 2021-04-01 ENCOUNTER — PATIENT MESSAGE (OUTPATIENT)
Dept: TRANSPLANT | Facility: CLINIC | Age: 71
End: 2021-04-01

## 2021-04-01 DIAGNOSIS — R06.82 TACHYPNEA: ICD-10-CM

## 2021-04-01 DIAGNOSIS — Z79.899 POLYPHARMACY: Primary | ICD-10-CM

## 2021-04-02 ENCOUNTER — PATIENT MESSAGE (OUTPATIENT)
Dept: TRANSPLANT | Facility: CLINIC | Age: 71
End: 2021-04-02

## 2021-04-03 ENCOUNTER — PATIENT MESSAGE (OUTPATIENT)
Dept: TRANSPLANT | Facility: CLINIC | Age: 71
End: 2021-04-03

## 2021-04-05 ENCOUNTER — TELEPHONE (OUTPATIENT)
Dept: TRANSPLANT | Facility: CLINIC | Age: 71
End: 2021-04-05

## 2021-04-06 ENCOUNTER — PATIENT MESSAGE (OUTPATIENT)
Dept: ADMINISTRATIVE | Facility: HOSPITAL | Age: 71
End: 2021-04-06

## 2021-04-07 ENCOUNTER — PES CALL (OUTPATIENT)
Dept: ADMINISTRATIVE | Facility: CLINIC | Age: 71
End: 2021-04-07

## 2021-04-12 DIAGNOSIS — R79.89 ABNORMAL TSH: Primary | ICD-10-CM

## 2021-04-12 DIAGNOSIS — R93.89 ABNORMAL FINDINGS ON DIAGNOSTIC IMAGING OF OTHER SPECIFIED BODY STRUCTURES: ICD-10-CM

## 2021-04-14 ENCOUNTER — OFFICE VISIT (OUTPATIENT)
Dept: HEMATOLOGY/ONCOLOGY | Facility: CLINIC | Age: 71
End: 2021-04-14
Payer: MEDICARE

## 2021-04-14 DIAGNOSIS — I70.0 ATHEROSCLEROSIS OF AORTA: ICD-10-CM

## 2021-04-14 DIAGNOSIS — D50.0 IRON DEFICIENCY ANEMIA DUE TO CHRONIC BLOOD LOSS: Primary | ICD-10-CM

## 2021-04-14 DIAGNOSIS — E11.29 TYPE 2 DIABETES MELLITUS WITH MICROALBUMINURIA, WITHOUT LONG-TERM CURRENT USE OF INSULIN: ICD-10-CM

## 2021-04-14 DIAGNOSIS — R80.9 TYPE 2 DIABETES MELLITUS WITH MICROALBUMINURIA, WITHOUT LONG-TERM CURRENT USE OF INSULIN: ICD-10-CM

## 2021-04-14 DIAGNOSIS — Z79.01 CURRENT USE OF LONG TERM ANTICOAGULATION: ICD-10-CM

## 2021-04-14 DIAGNOSIS — I27.9 CHRONIC PULMONARY HEART DISEASE: ICD-10-CM

## 2021-04-14 DIAGNOSIS — I50.22 CHRONIC SYSTOLIC CONGESTIVE HEART FAILURE: ICD-10-CM

## 2021-04-14 PROCEDURE — 99213 PR OFFICE/OUTPT VISIT, EST, LEVL III, 20-29 MIN: ICD-10-PCS | Mod: 95,,, | Performed by: INTERNAL MEDICINE

## 2021-04-14 PROCEDURE — 99213 OFFICE O/P EST LOW 20 MIN: CPT | Mod: 95,,, | Performed by: INTERNAL MEDICINE

## 2021-04-22 ENCOUNTER — PATIENT MESSAGE (OUTPATIENT)
Dept: FAMILY MEDICINE | Facility: CLINIC | Age: 71
End: 2021-04-22

## 2021-04-22 ENCOUNTER — OFFICE VISIT (OUTPATIENT)
Dept: FAMILY MEDICINE | Facility: CLINIC | Age: 71
End: 2021-04-22
Payer: MEDICARE

## 2021-04-22 VITALS
WEIGHT: 204.13 LBS | HEIGHT: 73 IN | BODY MASS INDEX: 27.05 KG/M2 | HEART RATE: 79 BPM | DIASTOLIC BLOOD PRESSURE: 72 MMHG | OXYGEN SATURATION: 96 % | SYSTOLIC BLOOD PRESSURE: 104 MMHG | TEMPERATURE: 98 F

## 2021-04-22 DIAGNOSIS — I26.99 PULMONARY EMBOLISM, UNSPECIFIED CHRONICITY, UNSPECIFIED PULMONARY EMBOLISM TYPE, UNSPECIFIED WHETHER ACUTE COR PULMONALE PRESENT: ICD-10-CM

## 2021-04-22 DIAGNOSIS — F25.9 SCHIZOAFFECTIVE DISORDER, UNSPECIFIED TYPE: ICD-10-CM

## 2021-04-22 DIAGNOSIS — I50.22 CHRONIC SYSTOLIC CONGESTIVE HEART FAILURE: ICD-10-CM

## 2021-04-22 DIAGNOSIS — I50.32 CHRONIC DIASTOLIC CHF (CONGESTIVE HEART FAILURE): Primary | ICD-10-CM

## 2021-04-22 PROCEDURE — 99213 OFFICE O/P EST LOW 20 MIN: CPT | Mod: PBBFAC,PO | Performed by: INTERNAL MEDICINE

## 2021-04-22 PROCEDURE — 99999 PR PBB SHADOW E&M-EST. PATIENT-LVL III: ICD-10-PCS | Mod: PBBFAC,,, | Performed by: INTERNAL MEDICINE

## 2021-04-22 PROCEDURE — 99214 OFFICE O/P EST MOD 30 MIN: CPT | Mod: S$PBB,,, | Performed by: INTERNAL MEDICINE

## 2021-04-22 PROCEDURE — 99999 PR PBB SHADOW E&M-EST. PATIENT-LVL III: CPT | Mod: PBBFAC,,, | Performed by: INTERNAL MEDICINE

## 2021-04-22 PROCEDURE — 99214 PR OFFICE/OUTPT VISIT, EST, LEVL IV, 30-39 MIN: ICD-10-PCS | Mod: S$PBB,,, | Performed by: INTERNAL MEDICINE

## 2021-04-22 RX ORDER — BUMETANIDE 2 MG/1
2 TABLET ORAL DAILY
Qty: 90 TABLET | Refills: 0 | Status: SHIPPED | OUTPATIENT
Start: 2021-04-22 | End: 2021-10-19 | Stop reason: SDUPTHER

## 2021-04-24 ENCOUNTER — PATIENT MESSAGE (OUTPATIENT)
Dept: FAMILY MEDICINE | Facility: CLINIC | Age: 71
End: 2021-04-24

## 2021-04-30 ENCOUNTER — TELEPHONE (OUTPATIENT)
Dept: ADMINISTRATIVE | Facility: CLINIC | Age: 71
End: 2021-04-30

## 2021-05-03 ENCOUNTER — OFFICE VISIT (OUTPATIENT)
Dept: HOME HEALTH SERVICES | Facility: CLINIC | Age: 71
End: 2021-05-03
Payer: MEDICARE

## 2021-05-03 VITALS — BODY MASS INDEX: 24.52 KG/M2 | HEIGHT: 73 IN | WEIGHT: 185 LBS

## 2021-05-03 DIAGNOSIS — I48.0 PAROXYSMAL ATRIAL FIBRILLATION: ICD-10-CM

## 2021-05-03 DIAGNOSIS — I50.22 CHRONIC SYSTOLIC CONGESTIVE HEART FAILURE: ICD-10-CM

## 2021-05-03 DIAGNOSIS — I50.32 CHRONIC DIASTOLIC CHF (CONGESTIVE HEART FAILURE): ICD-10-CM

## 2021-05-03 DIAGNOSIS — F25.9 SCHIZOAFFECTIVE DISORDER, UNSPECIFIED TYPE: ICD-10-CM

## 2021-05-03 DIAGNOSIS — I27.24 CTEPH (CHRONIC THROMBOEMBOLIC PULMONARY HYPERTENSION): ICD-10-CM

## 2021-05-03 DIAGNOSIS — R80.9 TYPE 2 DIABETES MELLITUS WITH MICROALBUMINURIA, WITHOUT LONG-TERM CURRENT USE OF INSULIN: ICD-10-CM

## 2021-05-03 DIAGNOSIS — E11.29 TYPE 2 DIABETES MELLITUS WITH MICROALBUMINURIA, WITHOUT LONG-TERM CURRENT USE OF INSULIN: ICD-10-CM

## 2021-05-03 DIAGNOSIS — I27.9 CHRONIC PULMONARY HEART DISEASE: ICD-10-CM

## 2021-05-03 DIAGNOSIS — Z00.00 ENCOUNTER FOR PREVENTIVE HEALTH EXAMINATION: Primary | ICD-10-CM

## 2021-05-03 DIAGNOSIS — Z79.01 CURRENT USE OF LONG TERM ANTICOAGULATION: ICD-10-CM

## 2021-05-03 DIAGNOSIS — N40.0 BENIGN NON-NODULAR PROSTATIC HYPERPLASIA WITHOUT LOWER URINARY TRACT SYMPTOMS: ICD-10-CM

## 2021-05-03 DIAGNOSIS — D50.0 IRON DEFICIENCY ANEMIA DUE TO CHRONIC BLOOD LOSS: ICD-10-CM

## 2021-05-03 DIAGNOSIS — I70.0 ATHEROSCLEROSIS OF AORTA: ICD-10-CM

## 2021-05-03 DIAGNOSIS — I27.20 PULMONARY HTN: ICD-10-CM

## 2021-05-03 PROCEDURE — G0439 PR MEDICARE ANNUAL WELLNESS SUBSEQUENT VISIT: ICD-10-PCS | Mod: 95,,, | Performed by: NURSE PRACTITIONER

## 2021-05-03 PROCEDURE — G0439 PPPS, SUBSEQ VISIT: HCPCS | Mod: 95,,, | Performed by: NURSE PRACTITIONER

## 2021-05-03 RX ORDER — LOSARTAN POTASSIUM 25 MG/1
25 TABLET ORAL DAILY
COMMUNITY

## 2021-05-13 ENCOUNTER — EPISODE CHANGES (OUTPATIENT)
Dept: TRANSPLANT | Facility: CLINIC | Age: 71
End: 2021-05-13

## 2021-08-13 ENCOUNTER — PATIENT MESSAGE (OUTPATIENT)
Dept: FAMILY MEDICINE | Facility: CLINIC | Age: 71
End: 2021-08-13

## 2021-09-14 ENCOUNTER — IMMUNIZATION (OUTPATIENT)
Dept: OBSTETRICS AND GYNECOLOGY | Facility: CLINIC | Age: 71
End: 2021-09-14
Payer: MEDICARE

## 2021-09-14 DIAGNOSIS — Z23 NEED FOR VACCINATION: Primary | ICD-10-CM

## 2021-09-14 PROCEDURE — 0003A COVID-19, MRNA, LNP-S, PF, 30 MCG/0.3 ML DOSE VACCINE: CPT | Mod: CV19,,, | Performed by: FAMILY MEDICINE

## 2021-09-14 PROCEDURE — 91300 COVID-19, MRNA, LNP-S, PF, 30 MCG/0.3 ML DOSE VACCINE: ICD-10-PCS | Mod: ,,, | Performed by: FAMILY MEDICINE

## 2021-09-14 PROCEDURE — 91300 COVID-19, MRNA, LNP-S, PF, 30 MCG/0.3 ML DOSE VACCINE: CPT | Mod: ,,, | Performed by: FAMILY MEDICINE

## 2021-09-14 PROCEDURE — 0003A COVID-19, MRNA, LNP-S, PF, 30 MCG/0.3 ML DOSE VACCINE: ICD-10-PCS | Mod: CV19,,, | Performed by: FAMILY MEDICINE

## 2021-10-19 ENCOUNTER — OFFICE VISIT (OUTPATIENT)
Dept: FAMILY MEDICINE | Facility: CLINIC | Age: 71
End: 2021-10-19
Payer: MEDICARE

## 2021-10-19 ENCOUNTER — LAB VISIT (OUTPATIENT)
Dept: LAB | Facility: HOSPITAL | Age: 71
End: 2021-10-19
Attending: INTERNAL MEDICINE
Payer: MEDICARE

## 2021-10-19 VITALS
HEART RATE: 77 BPM | DIASTOLIC BLOOD PRESSURE: 60 MMHG | HEIGHT: 73 IN | OXYGEN SATURATION: 97 % | BODY MASS INDEX: 24.01 KG/M2 | TEMPERATURE: 98 F | WEIGHT: 181.19 LBS | SYSTOLIC BLOOD PRESSURE: 90 MMHG

## 2021-10-19 DIAGNOSIS — S39.012A STRAIN OF MUSCLE, FASCIA AND TENDON OF LOWER BACK, INITIAL ENCOUNTER: Primary | ICD-10-CM

## 2021-10-19 DIAGNOSIS — D53.9 NUTRITIONAL ANEMIA, UNSPECIFIED: ICD-10-CM

## 2021-10-19 DIAGNOSIS — R80.9 TYPE 2 DIABETES MELLITUS WITH MICROALBUMINURIA, WITHOUT LONG-TERM CURRENT USE OF INSULIN: ICD-10-CM

## 2021-10-19 DIAGNOSIS — I87.2 VENOUS STASIS ULCER OF OTHER PART OF RIGHT LOWER LEG WITH FAT LAYER EXPOSED WITHOUT VARICOSE VEINS: ICD-10-CM

## 2021-10-19 DIAGNOSIS — R19.8 INCREASED ABDOMINAL GIRTH: ICD-10-CM

## 2021-10-19 DIAGNOSIS — L97.812 VENOUS STASIS ULCER OF OTHER PART OF RIGHT LOWER LEG WITH FAT LAYER EXPOSED WITHOUT VARICOSE VEINS: ICD-10-CM

## 2021-10-19 DIAGNOSIS — Z23 NEEDS FLU SHOT: ICD-10-CM

## 2021-10-19 DIAGNOSIS — R53.83 FATIGUE, UNSPECIFIED TYPE: ICD-10-CM

## 2021-10-19 DIAGNOSIS — E11.29 TYPE 2 DIABETES MELLITUS WITH MICROALBUMINURIA, WITHOUT LONG-TERM CURRENT USE OF INSULIN: ICD-10-CM

## 2021-10-19 DIAGNOSIS — I50.22 CHRONIC SYSTOLIC CONGESTIVE HEART FAILURE: ICD-10-CM

## 2021-10-19 DIAGNOSIS — I48.20 CHRONIC ATRIAL FIBRILLATION: ICD-10-CM

## 2021-10-19 DIAGNOSIS — I50.32 CHRONIC DIASTOLIC CHF (CONGESTIVE HEART FAILURE): ICD-10-CM

## 2021-10-19 DIAGNOSIS — R79.89 ABNORMAL TSH: ICD-10-CM

## 2021-10-19 DIAGNOSIS — I87.303 VENOUS HYPERTENSION OF BOTH LOWER EXTREMITIES: ICD-10-CM

## 2021-10-19 DIAGNOSIS — N40.0 BENIGN NON-NODULAR PROSTATIC HYPERPLASIA WITHOUT LOWER URINARY TRACT SYMPTOMS: ICD-10-CM

## 2021-10-19 LAB
ALBUMIN SERPL BCP-MCNC: 3.6 G/DL (ref 3.5–5.2)
ALP SERPL-CCNC: 115 U/L (ref 55–135)
ALT SERPL W/O P-5'-P-CCNC: 7 U/L (ref 10–44)
ANION GAP SERPL CALC-SCNC: 10 MMOL/L (ref 8–16)
AST SERPL-CCNC: 11 U/L (ref 10–40)
BASOPHILS # BLD AUTO: 0.04 K/UL (ref 0–0.2)
BASOPHILS NFR BLD: 1 % (ref 0–1.9)
BILIRUB SERPL-MCNC: 3.3 MG/DL (ref 0.1–1)
BUN SERPL-MCNC: 21 MG/DL (ref 8–23)
CALCIUM SERPL-MCNC: 9.2 MG/DL (ref 8.7–10.5)
CHLORIDE SERPL-SCNC: 107 MMOL/L (ref 95–110)
CHOLEST SERPL-MCNC: 105 MG/DL (ref 120–199)
CHOLEST/HDLC SERPL: 3.1 {RATIO} (ref 2–5)
CO2 SERPL-SCNC: 25 MMOL/L (ref 23–29)
CREAT SERPL-MCNC: 0.8 MG/DL (ref 0.5–1.4)
DIFFERENTIAL METHOD: ABNORMAL
EOSINOPHIL # BLD AUTO: 0.1 K/UL (ref 0–0.5)
EOSINOPHIL NFR BLD: 1.4 % (ref 0–8)
ERYTHROCYTE [DISTWIDTH] IN BLOOD BY AUTOMATED COUNT: 20.7 % (ref 11.5–14.5)
EST. GFR  (AFRICAN AMERICAN): >60 ML/MIN/1.73 M^2
EST. GFR  (NON AFRICAN AMERICAN): >60 ML/MIN/1.73 M^2
GLUCOSE SERPL-MCNC: 92 MG/DL (ref 70–110)
HCT VFR BLD AUTO: 32.9 % (ref 40–54)
HDLC SERPL-MCNC: 34 MG/DL (ref 40–75)
HDLC SERPL: 32.4 % (ref 20–50)
HGB BLD-MCNC: 10.4 G/DL (ref 14–18)
IMM GRANULOCYTES # BLD AUTO: 0.01 K/UL (ref 0–0.04)
IMM GRANULOCYTES NFR BLD AUTO: 0.2 % (ref 0–0.5)
LDLC SERPL CALC-MCNC: 58.4 MG/DL (ref 63–159)
LYMPHOCYTES # BLD AUTO: 1 K/UL (ref 1–4.8)
LYMPHOCYTES NFR BLD: 23.5 % (ref 18–48)
MCH RBC QN AUTO: 26.9 PG (ref 27–31)
MCHC RBC AUTO-ENTMCNC: 31.6 G/DL (ref 32–36)
MCV RBC AUTO: 85 FL (ref 82–98)
MONOCYTES # BLD AUTO: 0.4 K/UL (ref 0.3–1)
MONOCYTES NFR BLD: 9.5 % (ref 4–15)
NEUTROPHILS # BLD AUTO: 2.7 K/UL (ref 1.8–7.7)
NEUTROPHILS NFR BLD: 64.4 % (ref 38–73)
NONHDLC SERPL-MCNC: 71 MG/DL
NRBC BLD-RTO: 0 /100 WBC
PLATELET # BLD AUTO: 299 K/UL (ref 150–450)
PMV BLD AUTO: 10.5 FL (ref 9.2–12.9)
POTASSIUM SERPL-SCNC: 4 MMOL/L (ref 3.5–5.1)
PROT SERPL-MCNC: 7.4 G/DL (ref 6–8.4)
RBC # BLD AUTO: 3.86 M/UL (ref 4.6–6.2)
SODIUM SERPL-SCNC: 142 MMOL/L (ref 136–145)
T4 FREE SERPL-MCNC: 1.08 NG/DL (ref 0.71–1.51)
TRIGL SERPL-MCNC: 63 MG/DL (ref 30–150)
TSH SERPL DL<=0.005 MIU/L-ACNC: 3.31 UIU/ML (ref 0.4–4)
WBC # BLD AUTO: 4.21 K/UL (ref 3.9–12.7)

## 2021-10-19 PROCEDURE — 80053 COMPREHEN METABOLIC PANEL: CPT | Performed by: INTERNAL MEDICINE

## 2021-10-19 PROCEDURE — 99999 PR PBB SHADOW E&M-EST. PATIENT-LVL IV: CPT | Mod: PBBFAC,,, | Performed by: INTERNAL MEDICINE

## 2021-10-19 PROCEDURE — 84439 ASSAY OF FREE THYROXINE: CPT | Performed by: INTERNAL MEDICINE

## 2021-10-19 PROCEDURE — 84443 ASSAY THYROID STIM HORMONE: CPT | Performed by: INTERNAL MEDICINE

## 2021-10-19 PROCEDURE — 82746 ASSAY OF FOLIC ACID SERUM: CPT | Performed by: INTERNAL MEDICINE

## 2021-10-19 PROCEDURE — 99999 PR PBB SHADOW E&M-EST. PATIENT-LVL IV: ICD-10-PCS | Mod: PBBFAC,,, | Performed by: INTERNAL MEDICINE

## 2021-10-19 PROCEDURE — 85025 COMPLETE CBC W/AUTO DIFF WBC: CPT | Performed by: INTERNAL MEDICINE

## 2021-10-19 PROCEDURE — 80061 LIPID PANEL: CPT | Performed by: INTERNAL MEDICINE

## 2021-10-19 PROCEDURE — 99214 PR OFFICE/OUTPT VISIT, EST, LEVL IV, 30-39 MIN: ICD-10-PCS | Mod: S$PBB,,, | Performed by: INTERNAL MEDICINE

## 2021-10-19 PROCEDURE — 82607 VITAMIN B-12: CPT | Performed by: INTERNAL MEDICINE

## 2021-10-19 PROCEDURE — 90694 VACC AIIV4 NO PRSRV 0.5ML IM: CPT | Mod: PBBFAC,PO

## 2021-10-19 PROCEDURE — 99214 OFFICE O/P EST MOD 30 MIN: CPT | Mod: PBBFAC,PO,25 | Performed by: INTERNAL MEDICINE

## 2021-10-19 PROCEDURE — 83036 HEMOGLOBIN GLYCOSYLATED A1C: CPT | Performed by: INTERNAL MEDICINE

## 2021-10-19 PROCEDURE — 99214 OFFICE O/P EST MOD 30 MIN: CPT | Mod: S$PBB,,, | Performed by: INTERNAL MEDICINE

## 2021-10-19 PROCEDURE — G0008 ADMIN INFLUENZA VIRUS VAC: HCPCS | Mod: PBBFAC,PO

## 2021-10-19 RX ORDER — TAMSULOSIN HYDROCHLORIDE 0.4 MG/1
1 CAPSULE ORAL DAILY
Qty: 90 CAPSULE | Refills: 3 | Status: SHIPPED | OUTPATIENT
Start: 2021-10-19 | End: 2021-12-06

## 2021-10-19 RX ORDER — BUMETANIDE 2 MG/1
2 TABLET ORAL DAILY
Qty: 90 TABLET | Refills: 0 | Status: SHIPPED | OUTPATIENT
Start: 2021-10-19 | End: 2022-10-19

## 2021-10-20 PROBLEM — E53.8 FOLATE DEFICIENCY: Status: ACTIVE | Noted: 2021-10-20

## 2021-10-20 LAB
ESTIMATED AVG GLUCOSE: 117 MG/DL (ref 68–131)
FOLATE SERPL-MCNC: <2.2 NG/ML (ref 4–24)
HBA1C MFR BLD: 5.7 % (ref 4–5.6)
VIT B12 SERPL-MCNC: 531 PG/ML (ref 210–950)

## 2021-10-21 ENCOUNTER — HOSPITAL ENCOUNTER (OUTPATIENT)
Dept: RADIOLOGY | Facility: HOSPITAL | Age: 71
Discharge: HOME | End: 2021-10-21
Attending: INTERNAL MEDICINE
Payer: MEDICARE

## 2021-10-21 ENCOUNTER — PATIENT MESSAGE (OUTPATIENT)
Dept: FAMILY MEDICINE | Facility: CLINIC | Age: 71
End: 2021-10-21
Payer: MEDICARE

## 2021-10-21 DIAGNOSIS — R19.8 INCREASED ABDOMINAL GIRTH: ICD-10-CM

## 2021-10-21 PROCEDURE — 76700 US EXAM ABDOM COMPLETE: CPT | Mod: 26,,, | Performed by: RADIOLOGY

## 2021-10-21 PROCEDURE — 76700 US EXAM ABDOM COMPLETE: CPT | Mod: TC

## 2021-10-21 PROCEDURE — 76700 US ABDOMEN COMPLETE: ICD-10-PCS | Mod: 26,,, | Performed by: RADIOLOGY

## 2021-12-01 DIAGNOSIS — R06.02 SHORTNESS OF BREATH: ICD-10-CM

## 2021-12-01 DIAGNOSIS — Z79.899 POLYPHARMACY: Primary | ICD-10-CM

## 2021-12-01 DIAGNOSIS — I27.9 CHRONIC PULMONARY HEART DISEASE: ICD-10-CM

## 2021-12-01 DIAGNOSIS — R06.82 TACHYPNEA: ICD-10-CM

## 2021-12-02 ENCOUNTER — TELEPHONE (OUTPATIENT)
Dept: TRANSPLANT | Facility: CLINIC | Age: 71
End: 2021-12-02
Payer: MEDICARE

## 2021-12-06 ENCOUNTER — PATIENT MESSAGE (OUTPATIENT)
Dept: FAMILY MEDICINE | Facility: CLINIC | Age: 71
End: 2021-12-06
Payer: MEDICARE

## 2021-12-06 DIAGNOSIS — N40.0 BENIGN NON-NODULAR PROSTATIC HYPERPLASIA WITHOUT LOWER URINARY TRACT SYMPTOMS: ICD-10-CM

## 2021-12-08 ENCOUNTER — PATIENT MESSAGE (OUTPATIENT)
Dept: TRANSPLANT | Facility: CLINIC | Age: 71
End: 2021-12-08
Payer: MEDICARE

## 2021-12-08 ENCOUNTER — TELEPHONE (OUTPATIENT)
Dept: PULMONOLOGY | Facility: CLINIC | Age: 71
End: 2021-12-08
Payer: MEDICARE

## 2021-12-08 DIAGNOSIS — I27.9 CHRONIC PULMONARY HEART DISEASE: Primary | ICD-10-CM

## 2021-12-15 ENCOUNTER — OFFICE VISIT (OUTPATIENT)
Dept: HEMATOLOGY/ONCOLOGY | Facility: CLINIC | Age: 71
End: 2021-12-15
Payer: MEDICARE

## 2021-12-15 DIAGNOSIS — E11.29 TYPE 2 DIABETES MELLITUS WITH MICROALBUMINURIA, WITHOUT LONG-TERM CURRENT USE OF INSULIN: ICD-10-CM

## 2021-12-15 DIAGNOSIS — R80.9 TYPE 2 DIABETES MELLITUS WITH MICROALBUMINURIA, WITHOUT LONG-TERM CURRENT USE OF INSULIN: ICD-10-CM

## 2021-12-15 DIAGNOSIS — D50.0 IRON DEFICIENCY ANEMIA DUE TO CHRONIC BLOOD LOSS: Primary | ICD-10-CM

## 2021-12-15 DIAGNOSIS — E53.8 FOLATE DEFICIENCY: ICD-10-CM

## 2021-12-15 DIAGNOSIS — R31.9 HEMATURIA, UNSPECIFIED TYPE: ICD-10-CM

## 2021-12-15 DIAGNOSIS — I50.22 CHRONIC SYSTOLIC CONGESTIVE HEART FAILURE: ICD-10-CM

## 2021-12-15 DIAGNOSIS — I27.9 CHRONIC PULMONARY HEART DISEASE: ICD-10-CM

## 2021-12-15 DIAGNOSIS — Z79.01 CURRENT USE OF LONG TERM ANTICOAGULATION: ICD-10-CM

## 2021-12-15 DIAGNOSIS — I70.0 ATHEROSCLEROSIS OF AORTA: ICD-10-CM

## 2021-12-15 PROCEDURE — 99441 PR PHYSICIAN TELEPHONE EVALUATION 5-10 MIN: ICD-10-PCS | Mod: 95,,, | Performed by: INTERNAL MEDICINE

## 2021-12-15 PROCEDURE — 99441 PR PHYSICIAN TELEPHONE EVALUATION 5-10 MIN: CPT | Mod: 95,,, | Performed by: INTERNAL MEDICINE

## 2021-12-15 RX ORDER — SODIUM CHLORIDE 0.9 % (FLUSH) 0.9 %
10 SYRINGE (ML) INJECTION
Status: CANCELLED | OUTPATIENT
Start: 2021-12-21

## 2021-12-15 RX ORDER — SODIUM CHLORIDE 0.9 % (FLUSH) 0.9 %
10 SYRINGE (ML) INJECTION
Status: CANCELLED | OUTPATIENT
Start: 2021-12-29

## 2021-12-15 RX ORDER — HEPARIN 100 UNIT/ML
500 SYRINGE INTRAVENOUS
Status: CANCELLED | OUTPATIENT
Start: 2021-12-29

## 2021-12-15 RX ORDER — HEPARIN 100 UNIT/ML
500 SYRINGE INTRAVENOUS
Status: CANCELLED | OUTPATIENT
Start: 2021-12-21

## 2021-12-16 ENCOUNTER — PATIENT OUTREACH (OUTPATIENT)
Dept: ADMINISTRATIVE | Facility: OTHER | Age: 71
End: 2021-12-16
Payer: MEDICARE

## 2021-12-16 ENCOUNTER — HOSPITAL ENCOUNTER (OUTPATIENT)
Dept: CARDIOLOGY | Facility: HOSPITAL | Age: 71
Discharge: HOME | End: 2021-12-16
Attending: INTERNAL MEDICINE
Payer: MEDICARE

## 2021-12-16 VITALS
SYSTOLIC BLOOD PRESSURE: 110 MMHG | HEART RATE: 120 BPM | DIASTOLIC BLOOD PRESSURE: 70 MMHG | WEIGHT: 181 LBS | BODY MASS INDEX: 23.99 KG/M2 | HEIGHT: 73 IN

## 2021-12-16 DIAGNOSIS — R06.02 SHORTNESS OF BREATH: ICD-10-CM

## 2021-12-16 LAB
ASCENDING AORTA: 3.29 CM
AV INDEX (PROSTH): 0.86
AV MEAN GRADIENT: 4 MMHG
AV PEAK GRADIENT: 6 MMHG
AV VALVE AREA: 3.74 CM2
AV VELOCITY RATIO: 0.79
BSA FOR ECHO PROCEDURE: 2.06 M2
CV ECHO LV RWT: 0.3 CM
DOP CALC AO PEAK VEL: 1.25 M/S
DOP CALC AO VTI: 18.82 CM
DOP CALC LVOT AREA: 4.4 CM2
DOP CALC LVOT DIAMETER: 2.36 CM
DOP CALC LVOT PEAK VEL: 0.99 M/S
DOP CALC LVOT STROKE VOLUME: 70.44 CM3
DOP CALCLVOT PEAK VEL VTI: 16.11 CM
E WAVE DECELERATION TIME: 160.46 MSEC
E/A RATIO: 1.33
E/E' RATIO: 6.24 M/S
ECHO LV POSTERIOR WALL: 0.83 CM (ref 0.6–1.1)
EJECTION FRACTION: 35 %
FRACTIONAL SHORTENING: 29 % (ref 28–44)
INTERVENTRICULAR SEPTUM: 1.19 CM (ref 0.6–1.1)
LA MAJOR: 7.37 CM
LA MINOR: 6.57 CM
LA WIDTH: 3.9 CM
LEFT ATRIUM SIZE: 5.21 CM
LEFT ATRIUM VOLUME INDEX MOD: 38.7 ML/M2
LEFT ATRIUM VOLUME INDEX: 58.2 ML/M2
LEFT ATRIUM VOLUME MOD: 79.68 CM3
LEFT ATRIUM VOLUME: 119.98 CM3
LEFT INTERNAL DIMENSION IN SYSTOLE: 3.99 CM (ref 2.1–4)
LEFT VENTRICLE DIASTOLIC VOLUME INDEX: 75.24 ML/M2
LEFT VENTRICLE DIASTOLIC VOLUME: 154.99 ML
LEFT VENTRICLE MASS INDEX: 109 G/M2
LEFT VENTRICLE SYSTOLIC VOLUME INDEX: 33.8 ML/M2
LEFT VENTRICLE SYSTOLIC VOLUME: 69.6 ML
LEFT VENTRICULAR INTERNAL DIMENSION IN DIASTOLE: 5.62 CM (ref 3.5–6)
LEFT VENTRICULAR MASS: 223.94 G
LV LATERAL E/E' RATIO: 4.82 M/S
LV SEPTAL E/E' RATIO: 8.83 M/S
MV PEAK A VEL: 0.4 M/S
MV PEAK E VEL: 0.53 M/S
MV STENOSIS PRESSURE HALF TIME: 46.53 MS
MV VALVE AREA P 1/2 METHOD: 4.73 CM2
PISA MRMAX VEL: 0.05 M/S
PISA TR MAX VEL: 4.24 M/S
RA MAJOR: 7.1 CM
RA PRESSURE: 15 MMHG
RA WIDTH: 6.27 CM
RIGHT VENTRICULAR END-DIASTOLIC DIMENSION: 5.92 CM
SINUS: 3.88 CM
STJ: 2.9 CM
TDI LATERAL: 0.11 M/S
TDI SEPTAL: 0.06 M/S
TDI: 0.09 M/S
TR MAX PG: 72 MMHG
TRICUSPID ANNULAR PLANE SYSTOLIC EXCURSION: 1.36 CM
TV REST PULMONARY ARTERY PRESSURE: 87 MMHG

## 2021-12-16 PROCEDURE — 93306 ECHO (CUPID ONLY): ICD-10-PCS | Mod: 26,,, | Performed by: INTERNAL MEDICINE

## 2021-12-16 PROCEDURE — 93306 TTE W/DOPPLER COMPLETE: CPT | Mod: 26,,, | Performed by: INTERNAL MEDICINE

## 2021-12-16 PROCEDURE — 93306 TTE W/DOPPLER COMPLETE: CPT

## 2021-12-20 ENCOUNTER — HOSPITAL ENCOUNTER (OUTPATIENT)
Dept: PULMONOLOGY | Facility: CLINIC | Age: 71
Discharge: HOME | End: 2021-12-20
Payer: MEDICARE

## 2021-12-20 ENCOUNTER — OFFICE VISIT (OUTPATIENT)
Dept: PULMONOLOGY | Facility: CLINIC | Age: 71
End: 2021-12-20
Payer: MEDICARE

## 2021-12-20 VITALS — HEIGHT: 73 IN | BODY MASS INDEX: 23.86 KG/M2 | WEIGHT: 180 LBS

## 2021-12-20 VITALS
OXYGEN SATURATION: 96 % | DIASTOLIC BLOOD PRESSURE: 66 MMHG | WEIGHT: 180 LBS | HEIGHT: 73 IN | HEART RATE: 67 BPM | BODY MASS INDEX: 23.86 KG/M2 | SYSTOLIC BLOOD PRESSURE: 113 MMHG

## 2021-12-20 DIAGNOSIS — I50.32 CHRONIC DIASTOLIC CHF (CONGESTIVE HEART FAILURE): Primary | ICD-10-CM

## 2021-12-20 DIAGNOSIS — I27.9 CHRONIC PULMONARY HEART DISEASE: ICD-10-CM

## 2021-12-20 PROCEDURE — 99214 OFFICE O/P EST MOD 30 MIN: CPT | Mod: 25,S$PBB,, | Performed by: INTERNAL MEDICINE

## 2021-12-20 PROCEDURE — 94618 PULMONARY STRESS TESTING: CPT | Mod: 26,S$PBB,, | Performed by: INTERNAL MEDICINE

## 2021-12-20 PROCEDURE — 94618 PULMONARY STRESS TESTING: CPT | Mod: PBBFAC | Performed by: INTERNAL MEDICINE

## 2021-12-20 PROCEDURE — 99214 PR OFFICE/OUTPT VISIT, EST, LEVL IV, 30-39 MIN: ICD-10-PCS | Mod: 25,S$PBB,, | Performed by: INTERNAL MEDICINE

## 2021-12-20 PROCEDURE — 99214 OFFICE O/P EST MOD 30 MIN: CPT | Mod: PBBFAC | Performed by: INTERNAL MEDICINE

## 2021-12-20 PROCEDURE — 94618 PULMONARY STRESS TESTING: ICD-10-PCS | Mod: 26,S$PBB,, | Performed by: INTERNAL MEDICINE

## 2021-12-20 PROCEDURE — 99999 PR PBB SHADOW E&M-EST. PATIENT-LVL IV: CPT | Mod: PBBFAC,,, | Performed by: INTERNAL MEDICINE

## 2021-12-20 PROCEDURE — 99999 PR PBB SHADOW E&M-EST. PATIENT-LVL IV: ICD-10-PCS | Mod: PBBFAC,,, | Performed by: INTERNAL MEDICINE

## 2021-12-23 ENCOUNTER — PATIENT MESSAGE (OUTPATIENT)
Dept: PULMONOLOGY | Facility: CLINIC | Age: 71
End: 2021-12-23
Payer: MEDICARE

## 2021-12-27 ENCOUNTER — PATIENT MESSAGE (OUTPATIENT)
Dept: PULMONOLOGY | Facility: CLINIC | Age: 71
End: 2021-12-27
Payer: MEDICARE

## 2021-12-27 DIAGNOSIS — I27.9 CHRONIC PULMONARY HEART DISEASE: Primary | ICD-10-CM

## 2021-12-29 DIAGNOSIS — R31.9 HEMATURIA, UNSPECIFIED TYPE: Primary | ICD-10-CM

## 2021-12-30 ENCOUNTER — PATIENT MESSAGE (OUTPATIENT)
Dept: PULMONOLOGY | Facility: CLINIC | Age: 71
End: 2021-12-30
Payer: MEDICARE

## 2021-12-30 ENCOUNTER — TELEPHONE (OUTPATIENT)
Dept: HEMATOLOGY/ONCOLOGY | Facility: CLINIC | Age: 71
End: 2021-12-30
Payer: MEDICARE

## 2022-01-14 ENCOUNTER — LAB VISIT (OUTPATIENT)
Dept: LAB | Facility: HOSPITAL | Age: 72
End: 2022-01-14
Attending: INTERNAL MEDICINE
Payer: MEDICARE

## 2022-01-14 ENCOUNTER — OFFICE VISIT (OUTPATIENT)
Dept: PULMONOLOGY | Facility: CLINIC | Age: 72
End: 2022-01-14
Payer: MEDICARE

## 2022-01-14 VITALS
SYSTOLIC BLOOD PRESSURE: 119 MMHG | DIASTOLIC BLOOD PRESSURE: 70 MMHG | WEIGHT: 198.44 LBS | BODY MASS INDEX: 26.3 KG/M2 | HEIGHT: 73 IN | HEART RATE: 56 BPM | OXYGEN SATURATION: 92 %

## 2022-01-14 DIAGNOSIS — I27.22 PULMONARY HYPERTENSION DUE TO LEFT HEART DISEASE: Primary | ICD-10-CM

## 2022-01-14 DIAGNOSIS — I50.32 CHRONIC DIASTOLIC CHF (CONGESTIVE HEART FAILURE): ICD-10-CM

## 2022-01-14 DIAGNOSIS — I27.9 CHRONIC PULMONARY HEART DISEASE: ICD-10-CM

## 2022-01-14 LAB
ALBUMIN SERPL BCP-MCNC: 3.5 G/DL (ref 3.5–5.2)
ALP SERPL-CCNC: 102 U/L (ref 55–135)
ALT SERPL W/O P-5'-P-CCNC: 7 U/L (ref 10–44)
ANION GAP SERPL CALC-SCNC: 9 MMOL/L (ref 8–16)
AST SERPL-CCNC: 11 U/L (ref 10–40)
BASOPHILS # BLD AUTO: 0.04 K/UL (ref 0–0.2)
BASOPHILS NFR BLD: 1 % (ref 0–1.9)
BILIRUB SERPL-MCNC: 3.5 MG/DL (ref 0.1–1)
BNP SERPL-MCNC: 330 PG/ML (ref 0–99)
BUN SERPL-MCNC: 16 MG/DL (ref 8–23)
CALCIUM SERPL-MCNC: 8.6 MG/DL (ref 8.7–10.5)
CHLORIDE SERPL-SCNC: 106 MMOL/L (ref 95–110)
CO2 SERPL-SCNC: 21 MMOL/L (ref 23–29)
CREAT SERPL-MCNC: 0.8 MG/DL (ref 0.5–1.4)
DIFFERENTIAL METHOD: ABNORMAL
EOSINOPHIL # BLD AUTO: 0.1 K/UL (ref 0–0.5)
EOSINOPHIL NFR BLD: 1.7 % (ref 0–8)
ERYTHROCYTE [DISTWIDTH] IN BLOOD BY AUTOMATED COUNT: 21.8 % (ref 11.5–14.5)
EST. GFR  (AFRICAN AMERICAN): >60 ML/MIN/1.73 M^2
EST. GFR  (NON AFRICAN AMERICAN): >60 ML/MIN/1.73 M^2
GLUCOSE SERPL-MCNC: 91 MG/DL (ref 70–110)
HCT VFR BLD AUTO: 33.4 % (ref 40–54)
HGB BLD-MCNC: 10.4 G/DL (ref 14–18)
IMM GRANULOCYTES # BLD AUTO: 0.02 K/UL (ref 0–0.04)
IMM GRANULOCYTES NFR BLD AUTO: 0.5 % (ref 0–0.5)
LYMPHOCYTES # BLD AUTO: 0.9 K/UL (ref 1–4.8)
LYMPHOCYTES NFR BLD: 21.9 % (ref 18–48)
MCH RBC QN AUTO: 27.2 PG (ref 27–31)
MCHC RBC AUTO-ENTMCNC: 31.1 G/DL (ref 32–36)
MCV RBC AUTO: 87 FL (ref 82–98)
MONOCYTES # BLD AUTO: 0.4 K/UL (ref 0.3–1)
MONOCYTES NFR BLD: 9.1 % (ref 4–15)
NEUTROPHILS # BLD AUTO: 2.7 K/UL (ref 1.8–7.7)
NEUTROPHILS NFR BLD: 65.8 % (ref 38–73)
NRBC BLD-RTO: 0 /100 WBC
PLATELET # BLD AUTO: 267 K/UL (ref 150–450)
PMV BLD AUTO: 10 FL (ref 9.2–12.9)
POTASSIUM SERPL-SCNC: 3.8 MMOL/L (ref 3.5–5.1)
PROT SERPL-MCNC: 7.1 G/DL (ref 6–8.4)
RBC # BLD AUTO: 3.83 M/UL (ref 4.6–6.2)
SODIUM SERPL-SCNC: 136 MMOL/L (ref 136–145)
WBC # BLD AUTO: 4.06 K/UL (ref 3.9–12.7)

## 2022-01-14 PROCEDURE — 99213 OFFICE O/P EST LOW 20 MIN: CPT | Mod: PBBFAC | Performed by: INTERNAL MEDICINE

## 2022-01-14 PROCEDURE — 83880 ASSAY OF NATRIURETIC PEPTIDE: CPT | Performed by: INTERNAL MEDICINE

## 2022-01-14 PROCEDURE — 85025 COMPLETE CBC W/AUTO DIFF WBC: CPT | Performed by: INTERNAL MEDICINE

## 2022-01-14 PROCEDURE — 99999 PR PBB SHADOW E&M-EST. PATIENT-LVL III: ICD-10-PCS | Mod: PBBFAC,,, | Performed by: INTERNAL MEDICINE

## 2022-01-14 PROCEDURE — 99213 PR OFFICE/OUTPT VISIT, EST, LEVL III, 20-29 MIN: ICD-10-PCS | Mod: S$PBB,,, | Performed by: INTERNAL MEDICINE

## 2022-01-14 PROCEDURE — 99213 OFFICE O/P EST LOW 20 MIN: CPT | Mod: S$PBB,,, | Performed by: INTERNAL MEDICINE

## 2022-01-14 PROCEDURE — 36415 COLL VENOUS BLD VENIPUNCTURE: CPT | Performed by: INTERNAL MEDICINE

## 2022-01-14 PROCEDURE — 99999 PR PBB SHADOW E&M-EST. PATIENT-LVL III: CPT | Mod: PBBFAC,,, | Performed by: INTERNAL MEDICINE

## 2022-01-14 PROCEDURE — 80053 COMPREHEN METABOLIC PANEL: CPT | Performed by: INTERNAL MEDICINE

## 2022-01-20 ENCOUNTER — PATIENT MESSAGE (OUTPATIENT)
Dept: PULMONOLOGY | Facility: CLINIC | Age: 72
End: 2022-01-20
Payer: MEDICARE

## 2022-01-20 ENCOUNTER — OFFICE VISIT (OUTPATIENT)
Dept: UROLOGY | Facility: CLINIC | Age: 72
End: 2022-01-20
Payer: MEDICARE

## 2022-01-20 ENCOUNTER — LAB VISIT (OUTPATIENT)
Dept: LAB | Facility: HOSPITAL | Age: 72
End: 2022-01-20
Attending: UROLOGY
Payer: MEDICARE

## 2022-01-20 VITALS
BODY MASS INDEX: 24.58 KG/M2 | SYSTOLIC BLOOD PRESSURE: 111 MMHG | WEIGHT: 185.44 LBS | HEART RATE: 73 BPM | DIASTOLIC BLOOD PRESSURE: 63 MMHG | HEIGHT: 73 IN

## 2022-01-20 DIAGNOSIS — R33.9 RETENTION OF URINE: Primary | ICD-10-CM

## 2022-01-20 DIAGNOSIS — R33.9 RETENTION OF URINE: ICD-10-CM

## 2022-01-20 LAB
ALBUMIN SERPL BCP-MCNC: 3.6 G/DL (ref 3.5–5.2)
ALP SERPL-CCNC: 100 U/L (ref 55–135)
ALT SERPL W/O P-5'-P-CCNC: 8 U/L (ref 10–44)
ANION GAP SERPL CALC-SCNC: 10 MMOL/L (ref 8–16)
AST SERPL-CCNC: 11 U/L (ref 10–40)
BILIRUB SERPL-MCNC: 2.9 MG/DL (ref 0.1–1)
BUN SERPL-MCNC: 16 MG/DL (ref 8–23)
CALCIUM SERPL-MCNC: 8.7 MG/DL (ref 8.7–10.5)
CHLORIDE SERPL-SCNC: 104 MMOL/L (ref 95–110)
CO2 SERPL-SCNC: 25 MMOL/L (ref 23–29)
COMPLEXED PSA SERPL-MCNC: 7.1 NG/ML (ref 0–4)
CREAT SERPL-MCNC: 0.8 MG/DL (ref 0.5–1.4)
EST. GFR  (AFRICAN AMERICAN): >60 ML/MIN/1.73 M^2
EST. GFR  (NON AFRICAN AMERICAN): >60 ML/MIN/1.73 M^2
GLUCOSE SERPL-MCNC: 85 MG/DL (ref 70–110)
POTASSIUM SERPL-SCNC: 3.7 MMOL/L (ref 3.5–5.1)
PROT SERPL-MCNC: 7.2 G/DL (ref 6–8.4)
SODIUM SERPL-SCNC: 139 MMOL/L (ref 136–145)

## 2022-01-20 PROCEDURE — 99999 PR PBB SHADOW E&M-EST. PATIENT-LVL III: CPT | Mod: PBBFAC,,, | Performed by: UROLOGY

## 2022-01-20 PROCEDURE — 99214 OFFICE O/P EST MOD 30 MIN: CPT | Mod: S$PBB,,, | Performed by: UROLOGY

## 2022-01-20 PROCEDURE — 99999 PR PBB SHADOW E&M-EST. PATIENT-LVL III: ICD-10-PCS | Mod: PBBFAC,,, | Performed by: UROLOGY

## 2022-01-20 PROCEDURE — 36415 COLL VENOUS BLD VENIPUNCTURE: CPT | Performed by: UROLOGY

## 2022-01-20 PROCEDURE — 80053 COMPREHEN METABOLIC PANEL: CPT | Performed by: UROLOGY

## 2022-01-20 PROCEDURE — 84153 ASSAY OF PSA TOTAL: CPT | Performed by: UROLOGY

## 2022-01-20 PROCEDURE — 99214 PR OFFICE/OUTPT VISIT, EST, LEVL IV, 30-39 MIN: ICD-10-PCS | Mod: S$PBB,,, | Performed by: UROLOGY

## 2022-01-20 PROCEDURE — 99213 OFFICE O/P EST LOW 20 MIN: CPT | Mod: PBBFAC | Performed by: UROLOGY

## 2022-01-20 NOTE — PROGRESS NOTES
Subjective:       Patient ID: Ambrosio Bray III is a 71 y.o. male.    Chief Complaint: Urinary Retention (Pt concern about abnormal urinalysis in December he would like dr. Matute to check urine on today visit.)    HPI pat is been having some incontinence is decreased force in caliber stream.  ient is here with difficulty urinating.  His abdomen has been somewhat protuberant.  His postvoid residual is 800 cc.  He had a 36 g prostate on ultrasound in 2018.     Past Medical History:   Diagnosis Date    Anemia     Atrial fibrillation     Diabetes mellitus     Heart failure     Hyperlipidemia     Hypertension     PTSD (post-traumatic stress disorder)     Pulmonary hyperinflation     Urinary incontinence        Past Surgical History:   Procedure Laterality Date    CARDIAC CATHETERIZATION      COLONOSCOPY N/A 5/1/2018    Procedure: COLONOSCOPY;  Surgeon: Bubba Navarro MD;  [facility];  Service: Endoscopy;  Laterality: N/A;  confirmed appt 4/24/18    RIGHT HEART CATHETERIZATION Right 11/28/2018    Procedure: INSERTION, CATHETER, RIGHT HEART;  Surgeon: Chelsea Arceo MD;  Location: [facility] CATH LAB;  Service: Cardiology;  Laterality: Right;       Family History   Problem Relation Age of Onset    Cancer Father         colon    Colon cancer Father     Heart disease Mother     Asthma Mother     Diabetes Mother     Hypertension Mother     Kidney disease Maternal Grandmother     Diabetes Paternal Grandmother     Cirrhosis Paternal Grandfather        Social History     Socioeconomic History    Marital status: Single   Tobacco Use    Smoking status: Never Smoker    Smokeless tobacco: Never Used   Substance and Sexual Activity    Alcohol use: Yes     Alcohol/week: 2.0 - 3.0 standard drinks     Types: 2 - 3 Shots of liquor per week     Comment: maybe twice a month     Drug use: No    Sexual activity: Yes     Partners: Female     Social Determinants of Health     Financial Resource Strain:  Unknown    Difficulty of Paying Living Expenses: Patient refused   Food Insecurity: Unknown    Worried About Running Out of Food in the Last Year: Patient refused    Ran Out of Food in the Last Year: Patient refused   Transportation Needs: Unknown    Lack of Transportation (Medical): Patient refused    Lack of Transportation (Non-Medical): Patient refused   Physical Activity: Insufficiently Active    Days of Exercise per Week: 1 day    Minutes of Exercise per Session: 30 min   Stress: No Stress Concern Present    Feeling of Stress : Only a little   Social Connections: Unknown    Frequency of Communication with Friends and Family: Patient refused    Frequency of Social Gatherings with Friends and Family: Patient refused    Attends Christian Services: Never    Active Member of Clubs or Organizations: No    Attends Club or Organization Meetings: Patient refused    Marital Status: Patient refused   Housing Stability: Unknown    Unable to Pay for Housing in the Last Year: Patient refused    Number of Places Lived in the Last Year: 1    Unstable Housing in the Last Year: Patient refused       Allergies:  Patient has no known allergies.    Medications:    Current Outpatient Medications:     bumetanide (BUMEX) 2 MG tablet, Take 1 tablet (2 mg total) by mouth once daily., Disp: 90 tablet, Rfl: 0    ferrous sulfate 325 (65 FE) MG EC tablet, Take 1 tablet (325 mg total) by mouth 2 (two) times daily with meals., Disp: 180 tablet, Rfl: 3    ipratropium (ATROVENT) 42 mcg (0.06 %) nasal spray, 2 sprays by Nasal route 4 (four) times daily., Disp: 15 mL, Rfl: 6    losartan (COZAAR) 25 MG tablet, Take 25 mg by mouth once daily., Disp: , Rfl:     mometasone 0.1% (ELOCON) 0.1 % cream, Apply topically once daily., Disp: , Rfl:     potassium chloride SA (KLOR-CON M15) 15 MEQ tablet, Take 1 tablet (15 mEq total) by mouth 2 (two) times daily., Disp: 60 tablet, Rfl: 11    riociguat (ADEMPAS) 2.5 mg tablet, Take 1  tablet (2.5 mg total) by mouth 3 (three) times daily., Disp: 90 tablet, Rfl: 11    rivaroxaban (XARELTO) 20 mg Tab, Take 1 tablet (20 mg total) by mouth daily with dinner or evening meal., Disp: 90 tablet, Rfl: 1    tamsulosin (FLOMAX) 0.4 mg Cap, Take 1 capsule by mouth once daily, Disp: 90 capsule, Rfl: 3    UPTRAVI 1,600 mcg Tab, Take 1 tablet by mouth 2 (two) times daily., Disp: 60 tablet, Rfl: 12    Current Facility-Administered Medications:     lidocaine HCL 10 mg/ml (1%) injection 1 mL, 1 mL, Other, 1 time in Clinic/HOD, Julius Matute Jr., MD    Review of Systems   Constitutional: Negative for activity change, appetite change, chills, diaphoresis, fatigue, fever and unexpected weight change.   HENT: Negative for congestion, dental problem, hearing loss, mouth sores, postnasal drip, rhinorrhea, sinus pressure and trouble swallowing.    Eyes: Negative for pain, discharge and itching.   Respiratory: Negative for apnea, cough, choking, chest tightness, shortness of breath and wheezing.    Cardiovascular: Negative for chest pain, palpitations and leg swelling.   Gastrointestinal: Negative for abdominal distention, abdominal pain, anal bleeding, blood in stool, constipation, diarrhea, nausea, rectal pain and vomiting.   Endocrine: Negative for polydipsia and polyuria.   Genitourinary: Positive for decreased urine volume and difficulty urinating. Negative for dysuria, enuresis, flank pain, frequency, genital sores, hematuria, penile discharge, penile pain, penile swelling, scrotal swelling, testicular pain and urgency.   Musculoskeletal: Negative for arthralgias, back pain and myalgias.   Skin: Negative for color change, rash and wound.   Neurological: Negative for dizziness, syncope, speech difficulty, light-headedness and headaches.   Hematological: Negative for adenopathy. Does not bruise/bleed easily.   Psychiatric/Behavioral: Negative for behavioral problems, confusion, hallucinations and sleep  disturbance.       Objective:      Physical Exam  Constitutional:       Appearance: He is well-developed.   HENT:      Head: Normocephalic.   Cardiovascular:      Rate and Rhythm: Normal rate.   Pulmonary:      Effort: Pulmonary effort is normal.   Abdominal:      Palpations: Abdomen is soft.   Genitourinary:     Prostate: Normal.      Comments: Bladder seems distended.  Prostate is 40 g and firm.  Skin:     General: Skin is warm.   Neurological:      Mental Status: He is alert.   Psychiatric:         Mood and Affect: Mood and affect normal.         Assessment:       1. Retention of urine        Plan:       Ambrosio was seen today for urinary retention.    Diagnoses and all orders for this visit:    Retention of urine  -     Insert toscano catheter  -     US Retroperitoneal Complete; Future  -     Cystoscopy; Future  -     Transrectal ultrasound; Future  -     Simple urodynamics; Future        Toscano catheter was placed and only 100 cc was removed.  The bladder was irrigated without clots.  The Toscano was removed.  Will get a CT scan and await lab

## 2022-01-24 ENCOUNTER — HOSPITAL ENCOUNTER (OUTPATIENT)
Dept: RADIOLOGY | Facility: HOSPITAL | Age: 72
Discharge: HOME | End: 2022-01-24
Attending: UROLOGY
Payer: MEDICARE

## 2022-01-24 DIAGNOSIS — R33.9 RETENTION OF URINE: ICD-10-CM

## 2022-01-24 PROCEDURE — 74176 CT RENAL STONE STUDY ABD PELVIS WO: ICD-10-PCS | Mod: 26,,, | Performed by: INTERNAL MEDICINE

## 2022-01-24 PROCEDURE — 74176 CT ABD & PELVIS W/O CONTRAST: CPT | Mod: TC

## 2022-01-24 PROCEDURE — 74176 CT ABD & PELVIS W/O CONTRAST: CPT | Mod: 26,,, | Performed by: INTERNAL MEDICINE

## 2022-01-26 NOTE — PROGRESS NOTES
Subjective:       Patient ID: Ambrosio Bray III is a 71 y.o. male.    Chief Complaint: Follow-up    HPI   Ambrosio Bray III 71 y.o. male    has a past medical history of Anemia, Atrial fibrillation, Diabetes mellitus, Heart failure, Hyperlipidemia, Hypertension, PTSD (post-traumatic stress disorder), Pulmonary hyperinflation, and Urinary incontinence.    has a past surgical history that includes Cardiac catheterization; Colonoscopy (N/A, 5/1/2018); and Right heart catheterization (Right, 11/28/2018).   reports that he has never smoked. He has never used smokeless tobacco. He reports current alcohol use of about 2.0 - 3.0 standard drinks of alcohol per week. He reports that he does not use drugs.  Referred by: No ref. provider found  Who had concerns including Follow-up.  The patient's last visit with me was on 12/20/2021.  LAST VISIT    Interval History  Doing ok, improved with diuretics  Continues with abdominal distention, ascites and BRENTON  Need to continue aggressive diuresis  No fever chills, ns, wt changes, nausea, vomiting, diarrhea, constipation, chest pain, tightness, pressure. Remainder of review of systems is negative.  Otherwise asymptomatic from pulmonary standpoint.      Review of Systems    Objective:      Physical Exam  Personal Diagnostic Review    No flowsheet data found.      Assessment:       1. Pulmonary hypertension due to left heart disease        Outpatient Encounter Medications as of 1/14/2022   Medication Sig Dispense Refill    bumetanide (BUMEX) 2 MG tablet Take 1 tablet (2 mg total) by mouth once daily. 90 tablet 0    ferrous sulfate 325 (65 FE) MG EC tablet Take 1 tablet (325 mg total) by mouth 2 (two) times daily with meals. 180 tablet 3    ipratropium (ATROVENT) 42 mcg (0.06 %) nasal spray 2 sprays by Nasal route 4 (four) times daily. 15 mL 6    losartan (COZAAR) 25 MG tablet Take 25 mg by mouth once daily.      mometasone 0.1% (ELOCON) 0.1 % cream Apply topically once daily.       potassium chloride SA (KLOR-CON M15) 15 MEQ tablet Take 1 tablet (15 mEq total) by mouth 2 (two) times daily. 60 tablet 11    riociguat (ADEMPAS) 2.5 mg tablet Take 1 tablet (2.5 mg total) by mouth 3 (three) times daily. 90 tablet 11    rivaroxaban (XARELTO) 20 mg Tab Take 1 tablet (20 mg total) by mouth daily with dinner or evening meal. 90 tablet 1    tamsulosin (FLOMAX) 0.4 mg Cap Take 1 capsule by mouth once daily 90 capsule 3    UPTRAVI 1,600 mcg Tab Take 1 tablet by mouth 2 (two) times daily. 60 tablet 12     Facility-Administered Encounter Medications as of 1/14/2022   Medication Dose Route Frequency Provider Last Rate Last Admin    lidocaine HCL 10 mg/ml (1%) injection 1 mL  1 mL Other 1 time in Clinic/HOD Julius Matute Jr., MD         No orders of the defined types were placed in this encounter.      Plan:               Assessment:  Ambrosio was seen today for follow-up.    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    There are no Patient Instructions on file for this visit.    Immunization History   Administered Date(s) Administered    COVID-19, MRNA, LN-S, PF (Pfizer) (Purple Cap) 01/09/2021, 01/30/2021, 09/14/2021    Hepatitis A / Hepatitis B 02/12/2014, 07/03/2014    Influenza 09/08/1986, 01/05/2004, 12/01/2008, 01/17/2013, 10/25/2013    Influenza (FLUAD) - Quadrivalent - Adjuvanted - PF *Preferred* (65+) 09/24/2020, 10/19/2021    Influenza - High Dose - PF (65 years and older) 10/17/2015, 10/31/2016, 09/15/2017, 10/20/2018, 10/03/2019    Influenza - Quadrivalent 11/06/2014    Influenza - Quadrivalent - PF *Preferred* (6 months and  older) 01/17/2013    Influenza Split 01/17/2013, 01/17/2013    Pneumococcal Conjugate - 13 Valent 10/31/2016    Pneumococcal Polysaccharide - 23 Valent 04/09/2018    Zoster 01/01/2015, 01/13/2015    Zoster Recombinant 12/18/2019, 03/19/2020

## 2022-01-28 ENCOUNTER — HOSPITAL ENCOUNTER (OUTPATIENT)
Dept: RADIOLOGY | Facility: HOSPITAL | Age: 72
Discharge: HOME | End: 2022-01-28
Attending: UROLOGY
Payer: MEDICARE

## 2022-01-28 DIAGNOSIS — R33.9 RETENTION OF URINE: ICD-10-CM

## 2022-01-28 PROCEDURE — 76770 US EXAM ABDO BACK WALL COMP: CPT | Mod: 26,,, | Performed by: RADIOLOGY

## 2022-01-28 PROCEDURE — 76770 US EXAM ABDO BACK WALL COMP: CPT | Mod: TC

## 2022-01-28 PROCEDURE — 76770 US RETROPERITONEAL COMPLETE: ICD-10-PCS | Mod: 26,,, | Performed by: RADIOLOGY

## 2022-01-31 ENCOUNTER — PATIENT MESSAGE (OUTPATIENT)
Dept: PULMONOLOGY | Facility: CLINIC | Age: 72
End: 2022-01-31
Payer: MEDICARE

## 2022-02-03 ENCOUNTER — HOSPITAL ENCOUNTER (INPATIENT)
Facility: HOSPITAL | Age: 72
LOS: 10 days | Discharge: HOSPICE | DRG: 291 | End: 2022-02-13
Attending: EMERGENCY MEDICINE | Admitting: INTERNAL MEDICINE
Payer: MEDICARE

## 2022-02-03 DIAGNOSIS — I48.91 ATRIAL FIBRILLATION WITH RVR: Primary | ICD-10-CM

## 2022-02-03 DIAGNOSIS — R19.7 DIARRHEA, UNSPECIFIED TYPE: ICD-10-CM

## 2022-02-03 DIAGNOSIS — I50.9 ACUTE DECOMPENSATED HEART FAILURE: ICD-10-CM

## 2022-02-03 DIAGNOSIS — I48.91 A-FIB: ICD-10-CM

## 2022-02-03 DIAGNOSIS — R14.0 ABDOMINAL DISTENTION: ICD-10-CM

## 2022-02-03 DIAGNOSIS — R60.9 SWELLING: ICD-10-CM

## 2022-02-03 DIAGNOSIS — I27.20 PULMONARY HTN: ICD-10-CM

## 2022-02-03 DIAGNOSIS — R00.0 TACHYCARDIA: ICD-10-CM

## 2022-02-03 DIAGNOSIS — R06.02 SHORTNESS OF BREATH: ICD-10-CM

## 2022-02-03 LAB
ALBUMIN SERPL BCP-MCNC: 3 G/DL (ref 3.5–5.2)
ALLENS TEST: ABNORMAL
ALP SERPL-CCNC: 115 U/L (ref 55–135)
ALT SERPL W/O P-5'-P-CCNC: 6 U/L (ref 10–44)
ANION GAP SERPL CALC-SCNC: 11 MMOL/L (ref 8–16)
ANION GAP SERPL CALC-SCNC: 11 MMOL/L (ref 8–16)
ANION GAP SERPL CALC-SCNC: 12 MMOL/L (ref 8–16)
APTT BLDCRRT: 28.7 SEC (ref 21–32)
ASCENDING AORTA: 3 CM
AST SERPL-CCNC: 13 U/L (ref 10–40)
AV INDEX (PROSTH): 0.76
AV MEAN GRADIENT: 2 MMHG
AV PEAK GRADIENT: 3 MMHG
AV VALVE AREA: 2.28 CM2
AV VELOCITY RATIO: 0.64
BASOPHILS # BLD AUTO: 0.01 K/UL (ref 0–0.2)
BASOPHILS NFR BLD: 0.1 % (ref 0–1.9)
BILIRUB SERPL-MCNC: 3 MG/DL (ref 0.1–1)
BNP SERPL-MCNC: 574 PG/ML (ref 0–99)
BSA FOR ECHO PROCEDURE: 2.08 M2
BUN SERPL-MCNC: 49 MG/DL (ref 8–23)
BUN SERPL-MCNC: 52 MG/DL (ref 8–23)
BUN SERPL-MCNC: 52 MG/DL (ref 8–23)
CALCIUM SERPL-MCNC: 8.3 MG/DL (ref 8.7–10.5)
CALCIUM SERPL-MCNC: 8.3 MG/DL (ref 8.7–10.5)
CALCIUM SERPL-MCNC: 8.4 MG/DL (ref 8.7–10.5)
CHLORIDE SERPL-SCNC: 104 MMOL/L (ref 95–110)
CHLORIDE SERPL-SCNC: 104 MMOL/L (ref 95–110)
CHLORIDE SERPL-SCNC: 106 MMOL/L (ref 95–110)
CO2 SERPL-SCNC: 20 MMOL/L (ref 23–29)
CO2 SERPL-SCNC: 21 MMOL/L (ref 23–29)
CO2 SERPL-SCNC: 21 MMOL/L (ref 23–29)
CREAT SERPL-MCNC: 0.9 MG/DL (ref 0.5–1.4)
CTP QC/QA: YES
CV ECHO LV RWT: 0.4 CM
DELSYS: ABNORMAL
DIFFERENTIAL METHOD: ABNORMAL
DOP CALC AO PEAK VEL: 0.87 M/S
DOP CALC AO VTI: 13.91 CM
DOP CALC LVOT AREA: 3 CM2
DOP CALC LVOT DIAMETER: 1.96 CM
DOP CALC LVOT PEAK VEL: 0.56 M/S
DOP CALC LVOT STROKE VOLUME: 31.72 CM3
DOP CALCLVOT PEAK VEL VTI: 10.52 CM
ECHO LV POSTERIOR WALL: 1.14 CM (ref 0.6–1.1)
EJECTION FRACTION: 20 %
EOSINOPHIL # BLD AUTO: 0 K/UL (ref 0–0.5)
EOSINOPHIL NFR BLD: 0 % (ref 0–8)
ERYTHROCYTE [DISTWIDTH] IN BLOOD BY AUTOMATED COUNT: 21.8 % (ref 11.5–14.5)
EST. GFR  (AFRICAN AMERICAN): >60 ML/MIN/1.73 M^2
EST. GFR  (NON AFRICAN AMERICAN): >60 ML/MIN/1.73 M^2
FLOW: 5
FRACTIONAL SHORTENING: 28 % (ref 28–44)
GLUCOSE SERPL-MCNC: 102 MG/DL (ref 70–110)
GLUCOSE SERPL-MCNC: 103 MG/DL (ref 70–110)
GLUCOSE SERPL-MCNC: 103 MG/DL (ref 70–110)
HCO3 UR-SCNC: 21.1 MMOL/L (ref 24–28)
HCT VFR BLD AUTO: 32.7 % (ref 40–54)
HCV AB SERPL QL IA: NEGATIVE
HGB BLD-MCNC: 10.3 G/DL (ref 14–18)
HIV 1+2 AB+HIV1 P24 AG SERPL QL IA: NEGATIVE
IMM GRANULOCYTES # BLD AUTO: 0.15 K/UL (ref 0–0.04)
IMM GRANULOCYTES NFR BLD AUTO: 1.7 % (ref 0–0.5)
INR PPP: 1.5 (ref 0.8–1.2)
INTERVENTRICULAR SEPTUM: 1.4 CM (ref 0.6–1.1)
LA MAJOR: 5.8 CM
LA MINOR: 4.82 CM
LA WIDTH: 4.47 CM
LACTATE SERPL-SCNC: 1.7 MMOL/L (ref 0.5–2.2)
LEFT ATRIUM SIZE: 4.9 CM
LEFT ATRIUM VOLUME INDEX: 47.1 ML/M2
LEFT ATRIUM VOLUME: 98.02 CM3
LEFT INTERNAL DIMENSION IN SYSTOLE: 4.15 CM (ref 2.1–4)
LEFT VENTRICLE DIASTOLIC VOLUME INDEX: 79.14 ML/M2
LEFT VENTRICLE DIASTOLIC VOLUME: 164.62 ML
LEFT VENTRICLE MASS INDEX: 153 G/M2
LEFT VENTRICLE SYSTOLIC VOLUME INDEX: 36.7 ML/M2
LEFT VENTRICLE SYSTOLIC VOLUME: 76.25 ML
LEFT VENTRICULAR INTERNAL DIMENSION IN DIASTOLE: 5.77 CM (ref 3.5–6)
LEFT VENTRICULAR MASS: 318.22 G
LYMPHOCYTES # BLD AUTO: 0.5 K/UL (ref 1–4.8)
LYMPHOCYTES NFR BLD: 5.6 % (ref 18–48)
MAGNESIUM SERPL-MCNC: 2.2 MG/DL (ref 1.6–2.6)
MAGNESIUM SERPL-MCNC: 2.4 MG/DL (ref 1.6–2.6)
MCH RBC QN AUTO: 27.3 PG (ref 27–31)
MCHC RBC AUTO-ENTMCNC: 31.5 G/DL (ref 32–36)
MCV RBC AUTO: 87 FL (ref 82–98)
MODE: ABNORMAL
MONOCYTES # BLD AUTO: 0.5 K/UL (ref 0.3–1)
MONOCYTES NFR BLD: 5 % (ref 4–15)
NEUTROPHILS # BLD AUTO: 7.8 K/UL (ref 1.8–7.7)
NEUTROPHILS NFR BLD: 87.6 % (ref 38–73)
NRBC BLD-RTO: 0 /100 WBC
PCO2 BLDA: 35.2 MMHG (ref 35–45)
PH SMN: 7.39 [PH] (ref 7.35–7.45)
PHOSPHATE SERPL-MCNC: 4 MG/DL (ref 2.7–4.5)
PISA TR MAX VEL: 3.81 M/S
PLATELET # BLD AUTO: 270 K/UL (ref 150–450)
PMV BLD AUTO: 10.9 FL (ref 9.2–12.9)
PO2 BLDA: 26 MMHG (ref 40–60)
POC BE: -4 MMOL/L
POC SATURATED O2: 48 % (ref 95–100)
POC TCO2: 22 MMOL/L (ref 24–29)
POTASSIUM SERPL-SCNC: 3 MMOL/L (ref 3.5–5.1)
POTASSIUM SERPL-SCNC: 3 MMOL/L (ref 3.5–5.1)
POTASSIUM SERPL-SCNC: 3.1 MMOL/L (ref 3.5–5.1)
PROT SERPL-MCNC: 6.8 G/DL (ref 6–8.4)
PROTHROMBIN TIME: 14.8 SEC (ref 9–12.5)
RA MAJOR: 6.36 CM
RA PRESSURE: 15 MMHG
RA WIDTH: 4.7 CM
RBC # BLD AUTO: 3.77 M/UL (ref 4.6–6.2)
RIGHT VENTRICULAR END-DIASTOLIC DIMENSION: 5.34 CM
SAMPLE: ABNORMAL
SARS-COV-2 RDRP RESP QL NAA+PROBE: NEGATIVE
SINUS: 3.16 CM
SITE: ABNORMAL
SODIUM SERPL-SCNC: 136 MMOL/L (ref 136–145)
SODIUM SERPL-SCNC: 136 MMOL/L (ref 136–145)
SODIUM SERPL-SCNC: 138 MMOL/L (ref 136–145)
STJ: 3.39 CM
T4 FREE SERPL-MCNC: 0.88 NG/DL (ref 0.71–1.51)
TDI LATERAL: 0.1 M/S
TDI SEPTAL: 0.07 M/S
TDI: 0.09 M/S
TR MAX PG: 58 MMHG
TROPONIN I SERPL DL<=0.01 NG/ML-MCNC: 0.01 NG/ML (ref 0–0.03)
TSH SERPL DL<=0.005 MIU/L-ACNC: 7.38 UIU/ML (ref 0.4–4)
TV REST PULMONARY ARTERY PRESSURE: 73 MMHG
WBC # BLD AUTO: 8.93 K/UL (ref 3.9–12.7)

## 2022-02-03 PROCEDURE — 99223 PR INITIAL HOSPITAL CARE,LEVL III: ICD-10-PCS | Mod: AI,,, | Performed by: INTERNAL MEDICINE

## 2022-02-03 PROCEDURE — 93010 EKG 12-LEAD: ICD-10-PCS | Mod: ,,, | Performed by: INTERNAL MEDICINE

## 2022-02-03 PROCEDURE — 85610 PROTHROMBIN TIME: CPT | Performed by: STUDENT IN AN ORGANIZED HEALTH CARE EDUCATION/TRAINING PROGRAM

## 2022-02-03 PROCEDURE — 99291 CRITICAL CARE FIRST HOUR: CPT

## 2022-02-03 PROCEDURE — 83880 ASSAY OF NATRIURETIC PEPTIDE: CPT | Performed by: EMERGENCY MEDICINE

## 2022-02-03 PROCEDURE — 93010 ELECTROCARDIOGRAM REPORT: CPT | Mod: ,,, | Performed by: INTERNAL MEDICINE

## 2022-02-03 PROCEDURE — 86803 HEPATITIS C AB TEST: CPT | Performed by: EMERGENCY MEDICINE

## 2022-02-03 PROCEDURE — 93005 ELECTROCARDIOGRAM TRACING: CPT

## 2022-02-03 PROCEDURE — 99291 PR CRITICAL CARE, E/M 30-74 MINUTES: ICD-10-PCS | Mod: CS,,, | Performed by: EMERGENCY MEDICINE

## 2022-02-03 PROCEDURE — 96374 THER/PROPH/DIAG INJ IV PUSH: CPT

## 2022-02-03 PROCEDURE — 83605 ASSAY OF LACTIC ACID: CPT | Performed by: EMERGENCY MEDICINE

## 2022-02-03 PROCEDURE — 99900035 HC TECH TIME PER 15 MIN (STAT)

## 2022-02-03 PROCEDURE — 84100 ASSAY OF PHOSPHORUS: CPT | Performed by: STUDENT IN AN ORGANIZED HEALTH CARE EDUCATION/TRAINING PROGRAM

## 2022-02-03 PROCEDURE — 85025 COMPLETE CBC W/AUTO DIFF WBC: CPT | Performed by: EMERGENCY MEDICINE

## 2022-02-03 PROCEDURE — 63600175 PHARM REV CODE 636 W HCPCS: Performed by: INTERNAL MEDICINE

## 2022-02-03 PROCEDURE — 99291 CRITICAL CARE FIRST HOUR: CPT | Mod: CS,,, | Performed by: EMERGENCY MEDICINE

## 2022-02-03 PROCEDURE — 63600367 HC NITRIC OXIDE PER HOUR

## 2022-02-03 PROCEDURE — 96361 HYDRATE IV INFUSION ADD-ON: CPT

## 2022-02-03 PROCEDURE — 27000221 HC OXYGEN, UP TO 24 HOURS

## 2022-02-03 PROCEDURE — 25500020 PHARM REV CODE 255: Performed by: EMERGENCY MEDICINE

## 2022-02-03 PROCEDURE — 82803 BLOOD GASES ANY COMBINATION: CPT

## 2022-02-03 PROCEDURE — 25000003 PHARM REV CODE 250: Performed by: EMERGENCY MEDICINE

## 2022-02-03 PROCEDURE — 63600175 PHARM REV CODE 636 W HCPCS: Performed by: STUDENT IN AN ORGANIZED HEALTH CARE EDUCATION/TRAINING PROGRAM

## 2022-02-03 PROCEDURE — 80053 COMPREHEN METABOLIC PANEL: CPT | Performed by: STUDENT IN AN ORGANIZED HEALTH CARE EDUCATION/TRAINING PROGRAM

## 2022-02-03 PROCEDURE — 84439 ASSAY OF FREE THYROXINE: CPT | Performed by: EMERGENCY MEDICINE

## 2022-02-03 PROCEDURE — 83735 ASSAY OF MAGNESIUM: CPT | Performed by: STUDENT IN AN ORGANIZED HEALTH CARE EDUCATION/TRAINING PROGRAM

## 2022-02-03 PROCEDURE — 12000002 HC ACUTE/MED SURGE SEMI-PRIVATE ROOM

## 2022-02-03 PROCEDURE — 84443 ASSAY THYROID STIM HORMONE: CPT | Performed by: EMERGENCY MEDICINE

## 2022-02-03 PROCEDURE — U0002 COVID-19 LAB TEST NON-CDC: HCPCS | Performed by: INTERNAL MEDICINE

## 2022-02-03 PROCEDURE — 87389 HIV-1 AG W/HIV-1&-2 AB AG IA: CPT | Performed by: EMERGENCY MEDICINE

## 2022-02-03 PROCEDURE — 80048 BASIC METABOLIC PNL TOTAL CA: CPT | Performed by: STUDENT IN AN ORGANIZED HEALTH CARE EDUCATION/TRAINING PROGRAM

## 2022-02-03 PROCEDURE — 99223 1ST HOSP IP/OBS HIGH 75: CPT | Mod: AI,,, | Performed by: INTERNAL MEDICINE

## 2022-02-03 PROCEDURE — 25000003 PHARM REV CODE 250: Performed by: STUDENT IN AN ORGANIZED HEALTH CARE EDUCATION/TRAINING PROGRAM

## 2022-02-03 PROCEDURE — 84484 ASSAY OF TROPONIN QUANT: CPT | Performed by: EMERGENCY MEDICINE

## 2022-02-03 PROCEDURE — 93010 ELECTROCARDIOGRAM REPORT: CPT | Mod: 76,,, | Performed by: INTERNAL MEDICINE

## 2022-02-03 PROCEDURE — 87040 BLOOD CULTURE FOR BACTERIA: CPT | Performed by: EMERGENCY MEDICINE

## 2022-02-03 PROCEDURE — 85730 THROMBOPLASTIN TIME PARTIAL: CPT | Performed by: STUDENT IN AN ORGANIZED HEALTH CARE EDUCATION/TRAINING PROGRAM

## 2022-02-03 PROCEDURE — 94761 N-INVAS EAR/PLS OXIMETRY MLT: CPT

## 2022-02-03 RX ORDER — FUROSEMIDE 10 MG/ML
80 INJECTION INTRAMUSCULAR; INTRAVENOUS ONCE
Status: DISCONTINUED | OUTPATIENT
Start: 2022-02-03 | End: 2022-02-03

## 2022-02-03 RX ORDER — GLUCAGON 1 MG
1 KIT INJECTION
Status: DISCONTINUED | OUTPATIENT
Start: 2022-02-03 | End: 2022-02-13 | Stop reason: HOSPADM

## 2022-02-03 RX ORDER — LANOLIN ALCOHOL/MO/W.PET/CERES
1 CREAM (GRAM) TOPICAL DAILY
Status: DISCONTINUED | OUTPATIENT
Start: 2022-02-03 | End: 2022-02-13

## 2022-02-03 RX ORDER — SODIUM CHLORIDE 0.9 % (FLUSH) 0.9 %
10 SYRINGE (ML) INJECTION
Status: DISCONTINUED | OUTPATIENT
Start: 2022-02-03 | End: 2022-02-13 | Stop reason: HOSPADM

## 2022-02-03 RX ORDER — POTASSIUM CHLORIDE 20 MEQ/1
40 TABLET, EXTENDED RELEASE ORAL ONCE
Status: COMPLETED | OUTPATIENT
Start: 2022-02-04 | End: 2022-02-04

## 2022-02-03 RX ORDER — DILTIAZEM HYDROCHLORIDE 5 MG/ML
25 INJECTION INTRAVENOUS
Status: COMPLETED | OUTPATIENT
Start: 2022-02-03 | End: 2022-02-03

## 2022-02-03 RX ORDER — IBUPROFEN 200 MG
16 TABLET ORAL
Status: DISCONTINUED | OUTPATIENT
Start: 2022-02-03 | End: 2022-02-13 | Stop reason: HOSPADM

## 2022-02-03 RX ORDER — METOPROLOL TARTRATE 50 MG/1
50 TABLET ORAL
Status: COMPLETED | OUTPATIENT
Start: 2022-02-03 | End: 2022-02-03

## 2022-02-03 RX ORDER — POTASSIUM CHLORIDE 20 MEQ/1
40 TABLET, EXTENDED RELEASE ORAL ONCE
Status: COMPLETED | OUTPATIENT
Start: 2022-02-03 | End: 2022-02-04

## 2022-02-03 RX ORDER — IBUPROFEN 200 MG
24 TABLET ORAL
Status: DISCONTINUED | OUTPATIENT
Start: 2022-02-03 | End: 2022-02-13 | Stop reason: HOSPADM

## 2022-02-03 RX ORDER — METOPROLOL TARTRATE 50 MG/1
50 TABLET ORAL EVERY 6 HOURS
Status: DISCONTINUED | OUTPATIENT
Start: 2022-02-03 | End: 2022-02-03

## 2022-02-03 RX ORDER — INSULIN ASPART 100 [IU]/ML
0-5 INJECTION, SOLUTION INTRAVENOUS; SUBCUTANEOUS
Status: DISCONTINUED | OUTPATIENT
Start: 2022-02-03 | End: 2022-02-13 | Stop reason: HOSPADM

## 2022-02-03 RX ORDER — TAMSULOSIN HYDROCHLORIDE 0.4 MG/1
1 CAPSULE ORAL DAILY
Status: DISCONTINUED | OUTPATIENT
Start: 2022-02-03 | End: 2022-02-04

## 2022-02-03 RX ORDER — IPRATROPIUM BROMIDE 42 UG/1
2 SPRAY, METERED NASAL 4 TIMES DAILY
Status: DISCONTINUED | OUTPATIENT
Start: 2022-02-03 | End: 2022-02-13

## 2022-02-03 RX ORDER — DILTIAZEM HYDROCHLORIDE 5 MG/ML
INJECTION INTRAVENOUS
Status: DISPENSED
Start: 2022-02-03 | End: 2022-02-03

## 2022-02-03 RX ORDER — FUROSEMIDE 10 MG/ML
80 INJECTION INTRAMUSCULAR; INTRAVENOUS ONCE
Status: COMPLETED | OUTPATIENT
Start: 2022-02-03 | End: 2022-02-03

## 2022-02-03 RX ADMIN — DILTIAZEM HYDROCHLORIDE 25 MG: 5 INJECTION INTRAVENOUS at 09:02

## 2022-02-03 RX ADMIN — RIVAROXABAN 20 MG: 20 TABLET, FILM COATED ORAL at 08:02

## 2022-02-03 RX ADMIN — FUROSEMIDE 80 MG: 10 INJECTION, SOLUTION INTRAMUSCULAR; INTRAVENOUS at 04:02

## 2022-02-03 RX ADMIN — AMIODARONE HYDROCHLORIDE 1 MG/MIN: 1.8 INJECTION, SOLUTION INTRAVENOUS at 10:02

## 2022-02-03 RX ADMIN — AMIODARONE HYDROCHLORIDE 150 MG: 1.5 INJECTION, SOLUTION INTRAVENOUS at 10:02

## 2022-02-03 RX ADMIN — HUMAN ALBUMIN MICROSPHERES AND PERFLUTREN 0.66 MG: 10; .22 INJECTION, SOLUTION INTRAVENOUS at 04:02

## 2022-02-03 RX ADMIN — TAMSULOSIN HYDROCHLORIDE 0.4 MG: 0.4 CAPSULE ORAL at 04:02

## 2022-02-03 RX ADMIN — METOPROLOL TARTRATE 50 MG: 50 TABLET, FILM COATED ORAL at 10:02

## 2022-02-03 RX ADMIN — FUROSEMIDE 80 MG: 10 INJECTION, SOLUTION INTRAVENOUS at 02:02

## 2022-02-03 RX ADMIN — RIOCIGUAT 2.5 MG: 2.5 TABLET, FILM COATED ORAL at 08:02

## 2022-02-03 RX ADMIN — FERROUS SULFATE TAB 325 MG (65 MG ELEMENTAL FE) 1 EACH: 325 (65 FE) TAB at 04:02

## 2022-02-03 RX ADMIN — FUROSEMIDE 80 MG: 10 INJECTION, SOLUTION INTRAVENOUS at 04:02

## 2022-02-03 RX ADMIN — FUROSEMIDE 20 MG/HR: 10 INJECTION, SOLUTION INTRAVENOUS at 05:02

## 2022-02-03 NOTE — ASSESSMENT & PLAN NOTE
-12/16/22 TTE: LV normal in size with moderately decreased systolic function, EF 35, Moderate RV enlargement with hypertrophy and moderately reduced RVSF, grade II DD, Mod-severe TR, Mod MR, trivial pericardial effusion, PASP 87, CVP 15, LVIDd 5.62  -11/28/2018 RHC: RA: 14/ 15/ 11 RV: 112/ -4 PA: 117/ 31/ 59 PWP: 30/ 25/ 20, CO 5.48, CI 2.53, O2 sat: SVC 73, PA 64%, , SVR 1357 (on adempas)  - Resume home Adempas 2.5mg TID  - Utravi 1600 mcg BID

## 2022-02-03 NOTE — HOSPITAL COURSE
Pt was admitted to the CICU and started on home medications.  Also started on lasix gtt and Emmy.  He did not diurese well and UOP continued to decrease.  Given his poor prognosis palliative care was c/s and pt was made DNR and wanted to go home with home hospice.  Home hospice orders were placed and pt subsequently discharged home with hospice.

## 2022-02-03 NOTE — Clinical Note
Diagnosis: Atrial fibrillation with RVR [681161]   Admitting Provider:: REFUGIO ALBERTO [[id number]]   Future Attending Provider: REFUGIO ALBERTO [[id number]]   Bed request comments: TSU   Reason for IP Medical Treatment  (Clinical interventions that can only be accomplished in the IP setting? ) :: IV diuresis   Estimated Length of Stay:: 3-4 midnights   I certify that Inpatient services for greater than or equal to 2 midnights are medically necessary:: Yes   Plans for Post-Acute care--if anticipated (pick the single best option):: A. No post acute care anticipated at this time

## 2022-02-03 NOTE — ASSESSMENT & PLAN NOTE
-NICM   -12/16/22 TTE: LV normal in size with moderately decreased systolic function, EF 35, Moderate RV enlargement with hypertrophy and moderately reduced RVSF, grade II DD, Mod-severe TR, Mod MR, trivial pericardial effusion, PASP 87, CVP 15, LVIDd 5.62  -11/28/2018 RHC: RA: 14/ 15/ 11 RV: 112/ -4 PA: 117/ 31/ 59 PWP: 30/ 25/ 20, CO 5.48, CI 2.53, O2 sat: SVC 73, PA 64%, , SVR 1357 (on adempas)  -Hypervolemic on exam today  -Home Diuretic Regimen: Bumex 2mg daily  -Inpatient Diuretic Regimen: will give lasix 80mg IVP bolus and start lasix 20mg/hr, will start Emmy at 20ppm  -GDMT: Losartan 25mg daily  -2g Na dietary restriction, 1500 mL fluid restriction, strict I/Os  - follow repeat TTE

## 2022-02-03 NOTE — ED PROVIDER NOTES
Encounter Date: 2/3/2022       History     Chief Complaint   Patient presents with    Dehydration     Unable to hold food down, liquid stool, having urine incontinence and abdominal discomfort onset x2 weeks, hx of pulmonary htn          71-year-old gentleman with past medical history including hypertension, CHF, pulmonary hypertension, PTSD presents for evaluation of diarrhea.  He reports that he has been having explosive watery diarrhea for the last several days.  Denies any vomiting or fever.  He reports that he is unable to control his bowels at this time.  He reports some generalized abdominal cramping and discomfort.  He reports that his abdomen has been swollen, but he thinks for it may be better with all the diarrhea.    The history is provided by the patient and the spouse.     Review of patient's allergies indicates:  No Known Allergies  Past Medical History:   Diagnosis Date    Anemia     Atrial fibrillation     Diabetes mellitus     Heart failure     Hyperlipidemia     Hypertension     PTSD (post-traumatic stress disorder)     Pulmonary hyperinflation     Urinary incontinence      Past Surgical History:   Procedure Laterality Date    CARDIAC CATHETERIZATION      COLONOSCOPY N/A 5/1/2018    Procedure: COLONOSCOPY;  Surgeon: Bubba Navarro MD;  Location: [facility];  Service: Endoscopy;  Laterality: N/A;  confirmed appt 4/24/18    RIGHT HEART CATHETERIZATION Right 11/28/2018    Procedure: INSERTION, CATHETER, RIGHT HEART;  Surgeon: Chelsea Arceo MD;  Location: [facility] CATH [facility];  Service: Cardiology;  Laterality: Right;     Family History   Problem Relation Age of Onset    Cancer Father         colon    Colon cancer Father     Heart disease Mother     Asthma Mother     Diabetes Mother     Hypertension Mother     Kidney disease Maternal Grandmother     Diabetes Paternal Grandmother     Cirrhosis Paternal Grandfather      Social History     Tobacco Use    Smoking status: Never Smoker     Smokeless tobacco: Never Used   Substance Use Topics    Alcohol use: Yes     Alcohol/week: 2.0 - 3.0 standard drinks     Types: 2 - 3 Shots of liquor per week     Comment: maybe twice a month     Drug use: No     Review of Systems   Constitutional: Positive for fatigue. Negative for fever.   HENT: Negative for sore throat.    Respiratory: Negative for shortness of breath.    Cardiovascular: Negative for chest pain.   Gastrointestinal: Positive for abdominal distention and diarrhea. Negative for nausea and vomiting.   Genitourinary: Negative for dysuria.   Musculoskeletal: Negative for back pain.   Skin: Negative for rash.   Neurological: Negative for weakness.   Hematological: Does not bruise/bleed easily.       Physical Exam     Initial Vitals [02/03/22 0857]   BP Pulse Resp Temp SpO2   (!) 81/56 (!) 120 (!) 23 98.1 °F (36.7 °C) (!) 94 %      MAP       --         Physical Exam    Nursing note and vitals reviewed.  Constitutional: He appears well-developed. He appears ill.   HENT:   Head: Normocephalic and atraumatic.   Mouth/Throat: Oropharynx is clear and moist.   Eyes: Conjunctivae and EOM are normal. Pupils are equal, round, and reactive to light.   Neck: Trachea normal. Neck supple.   Normal range of motion.  Cardiovascular: Normal pulses. An irregularly irregular rhythm present.  Tachycardia present.    Pulmonary/Chest: Breath sounds normal. No respiratory distress.   Abdominal: Abdomen is soft. Normal appearance. He exhibits distension. There is no abdominal tenderness.   Musculoskeletal:         General: No edema. Normal range of motion.      Cervical back: Normal range of motion and neck supple.     Neurological: He is alert and oriented to person, place, and time.   Skin: Skin is warm and dry.         ED Course   Procedures  Labs Reviewed   CBC W/ AUTO DIFFERENTIAL - Abnormal; Notable for the following components:       Result Value    RBC 3.77 (*)     Hemoglobin 10.3 (*)     Hematocrit 32.7 (*)      MCHC 31.5 (*)     RDW 21.8 (*)     Immature Granulocytes 1.7 (*)     Gran # (ANC) 7.8 (*)     Immature Grans (Abs) 0.15 (*)     Lymph # 0.5 (*)     Gran % 87.6 (*)     Lymph % 5.6 (*)     All other components within normal limits    Narrative:     Release to patient->Immediate   TSH - Abnormal; Notable for the following components:    TSH 7.377 (*)     All other components within normal limits   B-TYPE NATRIURETIC PEPTIDE - Abnormal; Notable for the following components:     (*)     All other components within normal limits   ISTAT PROCEDURE - Abnormal; Notable for the following components:    POC PO2 26 (*)     POC HCO3 21.1 (*)     POC SATURATED O2 48 (*)     POC TCO2 22 (*)     All other components within normal limits   SARS-COV-2 RDRP GENE - Normal    Narrative:     This test utilizes isothermal nucleic acid amplification   technology to detect the SARS-CoV-2 RdRp nucleic acid segment.   The analytical sensitivity (limit of detection) is 125 genome   equivalents/mL.   A POSITIVE result implies infection with the SARS-CoV-2 virus;   the patient is presumed to be contagious.     A NEGATIVE result means that SARS-CoV-2 nucleic acids are not   present above the limit of detection. A NEGATIVE result should be   treated as presumptive. It does not rule out the possibility of   COVID-19 and should not be the sole basis for treatment decisions.   If COVID-19 is strongly suspected based on clinical and exposure   history, re-testing using an alternate molecular assay should be   considered.   This test is only for use under the Food and Drug   Administration s Emergency Use Authorization (EUA).   Commercial kits are provided by [employer].   Performance characteristics of the EUA have been independently   verified by [facility] Department of   Pathology and Laboratory Medicine.   _________________________________________________________________   The authorized Fact Sheet for Healthcare  Providers and the authorized Fact   Sheet for Patients of the ID NOW COVID-19 are available on the FDA   website:     https://www.fda.gov/media/574618/download  https://www.fda.gov/media/026125/download         CULTURE, BLOOD   CULTURE, BLOOD   RESPIRATORY INFECTION PANEL (PCR), NASOPHARYNGEAL   HIV 1 / 2 ANTIBODY    Narrative:     Release to patient->Immediate   HEPATITIS C ANTIBODY    Narrative:     Release to patient->Immediate   LACTIC ACID, PLASMA    Narrative:     Release to patient->Immediate   TROPONIN I   T4, FREE   COMPREHENSIVE METABOLIC PANEL   MAGNESIUM   URINALYSIS, REFLEX TO URINE CULTURE   HEMOGLOBIN A1C   SARS-COV-2 (COVID-19) QUALITATIVE PCR   BASIC METABOLIC PANEL   BASIC METABOLIC PANEL   MAGNESIUM   PROTIME-INR   APTT   COMPREHENSIVE METABOLIC PANEL   MAGNESIUM   PHOSPHORUS   BASIC METABOLIC PANEL   POCT GLUCOSE MONITORING CONTINUOUS     EKG Readings: (Independently Interpreted)   Initial Reading: No STEMI. Rhythm: Atrial Fibrillation. Heart Rate: 170. Conduction: RBBB. Clinical Impression: Atrial Fibrillation with RVR     ECG Results          Repeat EKG 12-lead (Final result)  Result time 02/03/22 21:51:39    Final result by Interface, Lab In [facility] (02/03/22 21:51:39)                 Narrative:    Test Reason : R00.0,    Vent. Rate : 173 BPM     Atrial Rate : 187 BPM     P-R Int : 000 ms          QRS Dur : 118 ms      QT Int : 314 ms       P-R-T Axes : 000 160 019 degrees     QTc Int : 532 ms    Atrial fibrillation with rapid ventricular response  Right axis deviation  Right bundle branch block  Abnormal ECG  When compared with ECG of 03-FEB-2022 09:19,  No significant change was found  Confirmed by Giovani Rush MD ([id number]) on 2/3/2022 9:51:32 PM    Referred By: [email or web address]   SELF           Confirmed By:Giovani Rush MD                             EKG 12-lead (Final result)  Result time 02/03/22 21:49:07    Final result by Interface, Lab In [facility] (02/03/22 21:49:07)                 Narrative:     Test Reason : R00.0,    Vent. Rate : 170 BPM     Atrial Rate : 170 BPM     P-R Int : 000 ms          QRS Dur : 124 ms      QT Int : 324 ms       P-R-T Axes : 000 142 -20 degrees     QTc Int : 544 ms    Atrial fibrillation with rapid ventricular response with premature  ventricular or aberrantly conducted complexes  Right axis deviation  Right bundle branch block  Abnormal ECG  When compared with ECG of 26-MAR-2019 12:55,  Atrial fibrillation has replaced Sinus rhythm  Vent. rate has increased BY  63 BPM    Confirmed by Victor Manuel ESTRADA, Giovani ([id number]) on 2/3/2022 9:48:59 PM    Referred By: RYNE   SELF           Confirmed By:Giovani Rush MD                            Imaging Results          X-Ray Chest AP Portable (Final result)  Result time 02/03/22 18:24:40    Final result by Indio aCraballo MD (02/03/22 18:24:40)                 Impression:      As above      Electronically signed by: Indio Caraballo MD  Date:    02/03/2022  Time:    18:24             Narrative:    EXAMINATION:  XR CHEST AP PORTABLE    CLINICAL HISTORY:  RIJ triple lumen cath placement;    TECHNIQUE:  Single frontal view of the chest was performed.    COMPARISON:  02/03/2022    FINDINGS:  Right central venous catheter tip projects over the distal SVC.  There is no pneumothorax or significant interval detrimental change in the cardiopulmonary status since the previous exam allowing for shallow inspiratory effort.                               X-Ray Chest 1 View (Final result)  Result time 02/03/22 11:41:35    Final result by Rox Chadwick MD (02/03/22 11:41:35)                 Impression:      Mild pulmonary edema, likely cardiogenic.      Electronically signed by: Rox Chadwick  Date:    02/03/2022  Time:    11:41             Narrative:    EXAMINATION:  XR CHEST 1 VIEW    CLINICAL HISTORY:  Shortness of breath    TECHNIQUE:  Single frontal view of the chest was performed.    COMPARISON:  12/26/2017    FINDINGS:  The cardiomediastinal silhouette  appears mildly enlarged.  There are new perihilar ground-glass opacities, right greater than left associated with pulmonary venous congestion.  The bones appear unremarkable for the age of the patient, with mild hypertrophic and/or degenerative changes.                              X-Rays:   Independently Interpreted Readings:   Chest X-Ray: Increased vascular markings consistent with CHF are present.  Cardiomegaly present.     Medications   diltiaZEM (CARDIZEM) 5 mg/mL injection (has no administration in time range)   ferrous sulfate tablet 1 each (1 each Oral Given 2/3/22 1609)   ipratropium 42 mcg (0.06 %) nasal spray 2 spray (has no administration in time range)   riociguat (ADEMPAS) tablet 2.5 mg (2.5 mg Oral Given 2/3/22 2029)   rivaroxaban tablet 20 mg (20 mg Oral Given 2/3/22 2029)   tamsulosin 24 hr capsule 0.4 mg (0.4 mg Oral Given 2/3/22 1609)   sodium chloride 0.9% flush 10 mL (has no administration in time range)   glucose chewable tablet 16 g (has no administration in time range)   glucose chewable tablet 24 g (has no administration in time range)   dextrose 50% injection 12.5 g (has no administration in time range)   dextrose 50% injection 25 g (has no administration in time range)   glucagon (human recombinant) injection 1 mg (has no administration in time range)   insulin aspart U-100 pen 0-5 Units (has no administration in time range)   selexipag Tab 1,600 mcg (has no administration in time range)   nitric oxide gas Gas 20 ppm (20 ppm Inhalation New Bag 2/3/22 1743)   furosemide injection 80 mg (80 mg Intravenous Not Given 2/3/22 1615)     Followed by   furosemide (LASIX) 500 mg infusion (conc: 10 mg/mL) (20 mg/hr Intravenous New Bag 2/3/22 1723)   amiodarone in dextrose 150 mg/100 mL (1.5 mg/mL) loading dose 150 mg (150 mg Intravenous New Bag 2/3/22 2225)   amiodarone 360 mg/200 mL (1.8 mg/mL) infusion (has no administration in time range)   amiodarone 360 mg/200 mL (1.8 mg/mL) infusion (has no  "administration in time range)   diltiaZEM injection 25 mg (25 mg Intravenous Given 2/3/22 0930)   sodium chloride 0.9% bolus 250 mL (0 mLs Intravenous Stopped 2/3/22 1100)   metoprolol tartrate (LOPRESSOR) tablet 50 mg (50 mg Oral Given 2/3/22 1016)   furosemide injection 80 mg (80 mg Intravenous Given 2/3/22 1402)   perflutren protein-A microsphr 0.22 mg/mL IV susp (0.66 mg Intravenous Given by Other 2/3/22 1604)     Medical Decision Making:   History:   Old Medical Records: I decided to obtain old medical records.  Initial Assessment:   Emergent evaluation diarrhea  Differential Diagnosis:   Dysthymias, dehydration , electrolyte abnormality   Independently Interpreted Test(s):   I have ordered and independently interpreted X-rays - see prior notes.  I have ordered and independently interpreted EKG Reading(s) - see prior notes  Clinical Tests:   Lab Tests: Ordered and Reviewed  Radiological Study: Ordered and Reviewed  Medical Tests: Ordered and Reviewed  Sepsis Perfusion Assessment: "I attest a sepsis perfusion exam was performed within 6 hours of sepsis, severe sepsis, or septic shock presentation, following fluid resuscitation."  Other:   I have discussed this case with another health care provider.            Attending Attestation:         Attending Critical Care:   Critical Care Times:   Direct Patient Care (initial evaluation, reassessments, and time considering the case)................................................................11 minutes.   Additional History from reviewing old medical records or taking additional history from the family, EMS, PCP, etc.......................5 minutes.   Ordering, Reviewing, and Interpreting Diagnostic Studies...............................................................................................................4 minutes. "   Documentation..................................................................................................................................................................................4 minutes.   Consultation with other Physicians. .................................................................................................................................................5 minutes.   Consultation with the patient's family directly relating to the patient's condition, care, and DNR status (when patient unable)......4 minutes.   ==============================================================  · Total Critical Care Time - exclusive of procedural time: 33 minutes.  ==============================================================      Attending ED Notes:   Presented in Afib RVR, BP soft. But mentation normal. rate controlled with IV diltz. Given history of pulm htn, patient will require admission to their service. Reviewed record and noted CT with ascites but no history of paracentesis. Recent LFTs normal. Unknown etiology of diarrhea . Will need stool studies. Will require admission to [facility].       ED Course as of 02/03/22 2229   Thu Feb 03, 2022   0956 Chart FYI noted. Discussed with \[facility]\"", patient to be admitted to their service.  [HS]      ED Course User Index  [HS] Negra Morales MD             Clinical Impression:   Final diagnoses:  [R00.0] Tachycardia  [R06.02] Shortness of breath  [I48.91] Atrial fibrillation with RVR (Primary)  [R19.7] Diarrhea, unspecified type  [R14.0] Abdominal distention  [I27.20] Pulmonary HTN          ED Disposition Condition    Admit               Negra Morales MD  02/03/22 1101

## 2022-02-03 NOTE — H&P
[facility] - Emergency Dept  Heart Transplant  Progress Note    Patient Name: Ambrosio Bray III  MRN: [id number]  Admission Date: 2/3/2022  Hospital Length of Stay: 0 days  Attending Physician: Negra Morales MD  Primary Care Provider: Luciana Cisse MD  Principal Problem:Acute decompensated heart failure    Subjective:   71 y.o M with history of HF, PH (diagnosed in 2018) on [facility], Uptravi, CTEPH on home oxygen 2L NC, Atrial Flutter s/p ablation 2020, DM, BPH, HLD, Anemia, PTSD, Pulmonary hyperinflation presents to ED with respiratory distress and worsening shortness of breath for past 4 days. Patient reports he was in his usual state of health until 4 days ago when he developed SOB while ambulating to use restroom. Patient reports he had removed his oxygen to use restroom and while ambulating to restroom he became lightheaded, dizzy and in respiratory distress and reports having a fall and syncopized. He reports he woke up after 30 seconds, did not hit his head. Patient reports since the past four days he has also been having increasing loose BM more than his usual, reports feeling dehydrated with increased fluid intake. Patient reports he recently followed up with pulmonologist in clinic and his home Bumex 2mg MWF was increased to daily and was prescribed home oxygen. Patient reports that has helped relieve his SOB. Patient denies chest pain, further episodes of syncope, change in vision, slurred speech, palpitations, headache, change in urinary habits, cough, fever.     Home Medications  - Bumex 2mg daily  - Adempas 2.5mg TID  - Uptravi 1600 mcg BID (to start on 3/21/22)  - Xarelto 20mg daily  - Losartan 25mg daily  - Flomax 0.4mg  - Lipitor 40mg daily  - Atrovent  - Ferrous Sulfate     ED Course      Vitals:     02/03/22 1332   BP: 98/65   Pulse: 104   Resp: (!) 23   Temp:       Pertinent Labs  CBC: WBC 8.93, Hbg 10.3, Hct 32.7, Plt 270  BMP: Na 139, K 3.7, Cl 104, CO2 25, BUN 16, Cr. 0.8,   LFT: TP 7.2,  Alb, 3.6, TB 2.9, AST 11, ALT 8  Free T4: 0.88  Lactate 1.7, Troponin 0.012,   EKG: Afib with RVR at a rate of 173, RBB  CXR: bilateral opacities, pulmonary congestion  ED Management  -Lopressor Tartrate 50mg q6h  -Diltiazem 25 mg IVP  -Lasix 80mg IVP    Cardiac Work up  22 TTE: LV normal in size with moderately decreased systolic function, EF 35, Moderate RV enlargement with hypertrophy and moderately reduced RVSF, grade II DD, Mod-severe TR, Mod MR, trivial pericardial effusion, PASP 87, CVP 15, LVIDd 5.62  2018 RHC: RA: 14/ 15/ 11 RV: 112/ -4 PA: 117/ / 59 PWP: , CO 5.48, CI 2.53, O2 sat: SVC 73, PA 64%, , SVR 1357 (on adempas)     Imagin.3.22 CXR: perihilar ground glass opacities R > L, pulmonary congestion  22 US ABD: no evidence of hydronephrosis or obstructive uropathy, prostate is enlarged, large volume ascites and BL pleural effusions  10/1/2014 CTA chest: positive study with central and segmental pulmonary emboli in R distal main pulmonary artery and segmental branches of RUL and RLL    Medications  Current Facility-Administered Medications on File Prior to Encounter   Medication Dose Route Frequency Provider Last Rate Last Admin    lidocaine HCL 10 mg/ml (1%) injection 1 mL  1 mL Other 1 time in Clinic/HOD Julius Matute Jr., MD         Current Outpatient Medications on File Prior to Encounter   Medication Sig Dispense Refill    bumetanide (BUMEX) 2 MG tablet Take 1 tablet (2 mg total) by mouth once daily. 90 tablet 0    ferrous sulfate 325 (65 FE) MG EC tablet Take 1 tablet (325 mg total) by mouth 2 (two) times daily with meals. 180 tablet 3    ipratropium (ATROVENT) 42 mcg (0.06 %) nasal spray 2 sprays by Nasal route 4 (four) times daily. 15 mL 6    losartan (COZAAR) 25 MG tablet Take 25 mg by mouth once daily.      mometasone 0.1% (ELOCON) 0.1 % cream Apply topically once daily.      potassium chloride SA (KLOR-CON M15) 15 MEQ tablet Take 1 tablet  (15 mEq total) by mouth 2 (two) times daily. 60 tablet 11    riociguat (ADEMPAS) 2.5 mg tablet Take 1 tablet (2.5 mg total) by mouth 3 (three) times daily. 90 tablet 11    rivaroxaban (XARELTO) 20 mg Tab Take 1 tablet (20 mg total) by mouth daily with dinner or evening meal. 90 tablet 1    tamsulosin (FLOMAX) 0.4 mg Cap Take 1 capsule by mouth once daily 90 capsule 3    UPTRAVI 1,600 mcg Tab Take 1 tablet by mouth 2 (two) times daily. 60 tablet 12       Assessment and Plan:     * Acute decompensated heart failure  -NICM   -12/16/22 TTE: LV normal in size with moderately decreased systolic function, EF 35, Moderate RV enlargement with hypertrophy and moderately reduced RVSF, grade II DD, Mod-severe TR, Mod MR, trivial pericardial effusion, PASP 87, CVP 15, LVIDd 5.62  -11/28/2018 RHC: RA: 14/ 15/ 11 RV: 112/ -4 PA: 117/ 31/ 59 PWP: 30/ 25/ 20, CO 5.48, CI 2.53, O2 sat: SVC 73, PA 64%, , SVR 1357 (on adempas)  -Hypervolemic on exam today  -Home Diuretic Regimen: Bumex 2mg daily  -Inpatient Diuretic Regimen: will give lasix 80mg IVP bolus and start lasix 20mg/hr, will start Emmy at 20ppm  -GDMT: Losartan 25mg daily  -2g Na dietary restriction, 1500 mL fluid restriction, strict I/Os  - follow repeat TTE    Pulmonary HTN  -12/16/22 TTE: LV normal in size with moderately decreased systolic function, EF 35, Moderate RV enlargement with hypertrophy and moderately reduced RVSF, grade II DD, Mod-severe TR, Mod MR, trivial pericardial effusion, PASP 87, CVP 15, LVIDd 5.62  -11/28/2018 RHC: RA: 14/ 15/ 11 RV: 112/ -4 PA: 117/ 31/ 59 PWP: 30/ 25/ 20, CO 5.48, CI 2.53, O2 sat: SVC 73, PA 64%, , SVR 1357 (on adempas)  - Resume home Adempas 2.5mg TID  - Utravi 1600 mcg BID     Atrial Flutter/ fibrillation  - In RVR on admission  - telemetry monitoring  - on eliquis  - s/p ablation in 2020    CTEPH (chronic thromboembolic pulmonary hypertension)  - resume home eliquis  - please see plan for PH    Type 2 diabetes  mellitus with microalbuminuria, without long-term current use of insulin  - follow A1c  - Sliding scale and FS  - endocrine consult    Iron deficiency anemia due to chronic blood loss  - monitor H/H  - resume home iron therapy    DVT PPx: Xarelto  GI PPx: not indicated     Jose Morel MD  Heart Transplant  [facility] - Emergency Dept

## 2022-02-03 NOTE — PROCEDURES
"Ambrosio Bray III is a 71 y.o. male patient.    Temp: 98.1 °F (36.7 °C) (02/03/22 0857)  Pulse: (!) 122 (02/03/22 1743)  Resp: 19 (02/03/22 1743)  BP: 112/78 (02/03/22 1417)  SpO2: 98 % (02/03/22 1743)  Weight: 83.9 kg (185 lb) (02/03/22 1417)  Height: 6' 1" (185.4 cm) (02/03/22 1417)    Central Line    Date/Time: 2/3/2022 5:46 PM  Performed by: Prasanna Murillo MD  Authorized by: Prasanna Murillo MD     Location procedure was performed:  [facility]  Consent Done ?:  Yes  Time out complete?: Verified correct patient, procedure, equipment, staff, and site/side    Indications:  Med administration, hemodynamic monitoring and vascular access  Anesthesia:  Local infiltration  Local anesthetic:  Lidocaine 1% with epinephrine  Preparation:  Skin prepped with ChloraPrep  Skin prep agent dried: Skin prep agent completely dried prior to procedure    Sterile barriers: All five maximal sterile barriers used - gloves, gown, cap, mask and large sterile sheet    Hand hygiene: Hand hygiene performed immediately prior to central venous catheter insertion    Location:  Right internal jugular  Catheter type:  Triple lumen  Catheter size:  7 Fr  Ultrasound guidance: Yes    Vessel Caliber:  Large   patent  Comprressibility:  Normal  Needle advanced into vessel with real time ultrasound guidance.    Guidewire confirmed in vessel.    Image recorded and saved.    Steril sheath on probe.    Sterile gel used.  Manometry: Yes    Number of attempts:  1  Securement:  Line sutured, chlorhexidine patch, sterile dressing applied and blood return through all ports  Complications: No    Specimens: No    Implants: No    XRay:  Placement verified by x-ray, no pneumothorax on x-ray, verified by fluoroscopy, tip termination and successful placement  Adverse Events:  NoneTermination Site: superior vena cava        No flowsheet data found.      2/3/2022  "

## 2022-02-03 NOTE — HPI
71 y.o M with history of HF, PH (diagnosed in 2018) on [facility], Uptravi, CTEPH on home oxygen 2L NC, Atrial Flutter s/p ablation 2020, DM, BPH, HLD, Anemia, PTSD, Pulmonary hyperinflation presents to ED with respiratory distress and worsening shortness of breath for past 4 days. Patient reports he was in his usual state of health until 4 days ago when he developed SOB while ambulating to use restroom. Patient reports he had removed his oxygen to use restroom and while ambulating to restroom he became lightheaded, dizzy and in respiratory distress and reports having a fall and syncopized. He reports he woke up after 30 seconds, did not hit his head. Patient reports since the past four days he has also been having increasing loose BM more than his usual, reports feeling dehydrated with increased fluid intake. Patient reports he recently followed up with pulmonologist in clinic and his home Bumex 2mg MWF was increased to daily and was prescribed home oxygen. Patient reports that has helped relieve his SOB. Patient denies chest pain, further episodes of syncope, change in vision, slurred speech, palpitations, headache, change in urinary habits, cough, fever.     Home Medications  - Bumex 2mg daily  - Adempas 2.5mg TID  - Uptravi 1600 mcg BID (to start on 3/21/22)  - Xarelto 20mg daily  - Losartan 25mg daily  - Flomax 0.4mg  - Lipitor 40mg daily  - Atrovent  - Ferrous Sulfate     ED Course      Vitals:     02/03/22 1332   BP: 98/65   Pulse: 104   Resp: (!) 23   Temp:       Pertinent Labs  CBC: WBC 8.93, Hbg 10.3, Hct 32.7, Plt 270  BMP: Na 139, K 3.7, Cl 104, CO2 25, BUN 16, Cr. 0.8,   LFT: TP 7.2, Alb, 3.6, TB 2.9, AST 11, ALT 8  Free T4: 0.88  Lactate 1.7, Troponin 0.012,   EKG: Afib with RVR at a rate of 173, RBB  CXR: bilateral opacities, pulmonary congestion  ED Management  -Lopressor Tartrate 50mg q6h  -Diltiazem 25 mg IVP  -Lasix 80mg IVP

## 2022-02-03 NOTE — CARE UPDATE
A patient presents to ED for worsening abdominal distension.  He has been evaluated for this distension and noted to have large volume ascites.  Also notes having multiple episodes of diarrhea.  He on Friday had a syncopal episode as he was going to the bathroom.  He is followed for pulmonary hypertension on Uptravi and Adempas. About 3 weeks ago he was started on oxygen chronically.  He is having increasing lower extremity edema.  Upon presentation to the ED today he was in atrial fibrillation with rapid ventricular response.    Recommend admission to [facility] for IV diuresis.  Possible need for further evaluation of his ascites.  No prior paracentesis rather diagnostic or therapeutic.  On echo done he has a reduced ejection fraction.  Possible need for titration of CHF/PH medications.

## 2022-02-04 LAB
ALBUMIN SERPL BCP-MCNC: 2.6 G/DL (ref 3.5–5.2)
ALBUMIN SERPL BCP-MCNC: 2.6 G/DL (ref 3.5–5.2)
ALBUMIN SERPL BCP-MCNC: 3.1 G/DL (ref 3.5–5.2)
ALLENS TEST: ABNORMAL
ALP SERPL-CCNC: 114 U/L (ref 55–135)
ALP SERPL-CCNC: 96 U/L (ref 55–135)
ALP SERPL-CCNC: 96 U/L (ref 55–135)
ALT SERPL W/O P-5'-P-CCNC: 8 U/L (ref 10–44)
ANION GAP SERPL CALC-SCNC: 10 MMOL/L (ref 8–16)
ANION GAP SERPL CALC-SCNC: 11 MMOL/L (ref 8–16)
ANION GAP SERPL CALC-SCNC: 8 MMOL/L (ref 8–16)
APTT BLDCRRT: 30.9 SEC (ref 21–32)
AST SERPL-CCNC: 13 U/L (ref 10–40)
AST SERPL-CCNC: 14 U/L (ref 10–40)
AST SERPL-CCNC: 14 U/L (ref 10–40)
BASOPHILS # BLD AUTO: 0.01 K/UL (ref 0–0.2)
BASOPHILS # BLD AUTO: 0.01 K/UL (ref 0–0.2)
BASOPHILS NFR BLD: 0.1 % (ref 0–1.9)
BASOPHILS NFR BLD: 0.2 % (ref 0–1.9)
BILIRUB DIRECT SERPL-MCNC: 1.3 MG/DL (ref 0.1–0.3)
BILIRUB SERPL-MCNC: 2.5 MG/DL (ref 0.1–1)
BILIRUB SERPL-MCNC: 2.5 MG/DL (ref 0.1–1)
BILIRUB SERPL-MCNC: 2.8 MG/DL (ref 0.1–1)
BUN SERPL-MCNC: 46 MG/DL (ref 8–23)
BUN SERPL-MCNC: 46 MG/DL (ref 8–23)
BUN SERPL-MCNC: 50 MG/DL (ref 8–23)
BUN SERPL-MCNC: 51 MG/DL (ref 8–23)
BUN SERPL-MCNC: 52 MG/DL (ref 8–23)
CALCIUM SERPL-MCNC: 7.1 MG/DL (ref 8.7–10.5)
CALCIUM SERPL-MCNC: 7.1 MG/DL (ref 8.7–10.5)
CALCIUM SERPL-MCNC: 8.4 MG/DL (ref 8.7–10.5)
CALCIUM SERPL-MCNC: 8.5 MG/DL (ref 8.7–10.5)
CALCIUM SERPL-MCNC: 8.6 MG/DL (ref 8.7–10.5)
CHLORIDE SERPL-SCNC: 104 MMOL/L (ref 95–110)
CHLORIDE SERPL-SCNC: 105 MMOL/L (ref 95–110)
CHLORIDE SERPL-SCNC: 105 MMOL/L (ref 95–110)
CHLORIDE SERPL-SCNC: 109 MMOL/L (ref 95–110)
CHLORIDE SERPL-SCNC: 109 MMOL/L (ref 95–110)
CO2 SERPL-SCNC: 17 MMOL/L (ref 23–29)
CO2 SERPL-SCNC: 17 MMOL/L (ref 23–29)
CO2 SERPL-SCNC: 21 MMOL/L (ref 23–29)
CO2 SERPL-SCNC: 22 MMOL/L (ref 23–29)
CO2 SERPL-SCNC: 22 MMOL/L (ref 23–29)
CREAT SERPL-MCNC: 0.8 MG/DL (ref 0.5–1.4)
CREAT SERPL-MCNC: 0.8 MG/DL (ref 0.5–1.4)
CREAT SERPL-MCNC: 1 MG/DL (ref 0.5–1.4)
CREAT SERPL-MCNC: 1 MG/DL (ref 0.5–1.4)
CREAT SERPL-MCNC: 1.1 MG/DL (ref 0.5–1.4)
CRP SERPL-MCNC: 57.9 MG/L (ref 0–8.2)
DELSYS: ABNORMAL
DELSYS: ABNORMAL
DIFFERENTIAL METHOD: ABNORMAL
DIFFERENTIAL METHOD: ABNORMAL
EOSINOPHIL # BLD AUTO: 0 K/UL (ref 0–0.5)
EOSINOPHIL # BLD AUTO: 0 K/UL (ref 0–0.5)
EOSINOPHIL NFR BLD: 0 % (ref 0–8)
EOSINOPHIL NFR BLD: 0 % (ref 0–8)
ERYTHROCYTE [DISTWIDTH] IN BLOOD BY AUTOMATED COUNT: 21.4 % (ref 11.5–14.5)
ERYTHROCYTE [DISTWIDTH] IN BLOOD BY AUTOMATED COUNT: 21.6 % (ref 11.5–14.5)
ERYTHROCYTE [SEDIMENTATION RATE] IN BLOOD BY WESTERGREN METHOD: 5 MM/H
EST. GFR  (AFRICAN AMERICAN): >60 ML/MIN/1.73 M^2
EST. GFR  (NON AFRICAN AMERICAN): >60 ML/MIN/1.73 M^2
GLUCOSE SERPL-MCNC: 133 MG/DL (ref 70–110)
GLUCOSE SERPL-MCNC: 135 MG/DL (ref 70–110)
GLUCOSE SERPL-MCNC: 140 MG/DL (ref 70–110)
GLUCOSE SERPL-MCNC: 94 MG/DL (ref 70–110)
GLUCOSE SERPL-MCNC: 94 MG/DL (ref 70–110)
HCO3 UR-SCNC: 20.9 MMOL/L (ref 24–28)
HCO3 UR-SCNC: 21 MMOL/L (ref 24–28)
HCO3 UR-SCNC: 21.1 MMOL/L (ref 24–28)
HCT VFR BLD AUTO: 25.8 % (ref 40–54)
HCT VFR BLD AUTO: 29.9 % (ref 40–54)
HGB BLD-MCNC: 8.2 G/DL (ref 14–18)
HGB BLD-MCNC: 9.6 G/DL (ref 14–18)
IMM GRANULOCYTES # BLD AUTO: 0.13 K/UL (ref 0–0.04)
IMM GRANULOCYTES # BLD AUTO: 0.29 K/UL (ref 0–0.04)
IMM GRANULOCYTES NFR BLD AUTO: 2.3 % (ref 0–0.5)
IMM GRANULOCYTES NFR BLD AUTO: 3.9 % (ref 0–0.5)
INR PPP: 1.6 (ref 0.8–1.2)
LDH SERPL L TO P-CCNC: 214 U/L (ref 110–260)
LYMPHOCYTES # BLD AUTO: 0.4 K/UL (ref 1–4.8)
LYMPHOCYTES # BLD AUTO: 0.4 K/UL (ref 1–4.8)
LYMPHOCYTES NFR BLD: 5.6 % (ref 18–48)
LYMPHOCYTES NFR BLD: 7.9 % (ref 18–48)
MAGNESIUM SERPL-MCNC: 1.8 MG/DL (ref 1.6–2.6)
MAGNESIUM SERPL-MCNC: 2.2 MG/DL (ref 1.6–2.6)
MAGNESIUM SERPL-MCNC: 2.2 MG/DL (ref 1.6–2.6)
MAGNESIUM SERPL-MCNC: 2.3 MG/DL (ref 1.6–2.6)
MCH RBC QN AUTO: 27.1 PG (ref 27–31)
MCH RBC QN AUTO: 27.3 PG (ref 27–31)
MCHC RBC AUTO-ENTMCNC: 31.8 G/DL (ref 32–36)
MCHC RBC AUTO-ENTMCNC: 32.1 G/DL (ref 32–36)
MCV RBC AUTO: 85 FL (ref 82–98)
MCV RBC AUTO: 86 FL (ref 82–98)
METHEMOGLOBIN: 1.1 % (ref 0–3)
MODE: ABNORMAL
MONOCYTES # BLD AUTO: 0.4 K/UL (ref 0.3–1)
MONOCYTES # BLD AUTO: 0.5 K/UL (ref 0.3–1)
MONOCYTES NFR BLD: 6.4 % (ref 4–15)
MONOCYTES NFR BLD: 6.8 % (ref 4–15)
NEUTROPHILS # BLD AUTO: 4.6 K/UL (ref 1.8–7.7)
NEUTROPHILS # BLD AUTO: 6.3 K/UL (ref 1.8–7.7)
NEUTROPHILS NFR BLD: 82.8 % (ref 38–73)
NEUTROPHILS NFR BLD: 84 % (ref 38–73)
NRBC BLD-RTO: 0 /100 WBC
NRBC BLD-RTO: 1 /100 WBC
PCO2 BLDA: 32 MMHG (ref 35–45)
PCO2 BLDA: 33.4 MMHG (ref 35–45)
PCO2 BLDA: 34.1 MMHG (ref 35–45)
PH SMN: 7.4 [PH] (ref 7.35–7.45)
PH SMN: 7.41 [PH] (ref 7.35–7.45)
PH SMN: 7.43 [PH] (ref 7.35–7.45)
PHOSPHATE SERPL-MCNC: 3.2 MG/DL (ref 2.7–4.5)
PLATELET # BLD AUTO: 256 K/UL (ref 150–450)
PLATELET # BLD AUTO: 305 K/UL (ref 150–450)
PMV BLD AUTO: 10.5 FL (ref 9.2–12.9)
PMV BLD AUTO: 11.1 FL (ref 9.2–12.9)
PO2 BLDA: 26 MMHG (ref 40–60)
PO2 BLDA: 29 MMHG (ref 40–60)
PO2 BLDA: 30 MMHG (ref 40–60)
POC BE: -3 MMOL/L
POC BE: -4 MMOL/L
POC BE: -4 MMOL/L
POC PCO2 TEMP: 34.1 MMHG
POC PH TEMP: 7.4
POC PO2 TEMP: 29 MMHG
POC SATURATED O2: 52 % (ref 95–100)
POC SATURATED O2: 55 % (ref 95–100)
POC SATURATED O2: 59 % (ref 95–100)
POC TCO2: 22 MMOL/L (ref 24–29)
POC TEMPERATURE: ABNORMAL
POCT GLUCOSE: 150 MG/DL (ref 70–110)
POTASSIUM SERPL-SCNC: 2.7 MMOL/L (ref 3.5–5.1)
POTASSIUM SERPL-SCNC: 2.7 MMOL/L (ref 3.5–5.1)
POTASSIUM SERPL-SCNC: 3.5 MMOL/L (ref 3.5–5.1)
POTASSIUM SERPL-SCNC: 4 MMOL/L (ref 3.5–5.1)
POTASSIUM SERPL-SCNC: 4 MMOL/L (ref 3.5–5.1)
PROCALCITONIN SERPL IA-MCNC: 1.04 NG/ML
PROT SERPL-MCNC: 6 G/DL (ref 6–8.4)
PROT SERPL-MCNC: 6 G/DL (ref 6–8.4)
PROT SERPL-MCNC: 7 G/DL (ref 6–8.4)
PROTHROMBIN TIME: 15.9 SEC (ref 9–12.5)
RBC # BLD AUTO: 3 M/UL (ref 4.6–6.2)
RBC # BLD AUTO: 3.54 M/UL (ref 4.6–6.2)
SAMPLE: ABNORMAL
SITE: ABNORMAL
SODIUM SERPL-SCNC: 135 MMOL/L (ref 136–145)
SODIUM SERPL-SCNC: 136 MMOL/L (ref 136–145)
SODIUM SERPL-SCNC: 137 MMOL/L (ref 136–145)
T4 FREE SERPL-MCNC: 0.87 NG/DL (ref 0.71–1.51)
WBC # BLD AUTO: 5.57 K/UL (ref 3.9–12.7)
WBC # BLD AUTO: 7.45 K/UL (ref 3.9–12.7)

## 2022-02-04 PROCEDURE — 25000003 PHARM REV CODE 250: Performed by: INTERNAL MEDICINE

## 2022-02-04 PROCEDURE — 84100 ASSAY OF PHOSPHORUS: CPT | Performed by: STUDENT IN AN ORGANIZED HEALTH CARE EDUCATION/TRAINING PROGRAM

## 2022-02-04 PROCEDURE — 99291 CRITICAL CARE FIRST HOUR: CPT | Mod: ,,, | Performed by: INTERNAL MEDICINE

## 2022-02-04 PROCEDURE — 94761 N-INVAS EAR/PLS OXIMETRY MLT: CPT

## 2022-02-04 PROCEDURE — 37799 UNLISTED PX VASCULAR SURGERY: CPT

## 2022-02-04 PROCEDURE — 84145 PROCALCITONIN (PCT): CPT | Performed by: STUDENT IN AN ORGANIZED HEALTH CARE EDUCATION/TRAINING PROGRAM

## 2022-02-04 PROCEDURE — 85025 COMPLETE CBC W/AUTO DIFF WBC: CPT | Mod: 91 | Performed by: STUDENT IN AN ORGANIZED HEALTH CARE EDUCATION/TRAINING PROGRAM

## 2022-02-04 PROCEDURE — 80053 COMPREHEN METABOLIC PANEL: CPT | Mod: 91 | Performed by: STUDENT IN AN ORGANIZED HEALTH CARE EDUCATION/TRAINING PROGRAM

## 2022-02-04 PROCEDURE — 63600175 PHARM REV CODE 636 W HCPCS: Performed by: STUDENT IN AN ORGANIZED HEALTH CARE EDUCATION/TRAINING PROGRAM

## 2022-02-04 PROCEDURE — 83615 LACTATE (LD) (LDH) ENZYME: CPT | Performed by: STUDENT IN AN ORGANIZED HEALTH CARE EDUCATION/TRAINING PROGRAM

## 2022-02-04 PROCEDURE — 83050 HGB METHEMOGLOBIN QUAN: CPT

## 2022-02-04 PROCEDURE — 93010 ELECTROCARDIOGRAM REPORT: CPT | Mod: 76,,, | Performed by: STUDENT IN AN ORGANIZED HEALTH CARE EDUCATION/TRAINING PROGRAM

## 2022-02-04 PROCEDURE — 93005 ELECTROCARDIOGRAM TRACING: CPT

## 2022-02-04 PROCEDURE — 99900035 HC TECH TIME PER 15 MIN (STAT)

## 2022-02-04 PROCEDURE — 83735 ASSAY OF MAGNESIUM: CPT | Performed by: STUDENT IN AN ORGANIZED HEALTH CARE EDUCATION/TRAINING PROGRAM

## 2022-02-04 PROCEDURE — 80048 BASIC METABOLIC PNL TOTAL CA: CPT | Mod: 91 | Performed by: INTERNAL MEDICINE

## 2022-02-04 PROCEDURE — 99291 PR CRITICAL CARE, E/M 30-74 MINUTES: ICD-10-PCS | Mod: ,,, | Performed by: INTERNAL MEDICINE

## 2022-02-04 PROCEDURE — 85610 PROTHROMBIN TIME: CPT | Performed by: STUDENT IN AN ORGANIZED HEALTH CARE EDUCATION/TRAINING PROGRAM

## 2022-02-04 PROCEDURE — 85730 THROMBOPLASTIN TIME PARTIAL: CPT | Performed by: STUDENT IN AN ORGANIZED HEALTH CARE EDUCATION/TRAINING PROGRAM

## 2022-02-04 PROCEDURE — 20000000 HC ICU ROOM

## 2022-02-04 PROCEDURE — 84439 ASSAY OF FREE THYROXINE: CPT | Performed by: STUDENT IN AN ORGANIZED HEALTH CARE EDUCATION/TRAINING PROGRAM

## 2022-02-04 PROCEDURE — 82803 BLOOD GASES ANY COMBINATION: CPT

## 2022-02-04 PROCEDURE — 93010 ELECTROCARDIOGRAM REPORT: CPT | Mod: ,,, | Performed by: STUDENT IN AN ORGANIZED HEALTH CARE EDUCATION/TRAINING PROGRAM

## 2022-02-04 PROCEDURE — 25000003 PHARM REV CODE 250: Performed by: STUDENT IN AN ORGANIZED HEALTH CARE EDUCATION/TRAINING PROGRAM

## 2022-02-04 PROCEDURE — 80053 COMPREHEN METABOLIC PANEL: CPT | Performed by: STUDENT IN AN ORGANIZED HEALTH CARE EDUCATION/TRAINING PROGRAM

## 2022-02-04 PROCEDURE — 27000221 HC OXYGEN, UP TO 24 HOURS

## 2022-02-04 PROCEDURE — 63600367 HC NITRIC OXIDE PER HOUR

## 2022-02-04 PROCEDURE — 93010 EKG 12-LEAD: ICD-10-PCS | Mod: ,,, | Performed by: STUDENT IN AN ORGANIZED HEALTH CARE EDUCATION/TRAINING PROGRAM

## 2022-02-04 PROCEDURE — 86140 C-REACTIVE PROTEIN: CPT | Performed by: STUDENT IN AN ORGANIZED HEALTH CARE EDUCATION/TRAINING PROGRAM

## 2022-02-04 PROCEDURE — 83735 ASSAY OF MAGNESIUM: CPT | Mod: 91 | Performed by: INTERNAL MEDICINE

## 2022-02-04 PROCEDURE — 80048 BASIC METABOLIC PNL TOTAL CA: CPT | Performed by: STUDENT IN AN ORGANIZED HEALTH CARE EDUCATION/TRAINING PROGRAM

## 2022-02-04 RX ORDER — SODIUM CHLORIDE 9 MG/ML
INJECTION, SOLUTION INTRAVENOUS CONTINUOUS
Status: DISCONTINUED | OUTPATIENT
Start: 2022-02-04 | End: 2022-02-13 | Stop reason: HOSPADM

## 2022-02-04 RX ORDER — POTASSIUM CHLORIDE 29.8 MG/ML
40 INJECTION INTRAVENOUS ONCE
Status: COMPLETED | OUTPATIENT
Start: 2022-02-04 | End: 2022-02-05

## 2022-02-04 RX ORDER — FUROSEMIDE 10 MG/ML
80 INJECTION INTRAMUSCULAR; INTRAVENOUS ONCE
Status: COMPLETED | OUTPATIENT
Start: 2022-02-04 | End: 2022-02-04

## 2022-02-04 RX ORDER — HEPARIN SODIUM,PORCINE/D5W 25000/250
0-40 INTRAVENOUS SOLUTION INTRAVENOUS CONTINUOUS
Status: DISCONTINUED | OUTPATIENT
Start: 2022-02-04 | End: 2022-02-13

## 2022-02-04 RX ORDER — MUPIROCIN 20 MG/G
OINTMENT TOPICAL 2 TIMES DAILY
Status: COMPLETED | OUTPATIENT
Start: 2022-02-04 | End: 2022-02-08

## 2022-02-04 RX ORDER — POTASSIUM CHLORIDE 29.8 MG/ML
40 INJECTION INTRAVENOUS ONCE
Status: COMPLETED | OUTPATIENT
Start: 2022-02-04 | End: 2022-02-04

## 2022-02-04 RX ADMIN — SODIUM CHLORIDE: 0.9 INJECTION, SOLUTION INTRAVENOUS at 03:02

## 2022-02-04 RX ADMIN — MUPIROCIN: 20 OINTMENT TOPICAL at 10:02

## 2022-02-04 RX ADMIN — FUROSEMIDE 80 MG: 10 INJECTION, SOLUTION INTRAVENOUS at 12:02

## 2022-02-04 RX ADMIN — FUROSEMIDE 40 MG/HR: 10 INJECTION, SOLUTION INTRAVENOUS at 12:02

## 2022-02-04 RX ADMIN — AMIODARONE HYDROCHLORIDE 0.5 MG/MIN: 50 INJECTION, SOLUTION INTRAVENOUS at 05:02

## 2022-02-04 RX ADMIN — AMIODARONE HYDROCHLORIDE 0.5 MG/MIN: 50 INJECTION, SOLUTION INTRAVENOUS at 04:02

## 2022-02-04 RX ADMIN — FUROSEMIDE 80 MG: 10 INJECTION, SOLUTION INTRAVENOUS at 03:02

## 2022-02-04 RX ADMIN — RIOCIGUAT 2.5 MG: 2.5 TABLET, FILM COATED ORAL at 05:02

## 2022-02-04 RX ADMIN — AMIODARONE HYDROCHLORIDE 0.5 MG/MIN: 50 INJECTION, SOLUTION INTRAVENOUS at 03:02

## 2022-02-04 RX ADMIN — FUROSEMIDE 40 MG/HR: 10 INJECTION, SOLUTION INTRAVENOUS at 06:02

## 2022-02-04 RX ADMIN — AMIODARONE HYDROCHLORIDE 150 MG: 1.5 INJECTION, SOLUTION INTRAVENOUS at 05:02

## 2022-02-04 RX ADMIN — IPRATROPIUM BROMIDE 2 SPRAY: 42 SPRAY, METERED NASAL at 08:02

## 2022-02-04 RX ADMIN — RIOCIGUAT 2.5 MG: 2.5 TABLET, FILM COATED ORAL at 11:02

## 2022-02-04 RX ADMIN — MUPIROCIN: 20 OINTMENT TOPICAL at 08:02

## 2022-02-04 RX ADMIN — AMIODARONE HYDROCHLORIDE 0.5 MG/MIN: 50 INJECTION, SOLUTION INTRAVENOUS at 07:02

## 2022-02-04 RX ADMIN — FUROSEMIDE 40 MG/HR: 10 INJECTION, SOLUTION INTRAVENOUS at 05:02

## 2022-02-04 RX ADMIN — FUROSEMIDE 20 MG/HR: 10 INJECTION, SOLUTION INTRAVENOUS at 02:02

## 2022-02-04 RX ADMIN — FUROSEMIDE 80 MG: 10 INJECTION, SOLUTION INTRAVENOUS at 04:02

## 2022-02-04 RX ADMIN — CHLOROTHIAZIDE SODIUM 500 MG: 500 INJECTION, POWDER, LYOPHILIZED, FOR SOLUTION INTRAVENOUS at 05:02

## 2022-02-04 RX ADMIN — IPRATROPIUM BROMIDE 2 SPRAY: 42 SPRAY, METERED NASAL at 10:02

## 2022-02-04 RX ADMIN — POTASSIUM CHLORIDE 40 MEQ: 1500 TABLET, EXTENDED RELEASE ORAL at 01:02

## 2022-02-04 RX ADMIN — IPRATROPIUM BROMIDE 2 SPRAY: 42 SPRAY, METERED NASAL at 01:02

## 2022-02-04 RX ADMIN — POTASSIUM CHLORIDE 40 MEQ: 29.8 INJECTION, SOLUTION INTRAVENOUS at 10:02

## 2022-02-04 RX ADMIN — POTASSIUM CHLORIDE 40 MEQ: 1500 TABLET, EXTENDED RELEASE ORAL at 03:02

## 2022-02-04 RX ADMIN — FERROUS SULFATE TAB 325 MG (65 MG ELEMENTAL FE) 1 EACH: 325 (65 FE) TAB at 10:02

## 2022-02-04 RX ADMIN — RIOCIGUAT 2.5 MG: 2.5 TABLET, FILM COATED ORAL at 08:02

## 2022-02-04 RX ADMIN — IPRATROPIUM BROMIDE 2 SPRAY: 42 SPRAY, METERED NASAL at 05:02

## 2022-02-04 RX ADMIN — HEPARIN SODIUM 12 UNITS/KG/HR: 5000 INJECTION INTRAVENOUS; SUBCUTANEOUS at 09:02

## 2022-02-04 RX ADMIN — POTASSIUM CHLORIDE 40 MEQ: 29.8 INJECTION, SOLUTION INTRAVENOUS at 03:02

## 2022-02-04 RX ADMIN — TAMSULOSIN HYDROCHLORIDE 0.4 MG: 0.4 CAPSULE ORAL at 10:02

## 2022-02-04 NOTE — ASSESSMENT & PLAN NOTE
-NICM   -12/16/22 TTE: LV normal in size with moderately decreased systolic function, EF 35, Moderate RV enlargement with hypertrophy and moderately reduced RVSF, grade II DD, Mod-severe TR, Mod MR, trivial pericardial effusion, PASP 87, CVP 15, LVIDd 5.62  -11/28/2018 RHC: RA: 14/ 15/ 11 RV: 112/ -4 PA: 117/ 31/ 59 PWP: 30/ 25/ 20, CO 5.48, CI 2.53, O2 sat: SVC 73, PA 64%, , SVR 1357 (on adempas)  -Hypervolemic on exam today  -Home Diuretic Regimen: Bumex 2mg daily  -Inpatient Diuretic Regimen: CVP 23 and UOP not at target, will re-bolus and increase lasix drip 40mg/hr after replacing electrolytes  -Emmy at 20ppm  -GDMT: Losartan 25mg daily  -2g Na dietary restriction, 1500 mL fluid restriction, strict I/Os  - follow repeat TTE

## 2022-02-04 NOTE — ASSESSMENT & PLAN NOTE
- patient has a history of known Atrial fibrillation s/p RITO/CV 9/30/14. In 2017 patient had wide complex tachycardia and received amiodarone and was started on daily regimen. EKG were reviewed by EP and rhythm was likely Aflutter. Patient underwent ablation in 2018. Last followed in EP clinic on 3/31/20, was told has high chances of developing Afib within next 5 years. Patient was in RVR on admission likely secondary to ADHF and electrolyte imbalance. Plan to continue diuresis and replace electrolytes, if Afib persists after adequate diuresis/ electrolyte replacement, will consider EP consult.  - patient is on chronic AC with xarelto

## 2022-02-04 NOTE — PROGRESS NOTES
[facility] - Cardiac [facility]  Heart Transplant  Progress Note    Patient Name: Ambrosio Bray III  MRN: [id number]  Admission Date: 2/3/2022  Hospital Length of Stay: 1 days  Attending Physician: Patrice Stephens MD  Primary Care Provider: Luciana Cisse MD  Principal Problem:Acute decompensated heart failure    Subjective:     Interval History:   Patient was seen and examined at bedside. Overnight patient was in RVR and was given amiodarone load and started on amiodarone drip. CVP in the evening was 29 and patient was re-bolused and drip was increased to 30mg/hr. UOP: -2.5L over past 24 hours and net negative -1.6L. Electrolytes this AM: K 2.7, Mg 1.8 -- replacements were given. CVP improved to 23 this AM, SVO2 59. Plan to repeat BMP for K and Mg, replace further if required and will give another lasix bolus and increase drip rate to 40mg/hr. Will continue Emmy 20ppm.    Hemodynamics  CVP 23  SVO2 59  CI 6.4  CO 3.1  .5    Lines: RI 2/3/22    Continuous Infusions:   sodium chloride 0.9% 5 mL/hr at 02/04/22 0800    amiodarone in dextrose 5% 0.5 mg/min (02/04/22 0800)    furosemide (LASIX) 10 mg/mL infusion (non-titrating) 30 mg/hr (02/04/22 0800)    nitric oxide gas       Scheduled Meds:   ferrous sulfate  1 tablet Oral Daily    ipratropium  2 spray Each Nostril QID    mupirocin   Nasal BID    riociguat  2.5 mg Oral TID    rivaroxaban  20 mg Oral Daily with dinner    selexipag  1,600 mcg Oral BID    tamsulosin  1 capsule Oral Daily     PRN Meds:dextrose 10%, dextrose 10%, glucagon (human recombinant), glucose, glucose, insulin aspart U-100, sodium chloride 0.9%    Review of patient's allergies indicates:  No Known Allergies  Objective:     Vital Signs (Most Recent):  Temp: 97.8 °F (36.6 °C) (02/04/22 0700)  Pulse: (!) 138 (02/04/22 0900)  Resp: 20 (02/04/22 0900)  BP: 99/68 (02/04/22 0900)  SpO2: (!) 94 % (02/04/22 0900) Vital Signs (24h Range):  Temp:  [97.8 °F (36.6 °C)-98.5 °F (36.9 °C)] 97.8 °F  (36.6 °C)  Pulse:  [] 138  Resp:  [16-24] 20  SpO2:  [78 %-99 %] 94 %  BP: ()/(65-81) 99/68     Patient Vitals for the past 72 hrs (Last 3 readings):   Weight   02/04/22 0400 84.9 kg (187 lb 2.7 oz)   02/03/22 1417 83.9 kg (185 lb)   02/03/22 0857 83.9 kg (185 lb)     Body mass index is 24.69 kg/m².      Intake/Output Summary (Last 24 hours) at 2/4/2022 1017  Last data filed at 2/4/2022 1000  Gross per 24 hour   Intake 1174.7 ml   Output 2730 ml   Net -1555.3 ml     Physical Exam  Constitutional:       Comments: Patient is alert and oriented, appears comfortable, does not appear to be in respiratory distress. On Emmy 20ppm with 4L NC.   HENT:      Head: Normocephalic and atraumatic.   Cardiovascular:      Rate and Rhythm: Tachycardia present. Rhythm irregular.      Pulses: Normal pulses.      Heart sounds: Normal heart sounds.   Pulmonary:      Comments: Bibasilar crackles R > L  Abdominal:      General: There is distension.      Comments: Tense on palpation. Appears anasarcic   Musculoskeletal:         General: Normal range of motion.      Cervical back: Normal range of motion and neck supple.      Comments: BL 4+ edema in LE, dependant, generalized anasarca   Skin:     General: Skin is warm and dry.       Significant Labs:  CBC:  Recent Labs   Lab 02/03/22 0928 02/04/22  0502   WBC 8.93 5.57   RBC 3.77* 3.00*   HGB 10.3* 8.2*   HCT 32.7* 25.8*    256   MCV 87 86   MCH 27.3 27.3   MCHC 31.5* 31.8*     BNP:  Recent Labs   Lab 02/03/22  0928   *     CMP:  Recent Labs   Lab 02/03/22  2216 02/04/22  0502     103  103 94  94   CALCIUM 8.4*  8.3*  8.3* 7.1*  7.1*   ALBUMIN 3.0* 2.6*  2.6*   PROT 6.8 6.0  6.0     136  136 137  137   K 3.1*  3.0*  3.0* 2.7*  2.7*   CO2 20*  21*  21* 17*  17*     104  104 109  109   BUN 49*  52*  52* 46*  46*   CREATININE 0.9  0.9  0.9 0.8  0.8   ALKPHOS 115 96  96   ALT 6* 8*  8*   AST 13 14  14   BILITOT 3.0* 2.5*   2.5*      Coagulation:   Recent Labs   Lab 02/03/22  2216   INR 1.5*   APTT 28.7     LDH:  Recent Labs   Lab 02/04/22  0502        Microbiology:  Microbiology Results (last 7 days)     Procedure Component Value Units Date/Time    Blood culture #2 **CANNOT BE ORDERED STAT** [[id number]] Collected: 02/03/22 0929    Order Status: Completed Specimen: Blood from Peripheral, Antecubital, Left Updated: 02/03/22 1715     Blood Culture, Routine No Growth to date    Blood culture #1 **CANNOT BE ORDERED STAT** [[id number]] Collected: 02/03/22 0940    Order Status: Completed Specimen: Blood from Peripheral, Forearm, Right Updated: 02/03/22 1715     Blood Culture, Routine No Growth to date    Respiratory Infection Panel (PCR), Nasopharyngeal [[id number]]     Order Status: No result Specimen: Nasopharyngeal Swab         Estimated Creatinine Clearance: 95.7 mL/min (based on SCr of 0.8 mg/dL).    Diagnostic Results:  I have reviewed and interpreted all pertinent imaging results/findings within the past 24 hours.    Assessment and Plan:     71 y.o M with history of HF, PH (diagnosed in 2018) on [facility], Uptravi, CTEPH on home oxygen 2L NC, Atrial Flutter s/p ablation 2020, DM, BPH, HLD, Anemia, PTSD, Pulmonary hyperinflation presents to ED with respiratory distress and worsening shortness of breath for past 4 days. Patient reports he was in his usual state of health until 4 days ago when he developed SOB while ambulating to use restroom. Patient reports he had removed his oxygen to use restroom and while ambulating to restroom he became lightheaded, dizzy and in respiratory distress and reports having a fall and syncopized. He reports he woke up after 30 seconds, did not hit his head. Patient reports since the past four days he has also been having increasing loose BM more than his usual, reports feeling dehydrated with increased fluid intake. Patient reports he recently followed up with pulmonologist in clinic and his home  Bumex 2mg MWF was increased to daily and was prescribed home oxygen. Patient reports that has helped relieve his SOB. Patient denies chest pain, further episodes of syncope, change in vision, slurred speech, palpitations, headache, change in urinary habits, cough, fever.     Home Medications  - Bumex 2mg daily  - Adempas 2.5mg TID  - Uptravi 1600 mcg BID (to start on 3/21/22)  - Xarelto 20mg daily  - Losartan 25mg daily  - Flomax 0.4mg  - Lipitor 40mg daily  - Atrovent  - Ferrous Sulfate     ED Course      Vitals:     02/03/22 1332   BP: 98/65   Pulse: 104   Resp: (!) 23   Temp:       Pertinent Labs  CBC: WBC 8.93, Hbg 10.3, Hct 32.7, Plt 270  BMP: Na 139, K 3.7, Cl 104, CO2 25, BUN 16, Cr. 0.8,   LFT: TP 7.2, Alb, 3.6, TB 2.9, AST 11, ALT 8  Free T4: 0.88  Lactate 1.7, Troponin 0.012,   EKG: Afib with RVR at a rate of 173, RBB  CXR: bilateral opacities, pulmonary congestion  ED Management  -Lopressor Tartrate 50mg q6h  -Diltiazem 25 mg IVP  -Lasix 80mg IVP      * Acute decompensated heart failure  -[facility]   -12/16/22 TTE: LV normal in size with moderately decreased systolic function, EF 35, Moderate RV enlargement with hypertrophy and moderately reduced RVSF, grade II DD, Mod-severe TR, Mod MR, trivial pericardial effusion, PASP 87, CVP 15, LVIDd 5.62  -11/28/2018 RHC: RA: 14/ 15/ 11 RV: 112/ -4 PA: 117/ 31/ 59 PWP: 30/ 25/ 20, CO 5.48, CI 2.53, O2 sat: SVC 73, PA 64%, , SVR 1357 (on adempas)  -Hypervolemic on exam today  -Home Diuretic Regimen: Bumex 2mg daily  -Inpatient Diuretic Regimen: CVP 23 and UOP not at target, will re-bolus and increase lasix drip 40mg/hr after replacing electrolytes  -Emmy at 20ppm  -GDMT: Losartan 25mg daily  -2g Na dietary restriction, 1500 mL fluid restriction, strict I/Os  - follow repeat TTE    Pulmonary HTN  -12/16/22 TTE: LV normal in size with moderately decreased systolic function, EF 35, Moderate RV enlargement with hypertrophy and moderately reduced RVSF, grade II  DD, Mod-severe TR, Mod MR, trivial pericardial effusion, PASP 87, CVP 15, LVIDd 5.62  -11/28/2018 RHC: RA: 14/ 15/ 11 RV: 112/ -4 PA: 117/ 31/ 59 PWP: 30/ 25/ 20, CO 5.48, CI 2.53, O2 sat: SVC 73, PA 64%, , SVR 1357 (on adempas)  - Resume home Adempas 2.5mg TID  - Utravi 1600 mcg BID     Atrial Flutter/ fibrillation  - patient has a history of known Atrial fibrillation s/p RITO/CV 9/30/14. In 2017 patient had wide complex tachycardia and received amiodarone and was started on daily regimen. EKG were reviewed by EP and rhythm was likely Aflutter. Patient underwent ablation in 2018. Last followed in EP clinic on 3/31/20, was told has high chances of developing Afib within next 5 years. Patient was in RVR on admission likely secondary to ADHF and electrolyte imbalance. Plan to continue diuresis and replace electrolytes, if Afib persists after adequate diuresis/ electrolyte replacement, will consider EP consult.  - patient is on chronic AC with xarelto    CTEPH (chronic thromboembolic pulmonary hypertension)  - resume home eliquis  - please see plan for PH    Type 2 diabetes mellitus with microalbuminuria, without long-term current use of insulin  - follow A1c  - Sliding scale and FS  - endocrine consult    Iron deficiency anemia due to chronic blood loss  - monitor H/H  - resume home iron therapy    DVT PPx: Xarelto  GI PPx: not indicated    Uninterrupted Critical Care Chart Review Time/ Counseling Time: 60 minutes (does not include procedures)    Jose Morel MD  Heart Transplant  [facility] - Cardiac Intensive Care

## 2022-02-04 NOTE — PLAN OF CARE
Brief Interval Note    Hemodynamics  /74   with highs of 150-160  CVP 18    UOP net negative -675ml since 7 AM this morning (60-100cc/hr)    BMP: K 4, Mg 2.2, BUN 51/ Cr. 1.0    Patient was given Lasix bolus 80mg IVP and lasix drip was increased 40mg/hr around 11:30AM and UOP since is -445cc. Pateint was having elevated heart rates sustained  Around 146 and highs of 150-160, EKG shows irregular rhythm at a rate of 150.    Plan  - Will give diuril 500mg IVP followed by Lasix 80mg IVP and continue Lasix drip at rate of 40mg/hr  - Follow repeat Lytes in process  - Will give another bolus of Amiodarone and continue drip at 0.5mg/hr    Jose Morel MD  [facility]  SpectraLink: [id number]      Discussed with [facility] Attending Dr. Stephens

## 2022-02-04 NOTE — CONSULTS
[facility] - Cardiac Intensive [facility]  Wound Care    Patient Name:  Ambrosio Bray III   MRN:  [id number]  Date: 2/4/2022  Diagnosis: Acute decompensated heart failure    History:     Past Medical History:   Diagnosis Date    Anemia     Atrial fibrillation     Diabetes mellitus     Heart failure     Hyperlipidemia     Hypertension     PTSD (post-traumatic stress disorder)     Pulmonary hyperinflation     Urinary incontinence        Social History     Socioeconomic History    Marital status: Single   Tobacco Use    Smoking status: Never Smoker    Smokeless tobacco: Never Used   Substance and Sexual Activity    Alcohol use: Yes     Alcohol/week: 2.0 - 3.0 standard drinks     Types: 2 - 3 Shots of liquor per week     Comment: maybe twice a month     Drug use: No    Sexual activity: Yes     Partners: Female     Social Determinants of Health     Financial Resource Strain: Unknown    Difficulty of Paying Living Expenses: Patient refused   Food Insecurity: Unknown    Worried About Running Out of Food in the Last Year: Patient refused    Ran Out of Food in the Last Year: Patient refused   Transportation Needs: Unknown    Lack of Transportation (Medical): Patient refused    Lack of Transportation (Non-Medical): Patient refused   Physical Activity: Insufficiently Active    Days of Exercise per Week: 1 day    Minutes of Exercise per Session: 30 min   Stress: No Stress Concern Present    Feeling of Stress : Only a little   Social Connections: Unknown    Frequency of Communication with Friends and Family: Patient refused    Frequency of Social Gatherings with Friends and Family: Patient refused    Attends Evangelical Services: Never    Active Member of Clubs or Organizations: No    Attends Club or Organization Meetings: Patient refused    Marital Status: Patient refused   Housing Stability: Unknown    Unable to Pay for Housing in the Last Year: Patient refused    Number of Places Lived in the Last Year: 1     Unstable Housing in the Last Year: Patient refused       Precautions:     Allergies as of 02/03/2022    (No Known Allergies)       [facility] Assessment Details/Treatment       Plan:  Right lateral/medial foot- cleanse wounds with sea clens wound cleanser, apply hydrofiber  (aquacel Ag hydrofiber) over wound beds, cover with foam dressing M-W-F  [facility]   Recommendations made to primary team for  . Orders placed.     02/04/2022

## 2022-02-04 NOTE — SUBJECTIVE & OBJECTIVE
Interval History:   Patient was seen and examined at bedside. Overnight patient was in RVR and was given amiodarone load and started on amiodarone drip. CVP in the evening was 29 and patient was re-bolused and drip was increased to 30mg/hr. UOP: -2.5L over past 24 hours and net negative -1.6L. Electrolytes this AM: K 2.7, Mg 1.8 -- replacements were given. CVP improved to 23 this AM, SVO2 59. Plan to repeat BMP for K and Mg, replace further if required and will give another lasix bolus and increase drip rate to 40mg/hr. Will continue Emmy 20ppm.    Hemodynamics  CVP 23  SVO2 59  CI 6.4  CO 3.1  .5    Lines: RI 2/3/22    Continuous Infusions:   sodium chloride 0.9% 5 mL/hr at 02/04/22 0800    amiodarone in dextrose 5% 0.5 mg/min (02/04/22 0800)    furosemide (LASIX) 10 mg/mL infusion (non-titrating) 30 mg/hr (02/04/22 0800)    nitric oxide gas       Scheduled Meds:   ferrous sulfate  1 tablet Oral Daily    ipratropium  2 spray Each Nostril QID    mupirocin   Nasal BID    riociguat  2.5 mg Oral TID    rivaroxaban  20 mg Oral Daily with dinner    selexipag  1,600 mcg Oral BID    tamsulosin  1 capsule Oral Daily     PRN Meds:dextrose 10%, dextrose 10%, glucagon (human recombinant), glucose, glucose, insulin aspart U-100, sodium chloride 0.9%    Review of patient's allergies indicates:  No Known Allergies  Objective:     Vital Signs (Most Recent):  Temp: 97.8 °F (36.6 °C) (02/04/22 0700)  Pulse: (!) 138 (02/04/22 0900)  Resp: 20 (02/04/22 0900)  BP: 99/68 (02/04/22 0900)  SpO2: (!) 94 % (02/04/22 0900) Vital Signs (24h Range):  Temp:  [97.8 °F (36.6 °C)-98.5 °F (36.9 °C)] 97.8 °F (36.6 °C)  Pulse:  [] 138  Resp:  [16-24] 20  SpO2:  [78 %-99 %] 94 %  BP: ()/(65-81) 99/68     Patient Vitals for the past 72 hrs (Last 3 readings):   Weight   02/04/22 0400 84.9 kg (187 lb 2.7 oz)   02/03/22 1417 83.9 kg (185 lb)   02/03/22 0857 83.9 kg (185 lb)     Body mass index is 24.69  kg/m².      Intake/Output Summary (Last 24 hours) at 2/4/2022 1017  Last data filed at 2/4/2022 1000  Gross per 24 hour   Intake 1174.7 ml   Output 2730 ml   Net -1555.3 ml     Physical Exam  Constitutional:       Comments: Patient is alert and oriented, appears comfortable, does not appear to be in respiratory distress. On Emmy 20ppm with 4L NC.   HENT:      Head: Normocephalic and atraumatic.   Cardiovascular:      Rate and Rhythm: Tachycardia present. Rhythm irregular.      Pulses: Normal pulses.      Heart sounds: Normal heart sounds.   Pulmonary:      Comments: Bibasilar crackles R > L  Abdominal:      General: There is distension.      Comments: Tense on palpation. Appears anasarcic   Musculoskeletal:         General: Normal range of motion.      Cervical back: Normal range of motion and neck supple.      Comments: BL 4+ edema in LE, dependant, generalized anasarca   Skin:     General: Skin is warm and dry.       Significant Labs:  CBC:  Recent Labs   Lab 02/03/22 0928 02/04/22  0502   WBC 8.93 5.57   RBC 3.77* 3.00*   HGB 10.3* 8.2*   HCT 32.7* 25.8*    256   MCV 87 86   MCH 27.3 27.3   MCHC 31.5* 31.8*     BNP:  Recent Labs   Lab 02/03/22 0928   *     CMP:  Recent Labs   Lab 02/03/22 2216 02/04/22  0502     103  103 94  94   CALCIUM 8.4*  8.3*  8.3* 7.1*  7.1*   ALBUMIN 3.0* 2.6*  2.6*   PROT 6.8 6.0  6.0     136  136 137  137   K 3.1*  3.0*  3.0* 2.7*  2.7*   CO2 20*  21*  21* 17*  17*     104  104 109  109   BUN 49*  52*  52* 46*  46*   CREATININE 0.9  0.9  0.9 0.8  0.8   ALKPHOS 115 96  96   ALT 6* 8*  8*   AST 13 14  14   BILITOT 3.0* 2.5*  2.5*      Coagulation:   Recent Labs   Lab 02/03/22 2216   INR 1.5*   APTT 28.7     LDH:  Recent Labs   Lab 02/04/22  0502        Microbiology:  Microbiology Results (last 7 days)     Procedure Component Value Units Date/Time    Blood culture #2 **CANNOT BE ORDERED STAT** [[id number]] Collected:  02/03/22 0929    Order Status: Completed Specimen: Blood from Peripheral, Antecubital, Left Updated: 02/03/22 1715     Blood Culture, Routine No Growth to date    Blood culture #1 **CANNOT BE ORDERED STAT** [[id number]] Collected: 02/03/22 0940    Order Status: Completed Specimen: Blood from Peripheral, Forearm, Right Updated: 02/03/22 1715     Blood Culture, Routine No Growth to date    Respiratory Infection Panel (PCR), Nasopharyngeal [[id number]]     Order Status: No result Specimen: Nasopharyngeal Swab         Estimated Creatinine Clearance: 95.7 mL/min (based on SCr of 0.8 mg/dL).    Diagnostic Results:  I have reviewed and interpreted all pertinent imaging results/findings within the past 24 hours.

## 2022-02-04 NOTE — PROGRESS NOTES
Admit Note     Met with patient to assess needs. Patient is a 71 y.o.  male, admitted for for heart failure.      Patient admitted from ED on 2/3/2022 .  At this time, patient presents as alert and oriented x 4, pleasant, good eye contact, concentration/judgement good, calm, communicative, cooperative and asking and answering questions appropriately.  At this time, patients caregiver is not present.         Household/Family Systems     Patient resides with patient's significant other, at       [address] [place] [id number].       Support system includes his Deo Wood [place] [phone].   Pt states he does not have any children.         Patient does not have dependents that are need of being cared for.     Patients primary caregiver is himself.  Confirmed patients contact information is [phone] (home);   Telephone Information:   Mobile [phone]   .    During admission, patient's caregiver plans to stay at home.  Confirmed patient and patients caregivers do have access to reliable transportation.    Cognitive Status/Learning     Patient reports reading ability as college and states patient does not have difficulty with reading, writing, seeing, hearing, comprehension, learning and memory.  Patient reports patient learns best by reading, watching, listening, and doing.   Needed: No.   Highest education level: Associate/Bachelor Degree Pt has a BS degree in Education.     Vocation/Disability   .  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 .    Adherence     Patient reports a high level of adherence to patients health care regimen.  Adherence counseling and education provided. Patient verbalizes understanding.    Substance Use    Patient reports the following substance usage.    Tobacco: none, patient denies any use.  Alcohol: Pt states he may havbe 1-3 drinks per  month and some months have 0 drinks. .  Illicit Drugs/Non-prescribed Medications: none, patient denies any use.  Patient states clear understanding of the potential impact of substance use.  Substance abstinence/cessation counseling, education and resources provided and reviewed.     Services Utilizing/ADLS    Infusion Service: Prior to admission, patient utilizing? no  Home Health: Prior to admission, patient utilizing? no  DME: Prior to admission, no  Pulmonary/Cardiac Rehab: Prior to admission, no  Dialysis:  Prior to admission, no  Transplant Specialty Pharmacy:  Prior to admission, no.    Prior to admission, patient reports patient was independent with ADLS and was driving.  Patient reports patient is able to care for self at this time..  Patient indicates a willingness to care for self once medically cleared to do so.    Insurance/Medications    Insured by   Payer/Plan Subscr  Sex Relation Sub. Ins. ID Effective Group Num   1. MEDICARE - ME* NAN BRITT * 1950 Male Self [id number] 7/1/15                                    [address]   2. BLUE CROSS BL* NAN BRITT * 1950 Male Self [id number] 3/1/15 [id number]                                   [address] [address]      Primary Insurance (for UNOS reporting): Public Insurance - Medicare & Choice  Secondary Insurance (for UNOS reporting): BCBS of [place]     Patient reports patient is able to obtain and afford medications at this time and at time of discharge.    Living Will/Healthcare Power of     Patient states patient has a LW and/or HCPA but not currently on file at [facility], Pt does not have copy with him.  provided education regarding LW and HCPA and the completion of forms.    Coping/Mental Health    Patient is coping well with the aid of  family members, friends and renetta.   Patient denies mental health difficulties.     Discharge Planning    At time of discharge, patient plans to return to patient's home under the  care of himself and his Fiancee Mercedes.  Patients significant other will transport patient.  Per rounds today, expected discharge date has not been medically determined at this time. Patient and patients caregiver  verbalize understanding and are involved in treatment planning and discharge process.    Additional Concerns    Patient is being followed for needs, education, resources, information, emotional support, supportive counseling, and for supportive and skilled discharge plan of care.  providing ongoing psychosocial support, education, resources and d/c planning as needed.  SW remains available.  provided resource list, patient choice, psychosocial and supportive counseling, resources, education, assistance and discharge planning with patient and caregiver involvement, ongoing SW availability and services as appropriate.  remains available. Patient denies additional needs and/or concerns at this time. Patient verbalizes understanding and agreement with information reviewed, social work availability, and how to access available resources as needed.

## 2022-02-04 NOTE — PROGRESS NOTES
[facility] - Cardiac Intensive Care  Wound Care    Patient Name:  Ambrosio Bray III   MRN:  [id number]  Date: 2/4/2022  Diagnosis: Acute decompensated heart failure    History:     Past Medical History:   Diagnosis Date    Anemia     Atrial fibrillation     Diabetes mellitus     Heart failure     Hyperlipidemia     Hypertension     PTSD (post-traumatic stress disorder)     Pulmonary hyperinflation     Urinary incontinence        Social History     Socioeconomic History    Marital status: Single   Tobacco Use    Smoking status: Never Smoker    Smokeless tobacco: Never Used   Substance and Sexual Activity    Alcohol use: Yes     Alcohol/week: 2.0 - 3.0 standard drinks     Types: 2 - 3 Shots of liquor per week     Comment: maybe twice a month     Drug use: No    Sexual activity: Yes     Partners: Female     Social Determinants of Health     Financial Resource Strain: Unknown    Difficulty of Paying Living Expenses: Patient refused   Food Insecurity: Unknown    Worried About Running Out of Food in the Last Year: Patient refused    Ran Out of Food in the Last Year: Patient refused   Transportation Needs: Unknown    Lack of Transportation (Medical): Patient refused    Lack of Transportation (Non-Medical): Patient refused   Physical Activity: Insufficiently Active    Days of Exercise per Week: 1 day    Minutes of Exercise per Session: 30 min   Stress: No Stress Concern Present    Feeling of Stress : Only a little   Social Connections: Unknown    Frequency of Communication with Friends and Family: Patient refused    Frequency of Social Gatherings with Friends and Family: Patient refused    Attends Temple Services: Never    Active Member of Clubs or Organizations: No    Attends Club or Organization Meetings: Patient refused    Marital Status: Patient refused   Housing Stability: Unknown    Unable to Pay for Housing in the Last Year: Patient refused    Number of Places Lived in the Last Year: 1    Unstable Housing in the Last  Year: Patient refused       Precautions:     Allergies as of 02/03/2022    (No Known Allergies)       [facility] Assessment Details/Treatment   Wound care consulted for right ankle by RN  Gurjit Asa [facility].  The right ankle ulcerations have no erythema, no purulent drainage.  The right medial ulcer is pink/moist with open wound edges  The right lateral is dry tan covered.    Plan:  Right medial/lateral ankle ulcers- sea-clens wound cleanser, Ag hydrofiber, foam dressing every Tues/Fri  [facility]    Nursing to continue care, pressure prevention measures  Wound care to follow-up prn  Recommendations made to primary team per secure chat for above plan . Orders placed.      02/04/22 1155        Wound 02/04/22 0053 Ulceration Right anterior;lower;medial Leg #1   Date First Assessed/Time First Assessed: 02/04/22 0053   Pre-existing: Yes  Primary Wound Type: Ulceration  Side: Right  Orientation: anterior;lower;medial  Location: Leg  Wound Number: #1   Wound Image    Wound WDL ex   Dressing Appearance Intact   Drainage Amount Scant   Drainage Characteristics/Odor Clear;Yellow   Appearance Pink;Moist   Tissue loss description Full thickness   Periwound Area Intact;Dry   Wound Edges Open        Wound 02/04/22 0054 Ulceration Right distal;lower;lateral Leg #2   Date First Assessed/Time First Assessed: 02/04/22 0054   Pre-existing: Yes  Primary Wound Type: Ulceration  Side: Right  Orientation: distal;lower;lateral  Location: Leg  Wound Number: #2   Wound Image    Wound WDL ex   Dressing Appearance Dry;Intact;Clean   Drainage Amount None   Appearance Tan;Dry   Tissue loss description Full thickness   Periwound Area Dry;Intact   Wound Edges Open   Wound Length (cm) 1.3 cm   Wound Width (cm) 1 cm   Wound Surface Area (cm^2) 1.3 cm^2   Care Cleansed with:;Sterile normal saline   Dressing   (nurse to dress when supplies come in)       02/04/2022

## 2022-02-05 LAB
ALBUMIN SERPL BCP-MCNC: 3.1 G/DL (ref 3.5–5.2)
ALBUMIN SERPL BCP-MCNC: 3.1 G/DL (ref 3.5–5.2)
ALLENS TEST: ABNORMAL
ALLENS TEST: ABNORMAL
ALLENS TEST: NORMAL
ALP SERPL-CCNC: 113 U/L (ref 55–135)
ALP SERPL-CCNC: 113 U/L (ref 55–135)
ALT SERPL W/O P-5'-P-CCNC: 8 U/L (ref 10–44)
ALT SERPL W/O P-5'-P-CCNC: 8 U/L (ref 10–44)
ANION GAP SERPL CALC-SCNC: 11 MMOL/L (ref 8–16)
ANION GAP SERPL CALC-SCNC: 11 MMOL/L (ref 8–16)
ANION GAP SERPL CALC-SCNC: 7 MMOL/L (ref 8–16)
ANION GAP SERPL CALC-SCNC: 7 MMOL/L (ref 8–16)
APTT BLDCRRT: 30.5 SEC (ref 21–32)
APTT BLDCRRT: 31.1 SEC (ref 21–32)
APTT BLDCRRT: 32.2 SEC (ref 21–32)
AST SERPL-CCNC: 14 U/L (ref 10–40)
AST SERPL-CCNC: 14 U/L (ref 10–40)
BASOPHILS # BLD AUTO: 0.04 K/UL (ref 0–0.2)
BASOPHILS NFR BLD: 0.5 % (ref 0–1.9)
BILIRUB DIRECT SERPL-MCNC: 1.4 MG/DL (ref 0.1–0.3)
BILIRUB SERPL-MCNC: 2.7 MG/DL (ref 0.1–1)
BILIRUB SERPL-MCNC: 2.7 MG/DL (ref 0.1–1)
BNP SERPL-MCNC: 595 PG/ML (ref 0–99)
BUN SERPL-MCNC: 51 MG/DL (ref 8–23)
BUN SERPL-MCNC: 51 MG/DL (ref 8–23)
BUN SERPL-MCNC: 57 MG/DL (ref 8–23)
BUN SERPL-MCNC: 58 MG/DL (ref 8–23)
CALCIUM SERPL-MCNC: 8.6 MG/DL (ref 8.7–10.5)
CALCIUM SERPL-MCNC: 8.7 MG/DL (ref 8.7–10.5)
CALCIUM SERPL-MCNC: 8.7 MG/DL (ref 8.7–10.5)
CALCIUM SERPL-MCNC: 8.9 MG/DL (ref 8.7–10.5)
CHLORIDE SERPL-SCNC: 101 MMOL/L (ref 95–110)
CHLORIDE SERPL-SCNC: 103 MMOL/L (ref 95–110)
CHLORIDE SERPL-SCNC: 103 MMOL/L (ref 95–110)
CHLORIDE SERPL-SCNC: 104 MMOL/L (ref 95–110)
CO2 SERPL-SCNC: 23 MMOL/L (ref 23–29)
CO2 SERPL-SCNC: 24 MMOL/L (ref 23–29)
CO2 SERPL-SCNC: 24 MMOL/L (ref 23–29)
CO2 SERPL-SCNC: 25 MMOL/L (ref 23–29)
CREAT SERPL-MCNC: 1.1 MG/DL (ref 0.5–1.4)
CREAT SERPL-MCNC: 1.2 MG/DL (ref 0.5–1.4)
DELSYS: ABNORMAL
DELSYS: ABNORMAL
DELSYS: NORMAL
DIFFERENTIAL METHOD: ABNORMAL
EOSINOPHIL # BLD AUTO: 0 K/UL (ref 0–0.5)
EOSINOPHIL NFR BLD: 0 % (ref 0–8)
ERYTHROCYTE [DISTWIDTH] IN BLOOD BY AUTOMATED COUNT: 21.3 % (ref 11.5–14.5)
EST. GFR  (AFRICAN AMERICAN): >60 ML/MIN/1.73 M^2
EST. GFR  (NON AFRICAN AMERICAN): >60 ML/MIN/1.73 M^2
GLUCOSE SERPL-MCNC: 132 MG/DL (ref 70–110)
GLUCOSE SERPL-MCNC: 132 MG/DL (ref 70–110)
GLUCOSE SERPL-MCNC: 134 MG/DL (ref 70–110)
GLUCOSE SERPL-MCNC: 141 MG/DL (ref 70–110)
HCO3 UR-SCNC: 22.4 MMOL/L (ref 24–28)
HCO3 UR-SCNC: 24.7 MMOL/L (ref 24–28)
HCT VFR BLD AUTO: 28.8 % (ref 40–54)
HGB BLD-MCNC: 9.6 G/DL (ref 14–18)
IMM GRANULOCYTES # BLD AUTO: 0.33 K/UL (ref 0–0.04)
IMM GRANULOCYTES NFR BLD AUTO: 4.4 % (ref 0–0.5)
LDH SERPL L TO P-CCNC: 1.16 MMOL/L (ref 0.5–2.2)
LYMPHOCYTES # BLD AUTO: 0.4 K/UL (ref 1–4.8)
LYMPHOCYTES NFR BLD: 5.6 % (ref 18–48)
MAGNESIUM SERPL-MCNC: 2.2 MG/DL (ref 1.6–2.6)
MAGNESIUM SERPL-MCNC: 2.4 MG/DL (ref 1.6–2.6)
MAGNESIUM SERPL-MCNC: 2.4 MG/DL (ref 1.6–2.6)
MCH RBC QN AUTO: 27.5 PG (ref 27–31)
MCHC RBC AUTO-ENTMCNC: 33.3 G/DL (ref 32–36)
MCV RBC AUTO: 83 FL (ref 82–98)
METHEMOGLOBIN: 0.4 % (ref 0–3)
MONOCYTES # BLD AUTO: 0.5 K/UL (ref 0.3–1)
MONOCYTES NFR BLD: 6.2 % (ref 4–15)
NEUTROPHILS # BLD AUTO: 6.2 K/UL (ref 1.8–7.7)
NEUTROPHILS NFR BLD: 83.3 % (ref 38–73)
NRBC BLD-RTO: 0 /100 WBC
PCO2 BLDA: 33.2 MMHG (ref 35–45)
PCO2 BLDA: 37.7 MMHG (ref 35–45)
PH SMN: 7.42 [PH] (ref 7.35–7.45)
PH SMN: 7.44 [PH] (ref 7.35–7.45)
PHOSPHATE SERPL-MCNC: 3.2 MG/DL (ref 2.7–4.5)
PLATELET # BLD AUTO: 323 K/UL (ref 150–450)
PMV BLD AUTO: 10.9 FL (ref 9.2–12.9)
PO2 BLDA: 28 MMHG (ref 40–60)
PO2 BLDA: 30 MMHG (ref 40–60)
POC BE: -2 MMOL/L
POC BE: 0 MMOL/L
POC SATURATED O2: 54 % (ref 95–100)
POC SATURATED O2: 61 % (ref 95–100)
POC TCO2: 23 MMOL/L (ref 24–29)
POC TCO2: 26 MMOL/L (ref 24–29)
POCT GLUCOSE: 133 MG/DL (ref 70–110)
POCT GLUCOSE: 139 MG/DL (ref 70–110)
POCT GLUCOSE: 154 MG/DL (ref 70–110)
POTASSIUM SERPL-SCNC: 3.1 MMOL/L (ref 3.5–5.1)
POTASSIUM SERPL-SCNC: 3.6 MMOL/L (ref 3.5–5.1)
POTASSIUM SERPL-SCNC: 3.6 MMOL/L (ref 3.5–5.1)
POTASSIUM SERPL-SCNC: 3.7 MMOL/L (ref 3.5–5.1)
PROT SERPL-MCNC: 7 G/DL (ref 6–8.4)
PROT SERPL-MCNC: 7 G/DL (ref 6–8.4)
RBC # BLD AUTO: 3.49 M/UL (ref 4.6–6.2)
SAMPLE: ABNORMAL
SAMPLE: ABNORMAL
SAMPLE: NORMAL
SITE: ABNORMAL
SITE: ABNORMAL
SITE: NORMAL
SODIUM SERPL-SCNC: 133 MMOL/L (ref 136–145)
SODIUM SERPL-SCNC: 134 MMOL/L (ref 136–145)
SODIUM SERPL-SCNC: 138 MMOL/L (ref 136–145)
SODIUM SERPL-SCNC: 138 MMOL/L (ref 136–145)
WBC # BLD AUTO: 7.45 K/UL (ref 3.9–12.7)

## 2022-02-05 PROCEDURE — 94761 N-INVAS EAR/PLS OXIMETRY MLT: CPT

## 2022-02-05 PROCEDURE — 83605 ASSAY OF LACTIC ACID: CPT

## 2022-02-05 PROCEDURE — 25000003 PHARM REV CODE 250: Performed by: STUDENT IN AN ORGANIZED HEALTH CARE EDUCATION/TRAINING PROGRAM

## 2022-02-05 PROCEDURE — 83050 HGB METHEMOGLOBIN QUAN: CPT

## 2022-02-05 PROCEDURE — 25000003 PHARM REV CODE 250: Performed by: HOSPITALIST

## 2022-02-05 PROCEDURE — 63600175 PHARM REV CODE 636 W HCPCS: Performed by: STUDENT IN AN ORGANIZED HEALTH CARE EDUCATION/TRAINING PROGRAM

## 2022-02-05 PROCEDURE — 80048 BASIC METABOLIC PNL TOTAL CA: CPT | Performed by: STUDENT IN AN ORGANIZED HEALTH CARE EDUCATION/TRAINING PROGRAM

## 2022-02-05 PROCEDURE — 85025 COMPLETE CBC W/AUTO DIFF WBC: CPT | Performed by: STUDENT IN AN ORGANIZED HEALTH CARE EDUCATION/TRAINING PROGRAM

## 2022-02-05 PROCEDURE — 85730 THROMBOPLASTIN TIME PARTIAL: CPT | Mod: 91 | Performed by: INTERNAL MEDICINE

## 2022-02-05 PROCEDURE — 80053 COMPREHEN METABOLIC PANEL: CPT | Performed by: STUDENT IN AN ORGANIZED HEALTH CARE EDUCATION/TRAINING PROGRAM

## 2022-02-05 PROCEDURE — 63600367 HC NITRIC OXIDE PER HOUR

## 2022-02-05 PROCEDURE — 20000000 HC ICU ROOM

## 2022-02-05 PROCEDURE — 99900035 HC TECH TIME PER 15 MIN (STAT)

## 2022-02-05 PROCEDURE — 84100 ASSAY OF PHOSPHORUS: CPT | Performed by: STUDENT IN AN ORGANIZED HEALTH CARE EDUCATION/TRAINING PROGRAM

## 2022-02-05 PROCEDURE — 99291 CRITICAL CARE FIRST HOUR: CPT | Mod: ,,, | Performed by: INTERNAL MEDICINE

## 2022-02-05 PROCEDURE — 85730 THROMBOPLASTIN TIME PARTIAL: CPT | Performed by: STUDENT IN AN ORGANIZED HEALTH CARE EDUCATION/TRAINING PROGRAM

## 2022-02-05 PROCEDURE — 63600175 PHARM REV CODE 636 W HCPCS: Performed by: HOSPITALIST

## 2022-02-05 PROCEDURE — 99291 PR CRITICAL CARE, E/M 30-74 MINUTES: ICD-10-PCS | Mod: ,,, | Performed by: INTERNAL MEDICINE

## 2022-02-05 PROCEDURE — 83880 ASSAY OF NATRIURETIC PEPTIDE: CPT | Performed by: STUDENT IN AN ORGANIZED HEALTH CARE EDUCATION/TRAINING PROGRAM

## 2022-02-05 PROCEDURE — 83735 ASSAY OF MAGNESIUM: CPT | Mod: 91 | Performed by: STUDENT IN AN ORGANIZED HEALTH CARE EDUCATION/TRAINING PROGRAM

## 2022-02-05 PROCEDURE — 27000221 HC OXYGEN, UP TO 24 HOURS

## 2022-02-05 RX ORDER — LIDOCAINE HYDROCHLORIDE 20 MG/ML
10 SOLUTION OROPHARYNGEAL ONCE
Status: COMPLETED | OUTPATIENT
Start: 2022-02-05 | End: 2022-02-05

## 2022-02-05 RX ORDER — ONDANSETRON 2 MG/ML
4 INJECTION INTRAMUSCULAR; INTRAVENOUS ONCE
Status: COMPLETED | OUTPATIENT
Start: 2022-02-05 | End: 2022-02-05

## 2022-02-05 RX ORDER — MAG HYDROX/ALUMINUM HYD/SIMETH 200-200-20
30 SUSPENSION, ORAL (FINAL DOSE FORM) ORAL ONCE
Status: COMPLETED | OUTPATIENT
Start: 2022-02-05 | End: 2022-02-05

## 2022-02-05 RX ORDER — FUROSEMIDE 10 MG/ML
80 INJECTION INTRAMUSCULAR; INTRAVENOUS ONCE
Status: COMPLETED | OUTPATIENT
Start: 2022-02-05 | End: 2022-02-05

## 2022-02-05 RX ORDER — PANTOPRAZOLE SODIUM 40 MG/1
40 TABLET, DELAYED RELEASE ORAL DAILY
Status: DISCONTINUED | OUTPATIENT
Start: 2022-02-05 | End: 2022-02-13

## 2022-02-05 RX ORDER — SIMETHICONE 80 MG
1 TABLET,CHEWABLE ORAL EVERY 6 HOURS
Status: DISCONTINUED | OUTPATIENT
Start: 2022-02-05 | End: 2022-02-13

## 2022-02-05 RX ORDER — ONDANSETRON 2 MG/ML
4 INJECTION INTRAMUSCULAR; INTRAVENOUS EVERY 8 HOURS PRN
Status: DISCONTINUED | OUTPATIENT
Start: 2022-02-05 | End: 2022-02-13 | Stop reason: HOSPADM

## 2022-02-05 RX ORDER — POTASSIUM CHLORIDE 20 MEQ/1
40 TABLET, EXTENDED RELEASE ORAL
Status: COMPLETED | OUTPATIENT
Start: 2022-02-05 | End: 2022-02-05

## 2022-02-05 RX ORDER — POTASSIUM CHLORIDE 29.8 MG/ML
40 INJECTION INTRAVENOUS ONCE
Status: COMPLETED | OUTPATIENT
Start: 2022-02-05 | End: 2022-02-05

## 2022-02-05 RX ORDER — POTASSIUM CHLORIDE 20 MEQ/1
40 TABLET, EXTENDED RELEASE ORAL ONCE
Status: DISCONTINUED | OUTPATIENT
Start: 2022-02-05 | End: 2022-02-08

## 2022-02-05 RX ORDER — POTASSIUM CHLORIDE 29.8 MG/ML
40 INJECTION INTRAVENOUS ONCE
Status: COMPLETED | OUTPATIENT
Start: 2022-02-05 | End: 2022-02-06

## 2022-02-05 RX ORDER — GUAIFENESIN 600 MG/1
600 TABLET, EXTENDED RELEASE ORAL 2 TIMES DAILY PRN
Status: DISCONTINUED | OUTPATIENT
Start: 2022-02-05 | End: 2022-02-13 | Stop reason: HOSPADM

## 2022-02-05 RX ADMIN — CHLOROTHIAZIDE SODIUM 500 MG: 500 INJECTION, POWDER, LYOPHILIZED, FOR SOLUTION INTRAVENOUS at 10:02

## 2022-02-05 RX ADMIN — ONDANSETRON 4 MG: 2 INJECTION INTRAMUSCULAR; INTRAVENOUS at 04:02

## 2022-02-05 RX ADMIN — RIOCIGUAT 2.5 MG: 2.5 TABLET, FILM COATED ORAL at 08:02

## 2022-02-05 RX ADMIN — POTASSIUM CHLORIDE 40 MEQ: 29.8 INJECTION, SOLUTION INTRAVENOUS at 04:02

## 2022-02-05 RX ADMIN — FUROSEMIDE 40 MG/HR: 10 INJECTION, SOLUTION INTRAVENOUS at 11:02

## 2022-02-05 RX ADMIN — ALUMINUM HYDROXIDE, MAGNESIUM HYDROXIDE, AND SIMETHICONE 30 ML: 200; 200; 20 SUSPENSION ORAL at 05:02

## 2022-02-05 RX ADMIN — PANTOPRAZOLE SODIUM 40 MG: 40 TABLET, DELAYED RELEASE ORAL at 11:02

## 2022-02-05 RX ADMIN — AMIODARONE HYDROCHLORIDE 0.5 MG/MIN: 50 INJECTION, SOLUTION INTRAVENOUS at 06:02

## 2022-02-05 RX ADMIN — MUPIROCIN: 20 OINTMENT TOPICAL at 08:02

## 2022-02-05 RX ADMIN — HEPARIN SODIUM 15 UNITS/KG/HR: 5000 INJECTION INTRAVENOUS; SUBCUTANEOUS at 04:02

## 2022-02-05 RX ADMIN — FUROSEMIDE 80 MG: 10 INJECTION, SOLUTION INTRAVENOUS at 10:02

## 2022-02-05 RX ADMIN — FUROSEMIDE 40 MG/HR: 10 INJECTION, SOLUTION INTRAVENOUS at 06:02

## 2022-02-05 RX ADMIN — RIOCIGUAT 2.5 MG: 2.5 TABLET, FILM COATED ORAL at 02:02

## 2022-02-05 RX ADMIN — LIDOCAINE HYDROCHLORIDE 10 ML: 20 SOLUTION ORAL; TOPICAL at 09:02

## 2022-02-05 RX ADMIN — AMIODARONE HYDROCHLORIDE 0.5 MG/MIN: 50 INJECTION, SOLUTION INTRAVENOUS at 05:02

## 2022-02-05 RX ADMIN — ALUMINUM HYDROXIDE, MAGNESIUM HYDROXIDE, AND SIMETHICONE 30 ML: 200; 200; 20 SUSPENSION ORAL at 09:02

## 2022-02-05 RX ADMIN — IPRATROPIUM BROMIDE 2 SPRAY: 42 SPRAY, METERED NASAL at 08:02

## 2022-02-05 RX ADMIN — LIDOCAINE HYDROCHLORIDE 10 ML: 20 SOLUTION ORAL; TOPICAL at 05:02

## 2022-02-05 RX ADMIN — POTASSIUM CHLORIDE 40 MEQ: 1500 TABLET, EXTENDED RELEASE ORAL at 08:02

## 2022-02-05 RX ADMIN — SIMETHICONE 80 MG: 80 TABLET, CHEWABLE ORAL at 11:02

## 2022-02-05 RX ADMIN — FERROUS SULFATE TAB 325 MG (65 MG ELEMENTAL FE) 1 EACH: 325 (65 FE) TAB at 08:02

## 2022-02-05 RX ADMIN — POTASSIUM CHLORIDE 40 MEQ: 400 INJECTION, SOLUTION INTRAVENOUS at 10:02

## 2022-02-05 RX ADMIN — POTASSIUM CHLORIDE 40 MEQ: 1500 TABLET, EXTENDED RELEASE ORAL at 09:02

## 2022-02-05 RX ADMIN — SIMETHICONE 80 MG: 80 TABLET, CHEWABLE ORAL at 05:02

## 2022-02-05 RX ADMIN — FUROSEMIDE 40 MG/HR: 10 INJECTION, SOLUTION INTRAVENOUS at 05:02

## 2022-02-05 NOTE — CARE UPDATE
Hemodynamic update:   SVO2: 52  CO/CI: 6.93/3.32  SVR: 669  UOP: sue. 375/hr  CVP: 19    Current support: lasix 40, Emmy 20, s/p diuril      Plan:   -no changes to support  -remains in AF w RVR but IV which amio going through noted to be infiltrated, continue to monitor

## 2022-02-05 NOTE — ASSESSMENT & PLAN NOTE
- patient has a history of known Atrial fibrillation s/p RITO/CV 9/30/14. In 2017 patient had wide complex tachycardia and received amiodarone and was started on daily regimen. EKG were reviewed by EP and rhythm was likely Aflutter. Patient underwent ablation in 2018. Last followed in EP clinic on 3/31/20, was told has high chances of developing Afib within next 5 years. Patient was in RVR on admission likely secondary to ADHF and electrolyte imbalance. Plan to continue diuresis and replace electrolytes, if Afib persists after adequate diuresis/ electrolyte replacement, will consider EP consult.  - patient is on chronic AC with xarelto, and heparin IV while inpateint  - continue amiodarone drip

## 2022-02-05 NOTE — SUBJECTIVE & OBJECTIVE
Interval History:   Patient was seen and examined at bedside. Overnight patient developed acid reflux and had an episode of emesis, contents brown in color of cranberries patient had for dinner. Was given GI cocktail and added simethicone, Protonix and Zofran. Patient was having minimal UOP yesterday and was in RVR. Was given diuril 500mg IVP followed lasix re-bolus and drip was continued at 40mg/hr. CVP this morning is 19. UOP: I 1.7L O -3.9L, -2.1L net negative. Plan to give Diuril 500mg IVP, Bolus with lasix 80mg IVP and continue drip at current rate 40mg/hr. Will continue Emmy 20ppm.     Hemodynamics  CVP 23 --> 19  SVO2 59 --> 55  CO 6.4 --> 5.8   CI 3.1 --> 2.8  .5 --> 883     Lines: [facility] 2/3/22    Continuous Infusions:   sodium chloride 0.9% Stopped (02/05/22 0449)    amiodarone in dextrose 5% 0.5 mg/min (02/05/22 0650)    furosemide (LASIX) 10 mg/mL infusion (non-titrating)      heparin (porcine) in D5W 15 Units/kg/hr (02/05/22 1028)    nitric oxide gas       Scheduled Meds:   chlorothiazide (DIURIL) IVPB  500 mg Intravenous Once    ferrous sulfate  1 tablet Oral Daily    ipratropium  2 spray Each Nostril QID    mupirocin   Nasal BID    potassium chloride  40 mEq Oral Once    riociguat  2.5 mg Oral TID    selexipag  1,600 mcg Oral BID     PRN Meds:dextrose 10%, dextrose 10%, glucagon (human recombinant), glucose, glucose, insulin aspart U-100, sodium chloride 0.9%    Review of patient's allergies indicates:  No Known Allergies  Objective:     Vital Signs (Most Recent):  Temp: 97.4 °F (36.3 °C) (02/05/22 0715)  Pulse: (!) 118 (02/05/22 0900)  Resp: 13 (02/05/22 0900)  BP: 100/64 (02/05/22 0900)  SpO2: (!) 94 % (02/05/22 0900) Vital Signs (24h Range):  Temp:  [97.4 °F (36.3 °C)-98.6 °F (37 °C)] 97.4 °F (36.3 °C)  Pulse:  [116-142] 118  Resp:  [13-38] 13  SpO2:  [84 %-98 %] 94 %  BP: ()/(53-80) 100/64     Patient Vitals for the past 72 hrs (Last 3 readings):   Weight   02/04/22 0400 84.9  kg (187 lb 2.7 oz)   02/03/22 1417 83.9 kg (185 lb)   02/03/22 0857 83.9 kg (185 lb)     Body mass index is 24.69 kg/m².      Intake/Output Summary (Last 24 hours) at 2/5/2022 1034  Last data filed at 2/5/2022 0900  Gross per 24 hour   Intake 1608.35 ml   Output 4375 ml   Net -2766.65 ml     Physical Exam  Constitutional:       Comments: Patient is alert and oriented, appears comfortable, does not appear to be in respiratory distress. On Emmy 20ppm with 4L NC.   HENT:      Head: Normocephalic and atraumatic.   Cardiovascular:      Rate and Rhythm: Tachycardia present. Rhythm irregular.      Pulses: Normal pulses.      Heart sounds: Normal heart sounds.   Pulmonary:      Comments: Bibasilar crackles R > L  Abdominal:      General: There is distension.      Comments: Tense on palpation. Appears anasarcic   Musculoskeletal:         General: Normal range of motion.      Cervical back: Normal range of motion and neck supple.      Comments: BL 4+ edema in LE, dependant, generalized anasarca   Skin:     General: Skin is warm and dry.       Significant Labs:  CBC:  Recent Labs   Lab 02/04/22  0502 02/04/22 2010 02/05/22  0255   WBC 5.57 7.45 7.45   RBC 3.00* 3.54* 3.49*   HGB 8.2* 9.6* 9.6*   HCT 25.8* 29.9* 28.8*    305 323   MCV 86 85 83   MCH 27.3 27.1 27.5   MCHC 31.8* 32.1 33.3     BNP:  Recent Labs   Lab 02/03/22  0928 02/05/22  0255   * 595*     CMP:  Recent Labs   Lab 02/04/22  0502 02/04/22  0502 02/04/22  1022 02/04/22  1514 02/04/22  2058 02/05/22  0255 02/05/22  0911   GLU 94  94   < > 135*   < > 140* 132*  132* 134*   CALCIUM 7.1*  7.1*   < > 8.5*   < > 8.6* 8.7  8.7 8.6*   ALBUMIN 2.6*  2.6*  --  3.1*  --   --  3.1*  3.1*  --    PROT 6.0  6.0  --  7.0  --   --  7.0  7.0  --      137   < > 135*   < > 137 138  138 134*   K 2.7*  2.7*   < > 4.0   < > 3.5 3.6  3.6 3.7   CO2 17*  17*   < > 22*   < > 22* 24  24 23     109   < > 105   < > 104 103  103 104   BUN 46*  46*   <  > 51*   < > 50* 51*  51* 57*   CREATININE 0.8  0.8   < > 1.0   < > 1.0 1.2  1.2 1.1   ALKPHOS 96  96  --  114  --   --  113  113  --    ALT 8*  8*  --  8*  --   --  8*  8*  --    AST 14  14  --  13  --   --  14  14  --    BILITOT 2.5*  2.5*  --  2.8*  --   --  2.7*  2.7*  --     < > = values in this interval not displayed.      Coagulation:   Recent Labs   Lab 02/03/22 2216 02/03/22  2216 02/04/22 2010 02/05/22  0255 02/05/22  0911   INR 1.5*  --  1.6*  --   --    APTT 28.7   < > 30.9 32.2* 30.5    < > = values in this interval not displayed.     LDH:  Recent Labs   Lab 02/04/22  0502        Microbiology:  Microbiology Results (last 7 days)     Procedure Component Value Units Date/Time    Blood culture #1 **CANNOT BE ORDERED STAT** [[id number]] Collected: 02/03/22 0940    Order Status: Completed Specimen: Blood from Peripheral, Forearm, Right Updated: 02/04/22 1212     Blood Culture, Routine No Growth to date      No Growth to date    Blood culture #2 **CANNOT BE ORDERED STAT** [[id number]] Collected: 02/03/22 0929    Order Status: Completed Specimen: Blood from Peripheral, Antecubital, Left Updated: 02/04/22 1212     Blood Culture, Routine No Growth to date      No Growth to date    Respiratory Infection Panel (PCR), Nasopharyngeal [[id number]]     Order Status: No result Specimen: Nasopharyngeal Swab         Estimated Creatinine Clearance: 69.6 mL/min (based on SCr of 1.1 mg/dL).    Diagnostic Results:  I have reviewed and interpreted all pertinent imaging results/findings within the past 24 hours.

## 2022-02-05 NOTE — PROGRESS NOTES
[facility] - Cardiac [facility]ensive [facility]  Heart Transplant  Progress Note    Patient Name: Ambrosio Bray III  MRN: [id number]  Admission Date: 2/3/2022  Hospital Length of Stay: 2 days  Attending Physician: Patrice Stephens MD  Primary Care Provider: Luciana Csise MD  Principal Problem:Acute decompensated heart failure    Subjective:     Interval History:   Patient was seen and examined at bedside. Overnight patient developed acid reflux and had an episode of emesis, contents brown in color of cranberries patient had for dinner. Was given GI cocktail and added simethicone, Protonix and Zofran. Patient was having minimal UOP yesterday and was in RVR. Was given diuril 500mg IVP followed lasix re-bolus and drip was continued at 40mg/hr. CVP this morning is 19. UOP: I 1.7L O -3.9L, -2.1L net negative. Plan to give Diuril 500mg IVP, Bolus with lasix 80mg IVP and continue drip at current rate 40mg/hr. Will continue Emmy 20ppm.     Hemodynamics  CVP 23 --> 19  SVO2 59 --> 55  CO 6.4 --> 5.8   CI 3.1 --> 2.8  .5 --> 883     Lines: [facility] 2/3/22    Continuous Infusions:   sodium chloride 0.9% Stopped (02/05/22 0449)    amiodarone in dextrose 5% 0.5 mg/min (02/05/22 0650)    furosemide (LASIX) 10 mg/mL infusion (non-titrating)      heparin (porcine) in D5W 15 Units/kg/hr (02/05/22 1028)    nitric oxide gas       Scheduled Meds:   chlorothiazide (DIURIL) IVPB  500 mg Intravenous Once    ferrous sulfate  1 tablet Oral Daily    ipratropium  2 spray Each Nostril QID    mupirocin   Nasal BID    potassium chloride  40 mEq Oral Once    riociguat  2.5 mg Oral TID    selexipag  1,600 mcg Oral BID     PRN Meds:dextrose 10%, dextrose 10%, glucagon (human recombinant), glucose, glucose, insulin aspart U-100, sodium chloride 0.9%    Review of patient's allergies indicates:  No Known Allergies  Objective:     Vital Signs (Most Recent):  Temp: 97.4 °F (36.3 °C) (02/05/22 0715)  Pulse: (!) 118 (02/05/22 0900)  Resp: 13 (02/05/22  0900)  BP: 100/64 (02/05/22 0900)  SpO2: (!) 94 % (02/05/22 0900) Vital Signs (24h Range):  Temp:  [97.4 °F (36.3 °C)-98.6 °F (37 °C)] 97.4 °F (36.3 °C)  Pulse:  [116-142] 118  Resp:  [13-38] 13  SpO2:  [84 %-98 %] 94 %  BP: ()/(53-80) 100/64     Patient Vitals for the past 72 hrs (Last 3 readings):   Weight   02/04/22 0400 84.9 kg (187 lb 2.7 oz)   02/03/22 1417 83.9 kg (185 lb)   02/03/22 0857 83.9 kg (185 lb)     Body mass index is 24.69 kg/m².      Intake/Output Summary (Last 24 hours) at 2/5/2022 1034  Last data filed at 2/5/2022 0900  Gross per 24 hour   Intake 1608.35 ml   Output 4375 ml   Net -2766.65 ml     Physical Exam  Constitutional:       Comments: Patient is alert and oriented, appears comfortable, does not appear to be in respiratory distress. On Emmy 20ppm with 4L NC.   HENT:      Head: Normocephalic and atraumatic.   Cardiovascular:      Rate and Rhythm: Tachycardia present. Rhythm irregular.      Pulses: Normal pulses.      Heart sounds: Normal heart sounds.   Pulmonary:      Comments: Bibasilar crackles R > L  Abdominal:      General: There is distension.      Comments: Tense on palpation. Appears anasarcic   Musculoskeletal:         General: Normal range of motion.      Cervical back: Normal range of motion and neck supple.      Comments: BL 4+ edema in LE, dependant, generalized anasarca   Skin:     General: Skin is warm and dry.       Significant Labs:  CBC:  Recent Labs   Lab 02/04/22  0502 02/04/22 2010 02/05/22  0255   WBC 5.57 7.45 7.45   RBC 3.00* 3.54* 3.49*   HGB 8.2* 9.6* 9.6*   HCT 25.8* 29.9* 28.8*    305 323   MCV 86 85 83   MCH 27.3 27.1 27.5   MCHC 31.8* 32.1 33.3     BNP:  Recent Labs   Lab 02/03/22  0928 02/05/22  0255   * 595*     CMP:  Recent Labs   Lab 02/04/22  0502 02/04/22  0502 02/04/22  1022 02/04/22  1514 02/04/22  2058 02/05/22  0255 02/05/22  0911   GLU 94  94   < > 135*   < > 140* 132*  132* 134*   CALCIUM 7.1*  7.1*   < > 8.5*   < > 8.6* 8.7   8.7 8.6*   ALBUMIN 2.6*  2.6*  --  3.1*  --   --  3.1*  3.1*  --    PROT 6.0  6.0  --  7.0  --   --  7.0  7.0  --      137   < > 135*   < > 137 138  138 134*   K 2.7*  2.7*   < > 4.0   < > 3.5 3.6  3.6 3.7   CO2 17*  17*   < > 22*   < > 22* 24  24 23     109   < > 105   < > 104 103  103 104   BUN 46*  46*   < > 51*   < > 50* 51*  51* 57*   CREATININE 0.8  0.8   < > 1.0   < > 1.0 1.2  1.2 1.1   ALKPHOS 96  96  --  114  --   --  113  113  --    ALT 8*  8*  --  8*  --   --  8*  8*  --    AST 14  14  --  13  --   --  14  14  --    BILITOT 2.5*  2.5*  --  2.8*  --   --  2.7*  2.7*  --     < > = values in this interval not displayed.      Coagulation:   Recent Labs   Lab 02/03/22 2216 02/03/22  2216 02/04/22 2010 02/05/22  0255 02/05/22  0911   INR 1.5*  --  1.6*  --   --    APTT 28.7   < > 30.9 32.2* 30.5    < > = values in this interval not displayed.     LDH:  Recent Labs   Lab 02/04/22  0502        Microbiology:  Microbiology Results (last 7 days)     Procedure Component Value Units Date/Time    Blood culture #1 **CANNOT BE ORDERED STAT** [[id number]] Collected: 02/03/22 0940    Order Status: Completed Specimen: Blood from Peripheral, Forearm, Right Updated: 02/04/22 1212     Blood Culture, Routine No Growth to date      No Growth to date    Blood culture #2 **CANNOT BE ORDERED STAT** [[id number]] Collected: 02/03/22 0929    Order Status: Completed Specimen: Blood from Peripheral, Antecubital, Left Updated: 02/04/22 1212     Blood Culture, Routine No Growth to date      No Growth to date    Respiratory Infection Panel (PCR), Nasopharyngeal [[id number]]     Order Status: No result Specimen: Nasopharyngeal Swab         Estimated Creatinine Clearance: 69.6 mL/min (based on SCr of 1.1 mg/dL).    Diagnostic Results:  I have reviewed and interpreted all pertinent imaging results/findings within the past 24 hours.    Assessment and Plan:     71 y.o M with history of HF, PH (diagnosed  in 2018) on [facility], Uptravi, CTEPH on home oxygen 2L NC, Atrial Flutter s/p ablation 2020, DM, BPH, HLD, Anemia, PTSD, Pulmonary hyperinflation presents to ED with respiratory distress and worsening shortness of breath for past 4 days. Patient reports he was in his usual state of health until 4 days ago when he developed SOB while ambulating to use restroom. Patient reports he had removed his oxygen to use restroom and while ambulating to restroom he became lightheaded, dizzy and in respiratory distress and reports having a fall and syncopized. He reports he woke up after 30 seconds, did not hit his head. Patient reports since the past four days he has also been having increasing loose BM more than his usual, reports feeling dehydrated with increased fluid intake. Patient reports he recently followed up with pulmonologist in clinic and his home Bumex 2mg MWF was increased to daily and was prescribed home oxygen. Patient reports that has helped relieve his SOB. Patient denies chest pain, further episodes of syncope, change in vision, slurred speech, palpitations, headache, change in urinary habits, cough, fever.     Home Medications  - Bumex 2mg daily  - Adempas 2.5mg TID  - Uptravi 1600 mcg BID (to start on 3/21/22)  - Xarelto 20mg daily  - Losartan 25mg daily  - Flomax 0.4mg  - Lipitor 40mg daily  - Atrovent  - Ferrous Sulfate     ED Course      Vitals:     02/03/22 1332   BP: 98/65   Pulse: 104   Resp: (!) 23   Temp:       Pertinent Labs  CBC: WBC 8.93, Hbg 10.3, Hct 32.7, Plt 270  BMP: Na 139, K 3.7, Cl 104, CO2 25, BUN 16, Cr. 0.8,   LFT: TP 7.2, Alb, 3.6, TB 2.9, AST 11, ALT 8  Free T4: 0.88  Lactate 1.7, Troponin 0.012,   EKG: Afib with RVR at a rate of 173, RBB  CXR: bilateral opacities, pulmonary congestion  ED Management  -Lopressor Tartrate 50mg q6h  -Diltiazem 25 mg IVP  -Lasix 80mg IVP      * Acute decompensated heart failure  -NICM   -2/5/22 TTE: EF 20, LV DD, Systolic flattening of IV septum  consistent with RV pressure overload, small pericardial effusion, mild LA enlargement, severe RV enlargement with severely reduced RVSF, severe TR, PASP 73, CVP 15, LVIDd 7.7  -12/16/22 TTE: LV normal in size with moderately decreased systolic function, EF 35, Moderate RV enlargement with hypertrophy and moderately reduced RVSF, grade II DD, Mod-severe TR, Mod MR, trivial pericardial effusion, PASP 87, CVP 15, LVIDd 5.62  -11/28/2018 RHC: RA: 14/ 15/ 11 RV: 112/ -4 PA: 117/ 31/ 59 PWP: 30/ 25/ 20, CO 5.48, CI 2.53, O2 sat: SVC 73, PA 64%, , SVR 1357 (on adempas)  -Hypervolemic on exam today  -Home Diuretic Regimen: Bumex 2mg daily  -Inpatient Diuretic Regimen: CVP 19 today and UOP net negative: -2.1L. Continue Diuril 500mg IVP daily, Will give another lasix re-bolus 80mg IVP and continue Lasix drip at rate of 40mg/hr  -Emmy at 20ppm  -GDMT: Losartan 25mg daily (holding)  -2g Na dietary restriction, 1500 mL fluid restriction, strict I/Os    Pulmonary HTN  -12/16/22 TTE: LV normal in size with moderately decreased systolic function, EF 35, Moderate RV enlargement with hypertrophy and moderately reduced RVSF, grade II DD, Mod-severe TR, Mod MR, trivial pericardial effusion, PASP 87, CVP 15, LVIDd 5.62  -11/28/2018 RHC: RA: 14/ 15/ 11 RV: 112/ -4 PA: 117/ 31/ 59 PWP: 30/ 25/ 20, CO 5.48, CI 2.53, O2 sat: SVC 73, PA 64%, , SVR 1357 (on adempas)  - Resume home Adempas 2.5mg TID  - Utravi 1600 mcg BID   - Plan for RHC on Monday, for further guidance of PH treatment. Plan to switch to Remodulin. Home xarelto switched to heparin IV.     Atrial Flutter/ fibrillation  - patient has a history of known Atrial fibrillation s/p RITO/CV 9/30/14. In 2017 patient had wide complex tachycardia and received amiodarone and was started on daily regimen. EKG were reviewed by EP and rhythm was likely Aflutter. Patient underwent ablation in 2018. Last followed in EP clinic on 3/31/20, was told has high chances of developing Afib  within next 5 years. Patient was in RVR on admission likely secondary to ADHF and electrolyte imbalance. Plan to continue diuresis and replace electrolytes, if Afib persists after adequate diuresis/ electrolyte replacement, will consider EP consult.  - patient is on chronic AC with xarelto, and heparin IV while inpateint  - continue amiodarone drip     CTEPH (chronic thromboembolic pulmonary hypertension)  - resume home eliquis  - please see plan for PH    Type 2 diabetes mellitus with microalbuminuria, without long-term current use of insulin  - follow A1c  - Sliding scale and FS  - endocrine consult    Iron deficiency anemia due to chronic blood loss  - monitor H/H  - resume home iron therapy    DVT PPx: Heparin IV while in patient (planned RHC on Monday)  GI PPx: Protonix    Uninterrupted Critical Care Counseling and Review Time: 60 minutes (does not include procedures)    Jose Morel MD  Heart Transplant  [facility] - [facility]

## 2022-02-05 NOTE — NURSING
2045: Pt having short runs of v-tach. Dr. Garcia notified. Labs sent off, K 3.5. MD ordered IV K replacement. Will administer and continue to monitor.    Dr. Garcia also notified of CVP 19, SVO2 52, and HR sustaining 130s-140s. No directives at this time, will continue to monitor.    2304: Pt continues having short runs of v-tach periodically and HR remains in 140s. Dr. Garcia notified. No changes at this time. Will continue to monitor.

## 2022-02-05 NOTE — ASSESSMENT & PLAN NOTE
-12/16/22 TTE: LV normal in size with moderately decreased systolic function, EF 35, Moderate RV enlargement with hypertrophy and moderately reduced RVSF, grade II DD, Mod-severe TR, Mod MR, trivial pericardial effusion, PASP 87, CVP 15, LVIDd 5.62  -11/28/2018 RHC: RA: 14/ 15/ 11 RV: 112/ -4 PA: 117/ 31/ 59 PWP: 30/ 25/ 20, CO 5.48, CI 2.53, O2 sat: SVC 73, PA 64%, , SVR 1357 (on adempas)  - Resume home Adempas 2.5mg TID  - Utravi 1600 mcg BID   - Plan for RHC on Monday, for further guidance of PH treatment. Plan to switch to Remodulin. Home xarelto switched to heparin IV.

## 2022-02-05 NOTE — PLAN OF CARE
CICU DAILY GOALS     A: Awake    RASS: Goal -  0  Actual -  0   Restraint necessity:  N/A  B: Breath   SBT: Not intubated                O2: 5L nasal cannula with Emmy @ 20  C: Coordinate A & B, analgesics/sedatives   Pain: managed    SAT: Not intubated  D: Delirium   CAM-ICU: Overall CAM-ICU: Negative  E: Early(intubated/ Progressive (non-intubated) Mobility   Activity: Activity Management: Rolling - L1  FAS: Feeding/Nutrition   Diet order: Cardiac Diet Diet/Nutrition Received: restrict fluids,   Fluid restriction:  1200 mL  T: Thrombus   DVT prophylaxis: VTE Required Core Measure: Pharmacological prophylaxis initiated/maintained  H: HOB Elevation   Head of Bed (HOB) Positioning: HOB at 30-45 degrees  U: Ulcer Prophylaxis   GI: yes  G: Glucose control   managed    S: Skin   Bundle compliance: yes   Bath complete, linens changed Date: 2/5  B: Bowel Function   no issues   I: Indwelling Catheters   Christensen necessity:      Urethral Catheter 02/04/22 0400-Reason for Continuing Urinary Catheterization: Critically ill in ICU and requiring hourly monitoring of intake/output   CVC necessity: Yes  D: De-escalation Antibx   No  Plan for the day   Monitor hemodynamics, manage nausea/indigestion  Family/Goals of care/Code Status   Code Status: Full Code     No acute events throughout day, VS and assessment per flow sheet, patient progressing towards goals as tolerated, plan of care reviewed with Ambrosio Bray III and family, all concerns addressed, will continue to monitor.

## 2022-02-05 NOTE — NURSING
Pt complaining of pain/tenderness to R arm. Upon assessment, arm is slightly red and swollen with amiodarone infusing to PIV without filter. Infusion stopped immediately. Dr. Garcia notified. Charge RN notified. Will continue to monitor site. New bag of amiodarone with new tubing hung and now infusing to central line. Extravasation protocol initiated per pharmacy with dry, warm compress.

## 2022-02-05 NOTE — ASSESSMENT & PLAN NOTE
-NICM   -2/5/22 TTE: EF 20, LV DD, Systolic flattening of IV septum consistent with RV pressure overload, small pericardial effusion, mild LA enlargement, severe RV enlargement with severely reduced RVSF, severe TR, PASP 73, CVP 15, LVIDd 7.7  -12/16/22 TTE: LV normal in size with moderately decreased systolic function, EF 35, Moderate RV enlargement with hypertrophy and moderately reduced RVSF, grade II DD, Mod-severe TR, Mod MR, trivial pericardial effusion, PASP 87, CVP 15, LVIDd 5.62  -11/28/2018 RHC: RA: 14/ 15/ 11 RV: 112/ -4 PA: 117/ 31/ 59 PWP: 30/ 25/ 20, CO 5.48, CI 2.53, O2 sat: SVC 73, PA 64%, , SVR 1357 (on adempas)  -Hypervolemic on exam today  -Home Diuretic Regimen: Bumex 2mg daily  -Inpatient Diuretic Regimen: CVP 19 today and UOP net negative: -2.1L. Continue Diuril 500mg IVP daily, Will give another lasix re-bolus 80mg IVP and continue Lasix drip at rate of 40mg/hr  -Emmy at 20ppm  -GDMT: Losartan 25mg daily (holding)  -2g Na dietary restriction, 1500 mL fluid restriction, strict I/Os

## 2022-02-06 LAB
ALBUMIN SERPL BCP-MCNC: 3 G/DL (ref 3.5–5.2)
ALLENS TEST: ABNORMAL
ALP SERPL-CCNC: 114 U/L (ref 55–135)
ALT SERPL W/O P-5'-P-CCNC: 8 U/L (ref 10–44)
ANION GAP SERPL CALC-SCNC: 10 MMOL/L (ref 8–16)
ANION GAP SERPL CALC-SCNC: 11 MMOL/L (ref 8–16)
ANION GAP SERPL CALC-SCNC: 12 MMOL/L (ref 8–16)
ANION GAP SERPL CALC-SCNC: 13 MMOL/L (ref 8–16)
APTT BLDCRRT: 32.9 SEC (ref 21–32)
APTT BLDCRRT: 33.5 SEC (ref 21–32)
APTT BLDCRRT: 38.7 SEC (ref 21–32)
APTT BLDCRRT: 40.7 SEC (ref 21–32)
AST SERPL-CCNC: 9 U/L (ref 10–40)
BASOPHILS # BLD AUTO: 0.02 K/UL (ref 0–0.2)
BASOPHILS # BLD AUTO: 0.03 K/UL (ref 0–0.2)
BASOPHILS NFR BLD: 0.2 % (ref 0–1.9)
BASOPHILS NFR BLD: 0.3 % (ref 0–1.9)
BILIRUB DIRECT SERPL-MCNC: 1.3 MG/DL (ref 0.1–0.3)
BILIRUB SERPL-MCNC: 2.5 MG/DL (ref 0.1–1)
BUN SERPL-MCNC: 58 MG/DL (ref 8–23)
BUN SERPL-MCNC: 60 MG/DL (ref 8–23)
BUN SERPL-MCNC: 61 MG/DL (ref 8–23)
BUN SERPL-MCNC: 64 MG/DL (ref 8–23)
CALCIUM SERPL-MCNC: 8.5 MG/DL (ref 8.7–10.5)
CALCIUM SERPL-MCNC: 8.7 MG/DL (ref 8.7–10.5)
CALCIUM SERPL-MCNC: 8.8 MG/DL (ref 8.7–10.5)
CALCIUM SERPL-MCNC: 8.8 MG/DL (ref 8.7–10.5)
CHLORIDE SERPL-SCNC: 101 MMOL/L (ref 95–110)
CHLORIDE SERPL-SCNC: 99 MMOL/L (ref 95–110)
CO2 SERPL-SCNC: 22 MMOL/L (ref 23–29)
CO2 SERPL-SCNC: 23 MMOL/L (ref 23–29)
CO2 SERPL-SCNC: 23 MMOL/L (ref 23–29)
CO2 SERPL-SCNC: 25 MMOL/L (ref 23–29)
CREAT SERPL-MCNC: 1.2 MG/DL (ref 0.5–1.4)
DELSYS: ABNORMAL
DIFFERENTIAL METHOD: ABNORMAL
DIFFERENTIAL METHOD: ABNORMAL
EOSINOPHIL # BLD AUTO: 0 K/UL (ref 0–0.5)
EOSINOPHIL # BLD AUTO: 0 K/UL (ref 0–0.5)
EOSINOPHIL NFR BLD: 0 % (ref 0–8)
EOSINOPHIL NFR BLD: 0 % (ref 0–8)
ERYTHROCYTE [DISTWIDTH] IN BLOOD BY AUTOMATED COUNT: 22 % (ref 11.5–14.5)
ERYTHROCYTE [DISTWIDTH] IN BLOOD BY AUTOMATED COUNT: 22.1 % (ref 11.5–14.5)
EST. GFR  (AFRICAN AMERICAN): >60 ML/MIN/1.73 M^2
EST. GFR  (NON AFRICAN AMERICAN): >60 ML/MIN/1.73 M^2
GLUCOSE SERPL-MCNC: 111 MG/DL (ref 70–110)
GLUCOSE SERPL-MCNC: 111 MG/DL (ref 70–110)
GLUCOSE SERPL-MCNC: 120 MG/DL (ref 70–110)
GLUCOSE SERPL-MCNC: 127 MG/DL (ref 70–110)
HCO3 UR-SCNC: 24.7 MMOL/L (ref 24–28)
HCT VFR BLD AUTO: 28.6 % (ref 40–54)
HCT VFR BLD AUTO: 29.2 % (ref 40–54)
HGB BLD-MCNC: 9.5 G/DL (ref 14–18)
HGB BLD-MCNC: 9.5 G/DL (ref 14–18)
IMM GRANULOCYTES # BLD AUTO: 0.33 K/UL (ref 0–0.04)
IMM GRANULOCYTES # BLD AUTO: 0.39 K/UL (ref 0–0.04)
IMM GRANULOCYTES NFR BLD AUTO: 3.2 % (ref 0–0.5)
IMM GRANULOCYTES NFR BLD AUTO: 4 % (ref 0–0.5)
LYMPHOCYTES # BLD AUTO: 0.5 K/UL (ref 1–4.8)
LYMPHOCYTES # BLD AUTO: 0.5 K/UL (ref 1–4.8)
LYMPHOCYTES NFR BLD: 4.6 % (ref 18–48)
LYMPHOCYTES NFR BLD: 4.7 % (ref 18–48)
MAGNESIUM SERPL-MCNC: 2.3 MG/DL (ref 1.6–2.6)
MAGNESIUM SERPL-MCNC: 2.4 MG/DL (ref 1.6–2.6)
MAGNESIUM SERPL-MCNC: 2.6 MG/DL (ref 1.6–2.6)
MAGNESIUM SERPL-MCNC: 2.6 MG/DL (ref 1.6–2.6)
MCH RBC QN AUTO: 27.1 PG (ref 27–31)
MCH RBC QN AUTO: 27.8 PG (ref 27–31)
MCHC RBC AUTO-ENTMCNC: 32.5 G/DL (ref 32–36)
MCHC RBC AUTO-ENTMCNC: 33.2 G/DL (ref 32–36)
MCV RBC AUTO: 83 FL (ref 82–98)
MCV RBC AUTO: 84 FL (ref 82–98)
METHEMOGLOBIN: 0.7 % (ref 0–3)
MONOCYTES # BLD AUTO: 0.5 K/UL (ref 0.3–1)
MONOCYTES # BLD AUTO: 0.6 K/UL (ref 0.3–1)
MONOCYTES NFR BLD: 5.2 % (ref 4–15)
MONOCYTES NFR BLD: 5.8 % (ref 4–15)
NEUTROPHILS # BLD AUTO: 8.3 K/UL (ref 1.8–7.7)
NEUTROPHILS # BLD AUTO: 9 K/UL (ref 1.8–7.7)
NEUTROPHILS NFR BLD: 85.2 % (ref 38–73)
NEUTROPHILS NFR BLD: 86.8 % (ref 38–73)
NRBC BLD-RTO: 0 /100 WBC
NRBC BLD-RTO: 0 /100 WBC
PCO2 BLDA: 37.1 MMHG (ref 35–45)
PH SMN: 7.43 [PH] (ref 7.35–7.45)
PLATELET # BLD AUTO: 306 K/UL (ref 150–450)
PLATELET # BLD AUTO: 325 K/UL (ref 150–450)
PMV BLD AUTO: 10.7 FL (ref 9.2–12.9)
PMV BLD AUTO: 11.4 FL (ref 9.2–12.9)
PO2 BLDA: 29 MMHG (ref 40–60)
POC BE: 0 MMOL/L
POC SATURATED O2: 58 % (ref 95–100)
POC TCO2: 26 MMOL/L (ref 24–29)
POCT GLUCOSE: 107 MG/DL (ref 70–110)
POTASSIUM SERPL-SCNC: 3.7 MMOL/L (ref 3.5–5.1)
POTASSIUM SERPL-SCNC: 3.9 MMOL/L (ref 3.5–5.1)
POTASSIUM SERPL-SCNC: 4.3 MMOL/L (ref 3.5–5.1)
POTASSIUM SERPL-SCNC: 4.5 MMOL/L (ref 3.5–5.1)
PROT SERPL-MCNC: 6.6 G/DL (ref 6–8.4)
RBC # BLD AUTO: 3.42 M/UL (ref 4.6–6.2)
RBC # BLD AUTO: 3.5 M/UL (ref 4.6–6.2)
SAMPLE: ABNORMAL
SITE: ABNORMAL
SODIUM SERPL-SCNC: 134 MMOL/L (ref 136–145)
SODIUM SERPL-SCNC: 135 MMOL/L (ref 136–145)
SODIUM SERPL-SCNC: 136 MMOL/L (ref 136–145)
SODIUM SERPL-SCNC: 136 MMOL/L (ref 136–145)
WBC # BLD AUTO: 10.38 K/UL (ref 3.9–12.7)
WBC # BLD AUTO: 9.71 K/UL (ref 3.9–12.7)

## 2022-02-06 PROCEDURE — 80076 HEPATIC FUNCTION PANEL: CPT | Performed by: STUDENT IN AN ORGANIZED HEALTH CARE EDUCATION/TRAINING PROGRAM

## 2022-02-06 PROCEDURE — 25000003 PHARM REV CODE 250: Performed by: HOSPITALIST

## 2022-02-06 PROCEDURE — 20000000 HC ICU ROOM

## 2022-02-06 PROCEDURE — 63600175 PHARM REV CODE 636 W HCPCS: Performed by: STUDENT IN AN ORGANIZED HEALTH CARE EDUCATION/TRAINING PROGRAM

## 2022-02-06 PROCEDURE — 80048 BASIC METABOLIC PNL TOTAL CA: CPT | Performed by: INTERNAL MEDICINE

## 2022-02-06 PROCEDURE — 85025 COMPLETE CBC W/AUTO DIFF WBC: CPT | Mod: 91 | Performed by: STUDENT IN AN ORGANIZED HEALTH CARE EDUCATION/TRAINING PROGRAM

## 2022-02-06 PROCEDURE — 25000003 PHARM REV CODE 250: Performed by: STUDENT IN AN ORGANIZED HEALTH CARE EDUCATION/TRAINING PROGRAM

## 2022-02-06 PROCEDURE — 85730 THROMBOPLASTIN TIME PARTIAL: CPT | Mod: 91 | Performed by: INTERNAL MEDICINE

## 2022-02-06 PROCEDURE — 99900035 HC TECH TIME PER 15 MIN (STAT)

## 2022-02-06 PROCEDURE — 83735 ASSAY OF MAGNESIUM: CPT | Mod: 91 | Performed by: STUDENT IN AN ORGANIZED HEALTH CARE EDUCATION/TRAINING PROGRAM

## 2022-02-06 PROCEDURE — 83050 HGB METHEMOGLOBIN QUAN: CPT

## 2022-02-06 PROCEDURE — 85730 THROMBOPLASTIN TIME PARTIAL: CPT | Performed by: INTERNAL MEDICINE

## 2022-02-06 PROCEDURE — 94761 N-INVAS EAR/PLS OXIMETRY MLT: CPT

## 2022-02-06 PROCEDURE — 80048 BASIC METABOLIC PNL TOTAL CA: CPT | Mod: 91 | Performed by: STUDENT IN AN ORGANIZED HEALTH CARE EDUCATION/TRAINING PROGRAM

## 2022-02-06 PROCEDURE — 27000221 HC OXYGEN, UP TO 24 HOURS

## 2022-02-06 PROCEDURE — 83735 ASSAY OF MAGNESIUM: CPT | Performed by: STUDENT IN AN ORGANIZED HEALTH CARE EDUCATION/TRAINING PROGRAM

## 2022-02-06 PROCEDURE — 63600367 HC NITRIC OXIDE PER HOUR

## 2022-02-06 RX ORDER — POTASSIUM CHLORIDE 20 MEQ/1
40 TABLET, EXTENDED RELEASE ORAL ONCE
Status: COMPLETED | OUTPATIENT
Start: 2022-02-06 | End: 2022-02-06

## 2022-02-06 RX ORDER — AMIODARONE HYDROCHLORIDE 200 MG/1
400 TABLET ORAL 2 TIMES DAILY
Status: DISCONTINUED | OUTPATIENT
Start: 2022-02-06 | End: 2022-02-08

## 2022-02-06 RX ORDER — AMIODARONE HYDROCHLORIDE 200 MG/1
200 TABLET ORAL DAILY
Status: DISCONTINUED | OUTPATIENT
Start: 2022-02-16 | End: 2022-02-08

## 2022-02-06 RX ORDER — FUROSEMIDE 10 MG/ML
80 INJECTION INTRAMUSCULAR; INTRAVENOUS ONCE
Status: COMPLETED | OUTPATIENT
Start: 2022-02-06 | End: 2022-02-06

## 2022-02-06 RX ADMIN — SIMETHICONE 80 MG: 80 TABLET, CHEWABLE ORAL at 01:02

## 2022-02-06 RX ADMIN — FUROSEMIDE 80 MG: 10 INJECTION, SOLUTION INTRAVENOUS at 12:02

## 2022-02-06 RX ADMIN — POTASSIUM CHLORIDE 40 MEQ: 1500 TABLET, EXTENDED RELEASE ORAL at 06:02

## 2022-02-06 RX ADMIN — POTASSIUM BICARBONATE 40 MEQ: 391 TABLET, EFFERVESCENT ORAL at 06:02

## 2022-02-06 RX ADMIN — RIOCIGUAT 2.5 MG: 2.5 TABLET, FILM COATED ORAL at 09:02

## 2022-02-06 RX ADMIN — FUROSEMIDE 40 MG/HR: 10 INJECTION, SOLUTION INTRAMUSCULAR; INTRAVENOUS at 05:02

## 2022-02-06 RX ADMIN — IPRATROPIUM BROMIDE 2 SPRAY: 42 SPRAY, METERED NASAL at 09:02

## 2022-02-06 RX ADMIN — SIMETHICONE 80 MG: 80 TABLET, CHEWABLE ORAL at 11:02

## 2022-02-06 RX ADMIN — IPRATROPIUM BROMIDE 2 SPRAY: 42 SPRAY, METERED NASAL at 08:02

## 2022-02-06 RX ADMIN — HEPARIN SODIUM 19 UNITS/KG/HR: 5000 INJECTION INTRAVENOUS; SUBCUTANEOUS at 12:02

## 2022-02-06 RX ADMIN — SIMETHICONE 80 MG: 80 TABLET, CHEWABLE ORAL at 12:02

## 2022-02-06 RX ADMIN — SIMETHICONE 80 MG: 80 TABLET, CHEWABLE ORAL at 06:02

## 2022-02-06 RX ADMIN — FERROUS SULFATE TAB 325 MG (65 MG ELEMENTAL FE) 1 EACH: 325 (65 FE) TAB at 08:02

## 2022-02-06 RX ADMIN — AMIODARONE HYDROCHLORIDE 400 MG: 200 TABLET ORAL at 09:02

## 2022-02-06 RX ADMIN — MUPIROCIN: 20 OINTMENT TOPICAL at 08:02

## 2022-02-06 RX ADMIN — AMIODARONE HYDROCHLORIDE 400 MG: 200 TABLET ORAL at 12:02

## 2022-02-06 RX ADMIN — IPRATROPIUM BROMIDE 2 SPRAY: 42 SPRAY, METERED NASAL at 04:02

## 2022-02-06 RX ADMIN — RIOCIGUAT 2.5 MG: 2.5 TABLET, FILM COATED ORAL at 08:02

## 2022-02-06 RX ADMIN — CHLOROTHIAZIDE SODIUM 500 MG: 500 INJECTION, POWDER, LYOPHILIZED, FOR SOLUTION INTRAVENOUS at 11:02

## 2022-02-06 RX ADMIN — AMIODARONE HYDROCHLORIDE 0.5 MG/MIN: 50 INJECTION, SOLUTION INTRAVENOUS at 05:02

## 2022-02-06 RX ADMIN — AMIODARONE HYDROCHLORIDE 0.5 MG/MIN: 50 INJECTION, SOLUTION INTRAVENOUS at 04:02

## 2022-02-06 RX ADMIN — IPRATROPIUM BROMIDE 2 SPRAY: 42 SPRAY, METERED NASAL at 12:02

## 2022-02-06 RX ADMIN — FUROSEMIDE 40 MG/HR: 10 INJECTION, SOLUTION INTRAVENOUS at 05:02

## 2022-02-06 RX ADMIN — MUPIROCIN: 20 OINTMENT TOPICAL at 09:02

## 2022-02-06 RX ADMIN — PANTOPRAZOLE SODIUM 40 MG: 40 TABLET, DELAYED RELEASE ORAL at 08:02

## 2022-02-06 RX ADMIN — RIOCIGUAT 2.5 MG: 2.5 TABLET, FILM COATED ORAL at 04:02

## 2022-02-06 NOTE — PLAN OF CARE
CICU DAILY GOALS       A: Awake    RASS: Goal -  0  Actual -  0   Restraint necessity:  NA  B: Breath   5L O2 nasal cannula with Emmy at 20  C: Coordinate A & B, analgesics/sedatives   Pain: managed    SAT: Not intubated  D: Delirium   CAM-ICU: Overall CAM-ICU: Negative  E: Early(intubated/ Progressive (non-intubated) Mobility   Activity: Activity Management: Rolling - L1  FAS: Feeding/Nutrition   Diet order: Diet/Nutrition Received: low saturated fat/low cholesterol,restrict fluids,   Fluid restriction: Fluid Requirement: 1200 fluid restriction  T: Thrombus   DVT prophylaxis: VTE Required Core Measure: Pharmacological prophylaxis initiated/maintained  H: HOB Elevation   Head of Bed (HOB) Positioning: HOB at 60-90 degrees  U: Ulcer Prophylaxis   GI: no  G: Glucose control   managed    S: Skin   Bundle compliance: yes   Bathing/Skin Care: shaved face Date: 2/5  B: Bowel Function   no issues   I: Indwelling Catheters   Christensen necessity:      Urethral Catheter 02/04/22 0400-Reason for Continuing Urinary Catheterization: Critically ill in ICU and requiring hourly monitoring of intake/output   CVC necessity: Yes  D: De-escalation Antibx   No  Plan for the day   Monitor VS, CVP, SVO2; monitor UOP  Family/Goals of care/Code Status   Code Status: Full Code     No acute events throughout day, VS and assessment per flow sheet, patient progressing towards goals as tolerated, plan of care reviewed with Ambrosio Bray III and family, all concerns addressed, will continue to monitor.

## 2022-02-06 NOTE — ASSESSMENT & PLAN NOTE
- patient has a history of known Atrial fibrillation s/p RITO/CV 9/30/14. In 2017 patient had wide complex tachycardia and received amiodarone and was started on daily regimen. EKG were reviewed by EP and rhythm was likely Aflutter. Patient underwent ablation in 2018. Last followed in EP clinic on 3/31/20, was told has high chances of developing Afib within next 5 years. Patient was in RVR on admission likely secondary to ADHF and electrolyte imbalance. Plan to continue diuresis and replace electrolytes, if Afib persists after adequate diuresis/ electrolyte replacement, will consider EP consult.  - patient is on chronic AC with xarelto, and heparin IV while inpateint  - plan to switch to amiodarone 400mg BID for 2 weeks followed by amiodarone 200mg daily

## 2022-02-06 NOTE — ASSESSMENT & PLAN NOTE
-12/16/22 TTE: LV normal in size with moderately decreased systolic function, EF 35, Moderate RV enlargement with hypertrophy and moderately reduced RVSF, grade II DD, Mod-severe TR, Mod MR, trivial pericardial effusion, PASP 87, CVP 15, LVIDd 5.62  -11/28/2018 RHC: RA: 14/ 15/ 11 RV: 112/ -4 PA: 117/ 31/ 59 PWP: 30/ 25/ 20, CO 5.48, CI 2.53, O2 sat: SVC 73, PA 64%, , SVR 1357 (on adempas)  - Resume home Adempas 2.5mg TID  - Utravi 1600 mcg BID   - Plan for RHC on Monday if CVP <10, for further guidance of PH treatment. Plan to switch to Remodulin. Home xarelto switched to heparin IV in anticipation of RHC. IF CVP >10, would wait until adequate diuresis is achieved.

## 2022-02-06 NOTE — PROGRESS NOTES
[facility] - [facility]  Heart Transplant  Progress Note    Patient Name: Ambrosio Bray III  MRN: [id number]  Admission Date: 2/3/2022  Hospital Length of Stay: 3 days  Attending Physician: Patrice Stephens MD  Primary Care Provider: Luciana Cisse MD  Principal Problem:Acute decompensated heart failure    Subjective:     Interval History:   Patient was seen and examined at bedside. Overnight patient developed acid reflux and had an episode of emesis, contents brown in color of cranberries patient had for dinner. Was given GI cocktail and added simethicone, Protonix and Zofran. CVP this morning improved to 15 (19 yesterday). UOP: I 2.4L O -4.7L, -2.3L net negative. Plan to give Diuril 500mg IVP, Bolus with lasix 80mg IVP and continue drip at current rate 40mg/hr. Will continue Emmy 20ppm. If CVP improves to <10 plan for RHC on Monday, if above 10 plan to wait until adequate diuresis is achieved.      Hemodynamics  CVP 23 --> 19 --> 15  SVO2 59 --> 55 --> 58  CO 6.4 --> 5.8  --> 5.3  CI 3.1 --> 2.8 --> 2.6  .5 --> 883 --> 860     Lines: PEDRO 2/3/22    Continuous Infusions:   sodium chloride 0.9% 5 mL/hr at 02/06/22 0902    amiodarone in dextrose 5% 0.5 mg/min (02/06/22 0902)    furosemide (LASIX) 10 mg/mL infusion (non-titrating) 40 mg/hr (02/06/22 0902)    heparin (porcine) in D5W 19 Units/kg/hr (02/06/22 1004)    nitric oxide gas       Scheduled Meds:   ferrous sulfate  1 tablet Oral Daily    ipratropium  2 spray Each Nostril QID    mupirocin   Nasal BID    pantoprazole  40 mg Oral Daily    potassium chloride  40 mEq Oral Once    riociguat  2.5 mg Oral TID    selexipag  1,600 mcg Oral BID    simethicone  1 tablet Oral Q6H     PRN Meds:dextrose 10%, dextrose 10%, glucagon (human recombinant), glucose, glucose, guaiFENesin, insulin aspart U-100, ondansetron, sodium chloride 0.9%    Review of patient's allergies indicates:  No Known Allergies  Objective:     Vital Signs (Most Recent):  Temp:  98.2 °F (36.8 °C) (02/06/22 0800)  Pulse: 97 (02/06/22 0930)  Resp: 15 (02/06/22 0930)  BP: (!) 86/59 (02/06/22 0930)  SpO2: 95 % (02/06/22 0930) Vital Signs (24h Range):  Temp:  [97.6 °F (36.4 °C)-98.3 °F (36.8 °C)] 98.2 °F (36.8 °C)  Pulse:  [] 97  Resp:  [13-22] 15  SpO2:  [90 %-95 %] 95 %  BP: ()/(54-71) 86/59     Patient Vitals for the past 72 hrs (Last 3 readings):   Weight   02/04/22 0400 84.9 kg (187 lb 2.7 oz)   02/03/22 1417 83.9 kg (185 lb)     Body mass index is 24.69 kg/m².      Intake/Output Summary (Last 24 hours) at 2/6/2022 1005  Last data filed at 2/6/2022 0902  Gross per 24 hour   Intake 2793.97 ml   Output 4720 ml   Net -1926.03 ml     Physical Exam  Constitutional:       Comments: Patient is alert and oriented, appears comfortable, does not appear to be in respiratory distress. On Emmy 20ppm with 4L NC.   HENT:      Head: Normocephalic and atraumatic.   Cardiovascular:      Rate and Rhythm: Tachycardia present. Rhythm irregular.      Pulses: Normal pulses.      Heart sounds: Normal heart sounds.   Pulmonary:      Comments: Bibasilar crackles R > L  Abdominal:      General: There is distension.      Comments: Tense on palpation. Appears anasarcic   Musculoskeletal:         General: Normal range of motion.      Cervical back: Normal range of motion and neck supple.      Comments: BL 3+ edema in LE improving   Skin:     General: Skin is warm and dry.       Significant Labs:  CBC:  Recent Labs   Lab 02/05/22  0255 02/06/22  0353 02/06/22  0818   WBC 7.45 9.71 10.38   RBC 3.49* 3.50* 3.42*   HGB 9.6* 9.5* 9.5*   HCT 28.8* 29.2* 28.6*    325 306   MCV 83 83 84   MCH 27.5 27.1 27.8   MCHC 33.3 32.5 33.2     BNP:  Recent Labs   Lab 02/03/22  0928 02/05/22  0255   * 595*     CMP:  Recent Labs   Lab 02/04/22  1022 02/04/22  1514 02/05/22  0255 02/05/22  0911 02/05/22  1623 02/06/22  0353 02/06/22  0818   *   < > 132*  132*   < > 141* 120* 111*   CALCIUM 8.5*   < > 8.7   8.7   < > 8.9 8.8 8.5*   ALBUMIN 3.1*  --  3.1*  3.1*  --   --   --  3.0*   PROT 7.0  --  7.0  7.0  --   --   --  6.6   *   < > 138  138   < > 133* 136 135*   K 4.0   < > 3.6  3.6   < > 3.1* 3.9 4.5   CO2 22*   < > 24  24   < > 25 22* 23      < > 103  103   < > 101 101 101   BUN 51*   < > 51*  51*   < > 58* 58* 61*   CREATININE 1.0   < > 1.2  1.2   < > 1.2 1.2 1.2   ALKPHOS 114  --  113  113  --   --   --  114   ALT 8*  --  8*  8*  --   --   --  8*   AST 13  --  14  14  --   --   --  9*   BILITOT 2.8*  --  2.7*  2.7*  --   --   --  2.5*    < > = values in this interval not displayed.      Coagulation:   Recent Labs   Lab 02/03/22 2216 02/03/22  2216 02/04/22  2010 02/05/22  0255 02/05/22  1623 02/06/22  0032 02/06/22  0818   INR 1.5*  --  1.6*  --   --   --   --    APTT 28.7   < > 30.9   < > 31.1 32.9* 33.5*    < > = values in this interval not displayed.     LDH:  Recent Labs   Lab 02/04/22  0502        Microbiology:  Microbiology Results (last 7 days)     Procedure Component Value Units Date/Time    Blood culture #2 **CANNOT BE ORDERED STAT** [[id number]] Collected: 02/03/22 0929    Order Status: Completed Specimen: Blood from Peripheral, Antecubital, Left Updated: 02/05/22 1212     Blood Culture, Routine No Growth to date      No Growth to date      No Growth to date    Blood culture #1 **CANNOT BE ORDERED STAT** [[id number]] Collected: 02/03/22 0940    Order Status: Completed Specimen: Blood from Peripheral, Forearm, Right Updated: 02/05/22 1212     Blood Culture, Routine No Growth to date      No Growth to date      No Growth to date    Respiratory Infection Panel (PCR), Nasopharyngeal [[id number]]     Order Status: No result Specimen: Nasopharyngeal Swab         Estimated Creatinine Clearance: 63.8 mL/min (based on SCr of 1.2 mg/dL).    Diagnostic Results:  I have reviewed and interpreted all pertinent imaging results/findings within the past 24 hours.    Assessment and Plan:      71 y.o M with history of HF, PH (diagnosed in 2018) on [facility], Uptravi, CTEPH on home oxygen 2L NC, Atrial Flutter s/p ablation 2020, DM, BPH, HLD, Anemia, PTSD, Pulmonary hyperinflation presents to ED with respiratory distress and worsening shortness of breath for past 4 days. Patient reports he was in his usual state of health until 4 days ago when he developed SOB while ambulating to use restroom. Patient reports he had removed his oxygen to use restroom and while ambulating to restroom he became lightheaded, dizzy and in respiratory distress and reports having a fall and syncopized. He reports he woke up after 30 seconds, did not hit his head. Patient reports since the past four days he has also been having increasing loose BM more than his usual, reports feeling dehydrated with increased fluid intake. Patient reports he recently followed up with pulmonologist in clinic and his home Bumex 2mg MWF was increased to daily and was prescribed home oxygen. Patient reports that has helped relieve his SOB. Patient denies chest pain, further episodes of syncope, change in vision, slurred speech, palpitations, headache, change in urinary habits, cough, fever.     Home Medications  - Bumex 2mg daily  - Adempas 2.5mg TID  - Uptravi 1600 mcg BID (to start on 3/21/22)  - Xarelto 20mg daily  - Losartan 25mg daily  - Flomax 0.4mg  - Lipitor 40mg daily  - Atrovent  - Ferrous Sulfate     ED Course      Vitals:     02/03/22 1332   BP: 98/65   Pulse: 104   Resp: (!) 23   Temp:       Pertinent Labs  CBC: WBC 8.93, Hbg 10.3, Hct 32.7, Plt 270  BMP: Na 139, K 3.7, Cl 104, CO2 25, BUN 16, Cr. 0.8,   LFT: TP 7.2, Alb, 3.6, TB 2.9, AST 11, ALT 8  Free T4: 0.88  Lactate 1.7, Troponin 0.012,   EKG: Afib with RVR at a rate of 173, RBB  CXR: bilateral opacities, pulmonary congestion  ED Management  -Lopressor Tartrate 50mg q6h  -Diltiazem 25 mg IVP  -Lasix 80mg IVP      * Acute decompensated heart failure  -NICM   -2/5/22 TTE: EF  20, LV DD, Systolic flattening of IV septum consistent with RV pressure overload, small pericardial effusion, mild LA enlargement, severe RV enlargement with severely reduced RVSF, severe TR, PASP 73, CVP 15, LVIDd 7.7  -12/16/22 TTE: LV normal in size with moderately decreased systolic function, EF 35, Moderate RV enlargement with hypertrophy and moderately reduced RVSF, grade II DD, Mod-severe TR, Mod MR, trivial pericardial effusion, PASP 87, CVP 15, LVIDd 5.62  -11/28/2018 RHC: RA: 14/ 15/ 11 RV: 112/ -4 PA: 117/ 31/ 59 PWP: 30/ 25/ 20, CO 5.48, CI 2.53, O2 sat: SVC 73, PA 64%, , SVR 1357 (on adempas)  -Hypervolemic on exam today  -Home Diuretic Regimen: Bumex 2mg daily  -Inpatient Diuretic Regimen: CVP 15 today and UOP net negative: -2.3L. Continue Diuril 500mg IVP daily, Will give another lasix re-bolus 80mg IVP and continue Lasix drip at rate of 40mg/hr  -Emmy at 20ppm  -GDMT: Losartan 25mg daily (holding)  -2g Na dietary restriction, 1500 mL fluid restriction, strict I/Os  -If CVP is <10 in AM plan for RHC on 2/7. If >10 would wait until patient is adequately diuresed     Pulmonary HTN  -12/16/22 TTE: LV normal in size with moderately decreased systolic function, EF 35, Moderate RV enlargement with hypertrophy and moderately reduced RVSF, grade II DD, Mod-severe TR, Mod MR, trivial pericardial effusion, PASP 87, CVP 15, LVIDd 5.62  -11/28/2018 RHC: RA: 14/ 15/ 11 RV: 112/ -4 PA: 117/ 31/ 59 PWP: 30/ 25/ 20, CO 5.48, CI 2.53, O2 sat: SVC 73, PA 64%, , SVR 1357 (on adempas)  - Resume home Adempas 2.5mg TID  - Utravi 1600 mcg BID   - Plan for RHC on Monday if CVP <10, for further guidance of PH treatment. Plan to switch to Remodulin. Home xarelto switched to heparin IV in anticipation of RHC. IF CVP >10, would wait until adequate diuresis is achieved.     Atrial Flutter/ fibrillation  - patient has a history of known Atrial fibrillation s/p RITO/CV 9/30/14. In 2017 patient had wide complex  tachycardia and received amiodarone and was started on daily regimen. EKG were reviewed by EP and rhythm was likely Aflutter. Patient underwent ablation in 2018. Last followed in EP clinic on 3/31/20, was told has high chances of developing Afib within next 5 years. Patient was in RVR on admission likely secondary to ADHF and electrolyte imbalance. Plan to continue diuresis and replace electrolytes, if Afib persists after adequate diuresis/ electrolyte replacement, will consider EP consult.  - patient is on chronic AC with xarelto, and heparin IV while inpateint  - plan to switch to amiodarone 400mg BID for 2 weeks followed by amiodarone 200mg daily    CTEPH (chronic thromboembolic pulmonary hypertension)  - resume home eliquis  - please see plan for PH    Type 2 diabetes mellitus with microalbuminuria, without long-term current use of insulin  - follow A1c  - Sliding scale and FS  - endocrine consult    Iron deficiency anemia due to chronic blood loss  - monitor H/H  - resume home iron therapy    DVT PPX: heparin IV for CTEPH/ Afib for RHC on Monday (on xarelto at home)  GI PPx: protonix    Critical Care Time: 30 minutes    Jose Morel MD  Heart Transplant  [facility]

## 2022-02-06 NOTE — ASSESSMENT & PLAN NOTE
-NICM   -2/5/22 TTE: EF 20, LV DD, Systolic flattening of IV septum consistent with RV pressure overload, small pericardial effusion, mild LA enlargement, severe RV enlargement with severely reduced RVSF, severe TR, PASP 73, CVP 15, LVIDd 7.7  -12/16/22 TTE: LV normal in size with moderately decreased systolic function, EF 35, Moderate RV enlargement with hypertrophy and moderately reduced RVSF, grade II DD, Mod-severe TR, Mod MR, trivial pericardial effusion, PASP 87, CVP 15, LVIDd 5.62  -11/28/2018 RHC: RA: 14/ 15/ 11 RV: 112/ -4 PA: 117/ 31/ 59 PWP: 30/ 25/ 20, CO 5.48, CI 2.53, O2 sat: SVC 73, PA 64%, , SVR 1357 (on adempas)  -Hypervolemic on exam today  -Home Diuretic Regimen: Bumex 2mg daily  -Inpatient Diuretic Regimen: CVP 15 today and UOP net negative: -2.3L. Continue Diuril 500mg IVP daily, Will give another lasix re-bolus 80mg IVP and continue Lasix drip at rate of 40mg/hr  -Emmy at 20ppm  -GDMT: Losartan 25mg daily (holding)  -2g Na dietary restriction, 1500 mL fluid restriction, strict I/Os  -If CVP is <10 in AM plan for RHC on 2/7. If >10 would wait until patient is adequately diuresed

## 2022-02-06 NOTE — SUBJECTIVE & OBJECTIVE
Interval History:   Patient was seen and examined at bedside. Overnight patient developed acid reflux and had an episode of emesis, contents brown in color of cranberries patient had for dinner. Was given GI cocktail and added simethicone, Protonix and Zofran. CVP this morning improved to 15 (19 yesterday). UOP: I 2.4L O -4.7L, -2.3L net negative. Plan to give Diuril 500mg IVP, Bolus with lasix 80mg IVP and continue drip at current rate 40mg/hr. Will continue Emmy 20ppm. If CVP improves to <10 plan for RHC on Monday, if above 10 plan to wait until adequate diuresis is achieved.      Hemodynamics  CVP 23 --> 19 --> 15  SVO2 59 --> 55 --> 58  CO 6.4 --> 5.8  --> 5.3  CI 3.1 --> 2.8 --> 2.6  .5 --> 883 --> 860     Lines: PEDRO 2/3/22    Continuous Infusions:   sodium chloride 0.9% 5 mL/hr at 02/06/22 0902    amiodarone in dextrose 5% 0.5 mg/min (02/06/22 0902)    furosemide (LASIX) 10 mg/mL infusion (non-titrating) 40 mg/hr (02/06/22 0902)    heparin (porcine) in D5W 19 Units/kg/hr (02/06/22 1004)    nitric oxide gas       Scheduled Meds:   ferrous sulfate  1 tablet Oral Daily    ipratropium  2 spray Each Nostril QID    mupirocin   Nasal BID    pantoprazole  40 mg Oral Daily    potassium chloride  40 mEq Oral Once    riociguat  2.5 mg Oral TID    selexipag  1,600 mcg Oral BID    simethicone  1 tablet Oral Q6H     PRN Meds:dextrose 10%, dextrose 10%, glucagon (human recombinant), glucose, glucose, guaiFENesin, insulin aspart U-100, ondansetron, sodium chloride 0.9%    Review of patient's allergies indicates:  No Known Allergies  Objective:     Vital Signs (Most Recent):  Temp: 98.2 °F (36.8 °C) (02/06/22 0800)  Pulse: 97 (02/06/22 0930)  Resp: 15 (02/06/22 0930)  BP: (!) 86/59 (02/06/22 0930)  SpO2: 95 % (02/06/22 0930) Vital Signs (24h Range):  Temp:  [97.6 °F (36.4 °C)-98.3 °F (36.8 °C)] 98.2 °F (36.8 °C)  Pulse:  [] 97  Resp:  [13-22] 15  SpO2:  [90 %-95 %] 95 %  BP: ()/(54-71) 86/59      Patient Vitals for the past 72 hrs (Last 3 readings):   Weight   02/04/22 0400 84.9 kg (187 lb 2.7 oz)   02/03/22 1417 83.9 kg (185 lb)     Body mass index is 24.69 kg/m².      Intake/Output Summary (Last 24 hours) at 2/6/2022 1005  Last data filed at 2/6/2022 0902  Gross per 24 hour   Intake 2793.97 ml   Output 4720 ml   Net -1926.03 ml     Physical Exam  Constitutional:       Comments: Patient is alert and oriented, appears comfortable, does not appear to be in respiratory distress. On Emmy 20ppm with 4L NC.   HENT:      Head: Normocephalic and atraumatic.   Cardiovascular:      Rate and Rhythm: Tachycardia present. Rhythm irregular.      Pulses: Normal pulses.      Heart sounds: Normal heart sounds.   Pulmonary:      Comments: Bibasilar crackles R > L  Abdominal:      General: There is distension.      Comments: Tense on palpation. Appears anasarcic   Musculoskeletal:         General: Normal range of motion.      Cervical back: Normal range of motion and neck supple.      Comments: BL 3+ edema in LE improving   Skin:     General: Skin is warm and dry.       Significant Labs:  CBC:  Recent Labs   Lab 02/05/22  0255 02/06/22  0353 02/06/22  0818   WBC 7.45 9.71 10.38   RBC 3.49* 3.50* 3.42*   HGB 9.6* 9.5* 9.5*   HCT 28.8* 29.2* 28.6*    325 306   MCV 83 83 84   MCH 27.5 27.1 27.8   MCHC 33.3 32.5 33.2     BNP:  Recent Labs   Lab 02/03/22  0928 02/05/22  0255   * 595*     CMP:  Recent Labs   Lab 02/04/22  1022 02/04/22  1514 02/05/22  0255 02/05/22  0911 02/05/22  1623 02/06/22  0353 02/06/22  0818   *   < > 132*  132*   < > 141* 120* 111*   CALCIUM 8.5*   < > 8.7  8.7   < > 8.9 8.8 8.5*   ALBUMIN 3.1*  --  3.1*  3.1*  --   --   --  3.0*   PROT 7.0  --  7.0  7.0  --   --   --  6.6   *   < > 138  138   < > 133* 136 135*   K 4.0   < > 3.6  3.6   < > 3.1* 3.9 4.5   CO2 22*   < > 24  24   < > 25 22* 23      < > 103  103   < > 101 101 101   BUN 51*   < > 51*  51*   < > 58*  58* 61*   CREATININE 1.0   < > 1.2  1.2   < > 1.2 1.2 1.2   ALKPHOS 114  --  113  113  --   --   --  114   ALT 8*  --  8*  8*  --   --   --  8*   AST 13  --  14  14  --   --   --  9*   BILITOT 2.8*  --  2.7*  2.7*  --   --   --  2.5*    < > = values in this interval not displayed.      Coagulation:   Recent Labs   Lab 02/03/22  2216 02/03/22  2216 02/04/22 2010 02/05/22  0255 02/05/22  1623 02/06/22  0032 02/06/22  0818   INR 1.5*  --  1.6*  --   --   --   --    APTT 28.7   < > 30.9   < > 31.1 32.9* 33.5*    < > = values in this interval not displayed.     LDH:  Recent Labs   Lab 02/04/22  0502        Microbiology:  Microbiology Results (last 7 days)     Procedure Component Value Units Date/Time    Blood culture #2 **CANNOT BE ORDERED STAT** [[id number]] Collected: 02/03/22 0929    Order Status: Completed Specimen: Blood from Peripheral, Antecubital, Left Updated: 02/05/22 1212     Blood Culture, Routine No Growth to date      No Growth to date      No Growth to date    Blood culture #1 **CANNOT BE ORDERED STAT** [[id number]] Collected: 02/03/22 0940    Order Status: Completed Specimen: Blood from Peripheral, Forearm, Right Updated: 02/05/22 1212     Blood Culture, Routine No Growth to date      No Growth to date      No Growth to date    Respiratory Infection Panel (PCR), Nasopharyngeal [[id number]]     Order Status: No result Specimen: Nasopharyngeal Swab         Estimated Creatinine Clearance: 63.8 mL/min (based on SCr of 1.2 mg/dL).    Diagnostic Results:  I have reviewed and interpreted all pertinent imaging results/findings within the past 24 hours.

## 2022-02-07 ENCOUNTER — TELEPHONE (OUTPATIENT)
Dept: UROLOGY | Facility: CLINIC | Age: 72
End: 2022-02-07
Payer: MEDICARE

## 2022-02-07 LAB
ALBUMIN SERPL BCP-MCNC: 2.9 G/DL (ref 3.5–5.2)
ALLENS TEST: ABNORMAL
ALLENS TEST: ABNORMAL
ALP SERPL-CCNC: 115 U/L (ref 55–135)
ALP SERPL-CCNC: 116 U/L (ref 55–135)
ALP SERPL-CCNC: 116 U/L (ref 55–135)
ALT SERPL W/O P-5'-P-CCNC: 6 U/L (ref 10–44)
ALT SERPL W/O P-5'-P-CCNC: 6 U/L (ref 10–44)
ALT SERPL W/O P-5'-P-CCNC: 8 U/L (ref 10–44)
ANION GAP SERPL CALC-SCNC: 13 MMOL/L (ref 8–16)
APTT BLDCRRT: 31.9 SEC (ref 21–32)
APTT BLDCRRT: 53.7 SEC (ref 21–32)
APTT BLDCRRT: 54.5 SEC (ref 21–32)
AST SERPL-CCNC: 11 U/L (ref 10–40)
AST SERPL-CCNC: 11 U/L (ref 10–40)
AST SERPL-CCNC: 12 U/L (ref 10–40)
BASOPHILS # BLD AUTO: 0.01 K/UL (ref 0–0.2)
BASOPHILS NFR BLD: 0.1 % (ref 0–1.9)
BILIRUB DIRECT SERPL-MCNC: 1.2 MG/DL (ref 0.1–0.3)
BILIRUB SERPL-MCNC: 2.3 MG/DL (ref 0.1–1)
BILIRUB SERPL-MCNC: 2.3 MG/DL (ref 0.1–1)
BILIRUB SERPL-MCNC: 2.5 MG/DL (ref 0.1–1)
BUN SERPL-MCNC: 65 MG/DL (ref 8–23)
BUN SERPL-MCNC: 66 MG/DL (ref 8–23)
BUN SERPL-MCNC: 66 MG/DL (ref 8–23)
CALCIUM SERPL-MCNC: 8.5 MG/DL (ref 8.7–10.5)
CALCIUM SERPL-MCNC: 8.5 MG/DL (ref 8.7–10.5)
CALCIUM SERPL-MCNC: 8.9 MG/DL (ref 8.7–10.5)
CHLORIDE SERPL-SCNC: 94 MMOL/L (ref 95–110)
CHLORIDE SERPL-SCNC: 98 MMOL/L (ref 95–110)
CHLORIDE SERPL-SCNC: 98 MMOL/L (ref 95–110)
CO2 SERPL-SCNC: 22 MMOL/L (ref 23–29)
CO2 SERPL-SCNC: 22 MMOL/L (ref 23–29)
CO2 SERPL-SCNC: 27 MMOL/L (ref 23–29)
CREAT SERPL-MCNC: 1.4 MG/DL (ref 0.5–1.4)
DELSYS: ABNORMAL
DELSYS: ABNORMAL
DIFFERENTIAL METHOD: ABNORMAL
EOSINOPHIL # BLD AUTO: 0 K/UL (ref 0–0.5)
EOSINOPHIL NFR BLD: 0 % (ref 0–8)
ERYTHROCYTE [DISTWIDTH] IN BLOOD BY AUTOMATED COUNT: 22.1 % (ref 11.5–14.5)
EST. GFR  (AFRICAN AMERICAN): 58 ML/MIN/1.73 M^2
EST. GFR  (NON AFRICAN AMERICAN): 50.2 ML/MIN/1.73 M^2
FIO2: 40
FLOW: 5
GLUCOSE SERPL-MCNC: 115 MG/DL (ref 70–110)
GLUCOSE SERPL-MCNC: 115 MG/DL (ref 70–110)
GLUCOSE SERPL-MCNC: 117 MG/DL (ref 70–110)
HCO3 UR-SCNC: 25.1 MMOL/L (ref 24–28)
HCO3 UR-SCNC: 26.3 MMOL/L (ref 24–28)
HCT VFR BLD AUTO: 28.3 % (ref 40–54)
HGB BLD-MCNC: 9.2 G/DL (ref 14–18)
IMM GRANULOCYTES # BLD AUTO: 0.28 K/UL (ref 0–0.04)
IMM GRANULOCYTES NFR BLD AUTO: 2.5 % (ref 0–0.5)
LYMPHOCYTES # BLD AUTO: 0.5 K/UL (ref 1–4.8)
LYMPHOCYTES NFR BLD: 4 % (ref 18–48)
MAGNESIUM SERPL-MCNC: 2.3 MG/DL (ref 1.6–2.6)
MAGNESIUM SERPL-MCNC: 2.5 MG/DL (ref 1.6–2.6)
MCH RBC QN AUTO: 27.4 PG (ref 27–31)
MCHC RBC AUTO-ENTMCNC: 32.5 G/DL (ref 32–36)
MCV RBC AUTO: 84 FL (ref 82–98)
METHEMOGLOBIN: 0.1 % (ref 0–3)
MODE: ABNORMAL
MONOCYTES # BLD AUTO: 0.5 K/UL (ref 0.3–1)
MONOCYTES NFR BLD: 4.8 % (ref 4–15)
NEUTROPHILS # BLD AUTO: 9.9 K/UL (ref 1.8–7.7)
NEUTROPHILS NFR BLD: 88.6 % (ref 38–73)
NRBC BLD-RTO: 0 /100 WBC
PCO2 BLDA: 37.5 MMHG (ref 35–45)
PCO2 BLDA: 38.1 MMHG (ref 35–45)
PH SMN: 7.43 [PH] (ref 7.35–7.45)
PH SMN: 7.46 [PH] (ref 7.35–7.45)
PHOSPHATE SERPL-MCNC: 3.6 MG/DL (ref 2.7–4.5)
PHOSPHATE SERPL-MCNC: 4.6 MG/DL (ref 2.7–4.5)
PLATELET # BLD AUTO: 294 K/UL (ref 150–450)
PMV BLD AUTO: 10.9 FL (ref 9.2–12.9)
PO2 BLDA: 27 MMHG (ref 40–60)
PO2 BLDA: 29 MMHG (ref 40–60)
POC BE: 1 MMOL/L
POC BE: 2 MMOL/L
POC SATURATED O2: 52 % (ref 95–100)
POC SATURATED O2: 59 % (ref 95–100)
POC TCO2: 26 MMOL/L (ref 24–29)
POC TCO2: 27 MMOL/L (ref 24–29)
POTASSIUM SERPL-SCNC: 2.6 MMOL/L (ref 3.5–5.1)
POTASSIUM SERPL-SCNC: 3.3 MMOL/L (ref 3.5–5.1)
POTASSIUM SERPL-SCNC: 3.3 MMOL/L (ref 3.5–5.1)
PROT SERPL-MCNC: 6.6 G/DL (ref 6–8.4)
PROT SERPL-MCNC: 6.6 G/DL (ref 6–8.4)
PROT SERPL-MCNC: 6.9 G/DL (ref 6–8.4)
RBC # BLD AUTO: 3.36 M/UL (ref 4.6–6.2)
SAMPLE: ABNORMAL
SAMPLE: ABNORMAL
SITE: ABNORMAL
SITE: ABNORMAL
SODIUM SERPL-SCNC: 133 MMOL/L (ref 136–145)
SODIUM SERPL-SCNC: 133 MMOL/L (ref 136–145)
SODIUM SERPL-SCNC: 134 MMOL/L (ref 136–145)
WBC # BLD AUTO: 11.15 K/UL (ref 3.9–12.7)

## 2022-02-07 PROCEDURE — 25000003 PHARM REV CODE 250: Performed by: STUDENT IN AN ORGANIZED HEALTH CARE EDUCATION/TRAINING PROGRAM

## 2022-02-07 PROCEDURE — 85730 THROMBOPLASTIN TIME PARTIAL: CPT | Mod: 91 | Performed by: INTERNAL MEDICINE

## 2022-02-07 PROCEDURE — 25000003 PHARM REV CODE 250: Performed by: INTERNAL MEDICINE

## 2022-02-07 PROCEDURE — 80053 COMPREHEN METABOLIC PANEL: CPT | Performed by: STUDENT IN AN ORGANIZED HEALTH CARE EDUCATION/TRAINING PROGRAM

## 2022-02-07 PROCEDURE — 63600175 PHARM REV CODE 636 W HCPCS: Performed by: STUDENT IN AN ORGANIZED HEALTH CARE EDUCATION/TRAINING PROGRAM

## 2022-02-07 PROCEDURE — 37799 UNLISTED PX VASCULAR SURGERY: CPT

## 2022-02-07 PROCEDURE — 99291 PR CRITICAL CARE, E/M 30-74 MINUTES: ICD-10-PCS | Mod: ,,, | Performed by: PHYSICIAN ASSISTANT

## 2022-02-07 PROCEDURE — 83880 ASSAY OF NATRIURETIC PEPTIDE: CPT | Performed by: STUDENT IN AN ORGANIZED HEALTH CARE EDUCATION/TRAINING PROGRAM

## 2022-02-07 PROCEDURE — 83735 ASSAY OF MAGNESIUM: CPT | Mod: 91 | Performed by: INTERNAL MEDICINE

## 2022-02-07 PROCEDURE — 63600367 HC NITRIC OXIDE PER HOUR

## 2022-02-07 PROCEDURE — 27000221 HC OXYGEN, UP TO 24 HOURS

## 2022-02-07 PROCEDURE — 99900035 HC TECH TIME PER 15 MIN (STAT)

## 2022-02-07 PROCEDURE — 94761 N-INVAS EAR/PLS OXIMETRY MLT: CPT

## 2022-02-07 PROCEDURE — 83735 ASSAY OF MAGNESIUM: CPT | Performed by: STUDENT IN AN ORGANIZED HEALTH CARE EDUCATION/TRAINING PROGRAM

## 2022-02-07 PROCEDURE — 99291 CRITICAL CARE FIRST HOUR: CPT | Mod: ,,, | Performed by: PHYSICIAN ASSISTANT

## 2022-02-07 PROCEDURE — 84100 ASSAY OF PHOSPHORUS: CPT | Performed by: STUDENT IN AN ORGANIZED HEALTH CARE EDUCATION/TRAINING PROGRAM

## 2022-02-07 PROCEDURE — 63600175 PHARM REV CODE 636 W HCPCS: Performed by: PHYSICIAN ASSISTANT

## 2022-02-07 PROCEDURE — 85730 THROMBOPLASTIN TIME PARTIAL: CPT | Performed by: STUDENT IN AN ORGANIZED HEALTH CARE EDUCATION/TRAINING PROGRAM

## 2022-02-07 PROCEDURE — 25000003 PHARM REV CODE 250: Performed by: PHYSICIAN ASSISTANT

## 2022-02-07 PROCEDURE — 20000000 HC ICU ROOM

## 2022-02-07 PROCEDURE — 85025 COMPLETE CBC W/AUTO DIFF WBC: CPT | Performed by: STUDENT IN AN ORGANIZED HEALTH CARE EDUCATION/TRAINING PROGRAM

## 2022-02-07 PROCEDURE — 83050 HGB METHEMOGLOBIN QUAN: CPT

## 2022-02-07 PROCEDURE — 80053 COMPREHEN METABOLIC PANEL: CPT | Mod: 91 | Performed by: INTERNAL MEDICINE

## 2022-02-07 PROCEDURE — 36415 COLL VENOUS BLD VENIPUNCTURE: CPT | Performed by: STUDENT IN AN ORGANIZED HEALTH CARE EDUCATION/TRAINING PROGRAM

## 2022-02-07 PROCEDURE — 84100 ASSAY OF PHOSPHORUS: CPT | Mod: 91 | Performed by: INTERNAL MEDICINE

## 2022-02-07 RX ORDER — LIDOCAINE HYDROCHLORIDE 20 MG/ML
10 SOLUTION OROPHARYNGEAL ONCE
Status: COMPLETED | OUTPATIENT
Start: 2022-02-07 | End: 2022-02-07

## 2022-02-07 RX ORDER — POTASSIUM CHLORIDE 29.8 MG/ML
40 INJECTION INTRAVENOUS ONCE
Status: COMPLETED | OUTPATIENT
Start: 2022-02-07 | End: 2022-02-08

## 2022-02-07 RX ORDER — POTASSIUM CHLORIDE 20 MEQ/1
40 TABLET, EXTENDED RELEASE ORAL ONCE
Status: COMPLETED | OUTPATIENT
Start: 2022-02-07 | End: 2022-02-07

## 2022-02-07 RX ORDER — MAG HYDROX/ALUMINUM HYD/SIMETH 200-200-20
30 SUSPENSION, ORAL (FINAL DOSE FORM) ORAL ONCE
Status: COMPLETED | OUTPATIENT
Start: 2022-02-07 | End: 2022-02-07

## 2022-02-07 RX ORDER — MAG HYDROX/ALUMINUM HYD/SIMETH 200-200-20
30 SUSPENSION, ORAL (FINAL DOSE FORM) ORAL
Status: DISCONTINUED | OUTPATIENT
Start: 2022-02-07 | End: 2022-02-07

## 2022-02-07 RX ORDER — FUROSEMIDE 10 MG/ML
120 INJECTION INTRAMUSCULAR; INTRAVENOUS ONCE
Status: COMPLETED | OUTPATIENT
Start: 2022-02-07 | End: 2022-02-07

## 2022-02-07 RX ADMIN — RIOCIGUAT 2.5 MG: 2.5 TABLET, FILM COATED ORAL at 08:02

## 2022-02-07 RX ADMIN — AMIODARONE HYDROCHLORIDE 400 MG: 200 TABLET ORAL at 09:02

## 2022-02-07 RX ADMIN — IPRATROPIUM BROMIDE 2 SPRAY: 42 SPRAY, METERED NASAL at 09:02

## 2022-02-07 RX ADMIN — SIMETHICONE 80 MG: 80 TABLET, CHEWABLE ORAL at 06:02

## 2022-02-07 RX ADMIN — MUPIROCIN: 20 OINTMENT TOPICAL at 08:02

## 2022-02-07 RX ADMIN — AMIODARONE HYDROCHLORIDE 400 MG: 200 TABLET ORAL at 08:02

## 2022-02-07 RX ADMIN — FUROSEMIDE 40 MG/HR: 10 INJECTION, SOLUTION INTRAMUSCULAR; INTRAVENOUS at 05:02

## 2022-02-07 RX ADMIN — HEPARIN SODIUM 22 UNITS/KG/HR: 5000 INJECTION INTRAVENOUS; SUBCUTANEOUS at 07:02

## 2022-02-07 RX ADMIN — FUROSEMIDE 120 MG: 10 INJECTION INTRAMUSCULAR; INTRAVENOUS at 01:02

## 2022-02-07 RX ADMIN — PANTOPRAZOLE SODIUM 40 MG: 40 TABLET, DELAYED RELEASE ORAL at 09:02

## 2022-02-07 RX ADMIN — POTASSIUM CHLORIDE 40 MEQ: 29.8 INJECTION, SOLUTION INTRAVENOUS at 09:02

## 2022-02-07 RX ADMIN — POTASSIUM CHLORIDE 40 MEQ: 1500 TABLET, EXTENDED RELEASE ORAL at 08:02

## 2022-02-07 RX ADMIN — FUROSEMIDE 40 MG/HR: 10 INJECTION, SOLUTION INTRAMUSCULAR; INTRAVENOUS at 01:02

## 2022-02-07 RX ADMIN — SIMETHICONE 80 MG: 80 TABLET, CHEWABLE ORAL at 11:02

## 2022-02-07 RX ADMIN — SIMETHICONE 80 MG: 80 TABLET, CHEWABLE ORAL at 12:02

## 2022-02-07 RX ADMIN — CHLOROTHIAZIDE SODIUM 500 MG: 500 INJECTION, POWDER, LYOPHILIZED, FOR SOLUTION INTRAVENOUS at 09:02

## 2022-02-07 RX ADMIN — LIDOCAINE HYDROCHLORIDE 10 ML: 20 SOLUTION ORAL; TOPICAL at 06:02

## 2022-02-07 RX ADMIN — RIOCIGUAT 2.5 MG: 2.5 TABLET, FILM COATED ORAL at 09:02

## 2022-02-07 RX ADMIN — IPRATROPIUM BROMIDE 2 SPRAY: 42 SPRAY, METERED NASAL at 05:02

## 2022-02-07 RX ADMIN — FERROUS SULFATE TAB 325 MG (65 MG ELEMENTAL FE) 1 EACH: 325 (65 FE) TAB at 09:02

## 2022-02-07 RX ADMIN — MUPIROCIN: 20 OINTMENT TOPICAL at 09:02

## 2022-02-07 RX ADMIN — HEPARIN SODIUM 20 UNITS/KG/HR: 5000 INJECTION INTRAVENOUS; SUBCUTANEOUS at 05:02

## 2022-02-07 RX ADMIN — AMIODARONE HYDROCHLORIDE 0.5 MG/MIN: 50 INJECTION, SOLUTION INTRAVENOUS at 05:02

## 2022-02-07 RX ADMIN — IPRATROPIUM BROMIDE 2 SPRAY: 42 SPRAY, METERED NASAL at 01:02

## 2022-02-07 RX ADMIN — RIOCIGUAT 2.5 MG: 2.5 TABLET, FILM COATED ORAL at 04:02

## 2022-02-07 RX ADMIN — ALUMINUM HYDROXIDE, MAGNESIUM HYDROXIDE, AND SIMETHICONE 30 ML: 200; 200; 20 SUSPENSION ORAL at 06:02

## 2022-02-07 NOTE — SUBJECTIVE & OBJECTIVE
Interval History:   No acute events or complaints this AM. CVP is back to being 21. UOP: 3.3L -1.0L net negative. Plan to give Diuril 500mg IVP BID today, Bolus with lasix 120 mgIVP and continue drip at current rate 40mg/hr. Will continue Emmy 20ppm. Trying to get CVP to <10 so that we can get accurate RHC. SvO2 52%, CO 6.2, CI 6.2 .       Lines: RIJ 2/3/22    Continuous Infusions:   sodium chloride 0.9% 5 mL/hr at 02/07/22 0600    amiodarone in dextrose 5% 0.5 mg/min (02/07/22 0600)    furosemide (LASIX) 10 mg/mL infusion (non-titrating) 40 mg/hr (02/07/22 0600)    heparin (porcine) in D5W 22 Units/kg/hr (02/07/22 0644)    nitric oxide gas       Scheduled Meds:   amiodarone  400 mg Oral BID    Followed by    [START ON 2/16/2022] amiodarone  200 mg Oral Daily    ferrous sulfate  1 tablet Oral Daily    ipratropium  2 spray Each Nostril QID    mupirocin   Nasal BID    pantoprazole  40 mg Oral Daily    potassium chloride  40 mEq Oral Once    riociguat  2.5 mg Oral TID    selexipag  1,600 mcg Oral BID    simethicone  1 tablet Oral Q6H     PRN Meds:dextrose 10%, dextrose 10%, glucagon (human recombinant), glucose, glucose, guaiFENesin, insulin aspart U-100, ondansetron, sodium chloride 0.9%    Review of patient's allergies indicates:  No Known Allergies  Objective:     Vital Signs (Most Recent):  Temp: 97.6 °F (36.4 °C) (02/07/22 1105)  Pulse: 105 (02/07/22 1105)  Resp: 16 (02/07/22 1105)  BP: 101/63 (02/07/22 1105)  SpO2: (!) 93 % (02/07/22 1105) Vital Signs (24h Range):  Temp:  [97.3 °F (36.3 °C)-97.6 °F (36.4 °C)] 97.6 °F (36.4 °C)  Pulse:  [] 105  Resp:  [12-30] 16  SpO2:  [87 %-95 %] 93 %  BP: ()/(47-76) 101/63     No data found.  Body mass index is 24.69 kg/m².      Intake/Output Summary (Last 24 hours) at 2/7/2022 1326  Last data filed at 2/7/2022 1100  Gross per 24 hour   Intake 2190.97 ml   Output 3635 ml   Net -1444.03 ml     Physical Exam  Constitutional:       Comments:  Patient is alert and oriented, appears comfortable, does not appear to be in respiratory distress. On Emmy 20ppm with 4L NC.   HENT:      Head: Normocephalic and atraumatic.   Neck:      Comments: JVD to the earlobe.  Cardiovascular:      Rate and Rhythm: Tachycardia present. Rhythm irregular.      Pulses: Normal pulses.      Heart sounds: Normal heart sounds.   Pulmonary:      Effort: Pulmonary effort is normal. No respiratory distress.      Breath sounds: Normal breath sounds.      Comments: Bibasilar crackles R > L  Abdominal:      General: There is distension.      Comments: Abdomen distended w/ fluids.   Musculoskeletal:         General: Normal range of motion.      Cervical back: Normal range of motion and neck supple.      Comments: BL 3+ edema in LE improving   Skin:     General: Skin is warm and dry.   Neurological:      General: No focal deficit present.      Mental Status: He is alert and oriented to person, place, and time.   Psychiatric:         Mood and Affect: Mood normal.         Behavior: Behavior normal.       Significant Labs:  CBC:  Recent Labs   Lab 02/06/22  0353 02/06/22  0818 02/07/22  0511   WBC 9.71 10.38 11.15   RBC 3.50* 3.42* 3.36*   HGB 9.5* 9.5* 9.2*   HCT 29.2* 28.6* 28.3*    306 294   MCV 83 84 84   MCH 27.1 27.8 27.4   MCHC 32.5 33.2 32.5     BNP:  Recent Labs   Lab 02/03/22  0928 02/05/22  0255   * 595*     CMP:  Recent Labs   Lab 02/05/22  0255 02/05/22  0911 02/06/22  0818 02/06/22  0818 02/06/22  1636 02/06/22  1950/22  0511   *  132*   < > 111*   < > 127* 111* 115*  115*   CALCIUM 8.7  8.7   < > 8.5*   < > 8.8 8.7 8.5*  8.5*   ALBUMIN 3.1*  3.1*  --  3.0*  --   --   --  2.9*  2.9*   PROT 7.0  7.0  --  6.6  --   --   --  6.6  6.6     138   < > 135*   < > 134* 136 133*  133*   K 3.6  3.6   < > 4.5   < > 3.7 4.3 3.3*  3.3*   CO2 24  24   < > 23   < > 25 23 22*  22*     103   < > 101   < > 99 101 98  98   BUN 51*  51*   < > 61*    < > 64* 60* 66*  66*   CREATININE 1.2  1.2   < > 1.2   < > 1.2 1.2 1.4  1.4   ALKPHOS 113  113  --  114  --   --   --  116  116   ALT 8*  8*  --  8*  --   --   --  6*  6*   AST 14  14  --  9*  --   --   --  11  11   BILITOT 2.7*  2.7*  --  2.5*  --   --   --  2.3*  2.3*    < > = values in this interval not displayed.      Coagulation:   Recent Labs   Lab 02/03/22 2216 02/03/22  2216 02/04/22 2010 02/05/22  0255 02/06/22  1636 02/06/22 2322 02/07/22  0511   INR 1.5*  --  1.6*  --   --   --   --    APTT 28.7   < > 30.9   < > 40.7* 38.7* 31.9    < > = values in this interval not displayed.     LDH:  No results for input(s): LDH in the last 72 hours.  Microbiology:  Microbiology Results (last 7 days)     Procedure Component Value Units Date/Time    Blood culture #1 **CANNOT BE ORDERED STAT** [[id number]] Collected: 02/03/22 0940    Order Status: Completed Specimen: Blood from Peripheral, Forearm, Right Updated: 02/07/22 1212     Blood Culture, Routine No Growth to date      No Growth to date      No Growth to date      No Growth to date      No Growth to date    Blood culture #2 **CANNOT BE ORDERED STAT** [[id number]] Collected: 02/03/22 0929    Order Status: Completed Specimen: Blood from Peripheral, Antecubital, Left Updated: 02/07/22 1212     Blood Culture, Routine No Growth to date      No Growth to date      No Growth to date      No Growth to date      No Growth to date    Respiratory Infection Panel (PCR), Nasopharyngeal [[id number]]     Order Status: No result Specimen: Nasopharyngeal Swab         Estimated Creatinine Clearance: 54.7 mL/min (based on SCr of 1.4 mg/dL).    Diagnostic Results:  I have reviewed and interpreted all pertinent imaging results/findings within the past 24 hours.

## 2022-02-07 NOTE — PLAN OF CARE
Pt alert and oriented throughout shift. HR is A.fib 110s-130s. MAP > 65. Pt on fluid restriction diet. Pt hourly -300. CVP 16-18. Safety measures maintained. C planned for tomorrow.

## 2022-02-07 NOTE — PROGRESS NOTES
[facility]  Heart Transplant  Progress Note    Patient Name: Ambrosio Bray III  MRN: [id number]  Admission Date: 2/3/2022  Hospital Length of Stay: 4 days  Attending Physician: Roland George MD  Primary Care Provider: Luciana Cisse MD  Principal Problem:Acute decompensated heart failure    Subjective:     Interval History:   No acute events or complaints this AM. CVP is back to being 21. UOP: 3.3L -1.0L net negative. Plan to give Diuril 500mg IVP BID today, Bolus with lasix 120 mgIVP and continue drip at current rate 40mg/hr. Will continue Emmy 20ppm. Trying to get CVP to <10 so that we can get accurate RHC. SvO2 52%, CO 6.2, CI 6.2 .       Lines: RIJ 2/3/22    Continuous Infusions:   sodium chloride 0.9% 5 mL/hr at 02/07/22 0600    amiodarone in dextrose 5% 0.5 mg/min (02/07/22 0600)    furosemide (LASIX) 10 mg/mL infusion (non-titrating) 40 mg/hr (02/07/22 0600)    heparin (porcine) in D5W 22 Units/kg/hr (02/07/22 0644)    nitric oxide gas       Scheduled Meds:   amiodarone  400 mg Oral BID    Followed by    [START ON 2/16/2022] amiodarone  200 mg Oral Daily    ferrous sulfate  1 tablet Oral Daily    ipratropium  2 spray Each Nostril QID    mupirocin   Nasal BID    pantoprazole  40 mg Oral Daily    potassium chloride  40 mEq Oral Once    riociguat  2.5 mg Oral TID    selexipag  1,600 mcg Oral BID    simethicone  1 tablet Oral Q6H     PRN Meds:dextrose 10%, dextrose 10%, glucagon (human recombinant), glucose, glucose, guaiFENesin, insulin aspart U-100, ondansetron, sodium chloride 0.9%    Review of patient's allergies indicates:  No Known Allergies  Objective:     Vital Signs (Most Recent):  Temp: 97.6 °F (36.4 °C) (02/07/22 1105)  Pulse: 105 (02/07/22 1105)  Resp: 16 (02/07/22 1105)  BP: 101/63 (02/07/22 1105)  SpO2: (!) 93 % (02/07/22 1105) Vital Signs (24h Range):  Temp:  [97.3 °F (36.3 °C)-97.6 °F (36.4 °C)] 97.6 °F (36.4 °C)  Pulse:  [] 105  Resp:   [12-30] 16  SpO2:  [87 %-95 %] 93 %  BP: ()/(47-76) 101/63     No data found.  Body mass index is 24.69 kg/m².      Intake/Output Summary (Last 24 hours) at 2/7/2022 1326  Last data filed at 2/7/2022 1100  Gross per 24 hour   Intake 2190.97 ml   Output 3635 ml   Net -1444.03 ml     Physical Exam  Constitutional:       Comments: Patient is alert and oriented, appears comfortable, does not appear to be in respiratory distress. On Emmy 20ppm with 4L NC.   HENT:      Head: Normocephalic and atraumatic.   Neck:      Comments: JVD to the earlobe.  Cardiovascular:      Rate and Rhythm: Tachycardia present. Rhythm irregular.      Pulses: Normal pulses.      Heart sounds: Normal heart sounds.   Pulmonary:      Effort: Pulmonary effort is normal. No respiratory distress.      Breath sounds: Normal breath sounds.      Comments: Bibasilar crackles R > L  Abdominal:      General: There is distension.      Comments: Abdomen distended w/ fluids.   Musculoskeletal:         General: Normal range of motion.      Cervical back: Normal range of motion and neck supple.      Comments: BL 3+ edema in LE improving   Skin:     General: Skin is warm and dry.   Neurological:      General: No focal deficit present.      Mental Status: He is alert and oriented to person, place, and time.   Psychiatric:         Mood and Affect: Mood normal.         Behavior: Behavior normal.       Significant Labs:  CBC:  Recent Labs   Lab 02/06/22  0353 02/06/22  0818 02/07/22  0511   WBC 9.71 10.38 11.15   RBC 3.50* 3.42* 3.36*   HGB 9.5* 9.5* 9.2*   HCT 29.2* 28.6* 28.3*    306 294   MCV 83 84 84   MCH 27.1 27.8 27.4   MCHC 32.5 33.2 32.5     BNP:  Recent Labs   Lab 02/03/22  0928 02/05/22  0255   * 595*     CMP:  Recent Labs   Lab 02/05/22  0255 02/05/22  0911 02/06/22  0818 02/06/22  0818 02/06/22  1636 02/06/22  1950/22  0511   *  132*   < > 111*   < > 127* 111* 115*  115*   CALCIUM 8.7  8.7   < > 8.5*   < > 8.8 8.7 8.5*   8.5*   ALBUMIN 3.1*  3.1*  --  3.0*  --   --   --  2.9*  2.9*   PROT 7.0  7.0  --  6.6  --   --   --  6.6  6.6     138   < > 135*   < > 134* 136 133*  133*   K 3.6  3.6   < > 4.5   < > 3.7 4.3 3.3*  3.3*   CO2 24  24   < > 23   < > 25 23 22*  22*     103   < > 101   < > 99 101 98  98   BUN 51*  51*   < > 61*   < > 64* 60* 66*  66*   CREATININE 1.2  1.2   < > 1.2   < > 1.2 1.2 1.4  1.4   ALKPHOS 113  113  --  114  --   --   --  116  116   ALT 8*  8*  --  8*  --   --   --  6*  6*   AST 14  14  --  9*  --   --   --  11  11   BILITOT 2.7*  2.7*  --  2.5*  --   --   --  2.3*  2.3*    < > = values in this interval not displayed.      Coagulation:   Recent Labs   Lab 02/03/22 2216 02/03/22  2216 02/04/22 2010 02/05/22  0255 02/06/22  1636 02/06/22  2322 02/07/22  0511   INR 1.5*  --  1.6*  --   --   --   --    APTT 28.7   < > 30.9   < > 40.7* 38.7* 31.9    < > = values in this interval not displayed.     LDH:  No results for input(s): LDH in the last 72 hours.  Microbiology:  Microbiology Results (last 7 days)     Procedure Component Value Units Date/Time    Blood culture #1 **CANNOT BE ORDERED STAT** [[id number]] Collected: 02/03/22 0940    Order Status: Completed Specimen: Blood from Peripheral, Forearm, Right Updated: 02/07/22 1212     Blood Culture, Routine No Growth to date      No Growth to date      No Growth to date      No Growth to date      No Growth to date    Blood culture #2 **CANNOT BE ORDERED STAT** [[id number]] Collected: 02/03/22 0929    Order Status: Completed Specimen: Blood from Peripheral, Antecubital, Left Updated: 02/07/22 1212     Blood Culture, Routine No Growth to date      No Growth to date      No Growth to date      No Growth to date      No Growth to date    Respiratory Infection Panel (PCR), Nasopharyngeal [[id number]]     Order Status: No result Specimen: Nasopharyngeal Swab         Estimated Creatinine Clearance: 54.7 mL/min (based on SCr of 1.4  mg/dL).    Diagnostic Results:  I have reviewed and interpreted all pertinent imaging results/findings within the past 24 hours.    Assessment and Plan:     71 y.o M with history of HF, PH (diagnosed in 2018) on [facility], Uptravi, CTEPH on home oxygen 2L NC, Atrial Flutter s/p ablation 2020, DM, BPH, HLD, Anemia, PTSD, Pulmonary hyperinflation presents to ED with respiratory distress and worsening shortness of breath for past 4 days. Patient reports he was in his usual state of health until 4 days ago when he developed SOB while ambulating to use restroom. Patient reports he had removed his oxygen to use restroom and while ambulating to restroom he became lightheaded, dizzy and in respiratory distress and reports having a fall and syncopized. He reports he woke up after 30 seconds, did not hit his head. Patient reports since the past four days he has also been having increasing loose BM more than his usual, reports feeling dehydrated with increased fluid intake. Patient reports he recently followed up with pulmonologist in clinic and his home Bumex 2mg MWF was increased to daily and was prescribed home oxygen. Patient reports that has helped relieve his SOB. Patient denies chest pain, further episodes of syncope, change in vision, slurred speech, palpitations, headache, change in urinary habits, cough, fever.     Home Medications  - Bumex 2mg daily  - Adempas 2.5mg TID  - Uptravi 1600 mcg BID (to start on 3/21/22)  - Xarelto 20mg daily  - Losartan 25mg daily  - Flomax 0.4mg  - Lipitor 40mg daily  - Atrovent  - Ferrous Sulfate     ED Course      Vitals:     02/03/22 1332   BP: 98/65   Pulse: 104   Resp: (!) 23   Temp:       Pertinent Labs  CBC: WBC 8.93, Hbg 10.3, Hct 32.7, Plt 270  BMP: Na 139, K 3.7, Cl 104, CO2 25, BUN 16, Cr. 0.8,   LFT: TP 7.2, Alb, 3.6, TB 2.9, AST 11, ALT 8  Free T4: 0.88  Lactate 1.7, Troponin 0.012,   EKG: Afib with RVR at a rate of 173, RBB  CXR: bilateral opacities, pulmonary  congestion  ED Management  -Lopressor Tartrate 50mg q6h  -Diltiazem 25 mg IVP  -Lasix 80mg IVP      * Acute decompensated heart failure  -NICM   -2/5/22 TTE: EF 20, LV DD, Systolic flattening of IV septum consistent with RV pressure overload, small pericardial effusion, mild LA enlargement, severe RV enlargement with severely reduced RVSF, severe TR, PASP 73, CVP 15, LVIDd 7.7  -12/16/22 TTE: LV normal in size with moderately decreased systolic function, EF 35, Moderate RV enlargement with hypertrophy and moderately reduced RVSF, grade II DD, Mod-severe TR, Mod MR, trivial pericardial effusion, PASP 87, CVP 15, LVIDd 5.62  -11/28/2018 RHC: RA: 14/ 15/ 11 RV: 112/ -4 PA: 117/ 31/ 59 PWP: 30/ 25/ 20, CO 5.48, CI 2.53, O2 sat: SVC 73, PA 64%, , SVR 1357 (on adempas)  -Hypervolemic on exam today  -Home Diuretic Regimen: Bumex 2mg daily  -Inpatient Diuretic Regimen: CVP 15 today and UOP net negative: -2.3L. Continue Diuril 500mg IVP daily, Will give another lasix re-bolus 80mg IVP and continue Lasix drip at rate of 40mg/hr  -Emmy at 20ppm  -GDMT: Losartan 25mg daily (holding)  -2g Na dietary restriction, 1500 mL fluid restriction, strict I/Os  -CVP still elevated at 21 this AM. Continue aggressive diuresis for goal CVP <10 then plan for RHC.     Iron deficiency anemia due to chronic blood loss  - monitor H/H  - resume home iron therapy    CTEPH (chronic thromboembolic pulmonary hypertension)  - resume home eliquis  - please see plan for PH    Pulmonary HTN  -12/16/22 TTE: LV normal in size with moderately decreased systolic function, EF 35, Moderate RV enlargement with hypertrophy and moderately reduced RVSF, grade II DD, Mod-severe TR, Mod MR, trivial pericardial effusion, PASP 87, CVP 15, LVIDd 5.62  -11/28/2018 RHC: RA: 14/ 15/ 11 RV: 112/ -4 PA: 117/ 31/ 59 PWP: 30/ 25/ 20, CO 5.48, CI 2.53, O2 sat: SVC 73, PA 64%, , SVR 1357 (on adempas)  - Resume home Adempas 2.5mg TID  - Utravi 1600 mcg BID   - Plan  for RHC on Monday if CVP <10, for further guidance of PH treatment. Plan to switch to Remodulin. Home xarelto switched to heparin IV in anticipation of RHC. IF CVP >10, would wait until adequate diuresis is achieved.     Atrial Flutter/ fibrillation  - patient has a history of known Atrial fibrillation s/p RITO/CV 9/30/14. In 2017 patient had wide complex tachycardia and received amiodarone and was started on daily regimen. EKG were reviewed by EP and rhythm was likely Aflutter. Patient underwent ablation in 2018. Last followed in EP clinic on 3/31/20, was told has high chances of developing Afib within next 5 years. Patient was in RVR on admission likely secondary to ADHF and electrolyte imbalance. Plan to continue diuresis and replace electrolytes, if Afib persists after adequate diuresis/ electrolyte replacement, will consider EP consult.  - patient is on chronic AC with xarelto, and heparin IV while inpateint  - plan to switch to amiodarone 400mg BID for 2 weeks followed by amiodarone 200mg daily    Type 2 diabetes mellitus with microalbuminuria, without long-term current use of insulin  - follow A1c  - Sliding scale and FS  - endocrine consult      Uninterrupted Critical Care/Counseling Time (not including procedures): 30 minutes.    Vikas Mendes PA-C  Heart Transplant  [facility]

## 2022-02-07 NOTE — ASSESSMENT & PLAN NOTE
-NICM   -2/5/22 TTE: EF 20, LV DD, Systolic flattening of IV septum consistent with RV pressure overload, small pericardial effusion, mild LA enlargement, severe RV enlargement with severely reduced RVSF, severe TR, PASP 73, CVP 15, LVIDd 7.7  -12/16/22 TTE: LV normal in size with moderately decreased systolic function, EF 35, Moderate RV enlargement with hypertrophy and moderately reduced RVSF, grade II DD, Mod-severe TR, Mod MR, trivial pericardial effusion, PASP 87, CVP 15, LVIDd 5.62  -11/28/2018 RHC: RA: 14/ 15/ 11 RV: 112/ -4 PA: 117/ 31/ 59 PWP: 30/ 25/ 20, CO 5.48, CI 2.53, O2 sat: SVC 73, PA 64%, , SVR 1357 (on adempas)  -Hypervolemic on exam today  -Home Diuretic Regimen: Bumex 2mg daily  -Inpatient Diuretic Regimen: CVP 15 today and UOP net negative: -2.3L. Continue Diuril 500mg IVP daily, Will give another lasix re-bolus 80mg IVP and continue Lasix drip at rate of 40mg/hr  -Emmy at 20ppm  -GDMT: Losartan 25mg daily (holding)  -2g Na dietary restriction, 1500 mL fluid restriction, strict I/Os  -CVP still elevated at 21 this AM. Continue aggressive diuresis for goal CVP <10 then plan for RHC.

## 2022-02-07 NOTE — PROGRESS NOTES
Update   LCSW went to check on Pt, however, Pt was asleep. LCSW will check on PT this week. SW providing ongoing psychosocial, counseling, & emotional support, education, resources, assistance, and discharge planning as indicated.  SW to continue to follow.

## 2022-02-07 NOTE — TELEPHONE ENCOUNTER
----- Message from Felicia Velasquez sent at 2/7/2022  9:59 AM CST -----  Regarding: pt  Pt is calling to cancel his up coming procedure appts can you please call pt at [phone].    ZULEMA

## 2022-02-08 PROBLEM — R18.8 ASCITES: Status: ACTIVE | Noted: 2022-02-08

## 2022-02-08 LAB
ALBUMIN SERPL BCP-MCNC: 3 G/DL (ref 3.5–5.2)
ALLENS TEST: ABNORMAL
ALLENS TEST: ABNORMAL
ALP SERPL-CCNC: 111 U/L (ref 55–135)
ALP SERPL-CCNC: 111 U/L (ref 55–135)
ALP SERPL-CCNC: 115 U/L (ref 55–135)
ALT SERPL W/O P-5'-P-CCNC: 5 U/L (ref 10–44)
ALT SERPL W/O P-5'-P-CCNC: 7 U/L (ref 10–44)
ALT SERPL W/O P-5'-P-CCNC: 8 U/L (ref 10–44)
ANION GAP SERPL CALC-SCNC: 11 MMOL/L (ref 8–16)
ANION GAP SERPL CALC-SCNC: 12 MMOL/L (ref 8–16)
ANION GAP SERPL CALC-SCNC: 12 MMOL/L (ref 8–16)
ANION GAP SERPL CALC-SCNC: 9 MMOL/L (ref 8–16)
APTT BLDCRRT: 45.6 SEC (ref 21–32)
AST SERPL-CCNC: 8 U/L (ref 10–40)
AST SERPL-CCNC: 9 U/L (ref 10–40)
AST SERPL-CCNC: 9 U/L (ref 10–40)
BACTERIA BLD CULT: NORMAL
BACTERIA BLD CULT: NORMAL
BASOPHILS # BLD AUTO: 0.01 K/UL (ref 0–0.2)
BASOPHILS NFR BLD: 0.1 % (ref 0–1.9)
BILIRUB DIRECT SERPL-MCNC: 1.3 MG/DL (ref 0.1–0.3)
BILIRUB SERPL-MCNC: 2.5 MG/DL (ref 0.1–1)
BILIRUB SERPL-MCNC: 2.6 MG/DL (ref 0.1–1)
BILIRUB SERPL-MCNC: 2.6 MG/DL (ref 0.1–1)
BNP SERPL-MCNC: 540 PG/ML (ref 0–99)
BUN SERPL-MCNC: 65 MG/DL (ref 8–23)
BUN SERPL-MCNC: 65 MG/DL (ref 8–23)
BUN SERPL-MCNC: 68 MG/DL (ref 8–23)
BUN SERPL-MCNC: 69 MG/DL (ref 8–23)
CALCIUM SERPL-MCNC: 8.5 MG/DL (ref 8.7–10.5)
CALCIUM SERPL-MCNC: 8.6 MG/DL (ref 8.7–10.5)
CALCIUM SERPL-MCNC: 8.8 MG/DL (ref 8.7–10.5)
CALCIUM SERPL-MCNC: 8.8 MG/DL (ref 8.7–10.5)
CHLORIDE SERPL-SCNC: 93 MMOL/L (ref 95–110)
CHLORIDE SERPL-SCNC: 94 MMOL/L (ref 95–110)
CHLORIDE SERPL-SCNC: 95 MMOL/L (ref 95–110)
CHLORIDE SERPL-SCNC: 95 MMOL/L (ref 95–110)
CO2 SERPL-SCNC: 26 MMOL/L (ref 23–29)
CO2 SERPL-SCNC: 27 MMOL/L (ref 23–29)
CO2 SERPL-SCNC: 27 MMOL/L (ref 23–29)
CO2 SERPL-SCNC: 28 MMOL/L (ref 23–29)
CREAT SERPL-MCNC: 1.5 MG/DL (ref 0.5–1.4)
CREAT SERPL-MCNC: 1.7 MG/DL (ref 0.5–1.4)
DELSYS: ABNORMAL
DELSYS: ABNORMAL
DIFFERENTIAL METHOD: ABNORMAL
EOSINOPHIL # BLD AUTO: 0 K/UL (ref 0–0.5)
EOSINOPHIL NFR BLD: 0 % (ref 0–8)
ERYTHROCYTE [DISTWIDTH] IN BLOOD BY AUTOMATED COUNT: 21.7 % (ref 11.5–14.5)
EST. GFR  (AFRICAN AMERICAN): 45.9 ML/MIN/1.73 M^2
EST. GFR  (AFRICAN AMERICAN): 53.4 ML/MIN/1.73 M^2
EST. GFR  (NON AFRICAN AMERICAN): 39.7 ML/MIN/1.73 M^2
EST. GFR  (NON AFRICAN AMERICAN): 46.2 ML/MIN/1.73 M^2
GLUCOSE SERPL-MCNC: 108 MG/DL (ref 70–110)
GLUCOSE SERPL-MCNC: 108 MG/DL (ref 70–110)
GLUCOSE SERPL-MCNC: 136 MG/DL (ref 70–110)
GLUCOSE SERPL-MCNC: 141 MG/DL (ref 70–110)
HCO3 UR-SCNC: 27 MMOL/L (ref 24–28)
HCO3 UR-SCNC: 27.5 MMOL/L (ref 24–28)
HCT VFR BLD AUTO: 29.4 % (ref 40–54)
HGB BLD-MCNC: 9.5 G/DL (ref 14–18)
IMM GRANULOCYTES # BLD AUTO: 0.2 K/UL (ref 0–0.04)
IMM GRANULOCYTES NFR BLD AUTO: 1.8 % (ref 0–0.5)
LYMPHOCYTES # BLD AUTO: 0.4 K/UL (ref 1–4.8)
LYMPHOCYTES NFR BLD: 3.1 % (ref 18–48)
MAGNESIUM SERPL-MCNC: 2.5 MG/DL (ref 1.6–2.6)
MAGNESIUM SERPL-MCNC: 2.5 MG/DL (ref 1.6–2.6)
MAGNESIUM SERPL-MCNC: 2.9 MG/DL (ref 1.6–2.6)
MCH RBC QN AUTO: 26.8 PG (ref 27–31)
MCHC RBC AUTO-ENTMCNC: 32.3 G/DL (ref 32–36)
MCV RBC AUTO: 83 FL (ref 82–98)
METHEMOGLOBIN: 0.3 % (ref 0–3)
MONOCYTES # BLD AUTO: 0.6 K/UL (ref 0.3–1)
MONOCYTES NFR BLD: 5 % (ref 4–15)
NEUTROPHILS # BLD AUTO: 10.2 K/UL (ref 1.8–7.7)
NEUTROPHILS NFR BLD: 90 % (ref 38–73)
NRBC BLD-RTO: 0 /100 WBC
PCO2 BLDA: 38.9 MMHG (ref 35–45)
PCO2 BLDA: 39.2 MMHG (ref 35–45)
PH SMN: 7.45 [PH] (ref 7.35–7.45)
PH SMN: 7.46 [PH] (ref 7.35–7.45)
PHOSPHATE SERPL-MCNC: 4.9 MG/DL (ref 2.7–4.5)
PLATELET # BLD AUTO: 309 K/UL (ref 150–450)
PMV BLD AUTO: 10 FL (ref 9.2–12.9)
PO2 BLDA: 25 MMHG (ref 40–60)
PO2 BLDA: 26 MMHG (ref 40–60)
POC BE: 3 MMOL/L
POC BE: 4 MMOL/L
POC SATURATED O2: 48 % (ref 95–100)
POC SATURATED O2: 51 % (ref 95–100)
POC TCO2: 28 MMOL/L (ref 24–29)
POC TCO2: 29 MMOL/L (ref 24–29)
POCT GLUCOSE: 122 MG/DL (ref 70–110)
POCT GLUCOSE: 128 MG/DL (ref 70–110)
POCT GLUCOSE: 134 MG/DL (ref 70–110)
POCT GLUCOSE: 135 MG/DL (ref 70–110)
POCT GLUCOSE: 139 MG/DL (ref 70–110)
POTASSIUM SERPL-SCNC: 3.3 MMOL/L (ref 3.5–5.1)
POTASSIUM SERPL-SCNC: 3.4 MMOL/L (ref 3.5–5.1)
POTASSIUM SERPL-SCNC: 4.5 MMOL/L (ref 3.5–5.1)
PROT SERPL-MCNC: 6.9 G/DL (ref 6–8.4)
PROT SERPL-MCNC: 6.9 G/DL (ref 6–8.4)
PROT SERPL-MCNC: 7 G/DL (ref 6–8.4)
RBC # BLD AUTO: 3.54 M/UL (ref 4.6–6.2)
SAMPLE: ABNORMAL
SAMPLE: ABNORMAL
SITE: ABNORMAL
SITE: ABNORMAL
SODIUM SERPL-SCNC: 128 MMOL/L (ref 136–145)
SODIUM SERPL-SCNC: 133 MMOL/L (ref 136–145)
SODIUM SERPL-SCNC: 134 MMOL/L (ref 136–145)
SODIUM SERPL-SCNC: 134 MMOL/L (ref 136–145)
WBC # BLD AUTO: 11.32 K/UL (ref 3.9–12.7)

## 2022-02-08 PROCEDURE — 25000003 PHARM REV CODE 250: Performed by: INTERNAL MEDICINE

## 2022-02-08 PROCEDURE — 25000003 PHARM REV CODE 250: Performed by: STUDENT IN AN ORGANIZED HEALTH CARE EDUCATION/TRAINING PROGRAM

## 2022-02-08 PROCEDURE — 63600175 PHARM REV CODE 636 W HCPCS: Performed by: STUDENT IN AN ORGANIZED HEALTH CARE EDUCATION/TRAINING PROGRAM

## 2022-02-08 PROCEDURE — 80053 COMPREHEN METABOLIC PANEL: CPT | Mod: 91 | Performed by: INTERNAL MEDICINE

## 2022-02-08 PROCEDURE — 99233 PR SUBSEQUENT HOSPITAL CARE,LEVL III: ICD-10-PCS | Mod: ,,, | Performed by: INTERNAL MEDICINE

## 2022-02-08 PROCEDURE — 94761 N-INVAS EAR/PLS OXIMETRY MLT: CPT

## 2022-02-08 PROCEDURE — 63600175 PHARM REV CODE 636 W HCPCS: Performed by: INTERNAL MEDICINE

## 2022-02-08 PROCEDURE — 80076 HEPATIC FUNCTION PANEL: CPT | Performed by: STUDENT IN AN ORGANIZED HEALTH CARE EDUCATION/TRAINING PROGRAM

## 2022-02-08 PROCEDURE — 84100 ASSAY OF PHOSPHORUS: CPT | Performed by: STUDENT IN AN ORGANIZED HEALTH CARE EDUCATION/TRAINING PROGRAM

## 2022-02-08 PROCEDURE — 63600175 PHARM REV CODE 636 W HCPCS

## 2022-02-08 PROCEDURE — 25000003 PHARM REV CODE 250: Performed by: PHYSICIAN ASSISTANT

## 2022-02-08 PROCEDURE — 20000000 HC ICU ROOM

## 2022-02-08 PROCEDURE — 99900035 HC TECH TIME PER 15 MIN (STAT)

## 2022-02-08 PROCEDURE — 83735 ASSAY OF MAGNESIUM: CPT | Performed by: STUDENT IN AN ORGANIZED HEALTH CARE EDUCATION/TRAINING PROGRAM

## 2022-02-08 PROCEDURE — 27000221 HC OXYGEN, UP TO 24 HOURS

## 2022-02-08 PROCEDURE — 83735 ASSAY OF MAGNESIUM: CPT | Mod: 91 | Performed by: INTERNAL MEDICINE

## 2022-02-08 PROCEDURE — 63600367 HC NITRIC OXIDE PER HOUR

## 2022-02-08 PROCEDURE — 83050 HGB METHEMOGLOBIN QUAN: CPT

## 2022-02-08 PROCEDURE — 84132 ASSAY OF SERUM POTASSIUM: CPT | Performed by: PHYSICIAN ASSISTANT

## 2022-02-08 PROCEDURE — 63600175 PHARM REV CODE 636 W HCPCS: Performed by: PHYSICIAN ASSISTANT

## 2022-02-08 PROCEDURE — 99291 PR CRITICAL CARE, E/M 30-74 MINUTES: ICD-10-PCS | Mod: ,,, | Performed by: PHYSICIAN ASSISTANT

## 2022-02-08 PROCEDURE — 99291 CRITICAL CARE FIRST HOUR: CPT | Mod: ,,, | Performed by: PHYSICIAN ASSISTANT

## 2022-02-08 PROCEDURE — 99233 SBSQ HOSP IP/OBS HIGH 50: CPT | Mod: ,,, | Performed by: INTERNAL MEDICINE

## 2022-02-08 PROCEDURE — 80048 BASIC METABOLIC PNL TOTAL CA: CPT | Performed by: INTERNAL MEDICINE

## 2022-02-08 PROCEDURE — 85025 COMPLETE CBC W/AUTO DIFF WBC: CPT | Performed by: STUDENT IN AN ORGANIZED HEALTH CARE EDUCATION/TRAINING PROGRAM

## 2022-02-08 PROCEDURE — 85730 THROMBOPLASTIN TIME PARTIAL: CPT | Performed by: STUDENT IN AN ORGANIZED HEALTH CARE EDUCATION/TRAINING PROGRAM

## 2022-02-08 PROCEDURE — 80053 COMPREHEN METABOLIC PANEL: CPT | Performed by: STUDENT IN AN ORGANIZED HEALTH CARE EDUCATION/TRAINING PROGRAM

## 2022-02-08 RX ORDER — TALC
6 POWDER (GRAM) TOPICAL NIGHTLY PRN
Status: DISCONTINUED | OUTPATIENT
Start: 2022-02-08 | End: 2022-02-13 | Stop reason: HOSPADM

## 2022-02-08 RX ORDER — POTASSIUM CHLORIDE 29.8 MG/ML
40 INJECTION INTRAVENOUS 2 TIMES DAILY
Status: DISCONTINUED | OUTPATIENT
Start: 2022-02-08 | End: 2022-02-08

## 2022-02-08 RX ORDER — FUROSEMIDE 10 MG/ML
120 INJECTION INTRAMUSCULAR; INTRAVENOUS ONCE
Status: COMPLETED | OUTPATIENT
Start: 2022-02-08 | End: 2022-02-08

## 2022-02-08 RX ORDER — MAGNESIUM SULFATE HEPTAHYDRATE 40 MG/ML
2 INJECTION, SOLUTION INTRAVENOUS ONCE
Status: COMPLETED | OUTPATIENT
Start: 2022-02-08 | End: 2022-02-08

## 2022-02-08 RX ORDER — POTASSIUM CHLORIDE 29.8 MG/ML
40 INJECTION INTRAVENOUS ONCE
Status: COMPLETED | OUTPATIENT
Start: 2022-02-08 | End: 2022-02-08

## 2022-02-08 RX ORDER — POTASSIUM CHLORIDE 20 MEQ/1
40 TABLET, EXTENDED RELEASE ORAL 3 TIMES DAILY
Status: DISCONTINUED | OUTPATIENT
Start: 2022-02-08 | End: 2022-02-08

## 2022-02-08 RX ORDER — AMIODARONE HYDROCHLORIDE 50 MG/ML
150 INJECTION, SOLUTION INTRAVENOUS ONCE
Status: COMPLETED | OUTPATIENT
Start: 2022-02-08 | End: 2022-02-08

## 2022-02-08 RX ORDER — POTASSIUM CHLORIDE 29.8 MG/ML
40 INJECTION INTRAVENOUS 2 TIMES DAILY
Status: DISCONTINUED | OUTPATIENT
Start: 2022-02-08 | End: 2022-02-13

## 2022-02-08 RX ORDER — POTASSIUM CHLORIDE 20 MEQ/1
40 TABLET, EXTENDED RELEASE ORAL ONCE
Status: COMPLETED | OUTPATIENT
Start: 2022-02-08 | End: 2022-02-08

## 2022-02-08 RX ADMIN — POTASSIUM CHLORIDE 40 MEQ: 1500 TABLET, EXTENDED RELEASE ORAL at 02:02

## 2022-02-08 RX ADMIN — FERROUS SULFATE TAB 325 MG (65 MG ELEMENTAL FE) 1 EACH: 325 (65 FE) TAB at 09:02

## 2022-02-08 RX ADMIN — FUROSEMIDE 40 MG/HR: 10 INJECTION, SOLUTION INTRAMUSCULAR; INTRAVENOUS at 01:02

## 2022-02-08 RX ADMIN — MUPIROCIN: 20 OINTMENT TOPICAL at 09:02

## 2022-02-08 RX ADMIN — SIMETHICONE 80 MG: 80 TABLET, CHEWABLE ORAL at 10:02

## 2022-02-08 RX ADMIN — IPRATROPIUM BROMIDE 2 SPRAY: 42 SPRAY, METERED NASAL at 08:02

## 2022-02-08 RX ADMIN — MUPIROCIN: 20 OINTMENT TOPICAL at 08:02

## 2022-02-08 RX ADMIN — HEPARIN SODIUM 22 UNITS/KG/HR: 5000 INJECTION INTRAVENOUS; SUBCUTANEOUS at 10:02

## 2022-02-08 RX ADMIN — FUROSEMIDE 160 MG: 10 INJECTION, SOLUTION INTRAVENOUS at 10:02

## 2022-02-08 RX ADMIN — POTASSIUM CHLORIDE 40 MEQ: 1500 TABLET, EXTENDED RELEASE ORAL at 05:02

## 2022-02-08 RX ADMIN — MAGNESIUM SULFATE IN WATER 2 G: 40 INJECTION, SOLUTION INTRAVENOUS at 03:02

## 2022-02-08 RX ADMIN — SIMETHICONE 80 MG: 80 TABLET, CHEWABLE ORAL at 05:02

## 2022-02-08 RX ADMIN — FUROSEMIDE 120 MG: 10 INJECTION, SOLUTION INTRAMUSCULAR; INTRAVENOUS at 01:02

## 2022-02-08 RX ADMIN — FUROSEMIDE 40 MG/HR: 10 INJECTION, SOLUTION INTRAMUSCULAR; INTRAVENOUS at 03:02

## 2022-02-08 RX ADMIN — IPRATROPIUM BROMIDE 2 SPRAY: 42 SPRAY, METERED NASAL at 09:02

## 2022-02-08 RX ADMIN — CHLOROTHIAZIDE SODIUM 500 MG: 500 INJECTION, POWDER, LYOPHILIZED, FOR SOLUTION INTRAVENOUS at 09:02

## 2022-02-08 RX ADMIN — RIOCIGUAT 2.5 MG: 2.5 TABLET, FILM COATED ORAL at 02:02

## 2022-02-08 RX ADMIN — POTASSIUM CHLORIDE 40 MEQ: 400 INJECTION, SOLUTION INTRAVENOUS at 08:02

## 2022-02-08 RX ADMIN — AMIODARONE HYDROCHLORIDE 150 MG: 50 INJECTION, SOLUTION INTRAVENOUS at 04:02

## 2022-02-08 RX ADMIN — RIOCIGUAT 2.5 MG: 2.5 TABLET, FILM COATED ORAL at 08:02

## 2022-02-08 RX ADMIN — SIMETHICONE 80 MG: 80 TABLET, CHEWABLE ORAL at 06:02

## 2022-02-08 RX ADMIN — CHLOROTHIAZIDE SODIUM 500 MG: 500 INJECTION, POWDER, LYOPHILIZED, FOR SOLUTION INTRAVENOUS at 11:02

## 2022-02-08 RX ADMIN — IPRATROPIUM BROMIDE 2 SPRAY: 42 SPRAY, METERED NASAL at 04:02

## 2022-02-08 RX ADMIN — CHLOROTHIAZIDE SODIUM 500 MG: 500 INJECTION, POWDER, LYOPHILIZED, FOR SOLUTION INTRAVENOUS at 10:02

## 2022-02-08 RX ADMIN — POTASSIUM CHLORIDE 40 MEQ: 400 INJECTION, SOLUTION INTRAVENOUS at 03:02

## 2022-02-08 RX ADMIN — PANTOPRAZOLE SODIUM 40 MG: 40 TABLET, DELAYED RELEASE ORAL at 09:02

## 2022-02-08 RX ADMIN — AMIODARONE HYDROCHLORIDE 400 MG: 200 TABLET ORAL at 09:02

## 2022-02-08 RX ADMIN — AMIODARONE HYDROCHLORIDE 1 MG/MIN: 50 INJECTION, SOLUTION INTRAVENOUS at 10:02

## 2022-02-08 RX ADMIN — AMIODARONE HYDROCHLORIDE 150 MG: 1.5 INJECTION, SOLUTION INTRAVENOUS at 03:02

## 2022-02-08 RX ADMIN — IPRATROPIUM BROMIDE 2 SPRAY: 42 SPRAY, METERED NASAL at 01:02

## 2022-02-08 RX ADMIN — RIOCIGUAT 2.5 MG: 2.5 TABLET, FILM COATED ORAL at 09:02

## 2022-02-08 RX ADMIN — AMIODARONE HYDROCHLORIDE 1 MG/MIN: 50 INJECTION, SOLUTION INTRAVENOUS at 04:02

## 2022-02-08 RX ADMIN — Medication 6 MG: at 10:02

## 2022-02-08 NOTE — PROGRESS NOTES
[facility]  Heart Transplant  Progress Note    Patient Name: Ambrosio Bray III  MRN: [id number]  Admission Date: 2/3/2022  Hospital Length of Stay: 5 days  Attending Physician: Roland George MD  Primary Care Provider: Luciana Cisse MD  Principal Problem:Acute decompensated heart failure    Subjective:     Interval History:   No acute events or complaints this AM. CVP is back to being 18. UOP: 3.4L -1.8L net negative. Plan to give Diuril 500mg IVP BID today, Will bolus with lasix 120 mg again and continue drip at current rate 40mg/hr. Will continue Emmy 20ppm. Trying to get CVP to <10 so that we can get accurate RHC. SvO2 51%, CO 5.5, CI 2.6 . Has significant ascites.     Lines: RIJ 2/3/22    Continuous Infusions:   sodium chloride 0.9% Stopped (02/07/22 2109)    furosemide (LASIX) 10 mg/mL infusion (non-titrating) 40 mg/hr (02/08/22 0905)    heparin (porcine) in D5W 22 Units/kg/hr (02/08/22 1017)    nitric oxide gas       Scheduled Meds:   amiodarone  400 mg Oral BID    Followed by    [START ON 2/16/2022] amiodarone  200 mg Oral Daily    chlorothiazide (DIURIL) IVPB  500 mg Intravenous BID    ferrous sulfate  1 tablet Oral Daily    ipratropium  2 spray Each Nostril QID    mupirocin   Nasal BID    pantoprazole  40 mg Oral Daily    potassium chloride  40 mEq Oral Once    potassium chloride  40 mEq Oral TID    riociguat  2.5 mg Oral TID    selexipag  1,600 mcg Oral BID    simethicone  1 tablet Oral Q6H     PRN Meds:dextrose 10%, dextrose 10%, glucagon (human recombinant), glucose, glucose, guaiFENesin, insulin aspart U-100, ondansetron, sodium chloride 0.9%    Review of patient's allergies indicates:  No Known Allergies  Objective:     Vital Signs (Most Recent):  Temp: 97.5 °F (36.4 °C) (02/08/22 0705)  Pulse: 96 (02/08/22 1105)  Resp: 15 (02/08/22 1105)  BP: 99/62 (02/08/22 1105)  SpO2: (!) 94 % (02/08/22 1105) Vital Signs (24h Range):  Temp:  [97.4 °F (36.3  °C)-97.7 °F (36.5 °C)] 97.5 °F (36.4 °C)  Pulse:  [] 96  Resp:  [11-50] 15  SpO2:  [87 %-96 %] 94 %  BP: ()/(52-71) 99/62     No data found.  Body mass index is 24.69 kg/m².      Intake/Output Summary (Last 24 hours) at 2/8/2022 1244  Last data filed at 2/8/2022 0905  Gross per 24 hour   Intake 1691.22 ml   Output 2550 ml   Net -858.78 ml     Physical Exam  Constitutional:       Comments: Patient is alert and oriented, appears comfortable, does not appear to be in respiratory distress. On Emmy 20ppm with 6L NC.   HENT:      Head: Normocephalic and atraumatic.   Neck:      Comments: +JVD  Cardiovascular:      Rate and Rhythm: Tachycardia present. Rhythm irregular.      Pulses: Normal pulses.      Heart sounds: Murmur heard.    Decrescendo systolic murmur is present.       Comments: Afib w/ RVR on tele  Pulmonary:      Effort: Pulmonary effort is normal. No respiratory distress.      Breath sounds: Normal breath sounds.      Comments: Bibasilar crackles R > L  Abdominal:      General: There is distension.      Comments: Abdomen distended w/ fluids.   Musculoskeletal:         General: Normal range of motion.      Cervical back: Normal range of motion and neck supple.      Comments: BL 3+ edema in LE improving   Skin:     General: Skin is warm and dry.   Neurological:      General: No focal deficit present.      Mental Status: He is alert and oriented to person, place, and time.   Psychiatric:         Mood and Affect: Mood normal.         Behavior: Behavior normal.       Significant Labs:  CBC:  Recent Labs   Lab 02/06/22  0818 02/07/22  0511 02/08/22  0305   WBC 10.38 11.15 11.32   RBC 3.42* 3.36* 3.54*   HGB 9.5* 9.2* 9.5*   HCT 28.6* 28.3* 29.4*    294 309   MCV 84 84 83   MCH 27.8 27.4 26.8*   MCHC 33.2 32.5 32.3     BNP:  Recent Labs   Lab 02/03/22  0928 02/05/22  0255 02/07/22  2333   * 595* 540*     CMP:  Recent Labs   Lab 02/07/22  0511 02/07/22  0511 02/07/22  1929 02/08/22  0301  02/08/22  0520   *  115*  --  117* 108  108  --    CALCIUM 8.5*  8.5*  --  8.9 8.8  8.8  --    ALBUMIN 2.9*  2.9*   < > 2.9* 3.0* 3.0*   PROT 6.6  6.6   < > 6.9 6.9 6.9   *  133*  --  134* 134*  134*  --    K 3.3*  3.3*  --  2.6* 3.4*  3.4*  --    CO2 22*  22*  --  27 27  27  --    CL 98  98  --  94* 95  95  --    BUN 66*  66*  --  65* 65*  65*  --    CREATININE 1.4  1.4  --  1.4 1.5*  1.5*  --    ALKPHOS 116  116   < > 115 111 111   ALT 6*  6*   < > 8* 8* 7*   AST 11  11   < > 12 9* 9*   BILITOT 2.3*  2.3*   < > 2.5* 2.6* 2.6*    < > = values in this interval not displayed.      Coagulation:   Recent Labs   Lab 02/03/22 2216 02/03/22  2216 02/04/22 2010 02/05/22  0255 02/07/22  1332 02/07/22  1929 02/08/22  0305   INR 1.5*  --  1.6*  --   --   --   --    APTT 28.7   < > 30.9   < > 53.7* 54.5* 45.6*    < > = values in this interval not displayed.     LDH:  No results for input(s): LDH in the last 72 hours.  Microbiology:  Microbiology Results (last 7 days)     Procedure Component Value Units Date/Time    Blood culture #1 **CANNOT BE ORDERED STAT** [[id number]] Collected: 02/03/22 0940    Order Status: Completed Specimen: Blood from Peripheral, Forearm, Right Updated: 02/08/22 1212     Blood Culture, Routine No growth after 5 days.    Blood culture #2 **CANNOT BE ORDERED STAT** [[id number]] Collected: 02/03/22 0929    Order Status: Completed Specimen: Blood from Peripheral, Antecubital, Left Updated: 02/08/22 1212     Blood Culture, Routine No growth after 5 days.    Respiratory Infection Panel (PCR), Nasopharyngeal [[id number]]     Order Status: No result Specimen: Nasopharyngeal Swab         Estimated Creatinine Clearance: 51 mL/min (A) (based on SCr of 1.5 mg/dL (H)).    Diagnostic Results:  I have reviewed and interpreted all pertinent imaging results/findings within the past 24 hours.    Assessment and Plan:     71 y.o M with history of HF, PH (diagnosed in 2018) on [facility],  Uptravi, CTEPH on home oxygen 2L NC, Atrial Flutter s/p ablation 2020, DM, BPH, HLD, Anemia, PTSD, Pulmonary hyperinflation presents to ED with respiratory distress and worsening shortness of breath for past 4 days. Patient reports he was in his usual state of health until 4 days ago when he developed SOB while ambulating to use restroom. Patient reports he had removed his oxygen to use restroom and while ambulating to restroom he became lightheaded, dizzy and in respiratory distress and reports having a fall and syncopized. He reports he woke up after 30 seconds, did not hit his head. Patient reports since the past four days he has also been having increasing loose BM more than his usual, reports feeling dehydrated with increased fluid intake. Patient reports he recently followed up with pulmonologist in clinic and his home Bumex 2mg MWF was increased to daily and was prescribed home oxygen. Patient reports that has helped relieve his SOB. Patient denies chest pain, further episodes of syncope, change in vision, slurred speech, palpitations, headache, change in urinary habits, cough, fever.     Home Medications  - Bumex 2mg daily  - Adempas 2.5mg TID  - Uptravi 1600 mcg BID (to start on 3/21/22)  - Xarelto 20mg daily  - Losartan 25mg daily  - Flomax 0.4mg  - Lipitor 40mg daily  - Atrovent  - Ferrous Sulfate     ED Course      Vitals:     02/03/22 1332   BP: 98/65   Pulse: 104   Resp: (!) 23   Temp:       Pertinent Labs  CBC: WBC 8.93, Hbg 10.3, Hct 32.7, Plt 270  BMP: Na 139, K 3.7, Cl 104, CO2 25, BUN 16, Cr. 0.8,   LFT: TP 7.2, Alb, 3.6, TB 2.9, AST 11, ALT 8  Free T4: 0.88  Lactate 1.7, Troponin 0.012,   EKG: Afib with RVR at a rate of 173, RBB  CXR: bilateral opacities, pulmonary congestion  ED Management  -Lopressor Tartrate 50mg q6h  -Diltiazem 25 mg IVP  -Lasix 80mg IVP      * Acute decompensated heart failure  -NICM   -2/5/22 TTE: EF 20, LV DD, Systolic flattening of IV septum consistent with RV  pressure overload, small pericardial effusion, mild LA enlargement, severe RV enlargement with severely reduced RVSF, severe TR, PASP 73, CVP 15, LVIDd 7.7  -12/16/22 TTE: LV normal in size with moderately decreased systolic function, EF 35, Moderate RV enlargement with hypertrophy and moderately reduced RVSF, grade II DD, Mod-severe TR, Mod MR, trivial pericardial effusion, PASP 87, CVP 15, LVIDd 5.62  -11/28/2018 RHC: RA: 14/ 15/ 11 RV: 112/ -4 PA: 117/ 31/ 59 PWP: 30/ 25/ 20, CO 5.48, CI 2.53, O2 sat: SVC 73, PA 64%, , SVR 1357 (on adempas)  -Hypervolemic on exam today  -Home Diuretic Regimen: Bumex 2mg daily  -Inpatient Diuretic Regimen: CVP 15 today and UOP net negative: -2.3L. Continue Diuril 500mg IVP daily, Will give another lasix re-bolus 80mg IVP and continue Lasix drip at rate of 40mg/hr  -Emmy at 20ppm  -GDMT: Losartan 25mg daily (holding)  -2g Na dietary restriction, 1500 mL fluid restriction, strict I/Os  -CVP still elevated at 21 this AM. Continue aggressive diuresis for goal CVP <10 then plan for RHC.     Ascites  - liver with cirrhotic appearance on past CT scans.   -will have IR do a paracentesis today for theapeutic and diagnostic purposes    Iron deficiency anemia due to chronic blood loss  - monitor H/H  - resume home iron therapy    CTEPH (chronic thromboembolic pulmonary hypertension)  - resume home eliquis  - please see plan for PH    Pulmonary HTN  -12/16/22 TTE: LV normal in size with moderately decreased systolic function, EF 35, Moderate RV enlargement with hypertrophy and moderately reduced RVSF, grade II DD, Mod-severe TR, Mod MR, trivial pericardial effusion, PASP 87, CVP 15, LVIDd 5.62  -11/28/2018 RHC: RA: 14/ 15/ 11 RV: 112/ -4 PA: 117/ 31/ 59 PWP: 30/ 25/ 20, CO 5.48, CI 2.53, O2 sat: SVC 73, PA 64%, , SVR 1357 (on adempas)  - Resume home Adempas 2.5mg TID  - Utravi 1600 mcg BID   - Plan for RHC once CVP <10, for further guidance of PH treatment. Plan to switch to  Remodulin. Home xarelto switched to heparin IV in anticipation of RHC. IF CVP >10, would wait until adequate diuresis is achieved. Will continue aggressive diuresis     Atrial Flutter/ fibrillation  - Patient has a history of known Atrial fibrillation s/p RITO/CV 9/30/14. In 2017 patient had wide complex tachycardia and received amiodarone and was started on daily regimen. EKG were reviewed by EP and rhythm was likely Aflutter. Patient underwent ablation in 2018. Last followed in EP clinic on 3/31/20, was told has high chances of developing Afib within next 5 years. Patient was in RVR on admission likely secondary to ADHF and electrolyte imbalance. Plan to continue diuresis and replace electrolytes, if Afib persists after adequate diuresis/ electrolyte replacement, will consider EP consult.  - patient is on chronic AC with xarelto, and heparin IV while inpateint  - plan to switch to amiodarone 400mg BID for 2 weeks followed by amiodarone 200mg daily    Type 2 diabetes mellitus with microalbuminuria, without long-term current use of insulin  - follow A1c  - Sliding scale and FS  - endocrine consult      Uninterrupted Critical Care/Counseling Time (not including procedures): 30 minutes.     Vikas Mendes PA-C  Heart Transplant  [facility]

## 2022-02-08 NOTE — PHYSICIAN QUERY
PT Name: Ambrosio Bray III  MR #: [id number]     DOCUMENTATION CLARIFICATION     CDS/: Shazia Timmons RN, CCDS             Contact information: [email or web address]  This form is a permanent document in the medical record.     Query Date: February 8, 2022    By submitting this query, we are merely seeking further clarification of documentation.  Please utilize your independent clinical judgment when addressing the question(s) below.    The Medical Record contains the following   Indicators Supporting Clinical Findings Location in Medical Record   X Heart Failure documented Acute decompensated heart failure 2/3 h/p, 2/4-2/7 prog notes   X  2/3 lab   X EF/Echo The left ventricle is mildly enlarged with eccentric hypertrophy and severely decreased systolic function.  The estimated ejection fraction is 20%.  Left ventricular diastolic dysfunction.  There is abnormal septal wall motion. There is systolic flattening of the interventricular septum consistent with right ventricle pressure overload.  Small pericardial effusion.  Mild left atrial enlargement.  There is a large left pleural effusion vs. ascites with organized fibrinous material.  Severe right ventricular enlargement with severely reduced right ventricular systolic function.  Mild-to-moderate mitral regurgitation.  Severe tricuspid regurgitation.  Severe right atrial enlargement.  Elevated central venous pressure (15 mmHg).  The estimated PA systolic pressure is 73 mmHg.     2/3 echo   X Radiology findings Mild pulmonary edema, likely cardiogenic.    2/3 cxr   X Subjective/Objective Respiratory Conditions presents to ED with respiratory distress and worsening shortness of breath for past 4 days 2/3 h/p    Recent/Current MI      Heart Transplant, LVAD      Edema, JVD     X Ascites Reviewed record and noted CT with ascites but no history of paracentesis 2/3 ed note   X Diuretics/Meds give lasix 80mg IVP bolus and start lasix 20mg/hr, will start Emmy at  20ppm 2/3 h/p   X Other Treatment 2g Na dietary restriction, 1500 mL fluid restriction, strict I/Os 2/3 h/p    Other       Heart failure is a clinical diagnosis which includes symptomatic fluid retention, elevated intracardiac pressures, and/or the inability of the heart to deliver adequate blood flow.    Heart Failure with reduced Ejection Fraction (HFrEF) or Systolic Heart Failure (loses ability to contract normally, EF is <40%)    Heart Failure with preserved Ejection Fraction (HFpEF) or Diastolic Heart Failure (stiff ventricles, does not relax properly, EF is >50%)     Heart Failure with Combined Systolic and Diastolic Failure (stiff ventricles, does not relax properly and EF is <50%)    Mid-range or mildly reduced ejection fraction (HFmrEF) is classified as systolic heart failure.   Common clues to acute exacerbation:  Rapidly progressive symptoms (w/in 2 weeks of presentation), using IV diuretics, using supplemental O2, pulmonary edema on Xray, new or worsening pleural effusion, +JVD or other signs of volume overload, MI w/in 4 weeks, and/or BNP >500  The clinical guidelines noted are only system guidelines, and do not replace the providers clinical judgment.    Provider, please specify the type of ADHF.    [ x  ]  Acute on Chronic Combined Systolic and Diastolic Heart Failure - worsening of CHF signs/symptoms in preexisting CHF   [   ]  Other (please specify): ___________________________________   [  ]  Clinically Undetermined       Please document in your progress notes daily for the duration of treatment until resolved and include in your discharge summary.    References:  American Heart Association editorial staff. (2017, May). Ejection Fraction Heart Failure Measurement. American Heart Association.  https://www.heart.org/en/health-topics/heart-failure/diagnosing-heart-failure/ejection-fraction-heart-failure-measurement#:~:text=Ejection%20fraction%20(EF)%20is%20a,pushed%20out%20with%20each%20heartbeat  TROY Raymundo (2020, December 15). Heart failure with preserved ejection fraction: Clinical manifestations and diagnosis. [employer]. https://www.[employer].Sh[employer]unch/contents/heart-failure-with-preserved-ejection-fraction-clinical-manifestations-and-diagnosis.  ICD-10-CM/PCS Coding Clinic Third Quarter ICD-10, Effective with discharges: September 8, 2020 [facility] § Heart failure with mid-range or mildly reduced ejection fraction (2020).  Form No. 81367

## 2022-02-08 NOTE — ASSESSMENT & PLAN NOTE
- liver with cirrhotic appearance on past CT scans.   -will have IR do a paracentesis today for theapeutic and diagnostic purposes

## 2022-02-08 NOTE — PROGRESS NOTES
Pt having runs of VTAC. Currently he is asymptomatic. Dr Mendes with Cardiology called and notified. -130's. BP 80-90's Systolic. EKG done.     MD at bedside to assess pt and Amio bolus ordered at this time.     Will continue to monitor closely.

## 2022-02-08 NOTE — PROGRESS NOTES
Update    Pt presents as AAO x4, calm, cooperative, and asking and answering questions appropriately. LCSW met with Pt today who states he is doing well and the team is trying to get fluid off of him. Pt states he has talked to his girlfriend on the phone. Pt had no additional needs during visit. SW providing ongoing psychosocial, counseling, & emotional support, education, resources, assistance, and discharge planning as indicated.  SW to continue to follow.

## 2022-02-08 NOTE — SUBJECTIVE & OBJECTIVE
Interval History:   No acute events or complaints this AM. CVP is back to being 18. UOP: 3.4L -1.8L net negative. Plan to give Diuril 500mg IVP BID today, Will bolus with lasix 120 mg again and continue drip at current rate 40mg/hr. Will continue Emmy 20ppm. Trying to get CVP to <10 so that we can get accurate RHC. SvO2 51%, CO 5.5, CI 2.6 . Has significant ascites.     Lines: RIJ 2/3/22    Continuous Infusions:   sodium chloride 0.9% Stopped (02/07/22 2109)    furosemide (LASIX) 10 mg/mL infusion (non-titrating) 40 mg/hr (02/08/22 0905)    heparin (porcine) in D5W 22 Units/kg/hr (02/08/22 1017)    nitric oxide gas       Scheduled Meds:   amiodarone  400 mg Oral BID    Followed by    [START ON 2/16/2022] amiodarone  200 mg Oral Daily    chlorothiazide (DIURIL) IVPB  500 mg Intravenous BID    ferrous sulfate  1 tablet Oral Daily    ipratropium  2 spray Each Nostril QID    mupirocin   Nasal BID    pantoprazole  40 mg Oral Daily    potassium chloride  40 mEq Oral Once    potassium chloride  40 mEq Oral TID    riociguat  2.5 mg Oral TID    selexipag  1,600 mcg Oral BID    simethicone  1 tablet Oral Q6H     PRN Meds:dextrose 10%, dextrose 10%, glucagon (human recombinant), glucose, glucose, guaiFENesin, insulin aspart U-100, ondansetron, sodium chloride 0.9%    Review of patient's allergies indicates:  No Known Allergies  Objective:     Vital Signs (Most Recent):  Temp: 97.5 °F (36.4 °C) (02/08/22 0705)  Pulse: 96 (02/08/22 1105)  Resp: 15 (02/08/22 1105)  BP: 99/62 (02/08/22 1105)  SpO2: (!) 94 % (02/08/22 1105) Vital Signs (24h Range):  Temp:  [97.4 °F (36.3 °C)-97.7 °F (36.5 °C)] 97.5 °F (36.4 °C)  Pulse:  [] 96  Resp:  [11-50] 15  SpO2:  [87 %-96 %] 94 %  BP: ()/(52-71) 99/62     No data found.  Body mass index is 24.69 kg/m².      Intake/Output Summary (Last 24 hours) at 2/8/2022 1244  Last data filed at 2/8/2022 0905  Gross per 24 hour   Intake 1691.22 ml   Output 2550 ml   Net  -858.78 ml     Physical Exam  Constitutional:       Comments: Patient is alert and oriented, appears comfortable, does not appear to be in respiratory distress. On Emmy 20ppm with 6L NC.   HENT:      Head: Normocephalic and atraumatic.   Neck:      Comments: +JVD  Cardiovascular:      Rate and Rhythm: Tachycardia present. Rhythm irregular.      Pulses: Normal pulses.      Heart sounds: Murmur heard.    Decrescendo systolic murmur is present.       Comments: Afib w/ RVR on tele  Pulmonary:      Effort: Pulmonary effort is normal. No respiratory distress.      Breath sounds: Normal breath sounds.      Comments: Bibasilar crackles R > L  Abdominal:      General: There is distension.      Comments: Abdomen distended w/ fluids.   Musculoskeletal:         General: Normal range of motion.      Cervical back: Normal range of motion and neck supple.      Comments: BL 3+ edema in LE improving   Skin:     General: Skin is warm and dry.   Neurological:      General: No focal deficit present.      Mental Status: He is alert and oriented to person, place, and time.   Psychiatric:         Mood and Affect: Mood normal.         Behavior: Behavior normal.       Significant Labs:  CBC:  Recent Labs   Lab 02/06/22  0818 02/07/22  0511 02/08/22  0305   WBC 10.38 11.15 11.32   RBC 3.42* 3.36* 3.54*   HGB 9.5* 9.2* 9.5*   HCT 28.6* 28.3* 29.4*    294 309   MCV 84 84 83   MCH 27.8 27.4 26.8*   MCHC 33.2 32.5 32.3     BNP:  Recent Labs   Lab 02/03/22  0928 02/05/22  0255 02/07/22  2333   * 595* 540*     CMP:  Recent Labs   Lab 02/07/22  0511 02/07/22  0511 02/07/22  1929 02/08/22  0305 02/08/22  0520   *  115*  --  117* 108  108  --    CALCIUM 8.5*  8.5*  --  8.9 8.8  8.8  --    ALBUMIN 2.9*  2.9*   < > 2.9* 3.0* 3.0*   PROT 6.6  6.6   < > 6.9 6.9 6.9   *  133*  --  134* 134*  134*  --    K 3.3*  3.3*  --  2.6* 3.4*  3.4*  --    CO2 22*  22*  --  27 27  27  --    CL 98  98  --  94* 95  95  --    BUN  66*  66*  --  65* 65*  65*  --    CREATININE 1.4  1.4  --  1.4 1.5*  1.5*  --    ALKPHOS 116  116   < > 115 111 111   ALT 6*  6*   < > 8* 8* 7*   AST 11  11   < > 12 9* 9*   BILITOT 2.3*  2.3*   < > 2.5* 2.6* 2.6*    < > = values in this interval not displayed.      Coagulation:   Recent Labs   Lab 02/03/22  2216 02/03/22  2216 02/04/22 2010 02/05/22  0255 02/07/22  1332 02/07/22  1929 02/08/22  0305   INR 1.5*  --  1.6*  --   --   --   --    APTT 28.7   < > 30.9   < > 53.7* 54.5* 45.6*    < > = values in this interval not displayed.     LDH:  No results for input(s): LDH in the last 72 hours.  Microbiology:  Microbiology Results (last 7 days)     Procedure Component Value Units Date/Time    Blood culture #1 **CANNOT BE ORDERED STAT** [[id number]] Collected: 02/03/22 0940    Order Status: Completed Specimen: Blood from Peripheral, Forearm, Right Updated: 02/08/22 1212     Blood Culture, Routine No growth after 5 days.    Blood culture #2 **CANNOT BE ORDERED STAT** [[id number]] Collected: 02/03/22 0929    Order Status: Completed Specimen: Blood from Peripheral, Antecubital, Left Updated: 02/08/22 1212     Blood Culture, Routine No growth after 5 days.    Respiratory Infection Panel (PCR), Nasopharyngeal [[id number]]     Order Status: No result Specimen: Nasopharyngeal Swab         Estimated Creatinine Clearance: 51 mL/min (A) (based on SCr of 1.5 mg/dL (H)).    Diagnostic Results:  I have reviewed and interpreted all pertinent imaging results/findings within the past 24 hours.

## 2022-02-08 NOTE — ASSESSMENT & PLAN NOTE
-12/16/22 TTE: LV normal in size with moderately decreased systolic function, EF 35, Moderate RV enlargement with hypertrophy and moderately reduced RVSF, grade II DD, Mod-severe TR, Mod MR, trivial pericardial effusion, PASP 87, CVP 15, LVIDd 5.62  -11/28/2018 RHC: RA: 14/ 15/ 11 RV: 112/ -4 PA: 117/ 31/ 59 PWP: 30/ 25/ 20, CO 5.48, CI 2.53, O2 sat: SVC 73, PA 64%, , SVR 1357 (on adempas)  - Resume home Adempas 2.5mg TID  - Utravi 1600 mcg BID   - Plan for RHC once CVP <10, for further guidance of PH treatment. Plan to switch to Remodulin. Home xarelto switched to heparin IV in anticipation of RHC. IF CVP >10, would wait until adequate diuresis is achieved. Will continue aggressive diuresis

## 2022-02-09 LAB
ALBUMIN FLD-MCNC: 1.8 G/DL
ALBUMIN SERPL BCP-MCNC: 3 G/DL (ref 3.5–5.2)
ALBUMIN SERPL BCP-MCNC: 3 G/DL (ref 3.5–5.2)
ALLENS TEST: ABNORMAL
ALLENS TEST: ABNORMAL
ALP SERPL-CCNC: 111 U/L (ref 55–135)
ALP SERPL-CCNC: 111 U/L (ref 55–135)
ALT SERPL W/O P-5'-P-CCNC: 6 U/L (ref 10–44)
ALT SERPL W/O P-5'-P-CCNC: 6 U/L (ref 10–44)
AMYLASE, BODY FLUID: 22 U/L
ANION GAP SERPL CALC-SCNC: 10 MMOL/L (ref 8–16)
ANION GAP SERPL CALC-SCNC: 10 MMOL/L (ref 8–16)
ANION GAP SERPL CALC-SCNC: 14 MMOL/L (ref 8–16)
APTT BLDCRRT: 36.2 SEC (ref 21–32)
APTT BLDCRRT: 36.4 SEC (ref 21–32)
APTT BLDCRRT: 49 SEC (ref 21–32)
AST SERPL-CCNC: 7 U/L (ref 10–40)
AST SERPL-CCNC: 7 U/L (ref 10–40)
BASOPHILS # BLD AUTO: 0.02 K/UL (ref 0–0.2)
BASOPHILS NFR BLD: 0.2 % (ref 0–1.9)
BILIRUB DIRECT SERPL-MCNC: 1.2 MG/DL (ref 0.1–0.3)
BILIRUB SERPL-MCNC: 2.1 MG/DL (ref 0.1–1)
BILIRUB SERPL-MCNC: 2.1 MG/DL (ref 0.1–1)
BODY FLUID SOURCE AMYLASE: NORMAL
BUN SERPL-MCNC: 74 MG/DL (ref 8–23)
BUN SERPL-MCNC: 76 MG/DL (ref 8–23)
BUN SERPL-MCNC: 76 MG/DL (ref 8–23)
CALCIUM SERPL-MCNC: 8.3 MG/DL (ref 8.7–10.5)
CALCIUM SERPL-MCNC: 8.3 MG/DL (ref 8.7–10.5)
CALCIUM SERPL-MCNC: 8.5 MG/DL (ref 8.7–10.5)
CHLORIDE SERPL-SCNC: 94 MMOL/L (ref 95–110)
CHLORIDE SERPL-SCNC: 96 MMOL/L (ref 95–110)
CHLORIDE SERPL-SCNC: 96 MMOL/L (ref 95–110)
CO2 SERPL-SCNC: 23 MMOL/L (ref 23–29)
CO2 SERPL-SCNC: 24 MMOL/L (ref 23–29)
CO2 SERPL-SCNC: 24 MMOL/L (ref 23–29)
CREAT SERPL-MCNC: 1.8 MG/DL (ref 0.5–1.4)
DELSYS: ABNORMAL
DELSYS: ABNORMAL
DIFFERENTIAL METHOD: ABNORMAL
EOSINOPHIL # BLD AUTO: 0 K/UL (ref 0–0.5)
EOSINOPHIL NFR BLD: 0 % (ref 0–8)
ERYTHROCYTE [DISTWIDTH] IN BLOOD BY AUTOMATED COUNT: 22.3 % (ref 11.5–14.5)
EST. GFR  (AFRICAN AMERICAN): 42.8 ML/MIN/1.73 M^2
EST. GFR  (NON AFRICAN AMERICAN): 37 ML/MIN/1.73 M^2
FLOW: 6
GLUCOSE FLD-MCNC: 141 MG/DL
GLUCOSE SERPL-MCNC: 139 MG/DL (ref 70–110)
GLUCOSE SERPL-MCNC: 139 MG/DL (ref 70–110)
GLUCOSE SERPL-MCNC: 142 MG/DL (ref 70–110)
HCO3 UR-SCNC: 24.5 MMOL/L (ref 24–28)
HCO3 UR-SCNC: 26 MMOL/L (ref 24–28)
HCT VFR BLD AUTO: 31.1 % (ref 40–54)
HGB BLD-MCNC: 9.9 G/DL (ref 14–18)
IMM GRANULOCYTES # BLD AUTO: 0.22 K/UL (ref 0–0.04)
IMM GRANULOCYTES NFR BLD AUTO: 2 % (ref 0–0.5)
LYMPHOCYTES # BLD AUTO: 0.4 K/UL (ref 1–4.8)
LYMPHOCYTES NFR BLD: 3.5 % (ref 18–48)
MAGNESIUM SERPL-MCNC: 2.9 MG/DL (ref 1.6–2.6)
MCH RBC QN AUTO: 26.9 PG (ref 27–31)
MCHC RBC AUTO-ENTMCNC: 31.8 G/DL (ref 32–36)
MCV RBC AUTO: 85 FL (ref 82–98)
METHEMOGLOBIN: 0.2 % (ref 0–3)
MODE: ABNORMAL
MONOCYTES # BLD AUTO: 0.6 K/UL (ref 0.3–1)
MONOCYTES NFR BLD: 5.5 % (ref 4–15)
NEUTROPHILS # BLD AUTO: 9.6 K/UL (ref 1.8–7.7)
NEUTROPHILS NFR BLD: 88.8 % (ref 38–73)
NRBC BLD-RTO: 0 /100 WBC
PCO2 BLDA: 32.1 MMHG (ref 35–45)
PCO2 BLDA: 37.1 MMHG (ref 35–45)
PH SMN: 7.45 [PH] (ref 7.35–7.45)
PH SMN: 7.49 [PH] (ref 7.35–7.45)
PHOSPHATE SERPL-MCNC: 4.9 MG/DL (ref 2.7–4.5)
PLATELET # BLD AUTO: 306 K/UL (ref 150–450)
PMV BLD AUTO: 10.7 FL (ref 9.2–12.9)
PO2 BLDA: 28 MMHG (ref 40–60)
PO2 BLDA: 38 MMHG (ref 40–60)
POC BE: 1 MMOL/L
POC BE: 2 MMOL/L
POC SATURATED O2: 56 % (ref 95–100)
POC SATURATED O2: 77 % (ref 95–100)
POC TCO2: 25 MMOL/L (ref 24–29)
POC TCO2: 27 MMOL/L (ref 24–29)
POCT GLUCOSE: 139 MG/DL (ref 70–110)
POCT GLUCOSE: 143 MG/DL (ref 70–110)
POCT GLUCOSE: 154 MG/DL (ref 70–110)
POTASSIUM SERPL-SCNC: 3.8 MMOL/L (ref 3.5–5.1)
POTASSIUM SERPL-SCNC: 4.2 MMOL/L (ref 3.5–5.1)
POTASSIUM SERPL-SCNC: 4.2 MMOL/L (ref 3.5–5.1)
POTASSIUM SERPL-SCNC: 4.4 MMOL/L (ref 3.5–5.1)
POTASSIUM SERPL-SCNC: 4.7 MMOL/L (ref 3.5–5.1)
POTASSIUM SERPL-SCNC: 6.2 MMOL/L (ref 3.5–5.1)
POTASSIUM SERPL-SCNC: 6.2 MMOL/L (ref 3.5–5.1)
PROT FLD-MCNC: 3.4 G/DL
PROT SERPL-MCNC: 7 G/DL (ref 6–8.4)
PROT SERPL-MCNC: 7 G/DL (ref 6–8.4)
RBC # BLD AUTO: 3.68 M/UL (ref 4.6–6.2)
SAMPLE: ABNORMAL
SAMPLE: ABNORMAL
SITE: ABNORMAL
SITE: ABNORMAL
SODIUM SERPL-SCNC: 130 MMOL/L (ref 136–145)
SODIUM SERPL-SCNC: 130 MMOL/L (ref 136–145)
SODIUM SERPL-SCNC: 131 MMOL/L (ref 136–145)
SPECIMEN SOURCE: NORMAL
WBC # BLD AUTO: 10.77 K/UL (ref 3.9–12.7)

## 2022-02-09 PROCEDURE — 94761 N-INVAS EAR/PLS OXIMETRY MLT: CPT

## 2022-02-09 PROCEDURE — 20000000 HC ICU ROOM

## 2022-02-09 PROCEDURE — 27000221 HC OXYGEN, UP TO 24 HOURS

## 2022-02-09 PROCEDURE — 63600367 HC NITRIC OXIDE PER HOUR

## 2022-02-09 PROCEDURE — 63600175 PHARM REV CODE 636 W HCPCS: Performed by: INTERNAL MEDICINE

## 2022-02-09 PROCEDURE — 85730 THROMBOPLASTIN TIME PARTIAL: CPT | Performed by: STUDENT IN AN ORGANIZED HEALTH CARE EDUCATION/TRAINING PROGRAM

## 2022-02-09 PROCEDURE — 99233 SBSQ HOSP IP/OBS HIGH 50: CPT | Mod: ,,, | Performed by: INTERNAL MEDICINE

## 2022-02-09 PROCEDURE — 83735 ASSAY OF MAGNESIUM: CPT | Performed by: STUDENT IN AN ORGANIZED HEALTH CARE EDUCATION/TRAINING PROGRAM

## 2022-02-09 PROCEDURE — 84100 ASSAY OF PHOSPHORUS: CPT | Performed by: STUDENT IN AN ORGANIZED HEALTH CARE EDUCATION/TRAINING PROGRAM

## 2022-02-09 PROCEDURE — 99291 CRITICAL CARE FIRST HOUR: CPT | Mod: ,,, | Performed by: PHYSICIAN ASSISTANT

## 2022-02-09 PROCEDURE — C1729 CATH, DRAINAGE: HCPCS

## 2022-02-09 PROCEDURE — 63600175 PHARM REV CODE 636 W HCPCS: Performed by: PHYSICIAN ASSISTANT

## 2022-02-09 PROCEDURE — 82042 OTHER SOURCE ALBUMIN QUAN EA: CPT | Performed by: PHYSICIAN ASSISTANT

## 2022-02-09 PROCEDURE — 99900035 HC TECH TIME PER 15 MIN (STAT)

## 2022-02-09 PROCEDURE — 80053 COMPREHEN METABOLIC PANEL: CPT | Performed by: STUDENT IN AN ORGANIZED HEALTH CARE EDUCATION/TRAINING PROGRAM

## 2022-02-09 PROCEDURE — 63600175 PHARM REV CODE 636 W HCPCS: Performed by: STUDENT IN AN ORGANIZED HEALTH CARE EDUCATION/TRAINING PROGRAM

## 2022-02-09 PROCEDURE — 82803 BLOOD GASES ANY COMBINATION: CPT

## 2022-02-09 PROCEDURE — 25000003 PHARM REV CODE 250: Performed by: PHYSICIAN ASSISTANT

## 2022-02-09 PROCEDURE — 80048 BASIC METABOLIC PNL TOTAL CA: CPT | Performed by: INTERNAL MEDICINE

## 2022-02-09 PROCEDURE — 25000003 PHARM REV CODE 250: Performed by: STUDENT IN AN ORGANIZED HEALTH CARE EDUCATION/TRAINING PROGRAM

## 2022-02-09 PROCEDURE — 85025 COMPLETE CBC W/AUTO DIFF WBC: CPT | Performed by: STUDENT IN AN ORGANIZED HEALTH CARE EDUCATION/TRAINING PROGRAM

## 2022-02-09 PROCEDURE — 99233 PR SUBSEQUENT HOSPITAL CARE,LEVL III: ICD-10-PCS | Mod: ,,, | Performed by: INTERNAL MEDICINE

## 2022-02-09 PROCEDURE — 99291 PR CRITICAL CARE, E/M 30-74 MINUTES: ICD-10-PCS | Mod: ,,, | Performed by: PHYSICIAN ASSISTANT

## 2022-02-09 PROCEDURE — 84157 ASSAY OF PROTEIN OTHER: CPT | Performed by: PHYSICIAN ASSISTANT

## 2022-02-09 PROCEDURE — 85730 THROMBOPLASTIN TIME PARTIAL: CPT | Mod: 91 | Performed by: INTERNAL MEDICINE

## 2022-02-09 PROCEDURE — 82945 GLUCOSE OTHER FLUID: CPT | Performed by: PHYSICIAN ASSISTANT

## 2022-02-09 PROCEDURE — 49083 ABD PARACENTESIS W/IMAGING: CPT

## 2022-02-09 PROCEDURE — 83050 HGB METHEMOGLOBIN QUAN: CPT

## 2022-02-09 PROCEDURE — 25000003 PHARM REV CODE 250: Performed by: INTERNAL MEDICINE

## 2022-02-09 PROCEDURE — 82150 ASSAY OF AMYLASE: CPT | Performed by: PHYSICIAN ASSISTANT

## 2022-02-09 PROCEDURE — 84132 ASSAY OF SERUM POTASSIUM: CPT | Performed by: PHYSICIAN ASSISTANT

## 2022-02-09 RX ORDER — MILRINONE LACTATE 0.2 MG/ML
0.12 INJECTION, SOLUTION INTRAVENOUS CONTINUOUS
Status: DISCONTINUED | OUTPATIENT
Start: 2022-02-09 | End: 2022-02-11

## 2022-02-09 RX ORDER — AMIODARONE HYDROCHLORIDE 200 MG/1
400 TABLET ORAL 2 TIMES DAILY
Status: DISCONTINUED | OUTPATIENT
Start: 2022-02-09 | End: 2022-02-13

## 2022-02-09 RX ORDER — FUROSEMIDE 10 MG/ML
120 INJECTION INTRAMUSCULAR; INTRAVENOUS ONCE
Status: COMPLETED | OUTPATIENT
Start: 2022-02-09 | End: 2022-02-09

## 2022-02-09 RX ADMIN — IPRATROPIUM BROMIDE 2 SPRAY: 42 SPRAY, METERED NASAL at 04:02

## 2022-02-09 RX ADMIN — SIMETHICONE 80 MG: 80 TABLET, CHEWABLE ORAL at 05:02

## 2022-02-09 RX ADMIN — IPRATROPIUM BROMIDE 2 SPRAY: 42 SPRAY, METERED NASAL at 09:02

## 2022-02-09 RX ADMIN — IPRATROPIUM BROMIDE 2 SPRAY: 42 SPRAY, METERED NASAL at 08:02

## 2022-02-09 RX ADMIN — SIMETHICONE 80 MG: 80 TABLET, CHEWABLE ORAL at 06:02

## 2022-02-09 RX ADMIN — RIOCIGUAT 2.5 MG: 2.5 TABLET, FILM COATED ORAL at 06:02

## 2022-02-09 RX ADMIN — FUROSEMIDE 40 MG/HR: 10 INJECTION, SOLUTION INTRAMUSCULAR; INTRAVENOUS at 03:02

## 2022-02-09 RX ADMIN — POTASSIUM CHLORIDE 40 MEQ: 400 INJECTION, SOLUTION INTRAVENOUS at 09:02

## 2022-02-09 RX ADMIN — CHLOROTHIAZIDE SODIUM 500 MG: 500 INJECTION, POWDER, LYOPHILIZED, FOR SOLUTION INTRAVENOUS at 08:02

## 2022-02-09 RX ADMIN — IPRATROPIUM BROMIDE 2 SPRAY: 42 SPRAY, METERED NASAL at 12:02

## 2022-02-09 RX ADMIN — RIOCIGUAT 2.5 MG: 2.5 TABLET, FILM COATED ORAL at 08:02

## 2022-02-09 RX ADMIN — SIMETHICONE 80 MG: 80 TABLET, CHEWABLE ORAL at 11:02

## 2022-02-09 RX ADMIN — SIMETHICONE 80 MG: 80 TABLET, CHEWABLE ORAL at 12:02

## 2022-02-09 RX ADMIN — AMIODARONE HYDROCHLORIDE 1 MG/MIN: 50 INJECTION, SOLUTION INTRAVENOUS at 04:02

## 2022-02-09 RX ADMIN — HEPARIN SODIUM 24 UNITS/KG/HR: 5000 INJECTION INTRAVENOUS; SUBCUTANEOUS at 09:02

## 2022-02-09 RX ADMIN — MILRINONE LACTATE IN DEXTROSE 0.12 MCG/KG/MIN: 200 INJECTION, SOLUTION INTRAVENOUS at 12:02

## 2022-02-09 RX ADMIN — FERROUS SULFATE TAB 325 MG (65 MG ELEMENTAL FE) 1 EACH: 325 (65 FE) TAB at 08:02

## 2022-02-09 RX ADMIN — FUROSEMIDE 50 MG/HR: 10 INJECTION, SOLUTION INTRAMUSCULAR; INTRAVENOUS at 12:02

## 2022-02-09 RX ADMIN — AMIODARONE HYDROCHLORIDE 400 MG: 200 TABLET ORAL at 09:02

## 2022-02-09 RX ADMIN — ONDANSETRON 4 MG: 2 INJECTION INTRAMUSCULAR; INTRAVENOUS at 09:02

## 2022-02-09 RX ADMIN — HEPARIN SODIUM 22 UNITS/KG/HR: 5000 INJECTION INTRAVENOUS; SUBCUTANEOUS at 02:02

## 2022-02-09 RX ADMIN — FUROSEMIDE 120 MG: 10 INJECTION, SOLUTION INTRAMUSCULAR; INTRAVENOUS at 12:02

## 2022-02-09 RX ADMIN — PANTOPRAZOLE SODIUM 40 MG: 40 TABLET, DELAYED RELEASE ORAL at 08:02

## 2022-02-09 RX ADMIN — AMIODARONE HYDROCHLORIDE 1 MG/MIN: 50 INJECTION, SOLUTION INTRAVENOUS at 10:02

## 2022-02-09 NOTE — PROGRESS NOTES
[facility] - Cardiac Intensive Care  Heart Transplant  Progress Note    Patient Name: Ambrosio Bray III  MRN: [id number]  Admission Date: 2/3/2022  Hospital Length of Stay: 6 days  Attending Physician: Roland George MD  Primary Care Provider: Luciana Cisse MD  Principal Problem:Acute decompensated heart failure    Subjective:     Interval History: Continued to be in what appeared to be a wide complex tachycardia overnight despite being bolused and started on amio drip. EP saw and believes was Afib with abberancy. Now appears to be in rate controlled Afib. Main complaint is discomfort from abdominal distention/ascites. CVP 23 this AM. Net positive 616cc w/ 900cc UOP. Continuing w/ BID IVP of diuril. Will rebolus lasix and increase to 50 mg/hr. Consulted IR and they will try to get to him today for paracentesis. Hemodynamically stable w/ SvO2 56%, CO 6.4 CI 3.0 .     Lines: RIJ 2/3/22    Continuous Infusions:   sodium chloride 0.9% Stopped (02/07/22 2109)    furosemide (LASIX) 10 mg/mL infusion (non-titrating) 50 mg/hr (02/09/22 1235)    heparin (porcine) in D5W 23 Units/kg/hr (02/09/22 1100)    milrinone 20mg/100ml D5W (200mcg/ml) 0.125 mcg/kg/min (02/09/22 1233)    nitric oxide gas       Scheduled Meds:   amiodarone  400 mg Oral BID    chlorothiazide (DIURIL) IVPB  500 mg Intravenous BID    ferrous sulfate  1 tablet Oral Daily    ipratropium  2 spray Each Nostril QID    pantoprazole  40 mg Oral Daily    potassium chloride in water  40 mEq Intravenous BID    riociguat  2.5 mg Oral TID    selexipag  1,600 mcg Oral BID    simethicone  1 tablet Oral Q6H     PRN Meds:dextrose 10%, dextrose 10%, glucagon (human recombinant), glucose, glucose, guaiFENesin, insulin aspart U-100, melatonin, ondansetron, sodium chloride 0.9%    Review of patient's allergies indicates:  No Known Allergies  Objective:     Vital Signs (Most Recent):  Temp: 98 °F (36.7 °C) (02/09/22 1100)  Pulse: 72 (02/09/22  1215)  Resp: 17 (02/09/22 1215)  BP: (!) 91/53 (02/09/22 1206)  SpO2: (!) 93 % (02/09/22 1215) Vital Signs (24h Range):  Temp:  [97.2 °F (36.2 °C)-98.1 °F (36.7 °C)] 98 °F (36.7 °C)  Pulse:  [] 72  Resp:  [12-21] 17  SpO2:  [89 %-96 %] 93 %  BP: ()/() 91/53     No data found.  Body mass index is 24.69 kg/m².      Intake/Output Summary (Last 24 hours) at 2/9/2022 1249  Last data filed at 2/9/2022 1100  Gross per 24 hour   Intake 1779.46 ml   Output 910 ml   Net 869.46 ml     Physical Exam  Constitutional:       Comments: Patient is alert and oriented, appears comfortable, does not appear to be in respiratory distress. On Emmy 20ppm with 6L NC.   HENT:      Head: Normocephalic and atraumatic.   Neck:      Comments: +JVD  Cardiovascular:      Rate and Rhythm: Tachycardia present. Rhythm irregular.      Pulses: Normal pulses.      Heart sounds: Murmur heard.    Decrescendo systolic murmur is present.      Pulmonary:      Effort: Pulmonary effort is normal. No respiratory distress.      Breath sounds: Normal breath sounds.      Comments: Bibasilar crackles R > L  Abdominal:      General: There is distension.      Comments: Abdomen distended w/ fluids.   Musculoskeletal:         General: Normal range of motion.      Cervical back: Normal range of motion and neck supple.      Comments: BL 3+ edema in LE improving   Skin:     General: Skin is warm and dry.   Neurological:      General: No focal deficit present.      Mental Status: He is alert and oriented to person, place, and time.   Psychiatric:         Mood and Affect: Mood normal.         Behavior: Behavior normal.       Significant Labs:  CBC:  Recent Labs   Lab 02/07/22  0511 02/08/22  0305 02/09/22  0422   WBC 11.15 11.32 10.77   RBC 3.36* 3.54* 3.68*   HGB 9.2* 9.5* 9.9*   HCT 28.3* 29.4* 31.1*    309 306   MCV 84 83 85   MCH 27.4 26.8* 26.9*   MCHC 32.5 32.3 31.8*     BNP:  Recent Labs   Lab 02/03/22  0928 02/05/22  0255 02/07/22  2333   BNP  574* 595* 540*     CMP:  Recent Labs   Lab 02/08/22  0520 02/08/22  1331 02/08/22  1943 02/08/22  2343 02/09/22  0422 02/09/22  0541 02/09/22  0845   GLU  --    < > 136*  --  139*  139* 142*  --    CALCIUM  --    < > 8.5*  --  8.3*  8.3* 8.5*  --    ALBUMIN 3.0*  --  3.0*  --  3.0*  3.0*  --   --    PROT 6.9  --  7.0  --  7.0  7.0  --   --    NA  --    < > 128*  --  130*  130* 131*  --    K  --    < > 4.5   < > 6.2*  6.2* 4.4 4.2   CO2  --    < > 26  --  24  24 23  --    CL  --    < > 93*  --  96  96 94*  --    BUN  --    < > 68*  --  76*  76* 74*  --    CREATININE  --    < > 1.7*  --  1.8*  1.8* 1.8*  --    ALKPHOS 111  --  115  --  111  111  --   --    ALT 7*  --  5*  --  6*  6*  --   --    AST 9*  --  8*  --  7*  7*  --   --    BILITOT 2.6*  --  2.5*  --  2.1*  2.1*  --   --     < > = values in this interval not displayed.      Coagulation:   Recent Labs   Lab 02/03/22 2216 02/03/22 2216 02/04/22 2010 02/05/22  0255 02/08/22  0305 02/09/22 0422 02/09/22  1123   INR 1.5*  --  1.6*  --   --   --   --    APTT 28.7   < > 30.9   < > 45.6* 36.2* 36.4*    < > = values in this interval not displayed.     LDH:  No results for input(s): LDH in the last 72 hours.  Microbiology:  Microbiology Results (last 7 days)     Procedure Component Value Units Date/Time    Blood culture #1 **CANNOT BE ORDERED STAT** [[id number]] Collected: 02/03/22 0940    Order Status: Completed Specimen: Blood from Peripheral, Forearm, Right Updated: 02/08/22 1212     Blood Culture, Routine No growth after 5 days.    Blood culture #2 **CANNOT BE ORDERED STAT** [[id number]] Collected: 02/03/22 0929    Order Status: Completed Specimen: Blood from Peripheral, Antecubital, Left Updated: 02/08/22 1212     Blood Culture, Routine No growth after 5 days.    Respiratory Infection Panel (PCR), Nasopharyngeal [[id number]]     Order Status: No result Specimen: Nasopharyngeal Swab         Estimated Creatinine Clearance: 42.5 mL/min (A) (based on  SCr of 1.8 mg/dL (H)).    Diagnostic Results:  I have reviewed and interpreted all pertinent imaging results/findings within the past 24 hours.    Assessment and Plan:     71 y.o M with history of HF, PH (diagnosed in 2018) on [facility], Uptravi, CTEPH on home oxygen 2L NC, Atrial Flutter s/p ablation 2020, DM, BPH, HLD, Anemia, PTSD, Pulmonary hyperinflation presents to ED with respiratory distress and worsening shortness of breath for past 4 days. Patient reports he was in his usual state of health until 4 days ago when he developed SOB while ambulating to use restroom. Patient reports he had removed his oxygen to use restroom and while ambulating to restroom he became lightheaded, dizzy and in respiratory distress and reports having a fall and syncopized. He reports he woke up after 30 seconds, did not hit his head. Patient reports since the past four days he has also been having increasing loose BM more than his usual, reports feeling dehydrated with increased fluid intake. Patient reports he recently followed up with pulmonologist in clinic and his home Bumex 2mg MWF was increased to daily and was prescribed home oxygen. Patient reports that has helped relieve his SOB. Patient denies chest pain, further episodes of syncope, change in vision, slurred speech, palpitations, headache, change in urinary habits, cough, fever.     Home Medications  - Bumex 2mg daily  - Adempas 2.5mg TID  - Uptravi 1600 mcg BID (to start on 3/21/22)  - Xarelto 20mg daily  - Losartan 25mg daily  - Flomax 0.4mg  - Lipitor 40mg daily  - Atrovent  - Ferrous Sulfate     ED Course      Vitals:     02/03/22 1332   BP: 98/65   Pulse: 104   Resp: (!) 23   Temp:       Pertinent Labs  CBC: WBC 8.93, Hbg 10.3, Hct 32.7, Plt 270  BMP: Na 139, K 3.7, Cl 104, CO2 25, BUN 16, Cr. 0.8,   LFT: TP 7.2, Alb, 3.6, TB 2.9, AST 11, ALT 8  Free T4: 0.88  Lactate 1.7, Troponin 0.012,   EKG: Afib with RVR at a rate of 173, RBB  CXR: bilateral opacities,  pulmonary congestion  ED Management  -Lopressor Tartrate 50mg q6h  -Diltiazem 25 mg IVP  -Lasix 80mg IVP      * Acute decompensated heart failure  -NICM   -2/5/22 TTE: EF 20, LV DD, Systolic flattening of IV septum consistent with RV pressure overload, small pericardial effusion, mild LA enlargement, severe RV enlargement with severely reduced RVSF, severe TR, PASP 73, CVP 15, LVIDd 7.7  -12/16/22 TTE: LV normal in size with moderately decreased systolic function, EF 35, Moderate RV enlargement with hypertrophy and moderately reduced RVSF, grade II DD, Mod-severe TR, Mod MR, trivial pericardial effusion, PASP 87, CVP 15, LVIDd 5.62  -11/28/2018 RHC: RA: 14/ 15/ 11 RV: 112/ -4 PA: 117/ 31/ 59 PWP: 30/ 25/ 20, CO 5.48, CI 2.53, O2 sat: SVC 73, PA 64%, , SVR 1357 (on adempas)  -Hypervolemic on exam today  -Home Diuretic Regimen: Bumex 2mg daily  -Inpatient Diuretic Regimen: CVP 15 today and UOP net negative: -2.3L. Continue Diuril 500mg IVP daily, Will give another lasix re-bolus 80mg IVP and continue Lasix drip at rate of 40mg/hr  -Emmy at 20ppm  -GDMT: Losartan 25mg daily (holding)  -2g Na dietary restriction, 1500 mL fluid restriction, strict I/Os  -CVP still elevated at 23 this AM. Continue aggressive diuresis for goal CVP <10 then plan for RHC.     Ascites  - liver with cirrhotic appearance on past CT scans.   -Spoke with IR, they will try to get to patient today for paracentesis.     Iron deficiency anemia due to chronic blood loss  - monitor H/H  - resume home iron therapy    CTEPH (chronic thromboembolic pulmonary hypertension)  - resume home eliquis  - please see plan for PH    Pulmonary HTN  -12/16/22 TTE: LV normal in size with moderately decreased systolic function, EF 35, Moderate RV enlargement with hypertrophy and moderately reduced RVSF, grade II DD, Mod-severe TR, Mod MR, trivial pericardial effusion, PASP 87, CVP 15, LVIDd 5.62  -11/28/2018 RHC: RA: 14/ 15/ 11 RV: 112/ -4 PA: 117/ 31/ 59 PWP:  30/ 25/ 20, CO 5.48, CI 2.53, O2 sat: SVC 73, PA 64%, , SVR 1357 (on adempas)  - Resume home Adempas 2.5mg TID  - Utravi 1600 mcg BID   - Plan for RHC once CVP <10, for further guidance of PH treatment. Plan to switch to Remodulin. Home xarelto switched to heparin IV in anticipation of RHC. IF CVP >10, would wait until adequate diuresis is achieved. Will continue aggressive diuresis     Atrial Flutter/ fibrillation  - Patient has a history of known Atrial fibrillation s/p RITO/CV 9/30/14. In 2017 patient had wide complex tachycardia and received amiodarone and was started on daily regimen. EKG were reviewed by EP and rhythm was likely Aflutter. Patient underwent ablation in 2018. Last followed in EP clinic on 3/31/20, was told has high chances of developing Afib within next 5 years. Patient was in RVR on admission likely secondary to ADHF and electrolyte imbalance. Plan to continue diuresis and replace electrolytes, if Afib persists after adequate diuresis/ electrolyte replacement, will consider EP consult.  - patient is on chronic AC with xarelto, and heparin IV while inpateint  - plan to switch to amiodarone 400mg BID for 2 weeks followed by amiodarone 200mg daily    Type 2 diabetes mellitus with microalbuminuria, without long-term current use of insulin  - follow A1c  - Sliding scale and FS  - endocrine consult    Uninterrupted Critical Care/Counseling Time (not including procedures): 30 minutes.     Vikas Mendes PA-C  Heart Transplant  [facility] - Cardiac Intensive Care

## 2022-02-09 NOTE — PROGRESS NOTES
Called [facility] @ [phone] and waiting on decision if pt need Albumin per IR protocol / 5000cc out = 37.5 grams Albumin IV

## 2022-02-09 NOTE — PROGRESS NOTES
Arrived to Room #3077 for Paracentesis / pt resting in bed NAD noted / Team introduce self and to get consent and time out done

## 2022-02-09 NOTE — ASSESSMENT & PLAN NOTE
- liver with cirrhotic appearance on past CT scans.   -Spoke with IR, they will try to get to patient today for paracentesis.

## 2022-02-09 NOTE — PROCEDURES
Radiology Post-Procedure Note    Pre Op Diagnosis: Ascites  Post Op Diagnosis: Same    Procedure: Paracentesis    Procedure performed by: Michael Hernandez MD    Written Informed Consent Obtained: Yes  Specimen Removed: YES 5000cc serous fluid  Estimated Blood Loss: Minimal    Findings:   Successful paracentesis.  Albumin administered PRN per protocol.    Patient tolerated procedure well.      Bigg Collins MD  Radiology PGY-2  [facility]

## 2022-02-09 NOTE — CARE UPDATE
HEMODYNAMICS:    SvO2 = 48  CVP = 22    Has been in a wide complex tachycardia all night.  D/w EP fellow and likely afib with abberancy.  Continue amio    PLAN:  - continue amio gtt for afib with abberancy  - if becomes HD stable can consider DCCV  - bolus 160mg lasix x1 and give diuril 500mg x1.  Continue lasix at 40mg/hr    Prasanna Murillo, PGY4  Cardiovascular Disease  [facility]

## 2022-02-09 NOTE — PROGRESS NOTES
Dr Garcia called to notify that despite Amio bolus and initiation of Amio gtt @ 1 mg/min pt continues to have frequent runs of VT. BP 95/65 MAP 74. MD has changed MAP goal to 60. No new orders.

## 2022-02-09 NOTE — ASSESSMENT & PLAN NOTE
-NICM   -2/5/22 TTE: EF 20, LV DD, Systolic flattening of IV septum consistent with RV pressure overload, small pericardial effusion, mild LA enlargement, severe RV enlargement with severely reduced RVSF, severe TR, PASP 73, CVP 15, LVIDd 7.7  -12/16/22 TTE: LV normal in size with moderately decreased systolic function, EF 35, Moderate RV enlargement with hypertrophy and moderately reduced RVSF, grade II DD, Mod-severe TR, Mod MR, trivial pericardial effusion, PASP 87, CVP 15, LVIDd 5.62  -11/28/2018 RHC: RA: 14/ 15/ 11 RV: 112/ -4 PA: 117/ 31/ 59 PWP: 30/ 25/ 20, CO 5.48, CI 2.53, O2 sat: SVC 73, PA 64%, , SVR 1357 (on adempas)  -Hypervolemic on exam today  -Home Diuretic Regimen: Bumex 2mg daily  -Inpatient Diuretic Regimen: CVP 15 today and UOP net negative: -2.3L. Continue Diuril 500mg IVP daily, Will give another lasix re-bolus 80mg IVP and continue Lasix drip at rate of 40mg/hr  -Emmy at 20ppm  -GDMT: Losartan 25mg daily (holding)  -2g Na dietary restriction, 1500 mL fluid restriction, strict I/Os  -CVP still elevated at 23 this AM. Continue aggressive diuresis for goal CVP <10 then plan for RHC.

## 2022-02-09 NOTE — H&P
Inpatient Radiology Pre-procedure Note    History of Present Illness:  Ambrosio Bray III is a 71 y.o. male who presents for paracentesis.    Admission H&P reviewed.  Past Medical History:   Diagnosis Date    Anemia     Atrial fibrillation     Diabetes mellitus     Heart failure     Hyperlipidemia     Hypertension     PTSD (post-traumatic stress disorder)     Pulmonary hyperinflation     Urinary incontinence      Past Surgical History:   Procedure Laterality Date    CARDIAC CATHETERIZATION      COLONOSCOPY N/A 5/1/2018    Procedure: COLONOSCOPY;  Surgeon: Bubba Navarro MD;  Location: [facility];  Service: Endoscopy;  Laterality: N/A;  confirmed appt 4/24/18    RIGHT HEART CATHETERIZATION Right 11/28/2018    Procedure: INSERTION, CATHETER, RIGHT HEART;  Surgeon: Chelsea Arceo MD;  Location: [facility] CATH LAB;  Service: Cardiology;  Laterality: Right;       Review of Systems:   As documented in primary team H&P    Home Meds:   Prior to Admission medications    Medication Sig Start Date End Date Taking? Authorizing Provider   bumetanide (BUMEX) 2 MG tablet Take 1 tablet (2 mg total) by mouth once daily. 10/19/21 10/19/22  Luciana Cisse MD   ferrous sulfate 325 (65 FE) MG EC tablet Take 1 tablet (325 mg total) by mouth 2 (two) times daily with meals. 3/13/20   Ron Ibarra MD   ipratropium (ATROVENT) 42 mcg (0.06 %) nasal spray 2 sprays by Nasal route 4 (four) times daily. 4/8/20   Luciana Cisse MD   losartan (COZAAR) 25 MG tablet Take 25 mg by mouth once daily.    Historical Provider   mometasone 0.1% (ELOCON) 0.1 % cream Apply topically once daily.    Historical Provider   potassium chloride SA (KLOR-CON M15) 15 MEQ tablet Take 1 tablet (15 mEq total) by mouth 2 (two) times daily. 9/4/19   Inna Posadas MD   riociguat (ADEMPAS) 2.5 mg tablet Take 1 tablet (2.5 mg total) by mouth 3 (three) times daily. 11/1/18   Inna Posadas MD   rivaroxaban (XARELTO) 20 mg Tab Take 1 tablet (20 mg total) by  mouth daily with dinner or evening meal. 10/19/21   Luciana Cisse MD   tamsulosin (FLOMAX) 0.4 mg Cap Take 1 capsule by mouth once daily 12/6/21   Luciana Cisse MD   UPTRAVI 1,600 mcg Tab Take 1 tablet by mouth 2 (two) times daily. 3/19/21   Inna Posadas MD     Scheduled Meds:    amiodarone  400 mg Oral BID    chlorothiazide (DIURIL) IVPB  500 mg Intravenous BID    ferrous sulfate  1 tablet Oral Daily    ipratropium  2 spray Each Nostril QID    pantoprazole  40 mg Oral Daily    potassium chloride in water  40 mEq Intravenous BID    riociguat  2.5 mg Oral TID    selexipag  1,600 mcg Oral BID    simethicone  1 tablet Oral Q6H     Continuous Infusions:    sodium chloride 0.9% Stopped (02/07/22 2109)    furosemide (LASIX) 10 mg/mL infusion (non-titrating) 50 mg/hr (02/09/22 1235)    heparin (porcine) in D5W 23 Units/kg/hr (02/09/22 1100)    milrinone 20mg/100ml D5W (200mcg/ml) 0.125 mcg/kg/min (02/09/22 1233)    nitric oxide gas       PRN Meds:dextrose 10%, dextrose 10%, glucagon (human recombinant), glucose, glucose, guaiFENesin, insulin aspart U-100, melatonin, ondansetron, sodium chloride 0.9%  Anticoagulants/Antiplatelets: Heparin    Allergies: Review of patient's allergies indicates:  No Known Allergies  Sedation Hx: have not been any systemic reactions    Labs:  Recent Labs   Lab 02/04/22 2010   INR 1.6*       Recent Labs   Lab 02/09/22 0422   WBC 10.77   HGB 9.9*   HCT 31.1*   MCV 85         Recent Labs   Lab 02/09/22  0422 02/09/22  0422 02/09/22  0541 02/09/22  0541 02/09/22  0845   *  139*   < > 142*  --   --    *  130*   < > 131*  --   --    K 6.2*  6.2*   < > 4.4   < > 4.2   CL 96  96   < > 94*  --   --    CO2 24  24   < > 23  --   --    BUN 76*  76*   < > 74*  --   --    CREATININE 1.8*  1.8*   < > 1.8*  --   --    CALCIUM 8.3*  8.3*   < > 8.5*  --   --    MG 2.9*  --   --   --   --    ALT 6*  6*  --   --   --   --    AST 7*  7*  --   --   --   --     ALBUMIN 3.0*  3.0*  --   --   --   --    BILITOT 2.1*  2.1*  --   --   --   --    BILIDIR 1.2*  --   --   --   --     < > = values in this interval not displayed.         Vitals:  Temp: 98 °F (36.7 °C) (02/09/22 1100)  Pulse: 72 (02/09/22 1215)  Resp: 17 (02/09/22 1215)  BP: (!) 91/53 (02/09/22 1206)  SpO2: (!) 93 % (02/09/22 1215)     Physical Exam:  ASA: 3  Mallampati: 3    General: no acute distress  Mental Status: alert and oriented to person, place and time  HEENT: normocephalic, atraumatic  Chest: unlabored breathing  Heart: regular heart rate  Abdomen: nondistended  Extremity: moves all extremities    Plan: ultrasound guided paracentesis  Sedation Plan: local        Bigg Collins MD  Radiology PGY-2  [facility]

## 2022-02-09 NOTE — SUBJECTIVE & OBJECTIVE
Interval History: Continued to be in what appeared to be a wide complex tachycardia overnight despite being bolused and started on amio drip. EP saw and believes was Afib with abberancy. Now appears to be in rate controlled Afib. Main complaint is discomfort from abdominal distention/ascites. CVP 23 this AM. Net positive 616cc w/ 900cc UOP. Continuing w/ BID IVP of diuril. Will rebolus lasix and increase to 50 mg/hr. Consulted IR and they will try to get to him today for paracentesis. Hemodynamically stable w/ SvO2 56%, CO 6.4 CI 3.0 .     Lines: RIJ 2/3/22    Continuous Infusions:   sodium chloride 0.9% Stopped (02/07/22 2109)    furosemide (LASIX) 10 mg/mL infusion (non-titrating) 50 mg/hr (02/09/22 1235)    heparin (porcine) in D5W 23 Units/kg/hr (02/09/22 1100)    milrinone 20mg/100ml D5W (200mcg/ml) 0.125 mcg/kg/min (02/09/22 1233)    nitric oxide gas       Scheduled Meds:   amiodarone  400 mg Oral BID    chlorothiazide (DIURIL) IVPB  500 mg Intravenous BID    ferrous sulfate  1 tablet Oral Daily    ipratropium  2 spray Each Nostril QID    pantoprazole  40 mg Oral Daily    potassium chloride in water  40 mEq Intravenous BID    riociguat  2.5 mg Oral TID    selexipag  1,600 mcg Oral BID    simethicone  1 tablet Oral Q6H     PRN Meds:dextrose 10%, dextrose 10%, glucagon (human recombinant), glucose, glucose, guaiFENesin, insulin aspart U-100, melatonin, ondansetron, sodium chloride 0.9%    Review of patient's allergies indicates:  No Known Allergies  Objective:     Vital Signs (Most Recent):  Temp: 98 °F (36.7 °C) (02/09/22 1100)  Pulse: 72 (02/09/22 1215)  Resp: 17 (02/09/22 1215)  BP: (!) 91/53 (02/09/22 1206)  SpO2: (!) 93 % (02/09/22 1215) Vital Signs (24h Range):  Temp:  [97.2 °F (36.2 °C)-98.1 °F (36.7 °C)] 98 °F (36.7 °C)  Pulse:  [] 72  Resp:  [12-21] 17  SpO2:  [89 %-96 %] 93 %  BP: ()/() 91/53     No data found.  Body mass index is 24.69 kg/m².      Intake/Output  Summary (Last 24 hours) at 2/9/2022 1249  Last data filed at 2/9/2022 1100  Gross per 24 hour   Intake 1779.46 ml   Output 910 ml   Net 869.46 ml     Physical Exam  Constitutional:       Comments: Patient is alert and oriented, appears comfortable, does not appear to be in respiratory distress. On Emmy 20ppm with 6L NC.   HENT:      Head: Normocephalic and atraumatic.   Neck:      Comments: +JVD  Cardiovascular:      Rate and Rhythm: Tachycardia present. Rhythm irregular.      Pulses: Normal pulses.      Heart sounds: Murmur heard.    Decrescendo systolic murmur is present.      Pulmonary:      Effort: Pulmonary effort is normal. No respiratory distress.      Breath sounds: Normal breath sounds.      Comments: Bibasilar crackles R > L  Abdominal:      General: There is distension.      Comments: Abdomen distended w/ fluids.   Musculoskeletal:         General: Normal range of motion.      Cervical back: Normal range of motion and neck supple.      Comments: BL 3+ edema in LE improving   Skin:     General: Skin is warm and dry.   Neurological:      General: No focal deficit present.      Mental Status: He is alert and oriented to person, place, and time.   Psychiatric:         Mood and Affect: Mood normal.         Behavior: Behavior normal.       Significant Labs:  CBC:  Recent Labs   Lab 02/07/22  0511 02/08/22  0305 02/09/22  0422   WBC 11.15 11.32 10.77   RBC 3.36* 3.54* 3.68*   HGB 9.2* 9.5* 9.9*   HCT 28.3* 29.4* 31.1*    309 306   MCV 84 83 85   MCH 27.4 26.8* 26.9*   MCHC 32.5 32.3 31.8*     BNP:  Recent Labs   Lab 02/03/22  0928 02/05/22  0255 02/07/22  2333   * 595* 540*     CMP:  Recent Labs   Lab 02/08/22  0520 02/08/22  1331 02/08/22  1943 02/08/22  2343 02/09/22  0422 02/09/22  0541 02/09/22  0845   GLU  --    < > 136*  --  139*  139* 142*  --    CALCIUM  --    < > 8.5*  --  8.3*  8.3* 8.5*  --    ALBUMIN 3.0*  --  3.0*  --  3.0*  3.0*  --   --    PROT 6.9  --  7.0  --  7.0  7.0  --   --     NA  --    < > 128*  --  130*  130* 131*  --    K  --    < > 4.5   < > 6.2*  6.2* 4.4 4.2   CO2  --    < > 26  --  24  24 23  --    CL  --    < > 93*  --  96  96 94*  --    BUN  --    < > 68*  --  76*  76* 74*  --    CREATININE  --    < > 1.7*  --  1.8*  1.8* 1.8*  --    ALKPHOS 111  --  115  --  111  111  --   --    ALT 7*  --  5*  --  6*  6*  --   --    AST 9*  --  8*  --  7*  7*  --   --    BILITOT 2.6*  --  2.5*  --  2.1*  2.1*  --   --     < > = values in this interval not displayed.      Coagulation:   Recent Labs   Lab 02/03/22 2216 02/03/22 2216 02/04/22 2010 02/05/22  0255 02/08/22  0305 02/09/22  0422 02/09/22  1123   INR 1.5*  --  1.6*  --   --   --   --    APTT 28.7   < > 30.9   < > 45.6* 36.2* 36.4*    < > = values in this interval not displayed.     LDH:  No results for input(s): LDH in the last 72 hours.  Microbiology:  Microbiology Results (last 7 days)     Procedure Component Value Units Date/Time    Blood culture #1 **CANNOT BE ORDERED STAT** [[id number]] Collected: 02/03/22 0940    Order Status: Completed Specimen: Blood from Peripheral, Forearm, Right Updated: 02/08/22 1212     Blood Culture, Routine No growth after 5 days.    Blood culture #2 **CANNOT BE ORDERED STAT** [[id number]] Collected: 02/03/22 0929    Order Status: Completed Specimen: Blood from Peripheral, Antecubital, Left Updated: 02/08/22 1212     Blood Culture, Routine No growth after 5 days.    Respiratory Infection Panel (PCR), Nasopharyngeal [[id number]]     Order Status: No result Specimen: Nasopharyngeal Swab         Estimated Creatinine Clearance: 42.5 mL/min (A) (based on SCr of 1.8 mg/dL (H)).    Diagnostic Results:  I have reviewed and interpreted all pertinent imaging results/findings within the past 24 hours.

## 2022-02-09 NOTE — ASSESSMENT & PLAN NOTE
- Patient has a history of known Atrial fibrillation s/p RITO/CV 9/30/14. In 2017 patient had wide complex tachycardia and received amiodarone and was started on daily regimen. EKG were reviewed by EP and rhythm was likely Aflutter. Patient underwent ablation in 2018. Last followed in EP clinic on 3/31/20, was told has high chances of developing Afib within next 5 years. Patient was in RVR on admission likely secondary to ADHF and electrolyte imbalance. Plan to continue diuresis and replace electrolytes, if Afib persists after adequate diuresis/ electrolyte replacement, will consider EP consult.  - patient is on chronic AC with xarelto, and heparin IV while inpateint  - plan to switch to amiodarone 400mg BID for 2 weeks followed by amiodarone 200mg daily   Clinic hours for Dr. Kristyn Sherwood:  Monday 7:45 am - 5:30 pm  Tuesday 7:45 am - 3:30 pm  Wednesday 7:45 am - 1:30 pm  Thursday  7:45 am - 12:00 pm  Friday -  Off    If you need a refill on your prescription please call your pharmacy and let them know. Please be proactive and call before your medication runs out. The pharmacy will then contact us for the refill. Please allow 24-48 hours for the refill to be processed. If your physician has ordered additional laboratory or radiology testing as part of your ongoing plan of care, please allow 5-7 business days from the day of your lab draw or test for the results to be sent and reviewed by your provider. If your results are critical and require more immediate intervention, you will be contacted sooner. Your results will be conveyed to you via a phone call or letter. You may be receiving a patient satisfaction survey in the mail. Please take the time to complete, as your feedback is very important to us. We strive to make your experience exceptional and your comments help us with that goal.  We look forward to hearing from you. 0 [facility]     [address] [place], [place] [address]l[address]ial. [place], [address]               [phone]  Hours of Operation:   Monday - Friday 700 a.m. -1000 p.m. Saturday and Sunday 800 a.m. - 400 p.m. Holiday Hours Vary. Please call the clinic for Holiday hours.

## 2022-02-10 PROBLEM — L97.912: Status: ACTIVE | Noted: 2022-02-10

## 2022-02-10 LAB
ALBUMIN SERPL BCP-MCNC: 2.9 G/DL (ref 3.5–5.2)
ALBUMIN SERPL BCP-MCNC: 2.9 G/DL (ref 3.5–5.2)
ALLENS TEST: ABNORMAL
ALP SERPL-CCNC: 104 U/L (ref 55–135)
ALP SERPL-CCNC: 104 U/L (ref 55–135)
ALT SERPL W/O P-5'-P-CCNC: <5 U/L (ref 10–44)
ALT SERPL W/O P-5'-P-CCNC: <5 U/L (ref 10–44)
ANION GAP SERPL CALC-SCNC: 10 MMOL/L (ref 8–16)
ANION GAP SERPL CALC-SCNC: 10 MMOL/L (ref 8–16)
APTT BLDCRRT: 54.4 SEC (ref 21–32)
APTT BLDCRRT: 67.1 SEC (ref 21–32)
APTT BLDCRRT: 74.8 SEC (ref 21–32)
AST SERPL-CCNC: 8 U/L (ref 10–40)
AST SERPL-CCNC: 8 U/L (ref 10–40)
BASOPHILS # BLD AUTO: 0.02 K/UL (ref 0–0.2)
BASOPHILS NFR BLD: 0.2 % (ref 0–1.9)
BILIRUB DIRECT SERPL-MCNC: 1.2 MG/DL (ref 0.1–0.3)
BILIRUB SERPL-MCNC: 2 MG/DL (ref 0.1–1)
BILIRUB SERPL-MCNC: 2 MG/DL (ref 0.1–1)
BUN SERPL-MCNC: 79 MG/DL (ref 8–23)
BUN SERPL-MCNC: 79 MG/DL (ref 8–23)
CALCIUM SERPL-MCNC: 8.5 MG/DL (ref 8.7–10.5)
CALCIUM SERPL-MCNC: 8.5 MG/DL (ref 8.7–10.5)
CHLORIDE SERPL-SCNC: 92 MMOL/L (ref 95–110)
CHLORIDE SERPL-SCNC: 92 MMOL/L (ref 95–110)
CO2 SERPL-SCNC: 28 MMOL/L (ref 23–29)
CO2 SERPL-SCNC: 28 MMOL/L (ref 23–29)
CREAT SERPL-MCNC: 1.9 MG/DL (ref 0.5–1.4)
CREAT SERPL-MCNC: 1.9 MG/DL (ref 0.5–1.4)
DELSYS: ABNORMAL
DIFFERENTIAL METHOD: ABNORMAL
EOSINOPHIL # BLD AUTO: 0 K/UL (ref 0–0.5)
EOSINOPHIL NFR BLD: 0 % (ref 0–8)
ERYTHROCYTE [DISTWIDTH] IN BLOOD BY AUTOMATED COUNT: 22 % (ref 11.5–14.5)
EST. GFR  (AFRICAN AMERICAN): 40.1 ML/MIN/1.73 M^2
EST. GFR  (AFRICAN AMERICAN): 40.1 ML/MIN/1.73 M^2
EST. GFR  (NON AFRICAN AMERICAN): 34.7 ML/MIN/1.73 M^2
EST. GFR  (NON AFRICAN AMERICAN): 34.7 ML/MIN/1.73 M^2
GLUCOSE SERPL-MCNC: 121 MG/DL (ref 70–110)
GLUCOSE SERPL-MCNC: 121 MG/DL (ref 70–110)
HCO3 UR-SCNC: 28.2 MMOL/L (ref 24–28)
HCT VFR BLD AUTO: 29.2 % (ref 40–54)
HGB BLD-MCNC: 9.8 G/DL (ref 14–18)
IMM GRANULOCYTES # BLD AUTO: 0.13 K/UL (ref 0–0.04)
IMM GRANULOCYTES NFR BLD AUTO: 1.5 % (ref 0–0.5)
LYMPHOCYTES # BLD AUTO: 0.3 K/UL (ref 1–4.8)
LYMPHOCYTES NFR BLD: 3.6 % (ref 18–48)
MAGNESIUM SERPL-MCNC: 2.8 MG/DL (ref 1.6–2.6)
MCH RBC QN AUTO: 27.3 PG (ref 27–31)
MCHC RBC AUTO-ENTMCNC: 33.6 G/DL (ref 32–36)
MCV RBC AUTO: 81 FL (ref 82–98)
METHEMOGLOBIN: 1 % (ref 0–3)
MONOCYTES # BLD AUTO: 0.6 K/UL (ref 0.3–1)
MONOCYTES NFR BLD: 6.6 % (ref 4–15)
NEUTROPHILS # BLD AUTO: 7.4 K/UL (ref 1.8–7.7)
NEUTROPHILS NFR BLD: 88.1 % (ref 38–73)
NRBC BLD-RTO: 0 /100 WBC
PCO2 BLDA: 38.9 MMHG (ref 35–45)
PH SMN: 7.47 [PH] (ref 7.35–7.45)
PHOSPHATE SERPL-MCNC: 5.2 MG/DL (ref 2.7–4.5)
PLATELET # BLD AUTO: 268 K/UL (ref 150–450)
PMV BLD AUTO: 10.7 FL (ref 9.2–12.9)
PO2 BLDA: 35 MMHG (ref 40–60)
POC BE: 5 MMOL/L
POC SATURATED O2: 70 % (ref 95–100)
POC TCO2: 29 MMOL/L (ref 24–29)
POCT GLUCOSE: 119 MG/DL (ref 70–110)
POCT GLUCOSE: 124 MG/DL (ref 70–110)
POCT GLUCOSE: 125 MG/DL (ref 70–110)
POCT GLUCOSE: 138 MG/DL (ref 70–110)
POTASSIUM SERPL-SCNC: 3.2 MMOL/L (ref 3.5–5.1)
POTASSIUM SERPL-SCNC: 3.2 MMOL/L (ref 3.5–5.1)
POTASSIUM SERPL-SCNC: 3.3 MMOL/L (ref 3.5–5.1)
POTASSIUM SERPL-SCNC: 3.3 MMOL/L (ref 3.5–5.1)
PROT SERPL-MCNC: 6.7 G/DL (ref 6–8.4)
PROT SERPL-MCNC: 6.7 G/DL (ref 6–8.4)
RBC # BLD AUTO: 3.59 M/UL (ref 4.6–6.2)
SAMPLE: ABNORMAL
SITE: ABNORMAL
SODIUM SERPL-SCNC: 130 MMOL/L (ref 136–145)
SODIUM SERPL-SCNC: 130 MMOL/L (ref 136–145)
WBC # BLD AUTO: 8.44 K/UL (ref 3.9–12.7)

## 2022-02-10 PROCEDURE — 83050 HGB METHEMOGLOBIN QUAN: CPT

## 2022-02-10 PROCEDURE — 20000000 HC ICU ROOM

## 2022-02-10 PROCEDURE — 25000003 PHARM REV CODE 250: Performed by: STUDENT IN AN ORGANIZED HEALTH CARE EDUCATION/TRAINING PROGRAM

## 2022-02-10 PROCEDURE — 83735 ASSAY OF MAGNESIUM: CPT | Performed by: STUDENT IN AN ORGANIZED HEALTH CARE EDUCATION/TRAINING PROGRAM

## 2022-02-10 PROCEDURE — 99291 CRITICAL CARE FIRST HOUR: CPT | Mod: ,,, | Performed by: PHYSICIAN ASSISTANT

## 2022-02-10 PROCEDURE — 85025 COMPLETE CBC W/AUTO DIFF WBC: CPT | Performed by: STUDENT IN AN ORGANIZED HEALTH CARE EDUCATION/TRAINING PROGRAM

## 2022-02-10 PROCEDURE — 63600175 PHARM REV CODE 636 W HCPCS: Performed by: PHYSICIAN ASSISTANT

## 2022-02-10 PROCEDURE — 94761 N-INVAS EAR/PLS OXIMETRY MLT: CPT

## 2022-02-10 PROCEDURE — 27000221 HC OXYGEN, UP TO 24 HOURS

## 2022-02-10 PROCEDURE — 85730 THROMBOPLASTIN TIME PARTIAL: CPT | Mod: 91 | Performed by: INTERNAL MEDICINE

## 2022-02-10 PROCEDURE — 84132 ASSAY OF SERUM POTASSIUM: CPT | Mod: 91 | Performed by: PHYSICIAN ASSISTANT

## 2022-02-10 PROCEDURE — 85730 THROMBOPLASTIN TIME PARTIAL: CPT | Performed by: STUDENT IN AN ORGANIZED HEALTH CARE EDUCATION/TRAINING PROGRAM

## 2022-02-10 PROCEDURE — 63600175 PHARM REV CODE 636 W HCPCS: Performed by: STUDENT IN AN ORGANIZED HEALTH CARE EDUCATION/TRAINING PROGRAM

## 2022-02-10 PROCEDURE — 80053 COMPREHEN METABOLIC PANEL: CPT | Performed by: STUDENT IN AN ORGANIZED HEALTH CARE EDUCATION/TRAINING PROGRAM

## 2022-02-10 PROCEDURE — 84100 ASSAY OF PHOSPHORUS: CPT | Performed by: STUDENT IN AN ORGANIZED HEALTH CARE EDUCATION/TRAINING PROGRAM

## 2022-02-10 PROCEDURE — 63600367 HC NITRIC OXIDE PER HOUR

## 2022-02-10 PROCEDURE — 99291 PR CRITICAL CARE, E/M 30-74 MINUTES: ICD-10-PCS | Mod: ,,, | Performed by: PHYSICIAN ASSISTANT

## 2022-02-10 PROCEDURE — 99900035 HC TECH TIME PER 15 MIN (STAT)

## 2022-02-10 PROCEDURE — 25000003 PHARM REV CODE 250: Performed by: PHYSICIAN ASSISTANT

## 2022-02-10 RX ORDER — POTASSIUM CHLORIDE 20 MEQ/1
40 TABLET, EXTENDED RELEASE ORAL ONCE
Status: DISCONTINUED | OUTPATIENT
Start: 2022-02-10 | End: 2022-02-10

## 2022-02-10 RX ORDER — POTASSIUM CHLORIDE 20 MEQ/1
40 TABLET, EXTENDED RELEASE ORAL ONCE
Status: COMPLETED | OUTPATIENT
Start: 2022-02-10 | End: 2022-02-10

## 2022-02-10 RX ORDER — POTASSIUM CHLORIDE 29.8 MG/ML
40 INJECTION INTRAVENOUS ONCE
Status: COMPLETED | OUTPATIENT
Start: 2022-02-10 | End: 2022-02-10

## 2022-02-10 RX ORDER — DIPHENHYDRAMINE HCL 25 MG
25 CAPSULE ORAL ONCE
Status: COMPLETED | OUTPATIENT
Start: 2022-02-10 | End: 2022-02-10

## 2022-02-10 RX ADMIN — AMIODARONE HYDROCHLORIDE 400 MG: 200 TABLET ORAL at 10:02

## 2022-02-10 RX ADMIN — POTASSIUM BICARBONATE 20 MEQ: 391 TABLET, EFFERVESCENT ORAL at 09:02

## 2022-02-10 RX ADMIN — POTASSIUM CHLORIDE 40 MEQ: 400 INJECTION, SOLUTION INTRAVENOUS at 06:02

## 2022-02-10 RX ADMIN — POTASSIUM CHLORIDE 40 MEQ: 1500 TABLET, EXTENDED RELEASE ORAL at 06:02

## 2022-02-10 RX ADMIN — CHLOROTHIAZIDE SODIUM 500 MG: 500 INJECTION, POWDER, LYOPHILIZED, FOR SOLUTION INTRAVENOUS at 09:02

## 2022-02-10 RX ADMIN — FUROSEMIDE 50 MG/HR: 10 INJECTION, SOLUTION INTRAMUSCULAR; INTRAVENOUS at 11:02

## 2022-02-10 RX ADMIN — SIMETHICONE 80 MG: 80 TABLET, CHEWABLE ORAL at 07:02

## 2022-02-10 RX ADMIN — POTASSIUM CHLORIDE 20 MEQ: 1500 TABLET, EXTENDED RELEASE ORAL at 09:02

## 2022-02-10 RX ADMIN — AMIODARONE HYDROCHLORIDE 400 MG: 200 TABLET ORAL at 09:02

## 2022-02-10 RX ADMIN — SIMETHICONE 80 MG: 80 TABLET, CHEWABLE ORAL at 06:02

## 2022-02-10 RX ADMIN — RIOCIGUAT 2.5 MG: 2.5 TABLET, FILM COATED ORAL at 04:02

## 2022-02-10 RX ADMIN — FERROUS SULFATE TAB 325 MG (65 MG ELEMENTAL FE) 1 EACH: 325 (65 FE) TAB at 10:02

## 2022-02-10 RX ADMIN — DIPHENHYDRAMINE HYDROCHLORIDE 25 MG: 25 CAPSULE ORAL at 02:02

## 2022-02-10 RX ADMIN — SIMETHICONE 80 MG: 80 TABLET, CHEWABLE ORAL at 12:02

## 2022-02-10 RX ADMIN — PANTOPRAZOLE SODIUM 40 MG: 40 TABLET, DELAYED RELEASE ORAL at 10:02

## 2022-02-10 RX ADMIN — MILRINONE LACTATE IN DEXTROSE 0.12 MCG/KG/MIN: 200 INJECTION, SOLUTION INTRAVENOUS at 07:02

## 2022-02-10 RX ADMIN — IPRATROPIUM BROMIDE 2 SPRAY: 42 SPRAY, METERED NASAL at 09:02

## 2022-02-10 RX ADMIN — RIOCIGUAT 2.5 MG: 2.5 TABLET, FILM COATED ORAL at 09:02

## 2022-02-10 RX ADMIN — FUROSEMIDE 50 MG/HR: 10 INJECTION, SOLUTION INTRAMUSCULAR; INTRAVENOUS at 10:02

## 2022-02-10 RX ADMIN — RIOCIGUAT 2.5 MG: 2.5 TABLET, FILM COATED ORAL at 10:02

## 2022-02-10 RX ADMIN — ONDANSETRON 4 MG: 2 INJECTION INTRAMUSCULAR; INTRAVENOUS at 09:02

## 2022-02-10 RX ADMIN — POTASSIUM CHLORIDE 40 MEQ: 400 INJECTION, SOLUTION INTRAVENOUS at 12:02

## 2022-02-10 RX ADMIN — HEPARIN SODIUM 22 UNITS/KG/HR: 5000 INJECTION INTRAVENOUS; SUBCUTANEOUS at 05:02

## 2022-02-10 RX ADMIN — ACETAZOLAMIDE SODIUM 500 MG: 500 INJECTION, POWDER, LYOPHILIZED, FOR SOLUTION INTRAVENOUS at 08:02

## 2022-02-10 RX ADMIN — IPRATROPIUM BROMIDE 2 SPRAY: 42 SPRAY, METERED NASAL at 01:02

## 2022-02-10 RX ADMIN — FUROSEMIDE 50 MG/HR: 10 INJECTION, SOLUTION INTRAMUSCULAR; INTRAVENOUS at 12:02

## 2022-02-10 RX ADMIN — POTASSIUM CHLORIDE 40 MEQ: 400 INJECTION, SOLUTION INTRAVENOUS at 08:02

## 2022-02-10 NOTE — HPI
Mr. Ambrosio Bray is a 71 year old male with history of HF, PH (diagnosed in 2018) on [facility], Uptravi, CTEPH on home oxygen 2L NC, Atrial Flutter s/p ablation 2020, DM, BPH, HLD, Anemia, PTSD, Pulmonary hyperinflation presents to ED with respiratory distress and worsening shortness of breath for past 4 days. Wound care is consulted for evaluation of wounds to right ankle.

## 2022-02-10 NOTE — ASSESSMENT & PLAN NOTE
- right medial and lateral ankle non pressure wounds with fat layer exposed.  - right medial with moist pink and yellow wound bed. Right lateral wound with yellow to tan wound bed.  - continue Aquacel Ag hydrofiber MWF to wounds and cover with foam dressings.  - specialty bed being utilized.  - nursing to maintain pressure injury prevention measures.

## 2022-02-10 NOTE — PROGRESS NOTES
[facility]  Heart Transplant  Progress Note    Patient Name: Ambrosio Bray III  MRN: [id number]  Admission Date: 2/3/2022  Hospital Length of Stay: 7 days  Attending Physician: Roland George MD  Primary Care Provider: Luciana Cisse MD  Principal Problem:Acute decompensated heart failure    Subjective:     Interval History: No acute events overnight. Paracentesis done by IR yesterday which removed 5L. Started low dose primacor yesterday and increased lasix drip to 50/hr. Net negative 6.4L (3.1L UOP + 5L Paracentesis). CVP 16 this AM. Hemodynamics: 70% SvO2, CO 9.8 CI 4.7  on 20 ppm Emmy and .125 mcg/kg/min milrinone.      Lines: [facility] 2/3/22 (7days old)    Continuous Infusions:   sodium chloride 0.9% Stopped (02/07/22 2109)    furosemide (LASIX) 10 mg/mL infusion (non-titrating) 50 mg/hr (02/10/22 1119)    heparin (porcine) in D5W 22 Units/kg/hr (02/10/22 0801)    milrinone 20mg/100ml D5W (200mcg/ml) 0.125 mcg/kg/min (02/10/22 0801)    nitric oxide gas       Scheduled Meds:   acetaZOLAMIDE (DIAMOX) IVPB  500 mg Intravenous Once    amiodarone  400 mg Oral BID    chlorothiazide (DIURIL) IVPB  500 mg Intravenous BID    ferrous sulfate  1 tablet Oral Daily    ipratropium  2 spray Each Nostril QID    pantoprazole  40 mg Oral Daily    potassium chloride in water  40 mEq Intravenous BID    riociguat  2.5 mg Oral TID    selexipag  1,600 mcg Oral BID    simethicone  1 tablet Oral Q6H     PRN Meds:dextrose 10%, dextrose 10%, glucagon (human recombinant), glucose, glucose, guaiFENesin, insulin aspart U-100, melatonin, ondansetron, sodium chloride 0.9%    Review of patient's allergies indicates:  No Known Allergies  Objective:     Vital Signs (Most Recent):  Temp: 96 °F (35.6 °C) (02/10/22 0305)  Pulse: 81 (02/10/22 0925)  Resp: 14 (02/10/22 0925)  BP: (!) 93/53 (02/10/22 0925)  SpO2: 95 % (02/10/22 0925) Vital Signs (24h Range):  Temp:  [95 °F (35 °C)-98.1 °F (36.7 °C)] 96 °F  (35.6 °C)  Pulse:  [69-90] 81  Resp:  [10-48] 14  SpO2:  [91 %-96 %] 95 %  BP: ()/(51-60) 93/53     No data found.  Body mass index is 24.69 kg/m².      Intake/Output Summary (Last 24 hours) at 2/10/2022 1206  Last data filed at 2/10/2022 0801  Gross per 24 hour   Intake 1264.97 ml   Output 7980 ml   Net -6715.03 ml     Physical Exam  Constitutional:       Comments: Patient is alert and oriented, appears comfortable, does not appear to be in respiratory distress. On Emmy 20ppm with 6L NC.   HENT:      Head: Normocephalic and atraumatic.   Neck:      Comments: +JVD  Cardiovascular:      Rate and Rhythm: Tachycardia present. Rhythm irregular.      Pulses: Normal pulses.      Heart sounds: Murmur heard.    Decrescendo systolic murmur is present.      Pulmonary:      Effort: Pulmonary effort is normal. No respiratory distress.      Breath sounds: Normal breath sounds.      Comments: Bibasilar crackles R > L  Abdominal:      General: There is distension.      Comments: Abdomen distended w/ fluids.   Musculoskeletal:         General: Normal range of motion.      Cervical back: Normal range of motion and neck supple.      Comments: BL 3+ edema in LE improving   Skin:     General: Skin is warm and dry.   Neurological:      General: No focal deficit present.      Mental Status: He is alert and oriented to person, place, and time.   Psychiatric:         Mood and Affect: Mood normal.         Behavior: Behavior normal.       Significant Labs:  CBC:  Recent Labs   Lab 02/08/22  0305 02/09/22  0422 02/10/22  0417   WBC 11.32 10.77 8.44   RBC 3.54* 3.68* 3.59*   HGB 9.5* 9.9* 9.8*   HCT 29.4* 31.1* 29.2*    306 268   MCV 83 85 81*   MCH 26.8* 26.9* 27.3   MCHC 32.3 31.8* 33.6     BNP:  Recent Labs   Lab 02/05/22  0255 02/07/22  2333   * 540*     CMP:  Recent Labs   Lab 02/08/22  1943 02/08/22  2343 02/09/22  0422 02/09/22  0422 02/09/22  0541 02/09/22  0845 02/09/22  1642 02/09/22  2324 02/10/22  0417   *    < > 139*  139*  --  142*  --   --   --  121*  121*   CALCIUM 8.5*   < > 8.3*  8.3*  --  8.5*  --   --   --  8.5*  8.5*   ALBUMIN 3.0*  --  3.0*  3.0*  --   --   --   --   --  2.9*  2.9*   PROT 7.0  --  7.0  7.0  --   --   --   --   --  6.7  6.7   *   < > 130*  130*  --  131*  --   --   --  130*  130*   K 4.5   < > 6.2*  6.2*   < > 4.4   < > 4.2 3.8 3.3*  3.3*   CO2 26   < > 24  24  --  23  --   --   --  28  28   CL 93*   < > 96  96  --  94*  --   --   --  92*  92*   BUN 68*   < > 76*  76*  --  74*  --   --   --  79*  79*   CREATININE 1.7*   < > 1.8*  1.8*  --  1.8*  --   --   --  1.9*  1.9*   ALKPHOS 115  --  111  111  --   --   --   --   --  104  104   ALT 5*  --  6*  6*  --   --   --   --   --  <5*  <5*   AST 8*  --  7*  7*  --   --   --   --   --  8*  8*   BILITOT 2.5*  --  2.1*  2.1*  --   --   --   --   --  2.0*  2.0*    < > = values in this interval not displayed.      Coagulation:   Recent Labs   Lab 02/03/22 2216 02/03/22 2216 02/04/22 2010 02/05/22  0255 02/09/22  1123 02/09/22  2324 02/10/22  0417   INR 1.5*  --  1.6*  --   --   --   --    APTT 28.7   < > 30.9   < > 36.4* 49.0* 67.1*    < > = values in this interval not displayed.     LDH:  No results for input(s): LDH in the last 72 hours.  Microbiology:  Microbiology Results (last 7 days)     Procedure Component Value Units Date/Time    Blood culture #1 **CANNOT BE ORDERED STAT** [[id number]] Collected: 02/03/22 0940    Order Status: Completed Specimen: Blood from Peripheral, Forearm, Right Updated: 02/08/22 1212     Blood Culture, Routine No growth after 5 days.    Blood culture #2 **CANNOT BE ORDERED STAT** [[id number]] Collected: 02/03/22 0929    Order Status: Completed Specimen: Blood from Peripheral, Antecubital, Left Updated: 02/08/22 1212     Blood Culture, Routine No growth after 5 days.    Respiratory Infection Panel (PCR), Nasopharyngeal [[id number]]     Order Status: No result Specimen: Nasopharyngeal Swab          Estimated Creatinine Clearance: 40.3 mL/min (A) (based on SCr of 1.9 mg/dL (H)).    Diagnostic Results:  I have reviewed and interpreted all pertinent imaging results/findings within the past 24 hours.    Assessment and Plan:     71 y.o M with history of HF, PH (diagnosed in 2018) on [facility], Uptravi, CTEPH on home oxygen 2L NC, Atrial Flutter s/p ablation 2020, DM, BPH, HLD, Anemia, PTSD, Pulmonary hyperinflation presents to ED with respiratory distress and worsening shortness of breath for past 4 days. Patient reports he was in his usual state of health until 4 days ago when he developed SOB while ambulating to use restroom. Patient reports he had removed his oxygen to use restroom and while ambulating to restroom he became lightheaded, dizzy and in respiratory distress and reports having a fall and syncopized. He reports he woke up after 30 seconds, did not hit his head. Patient reports since the past four days he has also been having increasing loose BM more than his usual, reports feeling dehydrated with increased fluid intake. Patient reports he recently followed up with pulmonologist in clinic and his home Bumex 2mg MWF was increased to daily and was prescribed home oxygen. Patient reports that has helped relieve his SOB. Patient denies chest pain, further episodes of syncope, change in vision, slurred speech, palpitations, headache, change in urinary habits, cough, fever.     Home Medications  - Bumex 2mg daily  - Adempas 2.5mg TID  - Uptravi 1600 mcg BID (to start on 3/21/22)  - Xarelto 20mg daily  - Losartan 25mg daily  - Flomax 0.4mg  - Lipitor 40mg daily  - Atrovent  - Ferrous Sulfate     ED Course      Vitals:     02/03/22 1332   BP: 98/65   Pulse: 104   Resp: (!) 23   Temp:       Pertinent Labs  CBC: WBC 8.93, Hbg 10.3, Hct 32.7, Plt 270  BMP: Na 139, K 3.7, Cl 104, CO2 25, BUN 16, Cr. 0.8,   LFT: TP 7.2, Alb, 3.6, TB 2.9, AST 11, ALT 8  Free T4: 0.88  Lactate 1.7, Troponin 0.012,   EKG:  Afib with RVR at a rate of 173, RBB  CXR: bilateral opacities, pulmonary congestion  ED Management  -Lopressor Tartrate 50mg q6h  -Diltiazem 25 mg IVP  -Lasix 80mg IVP      * Acute decompensated heart failure  -NICM   -2/5/22 TTE: EF 20, LV DD, Systolic flattening of IV septum consistent with RV pressure overload, small pericardial effusion, mild LA enlargement, severe RV enlargement with severely reduced RVSF, severe TR, PASP 73, CVP 15, LVIDd 7.7  -12/16/22 TTE: LV normal in size with moderately decreased systolic function, EF 35, Moderate RV enlargement with hypertrophy and moderately reduced RVSF, grade II DD, Mod-severe TR, Mod MR, trivial pericardial effusion, PASP 87, CVP 15, LVIDd 5.62  -11/28/2018 RHC: RA: 14/ 15/ 11 RV: 112/ -4 PA: 117/ 31/ 59 PWP: 30/ 25/ 20, CO 5.48, CI 2.53, O2 sat: SVC 73, PA 64%, , SVR 1357 (on adempas)  -Hypervolemic on exam today  -Home Diuretic Regimen: Bumex 2mg daily  -Inpatient Diuretic Regimen: CVP 15 today and UOP net negative: -2.3L. Continue Diuril 500mg IVP daily, Increased lasix drip to 50 mg/hr yesterday and will add Acetazolamide IV push today.   -Emmy at 20ppm  -GDMT: Losartan 25mg daily (holding)  -2g Na dietary restriction, 1500 mL fluid restriction, strict I/Os  -CVP still elevated at 16 this AM. Continue aggressive diuresis for goal CVP <10 then plan for RHC.     Ascites  - liver with cirrhotic appearance on past CT scans.   -Tapped by IR yesterday with 5L removed.     Iron deficiency anemia due to chronic blood loss  - monitor H/H  - resume home iron therapy    CTEPH (chronic thromboembolic pulmonary hypertension)  - On heparin for now. Can resume eliquis after RHC.   - please see plan for PH    Pulmonary HTN  -12/16/22 TTE: LV normal in size with moderately decreased systolic function, EF 35, Moderate RV enlargement with hypertrophy and moderately reduced RVSF, grade II DD, Mod-severe TR, Mod MR, trivial pericardial effusion, PASP 87, CVP 15, LVIDd  5.62  -11/28/2018 RHC: RA: 14/ 15/ 11 RV: 112/ -4 PA: 117/ 31/ 59 PWP: 30/ 25/ 20, CO 5.48, CI 2.53, O2 sat: SVC 73, PA 64%, , SVR 1357 (on adempas)  - Resume home Adempas 2.5mg TID  - Utravi 1600 mcg BID   - Plan for RHC once CVP <10, for further guidance of PH treatment. Plan to switch to Remodulin. Home xarelto switched to heparin IV in anticipation of RHC. IF CVP >10, would wait until adequate diuresis is achieved. Will continue aggressive diuresis     Atrial Flutter/ fibrillation  - Patient has a history of known Atrial fibrillation s/p RITO/CV 9/30/14. In 2017 patient had wide complex tachycardia and received amiodarone and was started on daily regimen. EKG were reviewed by EP and rhythm was likely Aflutter. Patient underwent ablation in 2018. Last followed in EP clinic on 3/31/20, was told has high chances of developing Afib within next 5 years. Patient was in RVR on admission likely secondary to ADHF and electrolyte imbalance. Plan to continue diuresis and replace electrolytes, if Afib persists after adequate diuresis/ electrolyte replacement, will consider EP consult.  - patient is on chronic AC with xarelto, and heparin IV while inpateint  - plan to switch to amiodarone 400mg BID for 2 weeks followed by amiodarone 200mg daily    Type 2 diabetes mellitus with microalbuminuria, without long-term current use of insulin  - follow A1c  - Sliding scale and FS  - endocrine consult      Uninterrupted Critical Care/Counseling Time (not including procedures): 30 minutes.     Vikas Mendes PA-C  Heart Transplant  [facility]

## 2022-02-10 NOTE — SUBJECTIVE & OBJECTIVE
Interval History: No acute events overnight. Paracentesis done by IR yesterday which removed 5L. Started low dose primacor yesterday and increased lasix drip to 50/hr. Net negative 6.4L (3.1L UOP + 5L Paracentesis). CVP 16 this AM. Hemodynamics: 70% SvO2, CO 9.8 CI 4.7  on 20 ppm Emmy and .125 mcg/kg/min milrinone.      Lines: [facility] 2/3/22 (7days old)    Continuous Infusions:   sodium chloride 0.9% Stopped (02/07/22 2109)    furosemide (LASIX) 10 mg/mL infusion (non-titrating) 50 mg/hr (02/10/22 1119)    heparin (porcine) in D5W 22 Units/kg/hr (02/10/22 0801)    milrinone 20mg/100ml D5W (200mcg/ml) 0.125 mcg/kg/min (02/10/22 0801)    nitric oxide gas       Scheduled Meds:   acetaZOLAMIDE (DIAMOX) IVPB  500 mg Intravenous Once    amiodarone  400 mg Oral BID    chlorothiazide (DIURIL) IVPB  500 mg Intravenous BID    ferrous sulfate  1 tablet Oral Daily    ipratropium  2 spray Each Nostril QID    pantoprazole  40 mg Oral Daily    potassium chloride in water  40 mEq Intravenous BID    riociguat  2.5 mg Oral TID    selexipag  1,600 mcg Oral BID    simethicone  1 tablet Oral Q6H     PRN Meds:dextrose 10%, dextrose 10%, glucagon (human recombinant), glucose, glucose, guaiFENesin, insulin aspart U-100, melatonin, ondansetron, sodium chloride 0.9%    Review of patient's allergies indicates:  No Known Allergies  Objective:     Vital Signs (Most Recent):  Temp: 96 °F (35.6 °C) (02/10/22 0305)  Pulse: 81 (02/10/22 0925)  Resp: 14 (02/10/22 0925)  BP: (!) 93/53 (02/10/22 0925)  SpO2: 95 % (02/10/22 0925) Vital Signs (24h Range):  Temp:  [95 °F (35 °C)-98.1 °F (36.7 °C)] 96 °F (35.6 °C)  Pulse:  [69-90] 81  Resp:  [10-48] 14  SpO2:  [91 %-96 %] 95 %  BP: ()/(51-60) 93/53     No data found.  Body mass index is 24.69 kg/m².      Intake/Output Summary (Last 24 hours) at 2/10/2022 1206  Last data filed at 2/10/2022 0801  Gross per 24 hour   Intake 1264.97 ml   Output 7980 ml   Net -6715.03 ml     Physical  Exam  Constitutional:       Comments: Patient is alert and oriented, appears comfortable, does not appear to be in respiratory distress. On Emmy 20ppm with 6L NC.   HENT:      Head: Normocephalic and atraumatic.   Neck:      Comments: +JVD  Cardiovascular:      Rate and Rhythm: Tachycardia present. Rhythm irregular.      Pulses: Normal pulses.      Heart sounds: Murmur heard.    Decrescendo systolic murmur is present.      Pulmonary:      Effort: Pulmonary effort is normal. No respiratory distress.      Breath sounds: Normal breath sounds.      Comments: Bibasilar crackles R > L  Abdominal:      General: There is distension.      Comments: Abdomen distended w/ fluids.   Musculoskeletal:         General: Normal range of motion.      Cervical back: Normal range of motion and neck supple.      Comments: BL 3+ edema in LE improving   Skin:     General: Skin is warm and dry.   Neurological:      General: No focal deficit present.      Mental Status: He is alert and oriented to person, place, and time.   Psychiatric:         Mood and Affect: Mood normal.         Behavior: Behavior normal.       Significant Labs:  CBC:  Recent Labs   Lab 02/08/22  0305 02/09/22  0422 02/10/22  0417   WBC 11.32 10.77 8.44   RBC 3.54* 3.68* 3.59*   HGB 9.5* 9.9* 9.8*   HCT 29.4* 31.1* 29.2*    306 268   MCV 83 85 81*   MCH 26.8* 26.9* 27.3   MCHC 32.3 31.8* 33.6     BNP:  Recent Labs   Lab 02/05/22  0255 02/07/22  2333   * 540*     CMP:  Recent Labs   Lab 02/08/22  1943 02/08/22  2343 02/09/22  0422 02/09/22  0422 02/09/22  0541 02/09/22  0845 02/09/22  1642 02/09/22  2324 02/10/22  0417   *   < > 139*  139*  --  142*  --   --   --  121*  121*   CALCIUM 8.5*   < > 8.3*  8.3*  --  8.5*  --   --   --  8.5*  8.5*   ALBUMIN 3.0*  --  3.0*  3.0*  --   --   --   --   --  2.9*  2.9*   PROT 7.0  --  7.0  7.0  --   --   --   --   --  6.7  6.7   *   < > 130*  130*  --  131*  --   --   --  130*  130*   K 4.5   < >  6.2*  6.2*   < > 4.4   < > 4.2 3.8 3.3*  3.3*   CO2 26   < > 24  24  --  23  --   --   --  28  28   CL 93*   < > 96  96  --  94*  --   --   --  92*  92*   BUN 68*   < > 76*  76*  --  74*  --   --   --  79*  79*   CREATININE 1.7*   < > 1.8*  1.8*  --  1.8*  --   --   --  1.9*  1.9*   ALKPHOS 115  --  111  111  --   --   --   --   --  104  104   ALT 5*  --  6*  6*  --   --   --   --   --  <5*  <5*   AST 8*  --  7*  7*  --   --   --   --   --  8*  8*   BILITOT 2.5*  --  2.1*  2.1*  --   --   --   --   --  2.0*  2.0*    < > = values in this interval not displayed.      Coagulation:   Recent Labs   Lab 02/03/22 2216 02/03/22 2216 02/04/22 2010 02/05/22  0255 02/09/22  1123 02/09/22  2324 02/10/22  0417   INR 1.5*  --  1.6*  --   --   --   --    APTT 28.7   < > 30.9   < > 36.4* 49.0* 67.1*    < > = values in this interval not displayed.     LDH:  No results for input(s): LDH in the last 72 hours.  Microbiology:  Microbiology Results (last 7 days)     Procedure Component Value Units Date/Time    Blood culture #1 **CANNOT BE ORDERED STAT** [[id number]] Collected: 02/03/22 0940    Order Status: Completed Specimen: Blood from Peripheral, Forearm, Right Updated: 02/08/22 1212     Blood Culture, Routine No growth after 5 days.    Blood culture #2 **CANNOT BE ORDERED STAT** [[id number]] Collected: 02/03/22 0929    Order Status: Completed Specimen: Blood from Peripheral, Antecubital, Left Updated: 02/08/22 1212     Blood Culture, Routine No growth after 5 days.    Respiratory Infection Panel (PCR), Nasopharyngeal [[id number]]     Order Status: No result Specimen: Nasopharyngeal Swab         Estimated Creatinine Clearance: 40.3 mL/min (A) (based on SCr of 1.9 mg/dL (H)).    Diagnostic Results:  I have reviewed and interpreted all pertinent imaging results/findings within the past 24 hours.

## 2022-02-10 NOTE — ASSESSMENT & PLAN NOTE
-NICM   -2/5/22 TTE: EF 20, LV DD, Systolic flattening of IV septum consistent with RV pressure overload, small pericardial effusion, mild LA enlargement, severe RV enlargement with severely reduced RVSF, severe TR, PASP 73, CVP 15, LVIDd 7.7  -12/16/22 TTE: LV normal in size with moderately decreased systolic function, EF 35, Moderate RV enlargement with hypertrophy and moderately reduced RVSF, grade II DD, Mod-severe TR, Mod MR, trivial pericardial effusion, PASP 87, CVP 15, LVIDd 5.62  -11/28/2018 RHC: RA: 14/ 15/ 11 RV: 112/ -4 PA: 117/ 31/ 59 PWP: 30/ 25/ 20, CO 5.48, CI 2.53, O2 sat: SVC 73, PA 64%, , SVR 1357 (on adempas)  -Hypervolemic on exam today  -Home Diuretic Regimen: Bumex 2mg daily  -Inpatient Diuretic Regimen: CVP 15 today and UOP net negative: -2.3L. Continue Diuril 500mg IVP daily, Increased lasix drip to 50 mg/hr yesterday and will add Acetazolamide IV push today.   -Emmy at 20ppm  -GDMT: Losartan 25mg daily (holding)  -2g Na dietary restriction, 1500 mL fluid restriction, strict I/Os  -CVP still elevated at 16 this AM. Continue aggressive diuresis for goal CVP <10 then plan for RHC.

## 2022-02-10 NOTE — PROGRESS NOTES
Update    Pt presents as AAO x4, calm, cooperative, and asking and answering questions appropriately. Pt states he is sleepy and has no needs. Pt asked LCSW to turn off the lights in his room. Pt will have the RN reach out to the LCSW if any needs arise. SW providing ongoing psychosocial, counseling, & emotional support, education, resources, assistance, and discharge planning as indicated.  SW to continue to follow.

## 2022-02-10 NOTE — SUBJECTIVE & OBJECTIVE
Scheduled Meds:   acetaZOLAMIDE (DIAMOX) IVPB  500 mg Intravenous Once    amiodarone  400 mg Oral BID    chlorothiazide (DIURIL) IVPB  500 mg Intravenous BID    ferrous sulfate  1 tablet Oral Daily    ipratropium  2 spray Each Nostril QID    pantoprazole  40 mg Oral Daily    potassium chloride in water  40 mEq Intravenous BID    riociguat  2.5 mg Oral TID    selexipag  1,600 mcg Oral BID    simethicone  1 tablet Oral Q6H     Continuous Infusions:   sodium chloride 0.9% Stopped (02/07/22 2109)    furosemide (LASIX) 10 mg/mL infusion (non-titrating) 50 mg/hr (02/10/22 1201)    heparin (porcine) in D5W 22 Units/kg/hr (02/10/22 1201)    milrinone 20mg/100ml D5W (200mcg/ml) 0.125 mcg/kg/min (02/10/22 1201)    nitric oxide gas       PRN Meds:dextrose 10%, dextrose 10%, glucagon (human recombinant), glucose, glucose, guaiFENesin, insulin aspart U-100, melatonin, ondansetron, sodium chloride 0.9%    Review of patient's allergies indicates:  No Known Allergies     Past Medical History:   Diagnosis Date    Anemia     Atrial fibrillation     Diabetes mellitus     Heart failure     Hyperlipidemia     Hypertension     PTSD (post-traumatic stress disorder)     Pulmonary hyperinflation     Urinary incontinence      Past Surgical History:   Procedure Laterality Date    CARDIAC CATHETERIZATION      COLONOSCOPY N/A 5/1/2018    Procedure: COLONOSCOPY;  Surgeon: Bubba Navarro MD;  Location: [facility] ENDO;  Service: Endoscopy;  Laterality: N/A;  confirmed appt 4/24/18    RIGHT HEART CATHETERIZATION Right 11/28/2018    Procedure: INSERTION, CATHETER, RIGHT HEART;  Surgeon: Chelsea Arceo MD;  Location: [facility] CATH LAB;  Service: Cardiology;  Laterality: Right;       Family History     Problem Relation (Age of Onset)    Asthma Mother    Cancer Father    Cirrhosis Paternal Grandfather    Colon cancer Father    Diabetes Mother, Paternal Grandmother    Heart disease Mother    Hypertension Mother    Kidney disease  Maternal Grandmother        Tobacco Use    Smoking status: Never Smoker    Smokeless tobacco: Never Used   Substance and Sexual Activity    Alcohol use: Yes     Alcohol/week: 2.0 - 3.0 standard drinks     Types: 2 - 3 Shots of liquor per week     Comment: maybe twice a month     Drug use: No    Sexual activity: Yes     Partners: Female     Review of Systems   Skin: Positive for wound.       Objective:     Vital Signs (Most Recent):  Temp: 96.9 °F (36.1 °C) (02/10/22 1215)  Pulse: 76 (02/10/22 1401)  Resp: 10 (02/10/22 1401)  BP: (!) 111/59 (02/10/22 1301)  SpO2: 95 % (02/10/22 1401) Vital Signs (24h Range):  Temp:  [95 °F (35 °C)-97.3 °F (36.3 °C)] 96.9 °F (36.1 °C)  Pulse:  [69-90] 76  Resp:  [8-48] 10  SpO2:  [91 %-96 %] 95 %  BP: ()/(51-62) 111/59     Weight: 84.9 kg (187 lb 2.7 oz)  Body mass index is 24.69 kg/m².  Physical Exam  Constitutional:       Appearance: Normal appearance.   Skin:     General: Skin is warm and dry.      Findings: Lesion present.   Neurological:      Mental Status: He is alert.         Laboratory:  All pertinent labs reviewed within the last 24 hours.    Diagnostic Results:  None

## 2022-02-10 NOTE — CONSULTS
[facility]  Skin Integrity ERIN  Consult Note    Patient Name: Ambrosio Bray III  MRN: [id number]  Admission Date: 2/3/2022  Hospital Length of Stay: 7 days  Attending Physician: Roland George MD  Primary Care Provider: Luciana Cisse MD     Consults  Subjective:     History of Present Illness:  Mr. Ambrosio Bray is a 71 year old male with history of HF, PH (diagnosed in 2018) on [facility], Uptravi, CTEPH on home oxygen 2L NC, Atrial Flutter s/p ablation 2020, DM, BPH, HLD, Anemia, PTSD, Pulmonary hyperinflation presents to ED with respiratory distress and worsening shortness of breath for past 4 days. Wound care is consulted for evaluation of wounds to right ankle.      Scheduled Meds:   acetaZOLAMIDE (DIAMOX) IVPB  500 mg Intravenous Once    amiodarone  400 mg Oral BID    chlorothiazide (DIURIL) IVPB  500 mg Intravenous BID    ferrous sulfate  1 tablet Oral Daily    ipratropium  2 spray Each Nostril QID    pantoprazole  40 mg Oral Daily    potassium chloride in water  40 mEq Intravenous BID    riociguat  2.5 mg Oral TID    selexipag  1,600 mcg Oral BID    simethicone  1 tablet Oral Q6H     Continuous Infusions:   sodium chloride 0.9% Stopped (02/07/22 2109)    furosemide (LASIX) 10 mg/mL infusion (non-titrating) 50 mg/hr (02/10/22 1201)    heparin (porcine) in D5W 22 Units/kg/hr (02/10/22 1201)    milrinone 20mg/100ml D5W (200mcg/ml) 0.125 mcg/kg/min (02/10/22 1201)    nitric oxide gas       PRN Meds:dextrose 10%, dextrose 10%, glucagon (human recombinant), glucose, glucose, guaiFENesin, insulin aspart U-100, melatonin, ondansetron, sodium chloride 0.9%    Review of patient's allergies indicates:  No Known Allergies     Past Medical History:   Diagnosis Date    Anemia     Atrial fibrillation     Diabetes mellitus     Heart failure     Hyperlipidemia     Hypertension     PTSD (post-traumatic stress disorder)     Pulmonary hyperinflation     Urinary incontinence       Past Surgical History:   Procedure Laterality Date    CARDIAC CATHETERIZATION      COLONOSCOPY N/A 5/1/2018    Procedure: COLONOSCOPY;  Surgeon: Bubba Navarro MD;  Location: [facility];  Service: Endoscopy;  Laterality: N/A;  confirmed appt 4/24/18    RIGHT HEART CATHETERIZATION Right 11/28/2018    Procedure: INSERTION, CATHETER, RIGHT HEART;  Surgeon: Chelsea Arceo MD;  Location: [facility] CATH LAB;  Service: Cardiology;  Laterality: Right;       Family History     Problem Relation (Age of Onset)    Asthma Mother    Cancer Father    Cirrhosis Paternal Grandfather    Colon cancer Father    Diabetes Mother, Paternal Grandmother    Heart disease Mother    Hypertension Mother    Kidney disease Maternal Grandmother        Tobacco Use    Smoking status: Never Smoker    Smokeless tobacco: Never Used   Substance and Sexual Activity    Alcohol use: Yes     Alcohol/week: 2.0 - 3.0 standard drinks     Types: 2 - 3 Shots of liquor per week     Comment: maybe twice a month     Drug use: No    Sexual activity: Yes     Partners: Female     Review of Systems   Skin: Positive for wound.       Objective:     Vital Signs (Most Recent):  Temp: 96.9 °F (36.1 °C) (02/10/22 1215)  Pulse: 76 (02/10/22 1401)  Resp: 10 (02/10/22 1401)  BP: (!) 111/59 (02/10/22 1301)  SpO2: 95 % (02/10/22 1401) Vital Signs (24h Range):  Temp:  [95 °F (35 °C)-97.3 °F (36.3 °C)] 96.9 °F (36.1 °C)  Pulse:  [69-90] 76  Resp:  [8-48] 10  SpO2:  [91 %-96 %] 95 %  BP: ()/(51-62) 111/59     Weight: 84.9 kg (187 lb 2.7 oz)  Body mass index is 24.69 kg/m².  Physical Exam  Constitutional:       Appearance: Normal appearance.   Skin:     General: Skin is warm and dry.      Findings: Lesion present.   Neurological:      Mental Status: He is alert.         Laboratory:  All pertinent labs reviewed within the last 24 hours.    Diagnostic Results:  None      Assessment/Plan:         ERIN Skin Integrity Evaluation    Skin Integrity ERIN evaluation of patient  as part of the comprehensive skin care team.  He has been admitted for 7 days. Skin injury was noted on 2/4/22. POA yes.                    Non-pressure ulcer of right lower extremity, with fat layer exposed  - right medial and lateral ankle non pressure wounds with fat layer exposed.  - right medial with moist pink and yellow wound bed. Right lateral wound with yellow to tan wound bed.  - continue Aquacel Ag hydrofiber MWF to wounds and cover with foam dressings.  - specialty bed being utilized.  - nursing to maintain pressure injury prevention measures.        Thank you for your consult. I will follow-up with patient. Please contact us if you have any additional questions.         Ami Terrell NP  Skin Integrity ERIN  [facility]

## 2022-02-10 NOTE — PLAN OF CARE
[facility] DAILY GOALS       A: Awake    RASS: Goal -    Actual -     Restraint necessity:    B: Breathe   SBT: Not intubated   C: Coordinate A & B, analgesics/sedatives   Pain: managed    SAT: Not intubated  D: Delirium   CAM-ICU: Overall CAM-ICU: Negative  E: Early(intubated/ Progressive (non-intubated) Mobility   MOVE Screen: Fail   Activity: Activity Management: Rolling - L1  FAS: Feeding/Nutrition   Diet order: Diet/Nutrition Received: restrict fluids,    T: Thrombus   DVT prophylaxis: VTE Required Core Measure: Pharmacological prophylaxis initiated/maintained  H: HOB Elevation   Head of Bed (HOB) Positioning: HOB elevated  U: Ulcer Prophylaxis   GI: yes  G: Glucose control   managed    S: Skin   Bathing/Skin Care: bath, complete,dressed/undressed,electrode patches/site rotation,incontinence care,linen changed  Device Skin Pressure Protection: absorbent pad utilized/changed,adhesive use limited,positioning supports utilized,pressure points protected,skin-to-device areas padded,skin-to-skin areas padded  Pressure Reduction Devices: feet on footrest/footstool,foam padding utilized,heel offloading device utilized,pressure-redistributing mattress utilized,specialty bed utilized,positioning supports utilized  Pressure Reduction Techniques: frequent weight shift encouraged,heels elevated off bed,positioned off wounds,pressure points protected,weight shift assistance provided  Skin Protection: adhesive use limited,incontinence pads utilized  B: Bowel Function   constipation   I: Indwelling Catheters   Christensen necessity:      Urethral Catheter 02/04/22 0400-Reason for Continuing Urinary Catheterization: Critically ill in ICU and requiring hourly monitoring of intake/output   CVC necessity: Yes  D: De-escalation Antibiotics   No    Family/Goals of care/Code Status   Code Status: Full Code    24H Vital Sign Range  Temp:  [95 °F (35 °C)-98.1 °F (36.7 °C)]   Pulse:  [68-90]   Resp:  [11-48]   BP: ()/(51-65)   SpO2:  [89  %-96 %]      Shift Events   No acute events throughout shift    VS and assessment per flow sheet, patient progressing towards goals as tolerated, plan of care reviewed with family, all concerns addressed, will continue to monitor.    Aminata Iqbal

## 2022-02-11 LAB
ALBUMIN SERPL BCP-MCNC: 2.9 G/DL (ref 3.5–5.2)
ALBUMIN SERPL BCP-MCNC: 2.9 G/DL (ref 3.5–5.2)
ALLENS TEST: ABNORMAL
ALLENS TEST: ABNORMAL
ALLENS TEST: NORMAL
ALP SERPL-CCNC: 106 U/L (ref 55–135)
ALP SERPL-CCNC: 106 U/L (ref 55–135)
ALT SERPL W/O P-5'-P-CCNC: 6 U/L (ref 10–44)
ALT SERPL W/O P-5'-P-CCNC: 6 U/L (ref 10–44)
ANION GAP SERPL CALC-SCNC: 11 MMOL/L (ref 8–16)
ANION GAP SERPL CALC-SCNC: 12 MMOL/L (ref 8–16)
ANION GAP SERPL CALC-SCNC: 12 MMOL/L (ref 8–16)
APTT BLDCRRT: 43.6 SEC (ref 21–32)
AST SERPL-CCNC: 10 U/L (ref 10–40)
AST SERPL-CCNC: 10 U/L (ref 10–40)
BASOPHILS # BLD AUTO: 0.02 K/UL (ref 0–0.2)
BASOPHILS NFR BLD: 0.2 % (ref 0–1.9)
BILIRUB DIRECT SERPL-MCNC: 1.3 MG/DL (ref 0.1–0.3)
BILIRUB SERPL-MCNC: 2.3 MG/DL (ref 0.1–1)
BILIRUB SERPL-MCNC: 2.3 MG/DL (ref 0.1–1)
BNP SERPL-MCNC: 496 PG/ML (ref 0–99)
BUN SERPL-MCNC: 74 MG/DL (ref 8–23)
BUN SERPL-MCNC: 74 MG/DL (ref 8–23)
BUN SERPL-MCNC: 76 MG/DL (ref 8–23)
CALCIUM SERPL-MCNC: 8.4 MG/DL (ref 8.7–10.5)
CALCIUM SERPL-MCNC: 8.4 MG/DL (ref 8.7–10.5)
CALCIUM SERPL-MCNC: 8.6 MG/DL (ref 8.7–10.5)
CHLORIDE SERPL-SCNC: 92 MMOL/L (ref 95–110)
CHLORIDE SERPL-SCNC: 93 MMOL/L (ref 95–110)
CHLORIDE SERPL-SCNC: 93 MMOL/L (ref 95–110)
CO2 SERPL-SCNC: 30 MMOL/L (ref 23–29)
CO2 SERPL-SCNC: 30 MMOL/L (ref 23–29)
CO2 SERPL-SCNC: 33 MMOL/L (ref 23–29)
CREAT SERPL-MCNC: 1.7 MG/DL (ref 0.5–1.4)
CREAT SERPL-MCNC: 1.7 MG/DL (ref 0.5–1.4)
CREAT SERPL-MCNC: 1.9 MG/DL (ref 0.5–1.4)
DELSYS: ABNORMAL
DELSYS: ABNORMAL
DELSYS: NORMAL
DIFFERENTIAL METHOD: ABNORMAL
EOSINOPHIL # BLD AUTO: 0 K/UL (ref 0–0.5)
EOSINOPHIL NFR BLD: 0 % (ref 0–8)
ERYTHROCYTE [DISTWIDTH] IN BLOOD BY AUTOMATED COUNT: 21.4 % (ref 11.5–14.5)
EST. GFR  (AFRICAN AMERICAN): 40.1 ML/MIN/1.73 M^2
EST. GFR  (AFRICAN AMERICAN): 45.9 ML/MIN/1.73 M^2
EST. GFR  (AFRICAN AMERICAN): 45.9 ML/MIN/1.73 M^2
EST. GFR  (NON AFRICAN AMERICAN): 34.7 ML/MIN/1.73 M^2
EST. GFR  (NON AFRICAN AMERICAN): 39.7 ML/MIN/1.73 M^2
EST. GFR  (NON AFRICAN AMERICAN): 39.7 ML/MIN/1.73 M^2
GLUCOSE SERPL-MCNC: 105 MG/DL (ref 70–110)
GLUCOSE SERPL-MCNC: 105 MG/DL (ref 70–110)
GLUCOSE SERPL-MCNC: 115 MG/DL (ref 70–110)
HCO3 UR-SCNC: 30.5 MMOL/L (ref 24–28)
HCO3 UR-SCNC: 31.6 MMOL/L (ref 24–28)
HCT VFR BLD AUTO: 29.7 % (ref 40–54)
HGB BLD-MCNC: 10.1 G/DL (ref 14–18)
IMM GRANULOCYTES # BLD AUTO: 0.12 K/UL (ref 0–0.04)
IMM GRANULOCYTES NFR BLD AUTO: 1 % (ref 0–0.5)
LDH SERPL L TO P-CCNC: 0.83 MMOL/L (ref 0.5–2.2)
LYMPHOCYTES # BLD AUTO: 0.2 K/UL (ref 1–4.8)
LYMPHOCYTES NFR BLD: 1.6 % (ref 18–48)
MAGNESIUM SERPL-MCNC: 2.5 MG/DL (ref 1.6–2.6)
MCH RBC QN AUTO: 27.3 PG (ref 27–31)
MCHC RBC AUTO-ENTMCNC: 34 G/DL (ref 32–36)
MCV RBC AUTO: 80 FL (ref 82–98)
METHEMOGLOBIN: 0.3 % (ref 0–3)
MONOCYTES # BLD AUTO: 0.4 K/UL (ref 0.3–1)
MONOCYTES NFR BLD: 3.8 % (ref 4–15)
NEUTROPHILS # BLD AUTO: 10.9 K/UL (ref 1.8–7.7)
NEUTROPHILS NFR BLD: 93.4 % (ref 38–73)
NRBC BLD-RTO: 0 /100 WBC
PCO2 BLDA: 40.9 MMHG (ref 35–45)
PCO2 BLDA: 42.2 MMHG (ref 35–45)
PH SMN: 7.48 [PH] (ref 7.35–7.45)
PH SMN: 7.48 [PH] (ref 7.35–7.45)
PHOSPHATE SERPL-MCNC: 4.3 MG/DL (ref 2.7–4.5)
PLATELET # BLD AUTO: 273 K/UL (ref 150–450)
PMV BLD AUTO: 10.8 FL (ref 9.2–12.9)
PO2 BLDA: 34 MMHG (ref 40–60)
PO2 BLDA: 35 MMHG (ref 40–60)
POC BE: 7 MMOL/L
POC BE: 8 MMOL/L
POC SATURATED O2: 69 % (ref 95–100)
POC SATURATED O2: 72 % (ref 95–100)
POC TCO2: 32 MMOL/L (ref 24–29)
POC TCO2: 33 MMOL/L (ref 24–29)
POCT GLUCOSE: 124 MG/DL (ref 70–110)
POCT GLUCOSE: 129 MG/DL (ref 70–110)
POCT GLUCOSE: 146 MG/DL (ref 70–110)
POCT GLUCOSE: 147 MG/DL (ref 70–110)
POTASSIUM SERPL-SCNC: 3 MMOL/L (ref 3.5–5.1)
POTASSIUM SERPL-SCNC: 3 MMOL/L (ref 3.5–5.1)
POTASSIUM SERPL-SCNC: 3.4 MMOL/L (ref 3.5–5.1)
POTASSIUM SERPL-SCNC: 3.8 MMOL/L (ref 3.5–5.1)
POTASSIUM SERPL-SCNC: 4 MMOL/L (ref 3.5–5.1)
PROT SERPL-MCNC: 6.6 G/DL (ref 6–8.4)
PROT SERPL-MCNC: 6.6 G/DL (ref 6–8.4)
RBC # BLD AUTO: 3.7 M/UL (ref 4.6–6.2)
SAMPLE: ABNORMAL
SAMPLE: ABNORMAL
SAMPLE: NORMAL
SITE: ABNORMAL
SITE: ABNORMAL
SITE: NORMAL
SODIUM SERPL-SCNC: 135 MMOL/L (ref 136–145)
SODIUM SERPL-SCNC: 135 MMOL/L (ref 136–145)
SODIUM SERPL-SCNC: 136 MMOL/L (ref 136–145)
WBC # BLD AUTO: 11.68 K/UL (ref 3.9–12.7)

## 2022-02-11 PROCEDURE — 97165 OT EVAL LOW COMPLEX 30 MIN: CPT

## 2022-02-11 PROCEDURE — 63600175 PHARM REV CODE 636 W HCPCS: Performed by: STUDENT IN AN ORGANIZED HEALTH CARE EDUCATION/TRAINING PROGRAM

## 2022-02-11 PROCEDURE — 99900035 HC TECH TIME PER 15 MIN (STAT)

## 2022-02-11 PROCEDURE — 85730 THROMBOPLASTIN TIME PARTIAL: CPT | Performed by: STUDENT IN AN ORGANIZED HEALTH CARE EDUCATION/TRAINING PROGRAM

## 2022-02-11 PROCEDURE — 25000003 PHARM REV CODE 250: Performed by: STUDENT IN AN ORGANIZED HEALTH CARE EDUCATION/TRAINING PROGRAM

## 2022-02-11 PROCEDURE — 63600175 PHARM REV CODE 636 W HCPCS: Performed by: PHYSICIAN ASSISTANT

## 2022-02-11 PROCEDURE — 83735 ASSAY OF MAGNESIUM: CPT | Performed by: STUDENT IN AN ORGANIZED HEALTH CARE EDUCATION/TRAINING PROGRAM

## 2022-02-11 PROCEDURE — 83880 ASSAY OF NATRIURETIC PEPTIDE: CPT | Performed by: STUDENT IN AN ORGANIZED HEALTH CARE EDUCATION/TRAINING PROGRAM

## 2022-02-11 PROCEDURE — 25000003 PHARM REV CODE 250: Performed by: NURSE PRACTITIONER

## 2022-02-11 PROCEDURE — 20000000 HC ICU ROOM

## 2022-02-11 PROCEDURE — 80048 BASIC METABOLIC PNL TOTAL CA: CPT | Performed by: INTERNAL MEDICINE

## 2022-02-11 PROCEDURE — 97530 THERAPEUTIC ACTIVITIES: CPT

## 2022-02-11 PROCEDURE — 84100 ASSAY OF PHOSPHORUS: CPT | Performed by: STUDENT IN AN ORGANIZED HEALTH CARE EDUCATION/TRAINING PROGRAM

## 2022-02-11 PROCEDURE — 99233 PR SUBSEQUENT HOSPITAL CARE,LEVL III: ICD-10-PCS | Mod: ,,, | Performed by: INTERNAL MEDICINE

## 2022-02-11 PROCEDURE — 27000221 HC OXYGEN, UP TO 24 HOURS

## 2022-02-11 PROCEDURE — 97161 PT EVAL LOW COMPLEX 20 MIN: CPT

## 2022-02-11 PROCEDURE — 99233 SBSQ HOSP IP/OBS HIGH 50: CPT | Mod: ,,, | Performed by: INTERNAL MEDICINE

## 2022-02-11 PROCEDURE — 63600367 HC NITRIC OXIDE PER HOUR

## 2022-02-11 PROCEDURE — 27100171 HC OXYGEN HIGH FLOW UP TO 24 HOURS

## 2022-02-11 PROCEDURE — 97110 THERAPEUTIC EXERCISES: CPT

## 2022-02-11 PROCEDURE — 80053 COMPREHEN METABOLIC PANEL: CPT | Performed by: STUDENT IN AN ORGANIZED HEALTH CARE EDUCATION/TRAINING PROGRAM

## 2022-02-11 PROCEDURE — 84132 ASSAY OF SERUM POTASSIUM: CPT | Performed by: PHYSICIAN ASSISTANT

## 2022-02-11 PROCEDURE — 94761 N-INVAS EAR/PLS OXIMETRY MLT: CPT

## 2022-02-11 PROCEDURE — 83050 HGB METHEMOGLOBIN QUAN: CPT

## 2022-02-11 PROCEDURE — 84132 ASSAY OF SERUM POTASSIUM: CPT | Mod: 91 | Performed by: PHYSICIAN ASSISTANT

## 2022-02-11 PROCEDURE — 25000003 PHARM REV CODE 250: Performed by: PHYSICIAN ASSISTANT

## 2022-02-11 PROCEDURE — 85025 COMPLETE CBC W/AUTO DIFF WBC: CPT | Performed by: STUDENT IN AN ORGANIZED HEALTH CARE EDUCATION/TRAINING PROGRAM

## 2022-02-11 RX ORDER — NOREPINEPHRINE BITARTRATE/D5W 4MG/250ML
0-3 PLASTIC BAG, INJECTION (ML) INTRAVENOUS CONTINUOUS
Status: DISCONTINUED | OUTPATIENT
Start: 2022-02-12 | End: 2022-02-12

## 2022-02-11 RX ORDER — POTASSIUM CHLORIDE 20 MEQ/1
20 TABLET, EXTENDED RELEASE ORAL 2 TIMES DAILY
Status: DISCONTINUED | OUTPATIENT
Start: 2022-02-11 | End: 2022-02-11

## 2022-02-11 RX ORDER — POLYETHYLENE GLYCOL 3350 17 G/17G
17 POWDER, FOR SOLUTION ORAL DAILY
Status: DISCONTINUED | OUTPATIENT
Start: 2022-02-11 | End: 2022-02-13

## 2022-02-11 RX ORDER — AMOXICILLIN 250 MG
2 CAPSULE ORAL 2 TIMES DAILY
Status: DISCONTINUED | OUTPATIENT
Start: 2022-02-11 | End: 2022-02-13

## 2022-02-11 RX ORDER — MIDODRINE HYDROCHLORIDE 5 MG/1
10 TABLET ORAL 3 TIMES DAILY
Status: DISCONTINUED | OUTPATIENT
Start: 2022-02-12 | End: 2022-02-13

## 2022-02-11 RX ORDER — MIDODRINE HYDROCHLORIDE 5 MG/1
5 TABLET ORAL 3 TIMES DAILY
Status: DISCONTINUED | OUTPATIENT
Start: 2022-02-11 | End: 2022-02-11

## 2022-02-11 RX ORDER — MIDODRINE HYDROCHLORIDE 5 MG/1
5 TABLET ORAL ONCE
Status: COMPLETED | OUTPATIENT
Start: 2022-02-11 | End: 2022-02-11

## 2022-02-11 RX ORDER — FUROSEMIDE 10 MG/ML
120 INJECTION INTRAMUSCULAR; INTRAVENOUS ONCE
Status: COMPLETED | OUTPATIENT
Start: 2022-02-11 | End: 2022-02-11

## 2022-02-11 RX ORDER — POTASSIUM CHLORIDE 29.8 MG/ML
40 INJECTION INTRAVENOUS ONCE
Status: COMPLETED | OUTPATIENT
Start: 2022-02-11 | End: 2022-02-11

## 2022-02-11 RX ADMIN — DOCUSATE SODIUM 50 MG AND SENNOSIDES 8.6 MG 2 TABLET: 8.6; 5 TABLET, FILM COATED ORAL at 09:02

## 2022-02-11 RX ADMIN — PANTOPRAZOLE SODIUM 40 MG: 40 TABLET, DELAYED RELEASE ORAL at 09:02

## 2022-02-11 RX ADMIN — IPRATROPIUM BROMIDE 2 SPRAY: 42 SPRAY, METERED NASAL at 09:02

## 2022-02-11 RX ADMIN — RIOCIGUAT 2.5 MG: 2.5 TABLET, FILM COATED ORAL at 09:02

## 2022-02-11 RX ADMIN — SIMETHICONE 80 MG: 80 TABLET, CHEWABLE ORAL at 11:02

## 2022-02-11 RX ADMIN — MIDODRINE HYDROCHLORIDE 5 MG: 5 TABLET ORAL at 11:02

## 2022-02-11 RX ADMIN — POTASSIUM CHLORIDE 40 MEQ: 400 INJECTION, SOLUTION INTRAVENOUS at 03:02

## 2022-02-11 RX ADMIN — FUROSEMIDE 50 MG/HR: 10 INJECTION, SOLUTION INTRAMUSCULAR; INTRAVENOUS at 11:02

## 2022-02-11 RX ADMIN — ONDANSETRON 4 MG: 2 INJECTION INTRAMUSCULAR; INTRAVENOUS at 09:02

## 2022-02-11 RX ADMIN — POTASSIUM BICARBONATE 40 MEQ: 391 TABLET, EFFERVESCENT ORAL at 03:02

## 2022-02-11 RX ADMIN — MIDODRINE HYDROCHLORIDE 5 MG: 5 TABLET ORAL at 09:02

## 2022-02-11 RX ADMIN — CHLOROTHIAZIDE SODIUM 500 MG: 500 INJECTION, POWDER, LYOPHILIZED, FOR SOLUTION INTRAVENOUS at 09:02

## 2022-02-11 RX ADMIN — MIDODRINE HYDROCHLORIDE 5 MG: 5 TABLET ORAL at 10:02

## 2022-02-11 RX ADMIN — POTASSIUM CHLORIDE 40 MEQ: 400 INJECTION, SOLUTION INTRAVENOUS at 10:02

## 2022-02-11 RX ADMIN — HEPARIN SODIUM 17 UNITS/KG/HR: 5000 INJECTION INTRAVENOUS; SUBCUTANEOUS at 09:02

## 2022-02-11 RX ADMIN — SIMETHICONE 80 MG: 80 TABLET, CHEWABLE ORAL at 12:02

## 2022-02-11 RX ADMIN — AMIODARONE HYDROCHLORIDE 400 MG: 200 TABLET ORAL at 09:02

## 2022-02-11 RX ADMIN — HEPARIN SODIUM 17 UNITS/KG/HR: 5000 INJECTION INTRAVENOUS; SUBCUTANEOUS at 03:02

## 2022-02-11 RX ADMIN — FUROSEMIDE 120 MG: 10 INJECTION, SOLUTION INTRAMUSCULAR; INTRAVENOUS at 11:02

## 2022-02-11 RX ADMIN — FERROUS SULFATE TAB 325 MG (65 MG ELEMENTAL FE) 1 EACH: 325 (65 FE) TAB at 09:02

## 2022-02-11 RX ADMIN — POLYETHYLENE GLYCOL 3350 17 G: 17 POWDER, FOR SOLUTION ORAL at 09:02

## 2022-02-11 RX ADMIN — MIDODRINE HYDROCHLORIDE 5 MG: 5 TABLET ORAL at 03:02

## 2022-02-11 RX ADMIN — FUROSEMIDE 50 MG/HR: 10 INJECTION, SOLUTION INTRAMUSCULAR; INTRAVENOUS at 12:02

## 2022-02-11 RX ADMIN — MILRINONE LACTATE IN DEXTROSE 0.12 MCG/KG/MIN: 200 INJECTION, SOLUTION INTRAVENOUS at 03:02

## 2022-02-11 RX ADMIN — FUROSEMIDE 50 MG/HR: 10 INJECTION, SOLUTION INTRAMUSCULAR; INTRAVENOUS at 03:02

## 2022-02-11 RX ADMIN — RIOCIGUAT 2.5 MG: 2.5 TABLET, FILM COATED ORAL at 03:02

## 2022-02-11 RX ADMIN — FUROSEMIDE 50 MG/HR: 10 INJECTION, SOLUTION INTRAMUSCULAR; INTRAVENOUS at 07:02

## 2022-02-11 RX ADMIN — SIMETHICONE 80 MG: 80 TABLET, CHEWABLE ORAL at 06:02

## 2022-02-11 NOTE — PROGRESS NOTES
[facility]  Heart Transplant  Progress Note    Patient Name: Amborsio Bray III  MRN: [id number]  Admission Date: 2/3/2022  Hospital Length of Stay: 8 days  Attending Physician: Roland George MD  Primary Care Provider: Luciana Cisse MD  Principal Problem:Acute decompensated heart failure    Subjective:     Interval History: No acute events overnight. Diuresing well. Did not sleep well last night.      Lines: RI 2/3/22     Continuous Infusions:   sodium chloride 0.9% Stopped (02/07/22 2109)    furosemide (LASIX) 10 mg/mL infusion (non-titrating) 50 mg/hr (02/11/22 0648)    heparin (porcine) in D5W Stopped (02/11/22 0629)    milrinone 20mg/100ml D5W (200mcg/ml) 0.125 mcg/kg/min (02/11/22 0648)    nitric oxide gas       Scheduled Meds:   amiodarone  400 mg Oral BID    chlorothiazide (DIURIL) IVPB  500 mg Intravenous BID    ferrous sulfate  1 tablet Oral Daily    ipratropium  2 spray Each Nostril QID    pantoprazole  40 mg Oral Daily    potassium chloride in water  40 mEq Intravenous BID    potassium chloride  20 mEq Oral BID    riociguat  2.5 mg Oral TID    selexipag  1,600 mcg Oral BID    simethicone  1 tablet Oral Q6H     PRN Meds:dextrose 10%, dextrose 10%, glucagon (human recombinant), glucose, glucose, guaiFENesin, insulin aspart U-100, melatonin, ondansetron, sodium chloride 0.9%    Review of patient's allergies indicates:  No Known Allergies  Objective:     Vital Signs (Most Recent):  Temp: 97.5 °F (36.4 °C) (02/11/22 0730)  Pulse: (!) 112 (02/11/22 0730)  Resp: 15 (02/11/22 0730)  BP: (!) 76/52 (02/11/22 0730)  SpO2: (!) 93 % (02/11/22 0730) Vital Signs (24h Range):  Temp:  [96.9 °F (36.1 °C)-98.4 °F (36.9 °C)] 97.5 °F (36.4 °C)  Pulse:  [] 112  Resp:  [8-33] 15  SpO2:  [91 %-96 %] 93 %  BP: ()/(51-69) 76/52     No data found.  Body mass index is 24.69 kg/m².      Intake/Output Summary (Last 24 hours) at 2/11/2022 0749  Last data filed at 2/11/2022  0700  Gross per 24 hour   Intake 1957.48 ml   Output 6225 ml   Net -4267.52 ml     Physical Exam  Constitutional:       Comments: Patient is alert and oriented, appears comfortable, does not appear to be in respiratory distress. On Emmy 20ppm with 6L NC.   HENT:      Head: Normocephalic and atraumatic.   Neck:      Comments: +JVD  Cardiovascular:      Rate and Rhythm: Tachycardia present. Rhythm irregular.      Pulses: Normal pulses.      Heart sounds: Murmur heard.    Decrescendo systolic murmur is present.      Pulmonary:      Effort: Pulmonary effort is normal. No respiratory distress.      Breath sounds: Normal breath sounds.      Comments: Bibasilar crackles R > L  Abdominal:      General: There is distension.   Musculoskeletal:         General: Normal range of motion.      Cervical back: Normal range of motion and neck supple.   Skin:     General: Skin is warm and dry.   Neurological:      General: No focal deficit present.      Mental Status: He is alert and oriented to person, place, and time.   Psychiatric:         Mood and Affect: Mood normal.         Behavior: Behavior normal.       Significant Labs:  CBC:  Recent Labs   Lab 02/09/22  0422 02/10/22  0417 02/11/22  0344   WBC 10.77 8.44 11.68   RBC 3.68* 3.59* 3.70*   HGB 9.9* 9.8* 10.1*   HCT 31.1* 29.2* 29.7*    268 273   MCV 85 81* 80*   MCH 26.9* 27.3 27.3   MCHC 31.8* 33.6 34.0     BNP:  Recent Labs   Lab 02/05/22  0255 02/07/22  2333 02/11/22  0052   * 540* 496*     CMP:  Recent Labs   Lab 02/09/22  0422 02/09/22  0422 02/09/22  0541 02/09/22  0845 02/10/22  0417 02/10/22  1154 02/10/22  1921 02/11/22  0052 02/11/22  0344   *  139*   < > 142*  --  121*  121*  --   --   --  105  105   CALCIUM 8.3*  8.3*   < > 8.5*  --  8.5*  8.5*  --   --   --  8.4*  8.4*   ALBUMIN 3.0*  3.0*  --   --   --  2.9*  2.9*  --   --   --  2.9*  2.9*   PROT 7.0  7.0  --   --   --  6.7  6.7  --   --   --  6.6  6.6   *  130*   < > 131*   --  130*  130*  --   --   --  135*  135*   K 6.2*  6.2*   < > 4.4   < > 3.3*  3.3*   < > 3.2* 3.4* 3.0*  3.0*   CO2 24  24   < > 23  --  28  28  --   --   --  30*  30*   CL 96  96   < > 94*  --  92*  92*  --   --   --  93*  93*   BUN 76*  76*   < > 74*  --  79*  79*  --   --   --  74*  74*   CREATININE 1.8*  1.8*   < > 1.8*  --  1.9*  1.9*  --   --   --  1.7*  1.7*   ALKPHOS 111  111  --   --   --  104  104  --   --   --  106  106   ALT 6*  6*  --   --   --  <5*  <5*  --   --   --  6*  6*   AST 7*  7*  --   --   --  8*  8*  --   --   --  10  10   BILITOT 2.1*  2.1*  --   --   --  2.0*  2.0*  --   --   --  2.3*  2.3*    < > = values in this interval not displayed.      Coagulation:   Recent Labs   Lab 02/04/22 2010 02/05/22  0255 02/10/22  1154 02/10/22  2255 02/11/22  0637   INR 1.6*  --   --   --   --    APTT 30.9   < > 74.8* 54.4* 43.6*    < > = values in this interval not displayed.     LDH:  No results for input(s): LDH in the last 72 hours.  Microbiology:  Microbiology Results (last 7 days)     Procedure Component Value Units Date/Time    Blood culture #1 **CANNOT BE ORDERED STAT** [[id number]] Collected: 02/03/22 0940    Order Status: Completed Specimen: Blood from Peripheral, Forearm, Right Updated: 02/08/22 1212     Blood Culture, Routine No growth after 5 days.    Blood culture #2 **CANNOT BE ORDERED STAT** [[id number]] Collected: 02/03/22 0929    Order Status: Completed Specimen: Blood from Peripheral, Antecubital, Left Updated: 02/08/22 1212     Blood Culture, Routine No growth after 5 days.        Estimated Creatinine Clearance: 45 mL/min (A) (based on SCr of 1.7 mg/dL (H)).    Diagnostic Results:  I have reviewed and interpreted all pertinent imaging results/findings within the past 24 hours.    Assessment and Plan:     71 y.o M with history of HF, PH (diagnosed in 2018) on [facility], Uptravi, CTEPH on home oxygen 2L NC, Atrial Flutter s/p ablation 2020, DM, BPH, HLD, Anemia,  PTSD, Pulmonary hyperinflation presents to ED with respiratory distress and worsening shortness of breath for past 4 days. Patient reports he was in his usual state of health until 4 days ago when he developed SOB while ambulating to use restroom. Patient reports he had removed his oxygen to use restroom and while ambulating to restroom he became lightheaded, dizzy and in respiratory distress and reports having a fall and syncopized. He reports he woke up after 30 seconds, did not hit his head. Patient reports since the past four days he has also been having increasing loose BM more than his usual, reports feeling dehydrated with increased fluid intake. Patient reports he recently followed up with pulmonologist in clinic and his home Bumex 2mg MWF was increased to daily and was prescribed home oxygen. Patient reports that has helped relieve his SOB. Patient denies chest pain, further episodes of syncope, change in vision, slurred speech, palpitations, headache, change in urinary habits, cough, fever.     Home Medications  - Bumex 2mg daily  - Adempas 2.5mg TID  - Uptravi 1600 mcg BID (to start on 3/21/22)  - Xarelto 20mg daily  - Losartan 25mg daily  - Flomax 0.4mg  - Lipitor 40mg daily  - Atrovent  - Ferrous Sulfate     ED Course      Vitals:     02/03/22 1332   BP: 98/65   Pulse: 104   Resp: (!) 23   Temp:       Pertinent Labs  CBC: WBC 8.93, Hbg 10.3, Hct 32.7, Plt 270  BMP: Na 139, K 3.7, Cl 104, CO2 25, BUN 16, Cr. 0.8,   LFT: TP 7.2, Alb, 3.6, TB 2.9, AST 11, ALT 8  Free T4: 0.88  Lactate 1.7, Troponin 0.012,   EKG: Afib with RVR at a rate of 173, RBB  CXR: bilateral opacities, pulmonary congestion  ED Management  -Lopressor Tartrate 50mg q6h  -Diltiazem 25 mg IVP  -Lasix 80mg IVP      * Acute decompensated heart failure  -NICM   -2/5/22 TTE: EF 20, LV DD, Systolic flattening of IV septum consistent with RV pressure overload, small pericardial effusion, mild LA enlargement, severe RV enlargement with  severely reduced RVSF, severe TR, PASP 73, CVP 15, LVIDd 7.7  -12/16/22 TTE: LV normal in size with moderately decreased systolic function, EF 35, Moderate RV enlargement with hypertrophy and moderately reduced RVSF, grade II DD, Mod-severe TR, Mod MR, trivial pericardial effusion, PASP 87, CVP 15, LVIDd 5.62  -11/28/2018 RHC: RA: 14/ 15/ 11 RV: 112/ -4 PA: 117/ 31/ 59 PWP: 30/ 25/ 20, CO 5.48, CI 2.53, O2 sat: SVC 73, PA 64%, , SVR 1357 (on adempas)  -Home Diuretic Regimen: Bumex 2mg daily  -Hypervolemic on exam today. ScVO2 69, CVP 14 and net negative: -4.3L. Continue Diuril 500mg IVP BID, lasix drip 50 mg/hr  -Emmy at 20ppm  -GDMT: Losartan 25mg daily (holding)  -2g Na dietary restriction, 1500 mL fluid restriction, strict I/Os  -Continue aggressive diuresis for goal CVP <10 then plan for RHC.     Pulmonary HTN  -12/16/22 TTE: LV normal in size with moderately decreased systolic function, EF 35, Moderate RV enlargement with hypertrophy and moderately reduced RVSF, grade II DD, Mod-severe TR, Mod MR, trivial pericardial effusion, PASP 87, CVP 15, LVIDd 5.62  -11/28/2018 RHC: RA: 14/ 15/ 11 RV: 112/ -4 PA: 117/ 31/ 59 PWP: 30/ 25/ 20, CO 5.48, CI 2.53, O2 sat: SVC 73, PA 64%, , SVR 1357 (on adempas)  - Continue Adempas 2.5mg TID, Utravi 1600 mcg BID   - Plan for RHC once CVP <10, for further guidance of PH treatment. Plan to switch to Remodulin. Home xarelto switched to heparin IV in anticipation of RHC. IF CVP >10, would wait until adequate diuresis is achieved. Will continue aggressive diuresis     CTEPH (chronic thromboembolic pulmonary hypertension)  - On heparin for now. Can resume eliquis after RHC.   - please see plan for PH    Atrial Flutter/ fibrillation  - Patient has a history of known Atrial fibrillation s/p RITO/CV 9/30/14. In 2017 patient had wide complex tachycardia and received amiodarone and was started on daily regimen. EKG were reviewed by EP and rhythm was likely Aflutter. Patient  underwent ablation in 2018. Last followed in EP clinic on 3/31/20, was told has high chances of developing Afib within next 5 years. Patient was in RVR on admission likely secondary to ADHF and electrolyte imbalance. Plan to continue diuresis and replace electrolytes, if Afib persists after adequate diuresis/ electrolyte replacement, will consider EP consult.  - patient is on chronic AC with xarelto, and heparin IV while inpateint.  - plan to switch to amiodarone 400mg BID for 2 weeks(until 2/24) followed by amiodarone 200mg daily.    Ascites  - liver with cirrhotic appearance on past CT scans.   -Tapped by IR 2/9 with 5L removed.     Iron deficiency anemia due to chronic blood loss  - monitor H/H  - resume home iron therapy    Type 2 diabetes mellitus with microalbuminuria, without long-term current use of insulin  - follow A1c  - Sliding scale and FS  - endocrine consult    Uninterrupted Critical Care/Counseling Time (not including procedures): 60mn  Eugene Singh NP [id number]  Heart Transplant  [facility]

## 2022-02-11 NOTE — SUBJECTIVE & OBJECTIVE
Interval History: No acute events overnight. Diuresing well. Did not sleep well last night.      Lines: RIJ 2/3/22     Continuous Infusions:   sodium chloride 0.9% Stopped (02/07/22 2109)    furosemide (LASIX) 10 mg/mL infusion (non-titrating) 50 mg/hr (02/11/22 0648)    heparin (porcine) in D5W Stopped (02/11/22 0629)    milrinone 20mg/100ml D5W (200mcg/ml) 0.125 mcg/kg/min (02/11/22 0648)    nitric oxide gas       Scheduled Meds:   amiodarone  400 mg Oral BID    chlorothiazide (DIURIL) IVPB  500 mg Intravenous BID    ferrous sulfate  1 tablet Oral Daily    ipratropium  2 spray Each Nostril QID    pantoprazole  40 mg Oral Daily    potassium chloride in water  40 mEq Intravenous BID    potassium chloride  20 mEq Oral BID    riociguat  2.5 mg Oral TID    selexipag  1,600 mcg Oral BID    simethicone  1 tablet Oral Q6H     PRN Meds:dextrose 10%, dextrose 10%, glucagon (human recombinant), glucose, glucose, guaiFENesin, insulin aspart U-100, melatonin, ondansetron, sodium chloride 0.9%    Review of patient's allergies indicates:  No Known Allergies  Objective:     Vital Signs (Most Recent):  Temp: 97.5 °F (36.4 °C) (02/11/22 0730)  Pulse: (!) 112 (02/11/22 0730)  Resp: 15 (02/11/22 0730)  BP: (!) 76/52 (02/11/22 0730)  SpO2: (!) 93 % (02/11/22 0730) Vital Signs (24h Range):  Temp:  [96.9 °F (36.1 °C)-98.4 °F (36.9 °C)] 97.5 °F (36.4 °C)  Pulse:  [] 112  Resp:  [8-33] 15  SpO2:  [91 %-96 %] 93 %  BP: ()/(51-69) 76/52     No data found.  Body mass index is 24.69 kg/m².      Intake/Output Summary (Last 24 hours) at 2/11/2022 0749  Last data filed at 2/11/2022 0700  Gross per 24 hour   Intake 1957.48 ml   Output 6225 ml   Net -4267.52 ml     Physical Exam  Constitutional:       Comments: Patient is alert and oriented, appears comfortable, does not appear to be in respiratory distress. On Emmy 20ppm with 6L NC.   HENT:      Head: Normocephalic and atraumatic.   Neck:      Comments:  +JVD  Cardiovascular:      Rate and Rhythm: Tachycardia present. Rhythm irregular.      Pulses: Normal pulses.      Heart sounds: Murmur heard.    Decrescendo systolic murmur is present.      Pulmonary:      Effort: Pulmonary effort is normal. No respiratory distress.      Breath sounds: Normal breath sounds.      Comments: Bibasilar crackles R > L  Abdominal:      General: There is distension.   Musculoskeletal:         General: Normal range of motion.      Cervical back: Normal range of motion and neck supple.   Skin:     General: Skin is warm and dry.   Neurological:      General: No focal deficit present.      Mental Status: He is alert and oriented to person, place, and time.   Psychiatric:         Mood and Affect: Mood normal.         Behavior: Behavior normal.       Significant Labs:  CBC:  Recent Labs   Lab 02/09/22  0422 02/10/22  0417 02/11/22  0344   WBC 10.77 8.44 11.68   RBC 3.68* 3.59* 3.70*   HGB 9.9* 9.8* 10.1*   HCT 31.1* 29.2* 29.7*    268 273   MCV 85 81* 80*   MCH 26.9* 27.3 27.3   MCHC 31.8* 33.6 34.0     BNP:  Recent Labs   Lab 02/05/22  0255 02/07/22  2333 02/11/22  0052   * 540* 496*     CMP:  Recent Labs   Lab 02/09/22  0422 02/09/22  0422 02/09/22  0541 02/09/22  0845 02/10/22  0417 02/10/22  1154 02/10/22  1921 02/11/22  0052 02/11/22  0344   *  139*   < > 142*  --  121*  121*  --   --   --  105  105   CALCIUM 8.3*  8.3*   < > 8.5*  --  8.5*  8.5*  --   --   --  8.4*  8.4*   ALBUMIN 3.0*  3.0*  --   --   --  2.9*  2.9*  --   --   --  2.9*  2.9*   PROT 7.0  7.0  --   --   --  6.7  6.7  --   --   --  6.6  6.6   *  130*   < > 131*  --  130*  130*  --   --   --  135*  135*   K 6.2*  6.2*   < > 4.4   < > 3.3*  3.3*   < > 3.2* 3.4* 3.0*  3.0*   CO2 24  24   < > 23  --  28  28  --   --   --  30*  30*   CL 96  96   < > 94*  --  92*  92*  --   --   --  93*  93*   BUN 76*  76*   < > 74*  --  79*  79*  --   --   --  74*  74*   CREATININE 1.8*   1.8*   < > 1.8*  --  1.9*  1.9*  --   --   --  1.7*  1.7*   ALKPHOS 111  111  --   --   --  104  104  --   --   --  106  106   ALT 6*  6*  --   --   --  <5*  <5*  --   --   --  6*  6*   AST 7*  7*  --   --   --  8*  8*  --   --   --  10  10   BILITOT 2.1*  2.1*  --   --   --  2.0*  2.0*  --   --   --  2.3*  2.3*    < > = values in this interval not displayed.      Coagulation:   Recent Labs   Lab 02/04/22 2010 02/05/22  0255 02/10/22  1154 02/10/22  2255 02/11/22  0637   INR 1.6*  --   --   --   --    APTT 30.9   < > 74.8* 54.4* 43.6*    < > = values in this interval not displayed.     LDH:  No results for input(s): LDH in the last 72 hours.  Microbiology:  Microbiology Results (last 7 days)     Procedure Component Value Units Date/Time    Blood culture #1 **CANNOT BE ORDERED STAT** [[id number]] Collected: 02/03/22 0940    Order Status: Completed Specimen: Blood from Peripheral, Forearm, Right Updated: 02/08/22 1212     Blood Culture, Routine No growth after 5 days.    Blood culture #2 **CANNOT BE ORDERED STAT** [[id number]] Collected: 02/03/22 0929    Order Status: Completed Specimen: Blood from Peripheral, Antecubital, Left Updated: 02/08/22 1212     Blood Culture, Routine No growth after 5 days.        Estimated Creatinine Clearance: 45 mL/min (A) (based on SCr of 1.7 mg/dL (H)).    Diagnostic Results:  I have reviewed and interpreted all pertinent imaging results/findings within the past 24 hours.

## 2022-02-11 NOTE — ASSESSMENT & PLAN NOTE
-NICM   -2/5/22 TTE: EF 20, LV DD, Systolic flattening of IV septum consistent with RV pressure overload, small pericardial effusion, mild LA enlargement, severe RV enlargement with severely reduced RVSF, severe TR, PASP 73, CVP 15, LVIDd 7.7  -12/16/22 TTE: LV normal in size with moderately decreased systolic function, EF 35, Moderate RV enlargement with hypertrophy and moderately reduced RVSF, grade II DD, Mod-severe TR, Mod MR, trivial pericardial effusion, PASP 87, CVP 15, LVIDd 5.62  -11/28/2018 RHC: RA: 14/ 15/ 11 RV: 112/ -4 PA: 117/ 31/ 59 PWP: 30/ 25/ 20, CO 5.48, CI 2.53, O2 sat: SVC 73, PA 64%, , SVR 1357 (on adempas)  -Home Diuretic Regimen: Bumex 2mg daily  -Hypervolemic on exam today. ScVO2 69, CVP 14 and net negative: -4.3L. Continue Diuril 500mg IVP BID, lasix drip 50 mg/hr  -Emmy at 20ppm  -GDMT: Losartan 25mg daily (holding)  -2g Na dietary restriction, 1500 mL fluid restriction, strict I/Os  -Continue aggressive diuresis for goal CVP <10 then plan for RHC.

## 2022-02-11 NOTE — PLAN OF CARE
CMICU DAILY GOALS       A: Awake    RASS: Goal -    Actual -     Restraint necessity:    B: Breathe   SBT: Not intubated   C: Coordinate A & B, analgesics/sedatives   Pain: managed    SAT: Not intubated  D: Delirium   CAM-ICU: Overall CAM-ICU: Negative  E: Early(intubated/ Progressive (non-intubated) Mobility   MOVE Screen: Fail   Activity: Activity Management: Rolling - L1  FAS: Feeding/Nutrition   Diet order: Diet/Nutrition Received: restrict fluids,    T: Thrombus   DVT prophylaxis: VTE Required Core Measure: Pharmacological prophylaxis initiated/maintained  H: HOB Elevation   Head of Bed (HOB) Positioning: HOB elevated  U: Ulcer Prophylaxis   GI: yes  G: Glucose control   managed    S: Skin   Bathing/Skin Care: bath, complete,dressed/undressed,electrode patches/site rotation,incontinence care,linen changed  Device Skin Pressure Protection: absorbent pad utilized/changed,adhesive use limited,positioning supports utilized,pressure points protected,skin-to-skin areas padded,skin-to-device areas padded  Pressure Reduction Devices: feet on footrest/footstool,foam padding utilized,heel offloading device utilized,positioning supports utilized,specialty bed utilized,pressure-redistributing mattress utilized  Pressure Reduction Techniques: frequent weight shift encouraged,positioned off wounds,heels elevated off bed,pressure points protected,weight shift assistance provided  Skin Protection: adhesive use limited,hydrocolloids used,incontinence pads utilized,tubing/devices free from skin contact,transparent dressing maintained,skin-to-skin areas padded,skin-to-device areas padded,skin sealant/moisture barrier applied,silicone foam dressing in place,pulse oximeter probe site changed  B: Bowel Function   constipation   I: Indwelling Catheters   Christensen necessity:      Urethral Catheter 02/04/22 0400-Reason for Continuing Urinary Catheterization: Critically ill in ICU and requiring hourly monitoring of intake/output   CVC  necessity: Yes  D: De-escalation Antibiotics   No    Family/Goals of care/Code Status   Code Status: Full Code    24H Vital Sign Range  Temp:  [96.9 °F (36.1 °C)-98.4 °F (36.9 °C)]   Pulse:  []   Resp:  [8-33]   BP: ()/(52-69)   SpO2:  [91 %-96 %]      Shift Events   No acute events throughout shift    VS and assessment per flow sheet, patient progressing towards goals as tolerated, plan of care reviewed with  patient , all concerns addressed, will continue to monitor.    Aminata Iqbal

## 2022-02-11 NOTE — ASSESSMENT & PLAN NOTE
-12/16/22 TTE: LV normal in size with moderately decreased systolic function, EF 35, Moderate RV enlargement with hypertrophy and moderately reduced RVSF, grade II DD, Mod-severe TR, Mod MR, trivial pericardial effusion, PASP 87, CVP 15, LVIDd 5.62  -11/28/2018 RHC: RA: 14/ 15/ 11 RV: 112/ -4 PA: 117/ 31/ 59 PWP: 30/ 25/ 20, CO 5.48, CI 2.53, O2 sat: SVC 73, PA 64%, , SVR 1357 (on adempas)  - Continue Adempas 2.5mg TID, Utravi 1600 mcg BID   - Plan for RHC once CVP <10, for further guidance of PH treatment. Plan to switch to Remodulin. Home xarelto switched to heparin IV in anticipation of RHC. IF CVP >10, would wait until adequate diuresis is achieved. Will continue aggressive diuresis

## 2022-02-11 NOTE — PT/OT/SLP EVAL
Occupational Therapy  Co- Evaluation    Name: Ambrosio Bray III  MRN: [id number]  Admitting Diagnosis:  Acute decompensated heart failure  Pt admitted with SOB and Respiratory distress x 4 days.  Pt with hx of Pulmonary HTN, HF and PTSD  Recommendations:     Discharge Recommendations: nursing facility, skilled    Assessment:     Ambrosio Bray III is a 71 y.o. male with a medical diagnosis of Acute decompensated heart failure.   Performance deficits affecting function: weakness,impaired endurance,impaired self care skills,impaired functional mobilty,gait instability,impaired balance.  Pt tolerated session well with good effort and performance. Pt limited this date due to decreased MAP with sitting.   Anticipate pt will benefit from post acute therapy prior to return home.     Rehab Prognosis: G Fair; patient would benefit from acute skilled OT services to address these deficits and reach maximum level of function.       Plan:     Patient to be seen   to address the above listed problems via therapeutic activities,therapeutic exercises,self-care/home management  · Plan of Care Expires:    · Plan of Care Reviewed with: patient    Subjective     Pt agreeable to therapy  Pt denies pain or dizziness.    Occupational Profile:  Pt resides alone and reports fiance will be living with him. Pt report she can assist as needed.  Pt reports his house is one story with no SLADE. Pt reports he has rollator and was independent prior to arrival. Pt reports he is able to drive.       Pain/Comfort:  · Pain Rating 1: 0/10    Patients cultural, spiritual, [employer] conflicts given the current situation: no    Objective:     Communicated with: nsg prior to session.  Patient found supine in bed with NO, toscano, central line, tele, pulse ox and BP cuff.  Co-eval completed this date to optimize functional performance and safety given impaired tolerance for activity.     General Precautions: Standard, fall     Occupational Performance:    Bed  Mobility:    Supine>sit with MIN A     Sit>supine with MAX A     Activities of Daily Living:  · Feeding: set-up  · G/H: set-up wash face and hands in supported sitting.   · UE/LE dressing with TOTAL A     Cognitive/Visual Perceptual:  Pt awake, alert and following commands. Pt with pleasant mood/affect.     Physical Exam:  Pt is right hand dominant and demo 3+/5 UE strength/ROM, coordination and sensation.     AMPAC 6 Click ADL:  AMPAC Total Score: 9    Treatment & Education:  MAP goal 60 and found to be 60 on arrival. Pt not on pressors.   HOB elevated to approx 80 degrees  and pt engaged with UE/LE exs with MAP increasing to 63.  Pt transitioned to EOB and pt without c/o. MAP found to be 55; thus, pt returned supine after sitting approx 2 min.  Once supine, MAP increased to 67.    Education provided to pt re: current barrier to progressive mobility/ADL performance and pt receptive. Education provided re: OT POC and safety with functional mobility/ADL skills. Nsg aware of MAP and performance.   Patient left supine with all lines intact, call button in reach and nsg notified    GOALS:   Multidisciplinary Problems     Occupational Therapy Goals        Problem: Occupational Therapy Goal    Goal Priority Disciplines Outcome Interventions   Occupational Therapy Goal     OT, PT/OT Ongoing, Progressing    Description: Goals to be met by: 12 days 2/25/22     Patient will increase functional independence with ADLs by performing:    Pt to complete UE dressing with set-up  Pt to complete LE dressing with MOD A   Pt to complete g/h skills seated with set-up  Pt to complete t/f to BSC with MIN A   Pt to demo Fair+ sitting balance to allow for increased engagement with ADL and transfer training.  Pt to demo independence with UE HEP to allow for increase functional ROM and strength.                    History:     Past Medical History:   Diagnosis Date    Anemia     Atrial fibrillation     Diabetes mellitus     Heart failure      Hyperlipidemia     Hypertension     PTSD (post-traumatic stress disorder)     Pulmonary hyperinflation     Urinary incontinence        Past Surgical History:   Procedure Laterality Date    CARDIAC CATHETERIZATION      COLONOSCOPY N/A 5/1/2018    Procedure: COLONOSCOPY;  Surgeon: Bubba Navarro MD;  Location: [facility] ENDO;  Service: Endoscopy;  Laterality: N/A;  confirmed appt 4/24/18    RIGHT HEART CATHETERIZATION Right 11/28/2018    Procedure: INSERTION, CATHETER, RIGHT HEART;  Surgeon: Chelsea Arceo MD;  Location: [facility] CATH LAB;  Service: Cardiology;  Laterality: Right;       Time Tracking:     OT Date of Treatment: 02/11/22  OT Start Time: 0855  OT Stop Time: 0918  OT Total Time (min): 23 min    Billable Minutes:Evaluation 12  Therapeutic Activity 11    2/11/2022

## 2022-02-11 NOTE — PLAN OF CARE
Problem: Physical Therapy Goal  Goal: Physical Therapy Goal  Description: Goals to be met by:      Patient will increase functional independence with mobility by performin. Supine to sit with Stand-by Assistance  2. Sit to supine with Stand-by Assistance  3. Sit to stand transfer with Stand-by Assistance  4. Bed to chair transfer with Minimal Assistance using LRAD  5. Gait  x 50 feet with Minimal Assistance using LRAD.   6. Lower extremity exercise program x15 reps per handout, with assistance as needed.     Outcome: Ongoing, Progressing   Evaluation completed, initiated plan of care.   Sarah Rendon, PT  2022

## 2022-02-11 NOTE — PLAN OF CARE
Problem: Occupational Therapy Goal  Goal: Occupational Therapy Goal  Description: Goals to be met by: 12 days 2/25/22     Patient will increase functional independence with ADLs by performing:    Pt to complete UE dressing with set-up  Pt to complete LE dressing with MOD A   Pt to complete g/h skills seated with set-up  Pt to complete t/f to BSC with MIN A   Pt to demo Fair+ sitting balance to allow for increased engagement with ADL and transfer training.  Pt to demo independence with UE HEP to allow for increase functional ROM and strength.   Outcome: Ongoing, Progressing

## 2022-02-11 NOTE — ASSESSMENT & PLAN NOTE
- Patient has a history of known Atrial fibrillation s/p IRTO/CV 9/30/14. In 2017 patient had wide complex tachycardia and received amiodarone and was started on daily regimen. EKG were reviewed by EP and rhythm was likely Aflutter. Patient underwent ablation in 2018. Last followed in EP clinic on 3/31/20, was told has high chances of developing Afib within next 5 years. Patient was in RVR on admission likely secondary to ADHF and electrolyte imbalance. Plan to continue diuresis and replace electrolytes, if Afib persists after adequate diuresis/ electrolyte replacement, will consider EP consult.  - patient is on chronic AC with xarelto, and heparin IV while inpateint.  - plan to switch to amiodarone 400mg BID for 2 weeks(until 2/24) followed by amiodarone 200mg daily.

## 2022-02-11 NOTE — PT/OT/SLP EVAL
"Physical Therapy Evaluation  Co-evaluation with OT due to acuity of condition, level of skilled assist needed for assessment of safety with mobility.   Patient Name:  Ambrosio Bray III   MRN:  [id number]    Recommendations:     Discharge Recommendations:  nursing facility, skilled   Discharge Equipment Recommendations:  (TBD pending progress)   Barriers to discharge: patient below functional baseline, risk of falls    Assessment:     Ambrosio Bray III is a 71 y.o. male admitted with a medical diagnosis of Acute decompensated heart failure.  He presents with the following impairments/functional limitations:  weakness,impaired endurance,impaired self care skills,impaired functional mobilty,decreased upper extremity function,decreased lower extremity function,impaired skin,edema,impaired cardiopulmonary response to activity. The patient demonstrates generalized deconditioning due to complicated medical course leading to prolonged bed rest. The patient was motivated to mobilize with therapy, however his mobility was limited due to hypotension in sitting, MAP dropped from 64 to 55 (MAP goal >60), patient asymptomatic throughout. Upon return to laying flat, MAP remained <60, RN alerted. Prior to admission, he was modified independent with mobility using rollator. He is not safe to return home due to risk of falls. He would benefit from SNF placement to address the above deficits and maximize their functional mobility.      Rehab Prognosis: Good; patient would benefit from acute skilled PT services to address these deficits and reach maximum level of function.    Recent Surgery: * No surgery found *      Plan:     During this hospitalization, patient to be seen 3 x/week to address the identified rehab impairments via gait training,therapeutic activities,therapeutic exercises,neuromuscular re-education and progress toward the following goals:    · Plan of Care Expires:  03/13/22    Subjective     Chief Complaint: "I've been " "in this bed for days, I know I won't get dizzy when I sit up"  Patient/Family Comments/goals: "I want to get out of this bed"  Pain/Comfort:  · Pain Rating 1: 0/10    Patients cultural, spiritual, Synagogue conflicts given the current situation: no    Living Environment:  The patient lives with his cortney in a H, 0 SLADE. Drives, R handed. Retired from coaching and teaching.   Prior to admission, patients level of function was modified independent with rollator past several weeks, on home O2.  Equipment used at home: rollator, O2.  DME owned (not currently used): none.  Upon discharge, patient will have assistance from cortney.    Objective:     Communicated with RN prior to session.  Patient found HOB elevated with blood pressure cuff,peripheral IV,toscano catheter,telemetry,pulse ox (continuous),pressure relief boots,oxygen,central line  upon PT entry to room.    General Precautions: Standard, fall   Orthopedic Precautions:N/A   Braces: N/A  Respiratory Status: Nasal cannula, flow 6 L/min    Orthostatic Hypotension positive in sitting        Position Blood Pressure MAP   Supine HOB 30 deg 88/52 63   Supine HOB 45 90/51 64   Sitting 79/43 55   Supine HOB flat 81/42 57   Asymptomatic throughout, RN alerted        Exams:    Cognitive Exam  Patient is A&O x4 and follows 100% of one -step commands    Fine Motor Coordination   -       WNL     Postural Exam Patient presented with the following abnormalities:    -       Rounded shoulders  -       Forward head  -       Kyphosis  -       Posterior pelvic tilt  -         Sensation    -       Light touch intact CHUCKIE LE   Skin Integrity/Edema     -       Skin integrity: visibly intact, wound to R foot dressed  -       Edema: mild CHUCKIE lower legs ankles, feet   R LE ROM WNL   R LE Strength 3+/5 hip flexion, knee ext/flex, and ankle DF/PF   L LE ROM WNL   L LE Strength  3+/5 hip flexion, knee ext/flex, and ankle DF/PF       Balance   Static Sitting stand by assistance    Dynamic " Sitting contact guard assist    Static Standing NT   Dynamic Standing       NT          Functional Mobility:    Bed Mobility  Supine to Sit on the L side:  minimum assistance   Sit to supine: maximum assistance  Scoot to HOB in supine: maximum assistance x2 drawsheet with patient assisting, partial bridge  Scoot to EOB in sitting: minimum assistance    Transfers Sit to Stand:  NT, hypotensive in sitting        Therapeutic Activities and Exercises:   Patient educated on role of therapy, goals of session, benefits of out of bed mobility. Patient agreeable to mobilize with therapy.  Discussed PT plan of care during hospitalization. Patient educated that they need to call for assistance to mobilize out of bed. Whiteboard updated as appropriate. Patient educated on how their diagnosis impacts their mobility within PT scope of practice.     Supine therex to promote circulation and prevent functional decline:   -Heel slides, 5 reps ea, minimum assistance   -hip abd/adduction, 10 reps ea, minimum assistance   -AP 10 reps   -Encouraged to perform 10 reps/hr    Patient educated on PT schedule.  Encouraged patient to maintain HOB as high as tolerable while maintaining BP parameters to counteract orthostatic hypotension. Patient verbalized understanding and agreement not to mobilize without RN assist. Patient in agreement with PT POC.       AM-PAC 6 CLICK MOBILITY  Total Score:10     Patient left supine with all lines intact, call button in reach and RN notified.    GOALS:   Multidisciplinary Problems     Physical Therapy Goals        Problem: Physical Therapy Goal    Goal Priority Disciplines Outcome Goal Variances Interventions   Physical Therapy Goal     PT, PT/OT Ongoing, Progressing     Description: Goals to be met by:      Patient will increase functional independence with mobility by performin. Supine to sit with Stand-by Assistance  2. Sit to supine with Stand-by Assistance  3. Sit to stand transfer with  Stand-by Assistance  4. Bed to chair transfer with Minimal Assistance using LRAD  5. Gait  x 50 feet with Minimal Assistance using LRAD.   6. Lower extremity exercise program x15 reps per handout, with assistance as needed.                      History:     Past Medical History:   Diagnosis Date    Anemia     Atrial fibrillation     Diabetes mellitus     Heart failure     Hyperlipidemia     Hypertension     PTSD (post-traumatic stress disorder)     Pulmonary hyperinflation     Urinary incontinence        Past Surgical History:   Procedure Laterality Date    CARDIAC CATHETERIZATION      COLONOSCOPY N/A 5/1/2018    Procedure: COLONOSCOPY;  Surgeon: Bubba Navarro MD;  Location: [facility] ENDO;  Service: Endoscopy;  Laterality: N/A;  confirmed appt 4/24/18    RIGHT HEART CATHETERIZATION Right 11/28/2018    Procedure: INSERTION, CATHETER, RIGHT HEART;  Surgeon: Chelsea Arceo MD;  Location: [facility] CATH LAB;  Service: Cardiology;  Laterality: Right;       Time Tracking:     PT Received On: 02/11/22  PT Start Time: 0855     PT Stop Time: 0919  PT Total Time (min): 24 min     Billable Minutes: Evaluation 12 and Therapeutic Exercise 12      02/11/2022

## 2022-02-11 NOTE — PLAN OF CARE
Problem: Adult Inpatient Plan of Care  Goal: Plan of Care Review  Outcome: Ongoing, Progressing  Goal: Patient-Specific Goal (Individualized)  Outcome: Ongoing, Progressing  Goal: Absence of Hospital-Acquired Illness or Injury  Outcome: Ongoing, Progressing  Goal: Optimal Comfort and Wellbeing  Outcome: Ongoing, Progressing  Goal: Readiness for Transition of Care  Outcome: Ongoing, Progressing     Problem: Infection  Goal: Absence of Infection Signs and Symptoms  Outcome: Ongoing, Progressing     Problem: Impaired Wound Healing  Goal: Optimal Wound Healing  Outcome: Ongoing, Progressing     Problem: Diabetes Comorbidity  Goal: Blood Glucose Level Within Targeted Range  Outcome: Ongoing, Progressing     Problem: Skin Injury Risk Increased  Goal: Skin Health and Integrity  Outcome: Ongoing, Progressing     Problem: Fall Injury Risk  Goal: Absence of Fall and Fall-Related Injury  Outcome: Ongoing, Progressing     CMICU DAILY GOALS       A: Awake    RASS: Goal -    Actual -     Restraint necessity:    B: Breathe   SBT: Not intubated   C: Coordinate A & B, analgesics/sedatives   Pain: managed    SAT: Not intubated  D: Delirium   CAM-ICU: Overall CAM-ICU: Negative  E: Early(intubated/ Progressive (non-intubated) Mobility   MOVE Screen: Fail   Activity: Activity Management: Rolling - L1  FAS: Feeding/Nutrition   Diet order: Diet/Nutrition Received: restrict fluids,    T: Thrombus   DVT prophylaxis: VTE Required Core Measure: Pharmacological prophylaxis initiated/maintained  H: HOB Elevation   Head of Bed (HOB) Positioning: HOB at 30-45 degrees  U: Ulcer Prophylaxis   GI: yes  G: Glucose control   managed    S: Skin   Bathing/Skin Care: bath, complete,dressed/undressed,electrode patches/site rotation,incontinence care,linen changed  Device Skin Pressure Protection: absorbent pad utilized/changed,pressure points protected  Pressure Reduction Devices: foam padding utilized,specialty bed utilized  Pressure Reduction  Techniques: frequent weight shift encouraged,weight shift assistance provided  Skin Protection: drying agents applied,hydrocolloids used,incontinence pads utilized,tubing/devices free from skin contact  B: Bowel Function   constipation   I: Indwelling Catheters   Christensen necessity:      Urethral Catheter 02/04/22 0400-Reason for Continuing Urinary Catheterization: Critically ill in ICU and requiring hourly monitoring of intake/output   CVC necessity: Yes  D: De-escalation Antibiotics   Yes    Family/Goals of care/Code Status   Code Status: Full Code    24H Vital Sign Range  Temp:  [96 °F (35.6 °C)-97.6 °F (36.4 °C)]   Pulse:  [71-90]   Resp:  [8-48]   BP: ()/(51-69)   SpO2:  [91 %-96 %]      Shift Events   No acute events throughout shift. Patient still on same drips; no changes made today. Possible heart cath placement next week. Patient vital signs stable.    VS and assessment per flow sheet, patient progressing towards goals as tolerated, plan of care reviewed with [unfilled] and family, all concerns addressed, will continue to monitor.    Etta Lux

## 2022-02-12 PROBLEM — Z51.5 PALLIATIVE CARE ENCOUNTER: Status: ACTIVE | Noted: 2022-02-12

## 2022-02-12 LAB
ALBUMIN SERPL BCP-MCNC: 2.8 G/DL (ref 3.5–5.2)
ALBUMIN SERPL BCP-MCNC: 2.8 G/DL (ref 3.5–5.2)
ALLENS TEST: ABNORMAL
ALLENS TEST: ABNORMAL
ALP SERPL-CCNC: 105 U/L (ref 55–135)
ALP SERPL-CCNC: 107 U/L (ref 55–135)
ALT SERPL W/O P-5'-P-CCNC: 6 U/L (ref 10–44)
ALT SERPL W/O P-5'-P-CCNC: 8 U/L (ref 10–44)
ANION GAP SERPL CALC-SCNC: 13 MMOL/L (ref 8–16)
ANION GAP SERPL CALC-SCNC: 15 MMOL/L (ref 8–16)
APTT BLDCRRT: 34.8 SEC (ref 21–32)
APTT BLDCRRT: 45.8 SEC (ref 21–32)
AST SERPL-CCNC: 10 U/L (ref 10–40)
AST SERPL-CCNC: 10 U/L (ref 10–40)
BASOPHILS # BLD AUTO: 0.01 K/UL (ref 0–0.2)
BASOPHILS NFR BLD: 0.1 % (ref 0–1.9)
BILIRUB DIRECT SERPL-MCNC: 1.5 MG/DL (ref 0.1–0.3)
BILIRUB SERPL-MCNC: 2.5 MG/DL (ref 0.1–1)
BILIRUB SERPL-MCNC: 2.5 MG/DL (ref 0.1–1)
BUN SERPL-MCNC: 77 MG/DL (ref 8–23)
BUN SERPL-MCNC: 81 MG/DL (ref 8–23)
CALCIUM SERPL-MCNC: 8.3 MG/DL (ref 8.7–10.5)
CALCIUM SERPL-MCNC: 8.4 MG/DL (ref 8.7–10.5)
CHLORIDE SERPL-SCNC: 92 MMOL/L (ref 95–110)
CHLORIDE SERPL-SCNC: 93 MMOL/L (ref 95–110)
CO2 SERPL-SCNC: 30 MMOL/L (ref 23–29)
CO2 SERPL-SCNC: 31 MMOL/L (ref 23–29)
CREAT SERPL-MCNC: 2 MG/DL (ref 0.5–1.4)
CREAT SERPL-MCNC: 2.1 MG/DL (ref 0.5–1.4)
DELSYS: ABNORMAL
DIFFERENTIAL METHOD: ABNORMAL
EOSINOPHIL # BLD AUTO: 0 K/UL (ref 0–0.5)
EOSINOPHIL NFR BLD: 0 % (ref 0–8)
ERYTHROCYTE [DISTWIDTH] IN BLOOD BY AUTOMATED COUNT: 21.2 % (ref 11.5–14.5)
EST. GFR  (AFRICAN AMERICAN): 35.5 ML/MIN/1.73 M^2
EST. GFR  (AFRICAN AMERICAN): 37.7 ML/MIN/1.73 M^2
EST. GFR  (NON AFRICAN AMERICAN): 30.7 ML/MIN/1.73 M^2
EST. GFR  (NON AFRICAN AMERICAN): 32.6 ML/MIN/1.73 M^2
ESTIMATED AVG GLUCOSE: 126 MG/DL (ref 68–131)
GLUCOSE SERPL-MCNC: 119 MG/DL (ref 70–110)
GLUCOSE SERPL-MCNC: 123 MG/DL (ref 70–110)
HBA1C MFR BLD: 6 % (ref 4–5.6)
HCO3 UR-SCNC: 30.6 MMOL/L (ref 24–28)
HCT VFR BLD AUTO: 29.8 % (ref 40–54)
HGB BLD-MCNC: 9.8 G/DL (ref 14–18)
IMM GRANULOCYTES # BLD AUTO: 0.14 K/UL (ref 0–0.04)
IMM GRANULOCYTES NFR BLD AUTO: 1 % (ref 0–0.5)
LYMPHOCYTES # BLD AUTO: 0.2 K/UL (ref 1–4.8)
LYMPHOCYTES NFR BLD: 1.2 % (ref 18–48)
MAGNESIUM SERPL-MCNC: 2.4 MG/DL (ref 1.6–2.6)
MAGNESIUM SERPL-MCNC: 2.5 MG/DL (ref 1.6–2.6)
MCH RBC QN AUTO: 26.8 PG (ref 27–31)
MCHC RBC AUTO-ENTMCNC: 32.9 G/DL (ref 32–36)
MCV RBC AUTO: 82 FL (ref 82–98)
METHEMOGLOBIN: 1.2 % (ref 0–3)
MONOCYTES # BLD AUTO: 0.7 K/UL (ref 0.3–1)
MONOCYTES NFR BLD: 4.8 % (ref 4–15)
NEUTROPHILS # BLD AUTO: 12.9 K/UL (ref 1.8–7.7)
NEUTROPHILS NFR BLD: 92.9 % (ref 38–73)
NRBC BLD-RTO: 0 /100 WBC
PCO2 BLDA: 42.1 MMHG (ref 35–45)
PH SMN: 7.47 [PH] (ref 7.35–7.45)
PLATELET # BLD AUTO: 246 K/UL (ref 150–450)
PMV BLD AUTO: 11.7 FL (ref 9.2–12.9)
PO2 BLDA: 35 MMHG (ref 40–60)
PO2 BLDA: 35 MMHG (ref 40–60)
POC BE: 7 MMOL/L
POC SATURATED O2: 70 % (ref 95–100)
POC SATURATED O2: 71 % (ref 95–100)
POC TCO2: 32 MMOL/L (ref 24–29)
POCT GLUCOSE: 150 MG/DL (ref 70–110)
POCT GLUCOSE: 152 MG/DL (ref 70–110)
POCT GLUCOSE: 164 MG/DL (ref 70–110)
POTASSIUM SERPL-SCNC: 3.6 MMOL/L (ref 3.5–5.1)
POTASSIUM SERPL-SCNC: 3.9 MMOL/L (ref 3.5–5.1)
POTASSIUM SERPL-SCNC: 4.2 MMOL/L (ref 3.5–5.1)
PROT SERPL-MCNC: 6.5 G/DL (ref 6–8.4)
PROT SERPL-MCNC: 6.6 G/DL (ref 6–8.4)
RBC # BLD AUTO: 3.65 M/UL (ref 4.6–6.2)
SAMPLE: ABNORMAL
SAMPLE: ABNORMAL
SITE: ABNORMAL
SITE: ABNORMAL
SODIUM SERPL-SCNC: 137 MMOL/L (ref 136–145)
SODIUM SERPL-SCNC: 137 MMOL/L (ref 136–145)
WBC # BLD AUTO: 13.85 K/UL (ref 3.9–12.7)

## 2022-02-12 PROCEDURE — 25000003 PHARM REV CODE 250: Performed by: INTERNAL MEDICINE

## 2022-02-12 PROCEDURE — 25000003 PHARM REV CODE 250: Performed by: NURSE PRACTITIONER

## 2022-02-12 PROCEDURE — 85730 THROMBOPLASTIN TIME PARTIAL: CPT | Mod: 91 | Performed by: INTERNAL MEDICINE

## 2022-02-12 PROCEDURE — 80053 COMPREHEN METABOLIC PANEL: CPT | Performed by: INTERNAL MEDICINE

## 2022-02-12 PROCEDURE — 99233 PR SUBSEQUENT HOSPITAL CARE,LEVL III: ICD-10-PCS | Mod: ,,, | Performed by: INTERNAL MEDICINE

## 2022-02-12 PROCEDURE — 83050 HGB METHEMOGLOBIN QUAN: CPT

## 2022-02-12 PROCEDURE — 25000003 PHARM REV CODE 250: Performed by: STUDENT IN AN ORGANIZED HEALTH CARE EDUCATION/TRAINING PROGRAM

## 2022-02-12 PROCEDURE — 63600175 PHARM REV CODE 636 W HCPCS: Performed by: STUDENT IN AN ORGANIZED HEALTH CARE EDUCATION/TRAINING PROGRAM

## 2022-02-12 PROCEDURE — 84132 ASSAY OF SERUM POTASSIUM: CPT | Performed by: INTERNAL MEDICINE

## 2022-02-12 PROCEDURE — 99497 PR ADVNCD CARE PLAN 30 MIN: ICD-10-PCS | Mod: 25,,, | Performed by: INTERNAL MEDICINE

## 2022-02-12 PROCEDURE — 63600175 PHARM REV CODE 636 W HCPCS: Performed by: PHYSICIAN ASSISTANT

## 2022-02-12 PROCEDURE — 83036 HEMOGLOBIN GLYCOSYLATED A1C: CPT | Performed by: INTERNAL MEDICINE

## 2022-02-12 PROCEDURE — 94761 N-INVAS EAR/PLS OXIMETRY MLT: CPT

## 2022-02-12 PROCEDURE — 85025 COMPLETE CBC W/AUTO DIFF WBC: CPT | Performed by: INTERNAL MEDICINE

## 2022-02-12 PROCEDURE — 99497 ADVNCD CARE PLAN 30 MIN: CPT | Mod: 25,,, | Performed by: INTERNAL MEDICINE

## 2022-02-12 PROCEDURE — 25000003 PHARM REV CODE 250: Performed by: PHYSICIAN ASSISTANT

## 2022-02-12 PROCEDURE — 83735 ASSAY OF MAGNESIUM: CPT | Mod: 91 | Performed by: INTERNAL MEDICINE

## 2022-02-12 PROCEDURE — 99233 SBSQ HOSP IP/OBS HIGH 50: CPT | Mod: ,,, | Performed by: INTERNAL MEDICINE

## 2022-02-12 PROCEDURE — 80048 BASIC METABOLIC PNL TOTAL CA: CPT | Performed by: INTERNAL MEDICINE

## 2022-02-12 PROCEDURE — 20000000 HC ICU ROOM

## 2022-02-12 PROCEDURE — 82803 BLOOD GASES ANY COMBINATION: CPT

## 2022-02-12 PROCEDURE — 99223 PR INITIAL HOSPITAL CARE,LEVL III: ICD-10-PCS | Mod: ,,, | Performed by: INTERNAL MEDICINE

## 2022-02-12 PROCEDURE — 83735 ASSAY OF MAGNESIUM: CPT | Performed by: INTERNAL MEDICINE

## 2022-02-12 PROCEDURE — 99223 1ST HOSP IP/OBS HIGH 75: CPT | Mod: ,,, | Performed by: INTERNAL MEDICINE

## 2022-02-12 PROCEDURE — 27100171 HC OXYGEN HIGH FLOW UP TO 24 HOURS

## 2022-02-12 PROCEDURE — 80076 HEPATIC FUNCTION PANEL: CPT | Performed by: STUDENT IN AN ORGANIZED HEALTH CARE EDUCATION/TRAINING PROGRAM

## 2022-02-12 PROCEDURE — 99900035 HC TECH TIME PER 15 MIN (STAT)

## 2022-02-12 PROCEDURE — 63600367 HC NITRIC OXIDE PER HOUR

## 2022-02-12 PROCEDURE — 63600175 PHARM REV CODE 636 W HCPCS: Performed by: INTERNAL MEDICINE

## 2022-02-12 RX ORDER — HYDROMORPHONE HYDROCHLORIDE 1 MG/ML
0.2 INJECTION, SOLUTION INTRAMUSCULAR; INTRAVENOUS; SUBCUTANEOUS
Status: DISCONTINUED | OUTPATIENT
Start: 2022-02-12 | End: 2022-02-13 | Stop reason: HOSPADM

## 2022-02-12 RX ORDER — SODIUM CHLORIDE 9 MG/ML
INJECTION, SOLUTION INTRAVENOUS
Status: DISCONTINUED | OUTPATIENT
Start: 2022-02-12 | End: 2022-02-13 | Stop reason: HOSPADM

## 2022-02-12 RX ADMIN — FERROUS SULFATE TAB 325 MG (65 MG ELEMENTAL FE) 1 EACH: 325 (65 FE) TAB at 09:02

## 2022-02-12 RX ADMIN — DOCUSATE SODIUM 50 MG AND SENNOSIDES 8.6 MG 2 TABLET: 8.6; 5 TABLET, FILM COATED ORAL at 08:02

## 2022-02-12 RX ADMIN — POTASSIUM CHLORIDE 40 MEQ: 400 INJECTION, SOLUTION INTRAVENOUS at 09:02

## 2022-02-12 RX ADMIN — IPRATROPIUM BROMIDE 2 SPRAY: 42 SPRAY, METERED NASAL at 09:02

## 2022-02-12 RX ADMIN — FUROSEMIDE 160 MG: 10 INJECTION, SOLUTION INTRAVENOUS at 11:02

## 2022-02-12 RX ADMIN — SODIUM CHLORIDE: 0.9 INJECTION, SOLUTION INTRAVENOUS at 08:02

## 2022-02-12 RX ADMIN — CHLOROTHIAZIDE SODIUM 500 MG: 500 INJECTION, POWDER, LYOPHILIZED, FOR SOLUTION INTRAVENOUS at 09:02

## 2022-02-12 RX ADMIN — HEPARIN SODIUM 18 UNITS/KG/HR: 5000 INJECTION INTRAVENOUS; SUBCUTANEOUS at 04:02

## 2022-02-12 RX ADMIN — POLYETHYLENE GLYCOL 3350 17 G: 17 POWDER, FOR SOLUTION ORAL at 09:02

## 2022-02-12 RX ADMIN — POTASSIUM CHLORIDE 40 MEQ: 400 INJECTION, SOLUTION INTRAVENOUS at 08:02

## 2022-02-12 RX ADMIN — AMIODARONE HYDROCHLORIDE 400 MG: 200 TABLET ORAL at 09:02

## 2022-02-12 RX ADMIN — FUROSEMIDE 50 MG/HR: 10 INJECTION, SOLUTION INTRAMUSCULAR; INTRAVENOUS at 09:02

## 2022-02-12 RX ADMIN — CHLOROTHIAZIDE SODIUM 1000 MG: 500 INJECTION, POWDER, LYOPHILIZED, FOR SOLUTION INTRAVENOUS at 11:02

## 2022-02-12 RX ADMIN — MIDODRINE HYDROCHLORIDE 10 MG: 5 TABLET ORAL at 09:02

## 2022-02-12 RX ADMIN — AMIODARONE HYDROCHLORIDE 400 MG: 200 TABLET ORAL at 08:02

## 2022-02-12 RX ADMIN — FUROSEMIDE 50 MG/HR: 10 INJECTION, SOLUTION INTRAMUSCULAR; INTRAVENOUS at 06:02

## 2022-02-12 RX ADMIN — SODIUM CHLORIDE: 0.9 INJECTION, SOLUTION INTRAVENOUS at 09:02

## 2022-02-12 RX ADMIN — RIOCIGUAT 2.5 MG: 2.5 TABLET, FILM COATED ORAL at 03:02

## 2022-02-12 RX ADMIN — MIDODRINE HYDROCHLORIDE 10 MG: 5 TABLET ORAL at 03:02

## 2022-02-12 RX ADMIN — HEPARIN SODIUM 18 UNITS/KG/HR: 5000 INJECTION INTRAVENOUS; SUBCUTANEOUS at 07:02

## 2022-02-12 RX ADMIN — SIMETHICONE 80 MG: 80 TABLET, CHEWABLE ORAL at 05:02

## 2022-02-12 RX ADMIN — DOCUSATE SODIUM 50 MG AND SENNOSIDES 8.6 MG 2 TABLET: 8.6; 5 TABLET, FILM COATED ORAL at 09:02

## 2022-02-12 RX ADMIN — PANTOPRAZOLE SODIUM 40 MG: 40 TABLET, DELAYED RELEASE ORAL at 09:02

## 2022-02-12 RX ADMIN — RIOCIGUAT 2.5 MG: 2.5 TABLET, FILM COATED ORAL at 09:02

## 2022-02-12 RX ADMIN — ONDANSETRON 4 MG: 2 INJECTION INTRAMUSCULAR; INTRAVENOUS at 09:02

## 2022-02-12 RX ADMIN — RIOCIGUAT 2.5 MG: 2.5 TABLET, FILM COATED ORAL at 08:02

## 2022-02-12 RX ADMIN — MIDODRINE HYDROCHLORIDE 10 MG: 5 TABLET ORAL at 08:02

## 2022-02-12 NOTE — HPI
71 year old male with PMH HF, PH (diagnosed in 2018) on [facility], Uptravi, CTEPH on home oxygen 2L NC, Atrial Flutter s/p ablation 2020, DM, BPH, HLD, Anemia, PTSD, Pulmonary hyperinflation presents to ED with respiratory distress and worsening shortness of breath for past 4 days. Patient has not improved with aggressive diuresis. Plan for RHC when CVP <10 but team has not been able to get patient to this point. Patient remains on lasix gtt. On nitric oxide. Not on pressors at the time of my visit. Palliative consulted for GOC

## 2022-02-12 NOTE — PROGRESS NOTES
RN contacted MD Murillo regarding pt's hypotension. Multiple pressures obtained with a maps < 55. Pt remaining asymptomatic. Pt's O2 requirements increasing; pt now on high flow.  Orders placed for RN to administer 5mg of midodrine and 120mg of lasix.  New map goal >50 and SBP goal >70.  Will continue to monitor.

## 2022-02-12 NOTE — NURSING
End of shift note -     BP soft most of the shift.  MAPs 48-65 today.  Eugene, NP, ordered midodrine TID.  Primacor stopped.  Lasix still infusing at 50mg/hr.     Christensen intact - 885cc of urine output today.      No BM since 2/2.  Senna and miralax started today.      OOB to cardiac chair x 1 hour today.  Waffle mattress applied to bed for comfort.      Wound care completed to right foot.        Patient denied pain all day.      VERY poor po intake.  Patient not interested in any meals.  Only likes to drink OJ, milk and water.      Palliative consulted.

## 2022-02-12 NOTE — PROGRESS NOTES
[facility] - [facility]  Heart Transplant  Progress Note    Patient Name: Ambrosio Bray III  MRN: [id number]  Admission Date: 2/3/2022  Hospital Length of Stay: 9 days  Attending Physician: Roland George MD  Primary Care Provider: Luciana Cisse MD  Principal Problem:Acute decompensated heart failure    Subjective:     Interval History: No acute events overnight. Diuresed at 57cc/hr and UOP continues to drop.  Net +893cc.  Received lasix 120mg overnight and midodrine increased to 10 TID     Lines: [facility] 2/3/22     Continuous Infusions:   sodium chloride 0.9% Stopped (02/12/22 0818)    furosemide (LASIX) 10 mg/mL infusion (non-titrating) 50 mg/hr (02/12/22 1200)    heparin (porcine) in D5W 18 Units/kg/hr (02/12/22 1200)    nitric oxide gas      NORepinephrine bitartrate-D5W       Scheduled Meds:   amiodarone  400 mg Oral BID    chlorothiazide (DIURIL) IVPB  500 mg Intravenous BID    ferrous sulfate  1 tablet Oral Daily    ipratropium  2 spray Each Nostril QID    midodrine  10 mg Oral TID    pantoprazole  40 mg Oral Daily    polyethylene glycol  17 g Oral Daily    potassium chloride in water  40 mEq Intravenous BID    riociguat  2.5 mg Oral TID    selexipag  1,600 mcg Oral BID    senna-docusate 8.6-50 mg  2 tablet Oral BID    simethicone  1 tablet Oral Q6H     PRN Meds:sodium chloride 0.9%, dextrose 10%, dextrose 10%, glucagon (human recombinant), glucose, glucose, guaiFENesin, insulin aspart U-100, melatonin, ondansetron, sodium chloride 0.9%    Review of patient's allergies indicates:  No Known Allergies  Objective:     Vital Signs (Most Recent):  Temp: 97.6 °F (36.4 °C) (02/12/22 1100)  Pulse: 99 (02/12/22 1200)  Resp: 13 (02/12/22 1200)  BP: (!) 81/50 (02/12/22 1215)  SpO2: 95 % (02/12/22 1200) Vital Signs (24h Range):  Temp:  [95 °F (35 °C)-97.8 °F (36.6 °C)] 97.6 °F (36.4 °C)  Pulse:  [] 99  Resp:  [11-25] 13  SpO2:  [89 %-100 %] 95 %  BP: (61-95)/(43-59) 81/50     No data  found.  Body mass index is 24.69 kg/m².      Intake/Output Summary (Last 24 hours) at 2/12/2022 1245  Last data filed at 2/12/2022 1200  Gross per 24 hour   Intake 2263.1 ml   Output 1335 ml   Net 928.1 ml     Physical Exam  Constitutional:       Comments: Patient is alert and oriented, appears comfortable, does not appear to be in respiratory distress. On Emmy 20ppm with 6L NC.   HENT:      Head: Normocephalic and atraumatic.   Neck:      Comments: +JVD  Cardiovascular:      Rate and Rhythm: Tachycardia present. Rhythm irregular.      Pulses: Normal pulses.      Heart sounds: Murmur heard.    Decrescendo systolic murmur is present.      Pulmonary:      Effort: Pulmonary effort is normal. No respiratory distress.      Breath sounds: Normal breath sounds.      Comments: Bibasilar crackles R > L  Abdominal:      General: There is distension.   Musculoskeletal:         General: Normal range of motion.      Cervical back: Normal range of motion and neck supple.   Skin:     General: Skin is warm and dry.   Neurological:      General: No focal deficit present.      Mental Status: He is alert and oriented to person, place, and time.   Psychiatric:         Mood and Affect: Mood normal.         Behavior: Behavior normal.       Significant Labs:  CBC:  Recent Labs   Lab 02/10/22  0417 02/11/22  0344 02/12/22  0547   WBC 8.44 11.68 13.85*   RBC 3.59* 3.70* 3.65*   HGB 9.8* 10.1* 9.8*   HCT 29.2* 29.7* 29.8*    273 246   MCV 81* 80* 82   MCH 27.3 27.3 26.8*   MCHC 33.6 34.0 32.9     BNP:  Recent Labs   Lab 02/07/22  2333 02/11/22  0052   * 496*     CMP:  Recent Labs   Lab 02/10/22  0417 02/10/22  1154 02/11/22  0344 02/11/22  0809 02/11/22  1653 02/12/22  0007 02/12/22  0547   *  121*  --  105  105   < > 115* 123* 119*   CALCIUM 8.5*  8.5*  --  8.4*  8.4*   < > 8.6* 8.4* 8.3*   ALBUMIN 2.9*  2.9*  --  2.9*  2.9*  --   --   --  2.8*  2.8*   PROT 6.7  6.7  --  6.6  6.6  --   --   --  6.5  6.6   NA  130*  130*  --  135*  135*   < > 136 137 137   K 3.3*  3.3*   < > 3.0*  3.0*   < > 4.0 3.9 3.6   CO2 28  28  --  30*  30*   < > 33* 31* 30*   CL 92*  92*  --  93*  93*   < > 92* 93* 92*   BUN 79*  79*  --  74*  74*   < > 76* 77* 81*   CREATININE 1.9*  1.9*  --  1.7*  1.7*   < > 1.9* 2.0* 2.1*   ALKPHOS 104  104  --  106  106  --   --   --  105  107   ALT <5*  <5*  --  6*  6*  --   --   --  8*  6*   AST 8*  8*  --  10  10  --   --   --  10  10   BILITOT 2.0*  2.0*  --  2.3*  2.3*  --   --   --  2.5*  2.5*    < > = values in this interval not displayed.      Coagulation:   Recent Labs   Lab 02/10/22  2255 02/11/22  0637 02/12/22  0547   APTT 54.4* 43.6* 34.8*     LDH:  No results for input(s): LDH in the last 72 hours.  Microbiology:  Microbiology Results (last 7 days)     Procedure Component Value Units Date/Time    Blood culture #1 **CANNOT BE ORDERED STAT** [[id number]] Collected: 02/03/22 0940    Order Status: Completed Specimen: Blood from Peripheral, Forearm, Right Updated: 02/08/22 1212     Blood Culture, Routine No growth after 5 days.    Blood culture #2 **CANNOT BE ORDERED STAT** [[id number]] Collected: 02/03/22 0929    Order Status: Completed Specimen: Blood from Peripheral, Antecubital, Left Updated: 02/08/22 1212     Blood Culture, Routine No growth after 5 days.        Estimated Creatinine Clearance: 36.5 mL/min (A) (based on SCr of 2.1 mg/dL (H)).    Diagnostic Results:  I have reviewed and interpreted all pertinent imaging results/findings within the past 24 hours.    Assessment and Plan:     71 y.o M with history of HF, PH (diagnosed in 2018) on [facility], Uptravi, CTEPH on home oxygen 2L NC, Atrial Flutter s/p ablation 2020, DM, BPH, HLD, Anemia, PTSD, Pulmonary hyperinflation presents to ED with respiratory distress and worsening shortness of breath for past 4 days. Patient reports he was in his usual state of health until 4 days ago when he developed SOB while ambulating to use  restroom. Patient reports he had removed his oxygen to use restroom and while ambulating to restroom he became lightheaded, dizzy and in respiratory distress and reports having a fall and syncopized. He reports he woke up after 30 seconds, did not hit his head. Patient reports since the past four days he has also been having increasing loose BM more than his usual, reports feeling dehydrated with increased fluid intake. Patient reports he recently followed up with pulmonologist in clinic and his home Bumex 2mg MWF was increased to daily and was prescribed home oxygen. Patient reports that has helped relieve his SOB. Patient denies chest pain, further episodes of syncope, change in vision, slurred speech, palpitations, headache, change in urinary habits, cough, fever.     Home Medications  - Bumex 2mg daily  - Adempas 2.5mg TID  - Uptravi 1600 mcg BID (to start on 3/21/22)  - Xarelto 20mg daily  - Losartan 25mg daily  - Flomax 0.4mg  - Lipitor 40mg daily  - Atrovent  - Ferrous Sulfate     ED Course      Vitals:     02/03/22 1332   BP: 98/65   Pulse: 104   Resp: (!) 23   Temp:       Pertinent Labs  CBC: WBC 8.93, Hbg 10.3, Hct 32.7, Plt 270  BMP: Na 139, K 3.7, Cl 104, CO2 25, BUN 16, Cr. 0.8,   LFT: TP 7.2, Alb, 3.6, TB 2.9, AST 11, ALT 8  Free T4: 0.88  Lactate 1.7, Troponin 0.012,   EKG: Afib with RVR at a rate of 173, RBB  CXR: bilateral opacities, pulmonary congestion  ED Management  -Lopressor Tartrate 50mg q6h  -Diltiazem 25 mg IVP  -Lasix 80mg IVP      * Acute decompensated heart failure  -NICM   -2/5/22 TTE: EF 20, LV DD, Systolic flattening of IV septum consistent with RV pressure overload, small pericardial effusion, mild LA enlargement, severe RV enlargement with severely reduced RVSF, severe TR, PASP 73, CVP 15, LVIDd 7.7  -12/16/22 TTE: LV normal in size with moderately decreased systolic function, EF 35, Moderate RV enlargement with hypertrophy and moderately reduced RVSF, grade II DD, Mod-severe  TR, Mod MR, trivial pericardial effusion, PASP 87, CVP 15, LVIDd 5.62  -11/28/2018 RHC: RA: 14/ 15/ 11 RV: 112/ -4 PA: 117/ 31/ 59 PWP: 30/ 25/ 20, CO 5.48, CI 2.53, O2 sat: SVC 73, PA 64%, , SVR 1357 (on adempas)  -Home Diuretic Regimen: Bumex 2mg daily  -Hypervolemic on exam today. ScVO2 69, CVP 14 and net negative: -4.3L. Continue Diuril 500mg IVP BID, lasix drip 50 mg/hr  -Emmy at 20ppm  -GDMT: Losartan 25mg daily (holding)  -2g Na dietary restriction, 1500 mL fluid restriction, strict I/Os  -Continue aggressive diuresis for goal CVP <10 then plan for RHC.   -Will consult palliative care to discuss GoC and start end of life discussion as there is very limited treatment modalities for this pt.  Pt in amenable to talk with palliative care for discharge purposes.    Ascites  - liver with cirrhotic appearance on past CT scans.   -Tapped by IR 2/9 with 5L removed.     Iron deficiency anemia due to chronic blood loss  - monitor H/H  - resume home iron therapy    CTEPH (chronic thromboembolic pulmonary hypertension)  - On heparin for now. Can resume eliquis after RHC.   - please see plan for PH    Pulmonary HTN  -12/16/22 TTE: LV normal in size with moderately decreased systolic function, EF 35, Moderate RV enlargement with hypertrophy and moderately reduced RVSF, grade II DD, Mod-severe TR, Mod MR, trivial pericardial effusion, PASP 87, CVP 15, LVIDd 5.62  -11/28/2018 RHC: RA: 14/ 15/ 11 RV: 112/ -4 PA: 117/ 31/ 59 PWP: 30/ 25/ 20, CO 5.48, CI 2.53, O2 sat: SVC 73, PA 64%, , SVR 1357 (on adempas)  - Continue Adempas 2.5mg TID, Utravi 1600 mcg BID   - Plan for RHC once CVP <10, for further guidance of PH treatment. Plan to switch to Remodulin. Home xarelto switched to heparin IV in anticipation of RHC. IF CVP >10, would wait until adequate diuresis is achieved. Will continue aggressive diuresis     Atrial Flutter/ fibrillation  - Patient has a history of known Atrial fibrillation s/p RITO/CV 9/30/14. In  2017 patient had wide complex tachycardia and received amiodarone and was started on daily regimen. EKG were reviewed by EP and rhythm was likely Aflutter. Patient underwent ablation in 2018. Last followed in EP clinic on 3/31/20, was told has high chances of developing Afib within next 5 years. Patient was in RVR on admission likely secondary to ADHF and electrolyte imbalance. Plan to continue diuresis and replace electrolytes, if Afib persists after adequate diuresis/ electrolyte replacement, will consider EP consult.  - patient is on chronic AC with xarelto, and heparin IV while inpateint.  - plan to switch to amiodarone 400mg BID for 2 weeks(until 2/24) followed by amiodarone 200mg daily.    Type 2 diabetes mellitus with microalbuminuria, without long-term current use of insulin  - follow A1c  - Sliding scale and FS  - endocrine consult        Prasanna Murillo MD  Heart Transplant  [facility]

## 2022-02-12 NOTE — CARE UPDATE
HEMODYNAMICS:    CVP = 12, SvO2 = 72    Support: lasix gtt at 50, diamox 500mg BID, midodrine 5mg TID, Emmy 20    CO = 10.8, CI 5.2,     Lactic acid neg at 0.8    PLAN:  - give 120mg lasix x1  - increase midodrine to 10mg TID  - ok to start levophed to maintain goal MAP >= 50, SBP >= 70    Prasanna Murillo, PGY4  Cardiovascular Disease  [facility]

## 2022-02-12 NOTE — SUBJECTIVE & OBJECTIVE
Interval History: No acute events overnight. Diuresed at 57cc/hr and UOP continues to drop.  Net +893cc.  Received lasix 120mg overnight and midodrine increased to 10 TID     Lines: [facility] 2/3/22     Continuous Infusions:   sodium chloride 0.9% Stopped (02/12/22 0818)    furosemide (LASIX) 10 mg/mL infusion (non-titrating) 50 mg/hr (02/12/22 1200)    heparin (porcine) in D5W 18 Units/kg/hr (02/12/22 1200)    nitric oxide gas      NORepinephrine bitartrate-D5W       Scheduled Meds:   amiodarone  400 mg Oral BID    chlorothiazide (DIURIL) IVPB  500 mg Intravenous BID    ferrous sulfate  1 tablet Oral Daily    ipratropium  2 spray Each Nostril QID    midodrine  10 mg Oral TID    pantoprazole  40 mg Oral Daily    polyethylene glycol  17 g Oral Daily    potassium chloride in water  40 mEq Intravenous BID    riociguat  2.5 mg Oral TID    selexipag  1,600 mcg Oral BID    senna-docusate 8.6-50 mg  2 tablet Oral BID    simethicone  1 tablet Oral Q6H     PRN Meds:sodium chloride 0.9%, dextrose 10%, dextrose 10%, glucagon (human recombinant), glucose, glucose, guaiFENesin, insulin aspart U-100, melatonin, ondansetron, sodium chloride 0.9%    Review of patient's allergies indicates:  No Known Allergies  Objective:     Vital Signs (Most Recent):  Temp: 97.6 °F (36.4 °C) (02/12/22 1100)  Pulse: 99 (02/12/22 1200)  Resp: 13 (02/12/22 1200)  BP: (!) 81/50 (02/12/22 1215)  SpO2: 95 % (02/12/22 1200) Vital Signs (24h Range):  Temp:  [95 °F (35 °C)-97.8 °F (36.6 °C)] 97.6 °F (36.4 °C)  Pulse:  [] 99  Resp:  [11-25] 13  SpO2:  [89 %-100 %] 95 %  BP: (61-95)/(43-59) 81/50     No data found.  Body mass index is 24.69 kg/m².      Intake/Output Summary (Last 24 hours) at 2/12/2022 1245  Last data filed at 2/12/2022 1200  Gross per 24 hour   Intake 2263.1 ml   Output 1335 ml   Net 928.1 ml     Physical Exam  Constitutional:       Comments: Patient is alert and oriented, appears comfortable, does not appear to be in  respiratory distress. On Emmy 20ppm with 6L NC.   HENT:      Head: Normocephalic and atraumatic.   Neck:      Comments: +JVD  Cardiovascular:      Rate and Rhythm: Tachycardia present. Rhythm irregular.      Pulses: Normal pulses.      Heart sounds: Murmur heard.    Decrescendo systolic murmur is present.      Pulmonary:      Effort: Pulmonary effort is normal. No respiratory distress.      Breath sounds: Normal breath sounds.      Comments: Bibasilar crackles R > L  Abdominal:      General: There is distension.   Musculoskeletal:         General: Normal range of motion.      Cervical back: Normal range of motion and neck supple.   Skin:     General: Skin is warm and dry.   Neurological:      General: No focal deficit present.      Mental Status: He is alert and oriented to person, place, and time.   Psychiatric:         Mood and Affect: Mood normal.         Behavior: Behavior normal.       Significant Labs:  CBC:  Recent Labs   Lab 02/10/22  0417 02/11/22  0344 02/12/22  0547   WBC 8.44 11.68 13.85*   RBC 3.59* 3.70* 3.65*   HGB 9.8* 10.1* 9.8*   HCT 29.2* 29.7* 29.8*    273 246   MCV 81* 80* 82   MCH 27.3 27.3 26.8*   MCHC 33.6 34.0 32.9     BNP:  Recent Labs   Lab 02/07/22  2333 02/11/22  0052   * 496*     CMP:  Recent Labs   Lab 02/10/22  0417 02/10/22  1154 02/11/22  0344 02/11/22  0809 02/11/22  1653 02/12/22  0007 02/12/22  0547   *  121*  --  105  105   < > 115* 123* 119*   CALCIUM 8.5*  8.5*  --  8.4*  8.4*   < > 8.6* 8.4* 8.3*   ALBUMIN 2.9*  2.9*  --  2.9*  2.9*  --   --   --  2.8*  2.8*   PROT 6.7  6.7  --  6.6  6.6  --   --   --  6.5  6.6   *  130*  --  135*  135*   < > 136 137 137   K 3.3*  3.3*   < > 3.0*  3.0*   < > 4.0 3.9 3.6   CO2 28  28  --  30*  30*   < > 33* 31* 30*   CL 92*  92*  --  93*  93*   < > 92* 93* 92*   BUN 79*  79*  --  74*  74*   < > 76* 77* 81*   CREATININE 1.9*  1.9*  --  1.7*  1.7*   < > 1.9* 2.0* 2.1*   ALKPHOS 104  104  --  106   106  --   --   --  105  107   ALT <5*  <5*  --  6*  6*  --   --   --  8*  6*   AST 8*  8*  --  10  10  --   --   --  10  10   BILITOT 2.0*  2.0*  --  2.3*  2.3*  --   --   --  2.5*  2.5*    < > = values in this interval not displayed.      Coagulation:   Recent Labs   Lab 02/10/22  2255 02/11/22  0637 02/12/22  0547   APTT 54.4* 43.6* 34.8*     LDH:  No results for input(s): LDH in the last 72 hours.  Microbiology:  Microbiology Results (last 7 days)     Procedure Component Value Units Date/Time    Blood culture #1 **CANNOT BE ORDERED STAT** [[id number]] Collected: 02/03/22 0940    Order Status: Completed Specimen: Blood from Peripheral, Forearm, Right Updated: 02/08/22 1212     Blood Culture, Routine No growth after 5 days.    Blood culture #2 **CANNOT BE ORDERED STAT** [[id number]] Collected: 02/03/22 0929    Order Status: Completed Specimen: Blood from Peripheral, Antecubital, Left Updated: 02/08/22 1212     Blood Culture, Routine No growth after 5 days.        Estimated Creatinine Clearance: 36.5 mL/min (A) (based on SCr of 2.1 mg/dL (H)).    Diagnostic Results:  I have reviewed and interpreted all pertinent imaging results/findings within the past 24 hours.

## 2022-02-12 NOTE — CONSULTS
[facility] - Cardiac Intensive Care  Palliative Medicine  Consult Note    Patient Name: Ambrosio Bray III  MRN: [id number]  Admission Date: 2/3/2022  Hospital Length of Stay: 9 days  Code Status: DNR   Attending Provider: Roland George MD  Consulting Provider: Sandra Burton MD  Primary Care Physician: Luciana Cisse MD  Principal Problem:Acute decompensated heart failure    Patient information was obtained from patient and primary team.      Inpatient consult to Palliative Care  Consult performed by: Sandra Burton MD  Consult ordered by: Eugene Singh NP        Assessment/Plan:     Palliative care encounter  71 year old male with end stage pulmonary HTN. Currently on nitric oxide, high flow, and lasix gtt. Patient not on pressors at the time of my visit. Palliative consulted for [facility]    Code status: DNR following our conversation 2/12     Surrogate decision maker: Nina Romero [phone]    GOC/ACP  -Met with patient at bedside. Introduced palliative medicine. Expressed my concern that he is not going to get better. Patient said he knows that he will not get better. Given where we are explored what is most important to patient. Patient shared that getting home to have a natural and peaceful death is most important. Explained that if we are able to get him home, time would be very short- hours to days. Patient expressed understanding. Discussed enrolling in hospice as a way to manage his symptoms at the end of life and ensure a natural and peaceful death. Explained that if he decompensates here prior to being able to get home we will ensure that he is comfortable here. Patient expressed understanding and agreement. In wishing for natural and peaceful death patient does not want intubation, cpr or shocks. Code status changed to DNR   Asked patient if I could call his fiancee to discuss. Patient said no. Patient said that he will talk to her. Highly encouraged patient to talk with Nina as  soon as possible.     -Code status changed to DNR. Patient would like to get home with hospice if possible understanding that time will be very short. Discussed with primary team. If patient decompensates prior to being able to get home patient would like to transition to comfort focused care here.     Shortness of breath   -prn IV dilaudid ordered for shortness of breath. Uptitrate as needed           Thank you for your consult. I will follow-up with patient. Please contact us if you have any additional questions.    Subjective:     HPI:   71 year old male with PMH HF, PH (diagnosed in 2018) on [facility], Uptravi, CTEPH on home oxygen 2L NC, Atrial Flutter s/p ablation 2020, DM, BPH, HLD, Anemia, PTSD, Pulmonary hyperinflation presents to ED with respiratory distress and worsening shortness of breath for past 4 days. Patient has not improved with aggressive diuresis. Plan for RHC when CVP <10 but team has not been able to get patient to this point. Patient remains on lasix gtt. On nitric oxide. Not on pressors at the time of my visit. Palliative consulted for [facility]      Hospital Course:  No notes on file        Past Medical History:   Diagnosis Date    Anemia     Atrial fibrillation     Diabetes mellitus     Heart failure     Hyperlipidemia     Hypertension     PTSD (post-traumatic stress disorder)     Pulmonary hyperinflation     Urinary incontinence        Past Surgical History:   Procedure Laterality Date    CARDIAC CATHETERIZATION      COLONOSCOPY N/A 5/1/2018    Procedure: COLONOSCOPY;  Surgeon: Bubba Navarro MD;  Location: [facility] ENDO;  Service: Endoscopy;  Laterality: N/A;  confirmed appt 4/24/18    RIGHT HEART CATHETERIZATION Right 11/28/2018    Procedure: INSERTION, CATHETER, RIGHT HEART;  Surgeon: Chelsea Arceo MD;  Location: [facility] CATH LAB;  Service: Cardiology;  Laterality: Right;       Review of patient's allergies indicates:  No Known Allergies    Medications:  Continuous Infusions:    sodium chloride 0.9% 5 mL/hr at 02/12/22 1400    furosemide (LASIX) 10 mg/mL infusion (non-titrating) 50 mg/hr (02/12/22 1400)    heparin (porcine) in D5W 18 Units/kg/hr (02/12/22 1400)    nitric oxide gas      vasopressin       Scheduled Meds:   amiodarone  400 mg Oral BID    chlorothiazide (DIURIL) IVPB  500 mg Intravenous BID    ferrous sulfate  1 tablet Oral Daily    ipratropium  2 spray Each Nostril QID    midodrine  10 mg Oral TID    pantoprazole  40 mg Oral Daily    polyethylene glycol  17 g Oral Daily    potassium chloride in water  40 mEq Intravenous BID    riociguat  2.5 mg Oral TID    selexipag  1,600 mcg Oral BID    senna-docusate 8.6-50 mg  2 tablet Oral BID    simethicone  1 tablet Oral Q6H     PRN Meds:sodium chloride 0.9%, dextrose 10%, dextrose 10%, glucagon (human recombinant), glucose, glucose, guaiFENesin, HYDROmorphone, insulin aspart U-100, melatonin, ondansetron, sodium chloride 0.9%    Family History     Problem Relation (Age of Onset)    Asthma Mother    Cancer Father    Cirrhosis Paternal Grandfather    Colon cancer Father    Diabetes Mother, Paternal Grandmother    Heart disease Mother    Hypertension Mother    Kidney disease Maternal Grandmother        Tobacco Use    Smoking status: Never Smoker    Smokeless tobacco: Never Used   Substance and Sexual Activity    Alcohol use: Yes     Alcohol/week: 2.0 - 3.0 standard drinks     Types: 2 - 3 Shots of liquor per week     Comment: maybe twice a month     Drug use: No    Sexual activity: Yes     Partners: Female       Review of Systems   Constitutional: Positive for activity change and fatigue.   HENT: Negative.    Respiratory: Positive for shortness of breath.    Cardiovascular: Positive for leg swelling.   Gastrointestinal: Positive for abdominal distention.   Endocrine: Negative.    Musculoskeletal: Negative.    Skin: Negative.    Neurological: Positive for weakness.   Psychiatric/Behavioral: Negative.      Objective:      Vital Signs (Most Recent):  Temp: 97.6 °F (36.4 °C) (02/12/22 1100)  Pulse: 98 (02/12/22 1400)  Resp: (!) 25 (02/12/22 1400)  BP: (!) 83/51 (02/12/22 1400)  SpO2: (!) 94 % (02/12/22 1400) Vital Signs (24h Range):  Temp:  [95 °F (35 °C)-97.8 °F (36.6 °C)] 97.6 °F (36.4 °C)  Pulse:  [] 98  Resp:  [11-25] 25  SpO2:  [89 %-100 %] 94 %  BP: (61-97)/(43-59) 83/51     Weight: 84.9 kg (187 lb 2.7 oz)  Body mass index is 24.69 kg/m².    Physical Exam  Vitals and nursing note reviewed.   Constitutional:       General: He is not in acute distress.     Appearance: He is ill-appearing.   HENT:      Head: Normocephalic.      Right Ear: External ear normal.      Left Ear: External ear normal.      Nose: Nose normal.      Mouth/Throat:      Mouth: Mucous membranes are dry.   Eyes:      Pupils: Pupils are equal, round, and reactive to light.   Cardiovascular:      Rate and Rhythm: Normal rate.   Pulmonary:      Effort: No respiratory distress.   Abdominal:      General: There is distension.   Musculoskeletal:         General: Normal range of motion.      Cervical back: Normal range of motion.   Neurological:      Mental Status: He is alert and oriented to person, place, and time.      Motor: Weakness present.   Psychiatric:         Cognition and Memory: Cognition is impaired. Memory is impaired.         Review of Symptoms    Symptom Assessment (ESAS 0-10 Scale)  Pain:  0  Dyspnea:  2  Anxiety:  0  Nausea:  0  Depression:  4  Anorexia:  0  Fatigue:  6  Insomnia:  0  Restlessness:  0  Agitation:  0     CAM / Delirium:  Negative  Constipation:  Negative  Diarrhea:  Negative    Bowel Management Plan (BMP):  Yes      Performance Status:  30    Living Arrangement: Lives with cortney.    Psychosocial/Cultural: Independent in all ADLs prior to this     Spiritual:  F - Stephanie and Belief:  Mosque        Advance Care Planning   Advance Directives:   Living Will: No    LaPOST: No    Do Not Resuscitate Status: No    Medical Power of  : Yes    Agent's Name:  Nina Romero   Agent's Contact Number:  [phone]    Decision Making:  Patient answered questions         Significant Labs: All pertinent labs within the past 24 hours have been reviewed.  CBC:   Recent Labs   Lab 02/12/22  0547   WBC 13.85*   HGB 9.8*   HCT 29.8*   MCV 82        BMP:  Recent Labs   Lab 02/12/22  0547   *      K 3.6   CL 92*   CO2 30*   BUN 81*   CREATININE 2.1*   CALCIUM 8.3*   MG 2.5     LFT:  Lab Results   Component Value Date    AST 10 02/12/2022    AST 10 02/12/2022    ALKPHOS 107 02/12/2022    ALKPHOS 105 02/12/2022    BILITOT 2.5 (H) 02/12/2022    BILITOT 2.5 (H) 02/12/2022     Albumin:   Albumin   Date Value Ref Range Status   02/12/2022 2.8 (L) 3.5 - 5.2 g/dL Final   02/12/2022 2.8 (L) 3.5 - 5.2 g/dL Final     Protein:   Total Protein   Date Value Ref Range Status   02/12/2022 6.6 6.0 - 8.4 g/dL Final   02/12/2022 6.5 6.0 - 8.4 g/dL Final     Lactic acid:   Lab Results   Component Value Date    LACTATE 1.7 02/03/2022       Significant Imaging: I have reviewed all pertinent imaging results/findings within the past 24 hours.      > 50% of 75 min visit spent in chart review, face to face discussion of goals of care,  symptom assessment, coordination of care and emotional support.    Additional 18 minutes spent discussing ACP    Sandra Burton MD  Palliative Medicine  [facility] - Cardiac Intensive Care

## 2022-02-12 NOTE — PLAN OF CARE
CMICU DAILY GOALS       A: Awake    RASS: Goal -    Actual -     Restraint necessity:    B: Breathe   SBT: Not intubated   C: Coordinate A & B, analgesics/sedatives   Pain: managed    SAT: Not intubated  D: Delirium   CAM-ICU: Overall CAM-ICU: Negative  E: Early(intubated/ Progressive (non-intubated) Mobility   MOVE Screen: Fail   Activity: Activity Management: Rolling - L1  FAS: Feeding/Nutrition   Diet order: Diet/Nutrition Received: no added salt,2 gram sodium,low saturated fat/low cholesterol,supplemental food,supplemental drink,    T: Thrombus   DVT prophylaxis: VTE Required Core Measure: Pharmacological prophylaxis initiated/maintained  H: HOB Elevation   Head of Bed (HOB) Positioning: HOB lowered  U: Ulcer Prophylaxis   GI: yes  G: Glucose control   managed Glycemic Management: blood glucose monitored  S: Skin   Bathing/Skin Care: linen changed  Device Skin Pressure Protection: absorbent pad utilized/changed,adhesive use limited,positioning supports utilized,pressure points protected,skin-to-device areas padded,skin-to-skin areas padded  Pressure Reduction Devices: feet on footrest/footstool,foam padding utilized,heel offloading device utilized,positioning supports utilized,pressure-redistributing mattress utilized,specialty bed utilized  Pressure Reduction Techniques: frequent weight shift encouraged,heels elevated off bed,positioned off wounds,pressure points protected,weight shift assistance provided  Skin Protection: adhesive use limited,incontinence pads utilized,protective footwear used,pulse oximeter probe site changed,silicone foam dressing in place,skin sealant/moisture barrier applied,skin-to-device areas padded,skin-to-skin areas padded,transparent dressing maintained,tubing/devices free from skin contact  B: Bowel Function   constipation   I: Indwelling Catheters   Christensen necessity:      Urethral Catheter 02/04/22 0400-Reason for Continuing Urinary Catheterization: Critically ill in ICU and  requiring hourly monitoring of intake/output   CVC necessity: Yes  D: De-escalation Antibiotics   No    Family/Goals of care/Code Status   Code Status: Full Code    24H Vital Sign Range  Temp:  [95 °F (35 °C)-97.8 °F (36.6 °C)]   Pulse:  []   Resp:  [11-44]   BP: (61-96)/(39-61)   SpO2:  [89 %-100 %]      Shift Events   No acute events throughout shift    VS and assessment per flow sheet, patient progressing towards goals as tolerated, plan of care reviewed with  patient , all concerns addressed, will continue to monitor.    Aminata Iqbal

## 2022-02-12 NOTE — NURSING
..  CICU DAILY GOALS   2/12/2022    A: Awake    RASS: Goal - RASS Goal: 0-->alert and calm  Actual - RASS ([place]endiola Agitation-Sedation Scale): 0-->alert and calm   Restraint necessity:    B: Breath   SBT: Not intubated   C: Coordinate A & B, analgesics/sedatives   Pain: managed    SAT: Not intubated  D: Delirium   CAM-ICU: Overall CAM-ICU: Negative  E: Early(intubated/ Progressive (non-intubated) Mobility   MOVE Screen: Fail   Activity: Activity Management: Rolling - L1  FAS: Feeding/Nutrition   Diet order: Diet/Nutrition Received: consistent carb/diabetic diet,   Fluid restriction: Fluid Requirement: 1500mL FR  T: Thrombus   DVT prophylaxis: VTE Required Core Measure: Pharmacological prophylaxis initiated/maintained  H: HOB Elevation   Head of Bed (HOB) Positioning: HOB at 45 degrees  U: Ulcer Prophylaxis   GI: yes  G: Glucose control   managed Glycemic Management: blood glucose monitored  S: Skin   Bundle compliance: yes   Bathing/Skin Care: linen changed,electrode patches/site rotation,dressed/undressed,bath, complete,incontinence care Date: 2/12/2022  B: Bowel Function   constipation   I: Indwelling Catheters   Christensen necessity:      Urethral Catheter 02/04/22 0400-Reason for Continuing Urinary Catheterization: Critically ill in ICU and requiring hourly monitoring of intake/output   CVC necessity: Yes   IPAD offered: No  D: De-escalation Antibx   No  Plan for the day   Palliative consulted, draw blood, monitor urine output, CVPs  Family/Goals of care/Code Status   Code Status: DNR     Palliative care was consulted and code status changed during shift. Plan of care is home hospice. Blood drawn during shift. CVP 25, 17,16 and SvO2 71%. MAPs remianed greater than 50 and no need for pressor support during shift. Pt urine output tapered off during shift. Plan of care reviewed with Ambrosio Bray III and family, all concerns addressed, will continue to monitor.    Drips:  Heparin 18units/kg/hr  lasix 50mg/hr

## 2022-02-12 NOTE — ASSESSMENT & PLAN NOTE
71 year old male with end stage pulmonary HTN. Currently on nitric oxide, high flow, and lasix gtt. Patient not on pressors at the time of my visit. Palliative consulted for [facility]    Code status: DNR following our conversation 2/12     Surrogate decision maker: Nina Romero [phone]    GOC/ACP  -Met with patient at bedside. Introduced palliative medicine. Expressed my concern that he is not going to get better. Patient said he knows that he will not get better. Given where we are explored what is most important to patient. Patient shared that getting home to have a natural and peaceful death is most important. Explained that if we are able to get him home, time would be very short- hours to days. Patient expressed understanding. Discussed enrolling in hospice as a way to manage his symptoms at the end of life and ensure a natural and peaceful death. Explained that if he decompensates here prior to being able to get home we will ensure that he is comfortable here. Patient expressed understanding and agreement. In wishing for natural and peaceful death patient does not want intubation, cpr or shocks. Code status changed to DNR   Asked patient if I could call his fiancee to discuss. Patient said no. Patient said that he will talk to her. Highly encouraged patient to talk with Mercedes as soon as possible.     -Code status changed to DNR. Patient would like to get home with hospice if possible understanding that time will be very short. Discussed with primary team. If patient decompensates prior to being able to get home patient would like to transition to comfort focused care here.     Shortness of breath   -prn IV dilaudid ordered for shortness of breath. Uptitrate as needed

## 2022-02-12 NOTE — ASSESSMENT & PLAN NOTE
-NICM   -2/5/22 TTE: EF 20, LV DD, Systolic flattening of IV septum consistent with RV pressure overload, small pericardial effusion, mild LA enlargement, severe RV enlargement with severely reduced RVSF, severe TR, PASP 73, CVP 15, LVIDd 7.7  -12/16/22 TTE: LV normal in size with moderately decreased systolic function, EF 35, Moderate RV enlargement with hypertrophy and moderately reduced RVSF, grade II DD, Mod-severe TR, Mod MR, trivial pericardial effusion, PASP 87, CVP 15, LVIDd 5.62  -11/28/2018 RHC: RA: 14/ 15/ 11 RV: 112/ -4 PA: 117/ 31/ 59 PWP: 30/ 25/ 20, CO 5.48, CI 2.53, O2 sat: SVC 73, PA 64%, , SVR 1357 (on adempas)  -Home Diuretic Regimen: Bumex 2mg daily  -Hypervolemic on exam today. ScVO2 69, CVP 14 and net negative: -4.3L. Continue Diuril 500mg IVP BID, lasix drip 50 mg/hr  -Emmy at 20ppm  -GDMT: Losartan 25mg daily (holding)  -2g Na dietary restriction, 1500 mL fluid restriction, strict I/Os  -Continue aggressive diuresis for goal CVP <10 then plan for RHC.   -Will consult palliative care to discuss GoC and start end of life discussion as there is very limited treatment modalities for this pt.  Pt in amenable to talk with palliative care for discharge purposes.

## 2022-02-12 NOTE — SUBJECTIVE & OBJECTIVE
Past Medical History:   Diagnosis Date    Anemia     Atrial fibrillation     Diabetes mellitus     Heart failure     Hyperlipidemia     Hypertension     PTSD (post-traumatic stress disorder)     Pulmonary hyperinflation     Urinary incontinence        Past Surgical History:   Procedure Laterality Date    CARDIAC CATHETERIZATION      COLONOSCOPY N/A 5/1/2018    Procedure: COLONOSCOPY;  Surgeon: Bubba Navarro MD;  Location: [facility] ENDO;  Service: Endoscopy;  Laterality: N/A;  confirmed appt 4/24/18    RIGHT HEART CATHETERIZATION Right 11/28/2018    Procedure: INSERTION, CATHETER, RIGHT HEART;  Surgeon: Chelsea Arceo MD;  Location: [facility] CATH LAB;  Service: Cardiology;  Laterality: Right;       Review of patient's allergies indicates:  No Known Allergies    Medications:  Continuous Infusions:   sodium chloride 0.9% 5 mL/hr at 02/12/22 1400    furosemide (LASIX) 10 mg/mL infusion (non-titrating) 50 mg/hr (02/12/22 1400)    heparin (porcine) in D5W 18 Units/kg/hr (02/12/22 1400)    nitric oxide gas      vasopressin       Scheduled Meds:   amiodarone  400 mg Oral BID    chlorothiazide (DIURIL) IVPB  500 mg Intravenous BID    ferrous sulfate  1 tablet Oral Daily    ipratropium  2 spray Each Nostril QID    midodrine  10 mg Oral TID    pantoprazole  40 mg Oral Daily    polyethylene glycol  17 g Oral Daily    potassium chloride in water  40 mEq Intravenous BID    riociguat  2.5 mg Oral TID    selexipag  1,600 mcg Oral BID    senna-docusate 8.6-50 mg  2 tablet Oral BID    simethicone  1 tablet Oral Q6H     PRN Meds:sodium chloride 0.9%, dextrose 10%, dextrose 10%, glucagon (human recombinant), glucose, glucose, guaiFENesin, HYDROmorphone, insulin aspart U-100, melatonin, ondansetron, sodium chloride 0.9%    Family History     Problem Relation (Age of Onset)    Asthma Mother    Cancer Father    Cirrhosis Paternal Grandfather    Colon cancer Father    Diabetes Mother, Paternal Grandmother     Heart disease Mother    Hypertension Mother    Kidney disease Maternal Grandmother        Tobacco Use    Smoking status: Never Smoker    Smokeless tobacco: Never Used   Substance and Sexual Activity    Alcohol use: Yes     Alcohol/week: 2.0 - 3.0 standard drinks     Types: 2 - 3 Shots of liquor per week     Comment: maybe twice a month     Drug use: No    Sexual activity: Yes     Partners: Female       Review of Systems   Constitutional: Positive for activity change and fatigue.   HENT: Negative.    Respiratory: Positive for shortness of breath.    Cardiovascular: Positive for leg swelling.   Gastrointestinal: Positive for abdominal distention.   Endocrine: Negative.    Musculoskeletal: Negative.    Skin: Negative.    Neurological: Positive for weakness.   Psychiatric/Behavioral: Negative.      Objective:     Vital Signs (Most Recent):  Temp: 97.6 °F (36.4 °C) (02/12/22 1100)  Pulse: 98 (02/12/22 1400)  Resp: (!) 25 (02/12/22 1400)  BP: (!) 83/51 (02/12/22 1400)  SpO2: (!) 94 % (02/12/22 1400) Vital Signs (24h Range):  Temp:  [95 °F (35 °C)-97.8 °F (36.6 °C)] 97.6 °F (36.4 °C)  Pulse:  [] 98  Resp:  [11-25] 25  SpO2:  [89 %-100 %] 94 %  BP: (61-97)/(43-59) 83/51     Weight: 84.9 kg (187 lb 2.7 oz)  Body mass index is 24.69 kg/m².    Physical Exam  Vitals and nursing note reviewed.   Constitutional:       General: He is not in acute distress.     Appearance: He is ill-appearing.   HENT:      Head: Normocephalic.      Right Ear: External ear normal.      Left Ear: External ear normal.      Nose: Nose normal.      Mouth/Throat:      Mouth: Mucous membranes are dry.   Eyes:      Pupils: Pupils are equal, round, and reactive to light.   Cardiovascular:      Rate and Rhythm: Normal rate.   Pulmonary:      Effort: No respiratory distress.   Abdominal:      General: There is distension.   Musculoskeletal:         General: Normal range of motion.      Cervical back: Normal range of motion.   Neurological:       Mental Status: He is alert and oriented to person, place, and time.      Motor: Weakness present.   Psychiatric:         Cognition and Memory: Cognition is impaired. Memory is impaired.         Review of Symptoms    Symptom Assessment (ESAS 0-10 Scale)  Pain:  0  Dyspnea:  2  Anxiety:  0  Nausea:  0  Depression:  4  Anorexia:  0  Fatigue:  6  Insomnia:  0  Restlessness:  0  Agitation:  0     CAM / Delirium:  Negative  Constipation:  Negative  Diarrhea:  Negative    Bowel Management Plan (BMP):  Yes      Performance Status:  30    Living Arrangement: Lives with cortney.    Psychosocial/Cultural: Independent in all ADLs prior to this     Spiritual:  F - Stephanie and Belief:  Sabianism        Advance Care Planning   Advance Directives:   Living Will: No    LaPOST: No    Do Not Resuscitate Status: No    Medical Power of : Yes    Agent's Name:  Nina Romero   Agent's Contact Number:  [phone]    Decision Making:  Patient answered questions         Significant Labs: All pertinent labs within the past 24 hours have been reviewed.  CBC:   Recent Labs   Lab 02/12/22  0547   WBC 13.85*   HGB 9.8*   HCT 29.8*   MCV 82        BMP:  Recent Labs   Lab 02/12/22  0547   *      K 3.6   CL 92*   CO2 30*   BUN 81*   CREATININE 2.1*   CALCIUM 8.3*   MG 2.5     LFT:  Lab Results   Component Value Date    AST 10 02/12/2022    AST 10 02/12/2022    ALKPHOS 107 02/12/2022    ALKPHOS 105 02/12/2022    BILITOT 2.5 (H) 02/12/2022    BILITOT 2.5 (H) 02/12/2022     Albumin:   Albumin   Date Value Ref Range Status   02/12/2022 2.8 (L) 3.5 - 5.2 g/dL Final   02/12/2022 2.8 (L) 3.5 - 5.2 g/dL Final     Protein:   Total Protein   Date Value Ref Range Status   02/12/2022 6.6 6.0 - 8.4 g/dL Final   02/12/2022 6.5 6.0 - 8.4 g/dL Final     Lactic acid:   Lab Results   Component Value Date    LACTATE 1.7 02/03/2022       Significant Imaging: I have reviewed all pertinent imaging results/findings within the past 24  hours.

## 2022-02-13 VITALS
BODY MASS INDEX: 24.81 KG/M2 | WEIGHT: 187.19 LBS | SYSTOLIC BLOOD PRESSURE: 77 MMHG | TEMPERATURE: 97 F | DIASTOLIC BLOOD PRESSURE: 49 MMHG | HEART RATE: 97 BPM | HEIGHT: 73 IN | RESPIRATION RATE: 18 BRPM | OXYGEN SATURATION: 91 %

## 2022-02-13 LAB
ALBUMIN SERPL BCP-MCNC: 2.8 G/DL (ref 3.5–5.2)
ALLENS TEST: ABNORMAL
ALP SERPL-CCNC: 106 U/L (ref 55–135)
ALP SERPL-CCNC: 107 U/L (ref 55–135)
ALP SERPL-CCNC: 107 U/L (ref 55–135)
ALT SERPL W/O P-5'-P-CCNC: 5 U/L (ref 10–44)
ALT SERPL W/O P-5'-P-CCNC: 7 U/L (ref 10–44)
ALT SERPL W/O P-5'-P-CCNC: 7 U/L (ref 10–44)
ANION GAP SERPL CALC-SCNC: 16 MMOL/L (ref 8–16)
APTT BLDCRRT: 44.1 SEC (ref 21–32)
AST SERPL-CCNC: 10 U/L (ref 10–40)
BASOPHILS # BLD AUTO: 0.01 K/UL (ref 0–0.2)
BASOPHILS NFR BLD: 0.1 % (ref 0–1.9)
BILIRUB DIRECT SERPL-MCNC: 1.6 MG/DL (ref 0.1–0.3)
BILIRUB DIRECT SERPL-MCNC: 1.6 MG/DL (ref 0.1–0.3)
BILIRUB SERPL-MCNC: 2.5 MG/DL (ref 0.1–1)
BUN SERPL-MCNC: 97 MG/DL (ref 8–23)
CALCIUM SERPL-MCNC: 8 MG/DL (ref 8.7–10.5)
CHLORIDE SERPL-SCNC: 92 MMOL/L (ref 95–110)
CO2 SERPL-SCNC: 28 MMOL/L (ref 23–29)
CREAT SERPL-MCNC: 2.7 MG/DL (ref 0.5–1.4)
DELSYS: ABNORMAL
DIFFERENTIAL METHOD: ABNORMAL
EOSINOPHIL # BLD AUTO: 0 K/UL (ref 0–0.5)
EOSINOPHIL NFR BLD: 0 % (ref 0–8)
ERYTHROCYTE [DISTWIDTH] IN BLOOD BY AUTOMATED COUNT: 21.4 % (ref 11.5–14.5)
EST. GFR  (AFRICAN AMERICAN): 26.2 ML/MIN/1.73 M^2
EST. GFR  (NON AFRICAN AMERICAN): 22.7 ML/MIN/1.73 M^2
GLUCOSE SERPL-MCNC: 127 MG/DL (ref 70–110)
HCO3 UR-SCNC: 29.9 MMOL/L (ref 24–28)
HCT VFR BLD AUTO: 29.3 % (ref 40–54)
HGB BLD-MCNC: 9.6 G/DL (ref 14–18)
IMM GRANULOCYTES # BLD AUTO: 0.13 K/UL (ref 0–0.04)
IMM GRANULOCYTES NFR BLD AUTO: 1 % (ref 0–0.5)
LYMPHOCYTES # BLD AUTO: 0.2 K/UL (ref 1–4.8)
LYMPHOCYTES NFR BLD: 1.7 % (ref 18–48)
MAGNESIUM SERPL-MCNC: 2.5 MG/DL (ref 1.6–2.6)
MCH RBC QN AUTO: 27 PG (ref 27–31)
MCHC RBC AUTO-ENTMCNC: 32.8 G/DL (ref 32–36)
MCV RBC AUTO: 83 FL (ref 82–98)
METHEMOGLOBIN: 2.2 % (ref 0–3)
MONOCYTES # BLD AUTO: 0.8 K/UL (ref 0.3–1)
MONOCYTES NFR BLD: 6.4 % (ref 4–15)
NEUTROPHILS # BLD AUTO: 11.5 K/UL (ref 1.8–7.7)
NEUTROPHILS NFR BLD: 90.8 % (ref 38–73)
NRBC BLD-RTO: 0 /100 WBC
PCO2 BLDA: 41 MMHG (ref 35–45)
PH SMN: 7.47 [PH] (ref 7.35–7.45)
PLATELET # BLD AUTO: 220 K/UL (ref 150–450)
PMV BLD AUTO: 11.3 FL (ref 9.2–12.9)
PO2 BLDA: 35 MMHG (ref 40–60)
POC BE: 6 MMOL/L
POC SATURATED O2: 71 % (ref 95–100)
POC TCO2: 31 MMOL/L (ref 24–29)
POTASSIUM SERPL-SCNC: 4.4 MMOL/L (ref 3.5–5.1)
PROT SERPL-MCNC: 6.5 G/DL (ref 6–8.4)
PROT SERPL-MCNC: 6.5 G/DL (ref 6–8.4)
PROT SERPL-MCNC: 6.6 G/DL (ref 6–8.4)
RBC # BLD AUTO: 3.55 M/UL (ref 4.6–6.2)
SAMPLE: ABNORMAL
SITE: ABNORMAL
SODIUM SERPL-SCNC: 136 MMOL/L (ref 136–145)
WBC # BLD AUTO: 12.66 K/UL (ref 3.9–12.7)

## 2022-02-13 PROCEDURE — 80076 HEPATIC FUNCTION PANEL: CPT | Performed by: STUDENT IN AN ORGANIZED HEALTH CARE EDUCATION/TRAINING PROGRAM

## 2022-02-13 PROCEDURE — 99238 PR HOSPITAL DISCHARGE DAY,<30 MIN: ICD-10-PCS | Mod: ,,, | Performed by: INTERNAL MEDICINE

## 2022-02-13 PROCEDURE — 85025 COMPLETE CBC W/AUTO DIFF WBC: CPT | Performed by: INTERNAL MEDICINE

## 2022-02-13 PROCEDURE — 83050 HGB METHEMOGLOBIN QUAN: CPT

## 2022-02-13 PROCEDURE — 94761 N-INVAS EAR/PLS OXIMETRY MLT: CPT

## 2022-02-13 PROCEDURE — 80053 COMPREHEN METABOLIC PANEL: CPT | Performed by: INTERNAL MEDICINE

## 2022-02-13 PROCEDURE — 85730 THROMBOPLASTIN TIME PARTIAL: CPT | Performed by: INTERNAL MEDICINE

## 2022-02-13 PROCEDURE — 63600175 PHARM REV CODE 636 W HCPCS: Performed by: PHYSICIAN ASSISTANT

## 2022-02-13 PROCEDURE — 27000221 HC OXYGEN, UP TO 24 HOURS

## 2022-02-13 PROCEDURE — 63600367 HC NITRIC OXIDE PER HOUR

## 2022-02-13 PROCEDURE — 83735 ASSAY OF MAGNESIUM: CPT | Performed by: INTERNAL MEDICINE

## 2022-02-13 PROCEDURE — 27100171 HC OXYGEN HIGH FLOW UP TO 24 HOURS

## 2022-02-13 PROCEDURE — 99900035 HC TECH TIME PER 15 MIN (STAT)

## 2022-02-13 PROCEDURE — 99238 HOSP IP/OBS DSCHRG MGMT 30/<: CPT | Mod: ,,, | Performed by: INTERNAL MEDICINE

## 2022-02-13 PROCEDURE — 63600175 PHARM REV CODE 636 W HCPCS: Performed by: STUDENT IN AN ORGANIZED HEALTH CARE EDUCATION/TRAINING PROGRAM

## 2022-02-13 PROCEDURE — 25000003 PHARM REV CODE 250: Performed by: STUDENT IN AN ORGANIZED HEALTH CARE EDUCATION/TRAINING PROGRAM

## 2022-02-13 RX ORDER — AMIODARONE HYDROCHLORIDE 400 MG/1
400 TABLET ORAL 2 TIMES DAILY
Qty: 60 TABLET | Refills: 11 | Status: SHIPPED | OUTPATIENT
Start: 2022-02-13 | End: 2023-02-13

## 2022-02-13 RX ADMIN — FUROSEMIDE 50 MG/HR: 10 INJECTION, SOLUTION INTRAMUSCULAR; INTRAVENOUS at 05:02

## 2022-02-13 RX ADMIN — HEPARIN SODIUM 18 UNITS/KG/HR: 5000 INJECTION INTRAVENOUS; SUBCUTANEOUS at 12:02

## 2022-02-13 NOTE — PROGRESS NOTES
On Call Social Work Note    KRISTI notified this morning by Dr. Murillo that patient will discharge home today. Patient would like to discharge home with home hospice. KRISTI spoke with pt's significant other Nina Romero ([phone]) as SW is unable to reach pt at this time. Mercedes provides care for the patient in the home. Mercedes is understanding with the pt discharging with home hospice and she is in agreement. Pt and pt's caregiver does not have a preference of home hospice agency. KRISTI faxed referral to [facility].     KRISTI communicated the pt's discharge with admissions coordinator Ventura with [facility] (ph:[phone]).     Ventura reports that they are able to accept the patient and can admit him in the home this afternoon once he is discharged.     Ventura spoke with the patient's significant other Mercedes and confirmed what equipment the patient has in the home. Patient has all of the DME he needs in the home, however, pt only has a small oxygen concentrator that can not accommodate the amount of O2 he is currently on. Ventura put in an express order for 10 L concentrators and a Y piece to be delivered to the pt's home prior to his discharge. Equipment delivered at 5:40pm and set up in the home.     Patient's nurse to call report to hospice nurse Molly Meyer with [facility] at [phone].     Pt will discharge home via ambulance. PFC order for ambulance  put in at 5:54pm. Pt's nurse Mady notified.     Pt's nurse to call Ventura with [facility] when the pt is leaving so the hospice nurse can meet the patient at his home upon arrival.     Pt's wife notified of this information and expressed understanding. No other needs identified. KRISTI remains available.

## 2022-02-13 NOTE — DISCHARGE SUMMARY
[facility] - Cardiac Intensive Care  Heart Transplant  Discharge Summary      Patient Name: Ambrosio Bray III  MRN: [id number]  Admission Date: 2/3/2022  Hospital Length of Stay: 10 days  Discharge Date and Time: 02/13/2022 1:00 PM  Attending Physician: Roland George MD   Discharging Provider: Prasanna Murillo MD  Primary Care Provider: Luciana Cisse MD     HPI:   71 y.o M with history of HF, PH (diagnosed in 2018) on [facility], Uptravi, CTEPH on home oxygen 2L [place], Atrial Flutter s/p ablation 2020, DM, BPH, HLD, Anemia, PTSD, Pulmonary hyperinflation presents to ED with respiratory distress and worsening shortness of breath for past 4 days. Patient reports he was in his usual state of health until 4 days ago when he developed SOB while ambulating to use restroom. Patient reports he had removed his oxygen to use restroom and while ambulating to restroom he became lightheaded, dizzy and in respiratory distress and reports having a fall and syncopized. He reports he woke up after 30 seconds, did not hit his head. Patient reports since the past four days he has also been having increasing loose BM more than his usual, reports feeling dehydrated with increased fluid intake. Patient reports he recently followed up with pulmonologist in clinic and his home Bumex 2mg MWF was increased to daily and was prescribed home oxygen. Patient reports that has helped relieve his SOB. Patient denies chest pain, further episodes of syncope, change in vision, slurred speech, palpitations, headache, change in urinary habits, cough, fever.     Home Medications  - Bumex 2mg daily  - Adempas 2.5mg TID  - Uptravi 1600 mcg BID (to start on 3/21/22)  - Xarelto 20mg daily  - Losartan 25mg daily  - Flomax 0.4mg  - Lipitor 40mg daily  - Atrovent  - Ferrous Sulfate     ED Course      Vitals:     02/03/22 1332   BP: 98/65   Pulse: 104   Resp: (!) 23   Temp:       Pertinent Labs  CBC: WBC 8.93, Hbg 10.3, Hct 32.7, Plt 270  BMP: Na 139, K 3.7,  Cl 104, CO2 25, BUN 16, Cr. 0.8,   LFT: TP 7.2, Alb, 3.6, TB 2.9, AST 11, ALT 8  Free T4: 0.88  Lactate 1.7, Troponin 0.012,   EKG: Afib with RVR at a rate of 173, RBB  CXR: bilateral opacities, pulmonary congestion  ED Management  -Lopressor Tartrate 50mg q6h  -Diltiazem 25 mg IVP  -Lasix 80mg IVP      * No surgery found *     Hospital Course: Pt was admitted to the CICU and started on home medications.  Also started on lasix gtt and Emmy.  He did not diurese well and UOP continued to decrease.  Given his poor prognosis palliative care was c/s and pt was made DNR and wanted to go home with home hospice.  Home hospice orders were placed and pt subsequently discharged home with hospice.        Goals of Care Treatment Preferences:  Code Status: DNR    Health care agent: [facility] agent number: [phone]                   Consults (From admission, onward)        Status Ordering Provider     Inpatient consult to Palliative Care  Once        Provider:  (Not yet assigned)    CODIE Mccloud          Significant Diagnostic Studies: Labs:   BMP:   Recent Labs   Lab 02/12/22  0007 02/12/22  0007 02/12/22  0547 02/12/22  1557 02/13/22  0334   *  --  119*  --  127*     --  137  --  136   K 3.9   < > 3.6 4.2 4.4   CL 93*  --  92*  --  92*   CO2 31*  --  30*  --  28   BUN 77*  --  81*  --  97*   CREATININE 2.0*  --  2.1*  --  2.7*   CALCIUM 8.4*  --  8.3*  --  8.0*   MG 2.4  --  2.5  --  2.5    < > = values in this interval not displayed.       Pending Diagnostic Studies:     Procedure Component Value Units Date/Time    APTT [[id number]] Collected: 02/11/22 0637    Order Status: Sent Lab Status: In process Updated: 02/11/22 0637    Specimen: Blood     CBC auto differential [[id number]] Collected: 02/10/22 0417    Order Status: Sent Lab Status: In process Updated: 02/10/22 0417    Specimen: Blood     Urinalysis, Reflex to Urine Culture Urine, Clean Catch [[id number]]  Collected: 02/03/22 1821    Order Status: Sent Lab Status: In process Updated: 02/03/22 1822    Specimen: Urine         Final Active Diagnoses:    Diagnosis Date Noted POA    PRINCIPAL PROBLEM:  Chronic combined systolic and diastolic congestive heart failure [I50.42] 09/27/2014 Yes     Chronic    Palliative care encounter [Z51.5] 02/12/2022 Not Applicable    Non-pressure ulcer of right lower extremity, with fat layer exposed [L97.912] 02/10/2022 Yes    Ascites [R18.8] 02/08/2022 Yes    Acute decompensated heart failure [I50.9] 02/03/2022 Yes    Schizoaffective disorder [F25.9] 04/22/2021 Yes    Benign non-nodular prostatic hyperplasia without lower urinary tract symptoms [N40.0] 04/08/2020 Yes    Iron deficiency anemia due to chronic blood loss [D50.0] 04/18/2019 Yes    Typical atrial flutter [I48.3] 01/11/2018 Yes    CTEPH (chronic thromboembolic pulmonary hypertension) [I27.24] 12/16/2016 Yes    Pulmonary HTN [I27.20] 11/06/2014 Yes    Atrial Flutter/ fibrillation [I48.91] 10/02/2014 Yes    Type 2 diabetes mellitus with microalbuminuria, without long-term current use of insulin [E11.29, R80.9] 09/27/2014 Yes      Problems Resolved During this Admission:      Discharged Condition: stable    Disposition: Hospice/Home    Follow Up:    Patient Instructions:   No discharge procedures on file.  Medications:  Reconciled Home Medications:      Medication List      START taking these medications    amiodarone 400 MG tablet  Commonly known as: PACERONE  Take 1 tablet (400 mg total) by mouth 2 (two) times daily.        CONTINUE taking these medications    bumetanide 2 MG tablet  Commonly known as: BUMEX  Take 1 tablet (2 mg total) by mouth once daily.     ferrous sulfate 325 (65 FE) MG EC tablet  Take 1 tablet (325 mg total) by mouth 2 (two) times daily with meals.     ipratropium 42 mcg (0.06 %) nasal spray  Commonly known as: ATROVENT  2 sprays by Nasal route 4 (four) times daily.     losartan 25 MG  tablet  Commonly known as: COZAAR  Take 25 mg by mouth once daily.     mometasone 0.1% 0.1 % cream  Commonly known as: ELOCON  Apply topically once daily.     potassium chloride SA 15 MEQ tablet  Commonly known as: KLOR-CON M15  Take 1 tablet (15 mEq total) by mouth 2 (two) times daily.     riociguat 2.5 mg tablet  Commonly known as: ADEMPAS  Take 1 tablet (2.5 mg total) by mouth 3 (three) times daily.     rivaroxaban 20 mg Tab  Commonly known as: XARELTO  Take 1 tablet (20 mg total) by mouth daily with dinner or evening meal.     tamsulosin 0.4 mg Cap  Commonly known as: FLOMAX  Take 1 capsule by mouth once daily     UPTRAVI 1,600 mcg Tab  Generic drug: selexipag  Take 1 tablet by mouth 2 (two) times daily.            Prasanna Murillo MD  Heart Transplant  [facility] - Card[facility]

## 2022-02-13 NOTE — PLAN OF CARE
[facility]  Department of Hospital Medicine  [address] [place] [address]  [phone] after hours  [phone] fax    HOSPICE  ORDERS    02/13/2022    Admit to Hospice:  Home Service Inpatient Service     Diagnoses:   Active Hospital Problems    Diagnosis  POA    *Chronic combined systolic and diastolic congestive heart failure [I50.42]  Yes     Chronic    Palliative care encounter [Z51.5]  Not Applicable    Non-pressure ulcer of right lower extremity, with fat layer exposed [L97.912]  Yes    Ascites [R18.8]  Yes    Acute decompensated heart failure [I50.9]  Yes    Schizoaffective disorder [F25.9]  Yes    Benign non-nodular prostatic hyperplasia without lower urinary tract symptoms [N40.0]  Yes     - following with urology as outpatient  - 1/20/22 renal sonogram did not show any obstruction or hydronephrosis  - s/p toscano placement  - continue home flomax      Iron deficiency anemia due to chronic blood loss [D50.0]  Yes     New iron def anemia- etiology unclear. Patient will need egd, colonoscopy to evaluate further.       Typical atrial flutter [I48.3]  Yes    CTEPH (chronic thromboembolic pulmonary hypertension) [I27.24]  Yes     Patient with cteph, doing well, but concern by me for increased pressures  RHC confirmed  Add uptravi to riociguat  And xarelto  Continue hctz      Pulmonary HTN [I27.20]  Yes     Doing well on uptravi and adempas  Echo with decreasing pap from RHC  RV function improving  bnp improved  Continue current therapy  Follow up in 3 months      Atrial Flutter/ fibrillation [I48.91]  Yes    Type 2 diabetes mellitus with microalbuminuria, without long-term current use of insulin [E11.29, R80.9]  Yes      Resolved Hospital Problems   No resolved problems to display.       Hospice Qualifying Diagnoses:        Patient has a life expectancy < 6 months due to:  1) Primary Hospice Diagnosis: Systolic heart failure  2) Comorbid Conditions  Contributing to Decline: pulmonary hypertension, chronic respiratory failure, chronic kidney disease    Vital Signs: Routine per Hospice Protocol.    Code Status: DNR    Allergies: Review of patient's allergies indicates:  No Known Allergies    Diet: cardiac diet     Activities: As tolerated    Nursing: Per Hospice Routine.    Colostomy Care:  Empty bag every shift and prn                                             Change and clean site every 48 hours    PEG Care:  Clean site daily.  Monitor skin integrity.    Christensen Care: Empty Christensen bag Q shift and PRN.  Change Christensen every month.    Routine Skin for Bedridden Patients: Apply moisture barrier cream to all skin folds and   wet areas in perineal area daily and after baths and all bowel movements.    PICC Care:   Scrub the Hub: Prior to accessing the line, always perform a 30 second alcohol scrub  Each lumen of the central line is to be flushed at least daily with 10 mL Normal Saline and 3 mL Heparin flush (100 units/mL)  Skilled Nurse (SN) may draw blood from IV access  Date of removal: 2/13/2022    Central :   - Sterile dressing changes are done weekly and as needed.   - Use chlor-hexadine scrub to cleanse site, apply Biopatch to insertion site,       apply securement device dressing   - Posi-flow caps are changed weekly and after EVERY lab draw.   - If sterile gauze is under dressing to control oozing,                 dressing change must be performed every 24 hours until gauze is not needed.  - Date of removal: 2/13/2022    Oxygen: 11L [facility]NC    Other Miscellaneous Care:         Medications:        Medication List      START taking these medications    amiodarone 400 MG tablet  Commonly known as: PACERONE  Take 1 tablet (400 mg total) by mouth 2 (two) times daily.        CONTINUE taking these medications    bumetanide 2 MG tablet  Commonly known as: BUMEX  Take 1 tablet (2 mg total) by mouth once daily.     ferrous sulfate 325 (65 FE) MG EC  tablet  Take 1 tablet (325 mg total) by mouth 2 (two) times daily with meals.     ipratropium 42 mcg (0.06 %) nasal spray  Commonly known as: ATROVENT  2 sprays by Nasal route 4 (four) times daily.     losartan 25 MG tablet  Commonly known as: COZAAR  Take 25 mg by mouth once daily.     mometasone 0.1% 0.1 % cream  Commonly known as: ELOCON  Apply topically once daily.     potassium chloride SA 15 MEQ tablet  Commonly known as: KLOR-CON M15  Take 1 tablet (15 mEq total) by mouth 2 (two) times daily.     riociguat 2.5 mg tablet  Commonly known as: ADEMPAS  Take 1 tablet (2.5 mg total) by mouth 3 (three) times daily.     rivaroxaban 20 mg Tab  Commonly known as: XARELTO  Take 1 tablet (20 mg total) by mouth daily with dinner or evening meal.     tamsulosin 0.4 mg Cap  Commonly known as: FLOMAX  Take 1 capsule by mouth once daily     UPTRAVI 1,600 mcg Tab  Generic drug: selexipag  Take 1 tablet by mouth 2 (two) times daily.              DIABETES CARE:   Nurse to perform and educate diabetic management with blood glucose monitoring:      Fingerstick blood sugar a.m. and p.m.     Fingerstick blood sugar AC and HS     Fingerstick blood sugar every 6 hours if patient is unable to eat    Report CBG < 60 or > 350 to physician.         Insulin Sliding Scale         Glucose  Novolog Insulin Subcutaneous        0 - 60   Orange juice or glucose tablet      No insulin   201-250  2 units   251-300  4 units   301-350  6 units   351-400  8 units   >400   10 units then call physician      Future Orders:  Hospice Medical Director may dictate new orders for comfortable care measures & sign death certificate.        _________________________________  Prasanna Murillo MD  02/13/2022

## 2022-02-13 NOTE — NURSING
"0815: pt withdrawn. Music playing per pt jonellet. Pt refusing medications and accuchecks. Pt states "I just want to go home." MD informed.     1100: pt refused accuchecks and CVP. Dr. Adkins informed. Per MD duran.     1300: spoke with patient about plan of care and transporting pt home on home hospice today. Pt understands plan of care and no further questions form patient at this time. Will continue to monitor.     1643: report given to [facility] hospice nurse from [facility].     1700: pt updated with plan. No further questions from patient. AVS printed and placed with patient belongings. Will continue to monitor.   "

## 2022-02-13 NOTE — CARE UPDATE
Hemodynamics Care Update Note    On Lasix 50, Emmy 20.  SVO2: 71  CVP: 13  CO: 8.1  CI: 3.9  SVR: 515     MAP 65  I/O +915 mL  (calculated using oxygen consumption constant of 3.5)       Plan:  - Re-bolus with lasix 160 mg IV x1 and Diuril 1 gm IV x1  - No other changes to regimen overnight    Case discussed with Dr Roland Melgoza MD.    Austin Hebert MD PGY4  Cardiovascular Medicine Fellow  [facility]  Pager: [phone]

## 2022-02-14 NOTE — NURSING
EMS at bs to tx pt to home. Pt a/aox4, no s/s of acute distress. CVL and PIV DC'd pre hospital protocol. No bleeding noted. Phone sent with pt in pt belongings bag along with . Bag also contained a small red bag as well. Notified Molly Meyer RN with hospice that pt was in route to home. Verbalized understanding.

## 2022-02-22 NOTE — PT/OT/SLP DISCHARGE
Occupational Therapy Discharge Summary    Ambrosio Bray III  MRN: [id number]   Principal Problem: Chronic combined systolic and diastolic congestive heart failure      Patient Discharged from acute Occupational Therapy on 2/22/2022  .  Please refer to prior OT note dated 2/11/22 for functional status.    Assessment:      Patient appropriate for care in another setting.    Objective:     GOALS:   Multidisciplinary Problems     Occupational Therapy Goals        Problem: Occupational Therapy Goal    Goal Priority Disciplines Outcome Interventions   Occupational Therapy Goal     OT, PT/OT Ongoing, Progressing    Description: Goals to be met by: 12 days 2/25/22     Patient will increase functional independence with ADLs by performing:    Pt to complete UE dressing with set-up  Pt to complete LE dressing with MOD A   Pt to complete g/h skills seated with set-up  Pt to complete t/f to BSC with MIN A   Pt to demo Fair+ sitting balance to allow for increased engagement with ADL and transfer training.  Pt to demo independence with UE HEP to allow for increase functional ROM and strength.                    Reasons for Discontinuation of Therapy Services  Transfer to alternate level of care.      Plan:     Patient Discharged to: Home with hospice.     2/22/2022

## 2022-02-28 ENCOUNTER — PES CALL (OUTPATIENT)
Dept: ADMINISTRATIVE | Facility: CLINIC | Age: 72
End: 2022-02-28
Payer: MEDICARE

## 2022-04-05 DIAGNOSIS — Z71.89 COMPLEX CARE COORDINATION: ICD-10-CM

## 2023-03-15 NOTE — NURSING TRANSFER
Nursing Transfer Note      2/2/2018     Transfer To: 315    Transfer via stretcher    Transfer with cardiac monitoring    Transported by RN    Medicines sent: no    Chart send with patient: Yes    Notified: friend    Patient reassessed at: 2/2/18@1000hrs  
yes

## 2023-07-07 NOTE — PATIENT INSTRUCTIONS
Christensen Catheter Care    A Christensen catheter is a rubber tube that is placed through the urethra (opening where urine comes out) and into the bladder. This helps drain urine from the bladder. There is a small balloon on the end of the tube that is inflated after insertion. This keeps the catheter from sliding out of the bladder.  A Christensen catheter is used to treat urinary retention (unable to pass urine). It is also used when there is incontinence (loss of bladder control).  Home care  · Finish taking any prescribed antibiotic even if you are feeling better before then.  · It is important to keep bacteria from getting into the collection bag. Do not disconnect the catheter from the collection bag.  · Use a leg band to secure the drainage tube, so it does not pull on the catheter. Drain the collection bag when it becomes full using the drain spout at the bottom of the bag.  · Do not try to pull or remove your catheter. This will injure your urethra. It must be removed by your healthcare provider or nurse.  Follow-up care  Follow up with your healthcare provider as advised for repeat urine testing and catheter removal or replacement.  When to seek medical advice  Call your healthcare provider right away if any of these occur:  · Fever of 101.4ºF (38ºC) or higher, or as directed by your healthcare provider  · Bladder pain or fullness  · Abdominal swelling, nausea or vomiting, or back pain  · Blood or urine leakage around the catheter  · Bloody urine coming from the catheter (if a new symptom)  · Catheter falls out  · Catheter stops draining for 6 hours  · Weakness, dizziness, or fainting  Date Last Reviewed: 10/1/2016  © 9456-3424 [employer], [employer]. [address] [place] [address], [place] [address]. All rights reserved. This information is not intended as a substitute for professional medical care. Always follow your healthcare professional's instructions.         DC instructions

## (undated) DEVICE — CATH SWAN GANZ STND 7FR

## (undated) DEVICE — KIT MICROINTRODUCE MINI 5X10CM

## (undated) DEVICE — DRESSING TRANS 4X4 TEGADERM

## (undated) DEVICE — SHEATH INTRODUCER 7FR 11CM

## (undated) DEVICE — SEE MEDLINE ITEM 156894

## (undated) DEVICE — KIT PROBE COVER WITH GEL